# Patient Record
Sex: FEMALE | Race: WHITE | Employment: UNEMPLOYED | URBAN - METROPOLITAN AREA
[De-identification: names, ages, dates, MRNs, and addresses within clinical notes are randomized per-mention and may not be internally consistent; named-entity substitution may affect disease eponyms.]

---

## 2017-01-19 RX ORDER — BENZONATATE 100 MG/1
CAPSULE ORAL
Qty: 45 CAPSULE | Refills: 0 | Status: SHIPPED | OUTPATIENT
Start: 2017-01-19 | End: 2017-03-01 | Stop reason: SDUPTHER

## 2017-01-19 RX ORDER — DIPHENHYDRAMINE HCL 25 MG
CAPSULE ORAL
Qty: 30 CAPSULE | Refills: 0 | Status: SHIPPED | OUTPATIENT
Start: 2017-01-19 | End: 2017-01-26 | Stop reason: SDUPTHER

## 2017-01-19 RX ORDER — HYDROXYZINE HYDROCHLORIDE 10 MG/1
TABLET, FILM COATED ORAL
Qty: 60 TABLET | Refills: 0 | Status: SHIPPED | OUTPATIENT
Start: 2017-01-19 | End: 2017-03-01 | Stop reason: SDUPTHER

## 2017-01-26 ENCOUNTER — OFFICE VISIT (OUTPATIENT)
Dept: INTERNAL MEDICINE | Age: 57
End: 2017-01-26

## 2017-01-26 VITALS
BODY MASS INDEX: 22.49 KG/M2 | DIASTOLIC BLOOD PRESSURE: 76 MMHG | OXYGEN SATURATION: 94 % | HEIGHT: 65 IN | SYSTOLIC BLOOD PRESSURE: 118 MMHG | HEART RATE: 87 BPM | TEMPERATURE: 98 F | RESPIRATION RATE: 14 BRPM | WEIGHT: 135 LBS

## 2017-01-26 DIAGNOSIS — J06.9 UPPER RESPIRATORY TRACT INFECTION, UNSPECIFIED TYPE: ICD-10-CM

## 2017-01-26 DIAGNOSIS — T27.0XXA: Primary | ICD-10-CM

## 2017-01-26 DIAGNOSIS — E78.2 MIXED HYPERLIPIDEMIA: ICD-10-CM

## 2017-01-26 DIAGNOSIS — E03.9 ACQUIRED HYPOTHYROIDISM: ICD-10-CM

## 2017-01-26 PROCEDURE — 99214 OFFICE O/P EST MOD 30 MIN: CPT | Performed by: FAMILY MEDICINE

## 2017-01-26 RX ORDER — METHYLPREDNISOLONE 4 MG/1
TABLET ORAL
Qty: 1 KIT | Refills: 0 | Status: SHIPPED | OUTPATIENT
Start: 2017-01-26 | End: 2017-02-09 | Stop reason: ALTCHOICE

## 2017-01-26 RX ORDER — TRAMADOL HYDROCHLORIDE 50 MG/1
TABLET ORAL
Qty: 60 TABLET | Refills: 0 | Status: SHIPPED | OUTPATIENT
Start: 2017-01-26 | End: 2017-03-01 | Stop reason: SDUPTHER

## 2017-01-26 RX ORDER — DOXYCYCLINE HYCLATE 100 MG
100 TABLET ORAL 2 TIMES DAILY
Qty: 20 TABLET | Refills: 0 | Status: SHIPPED | OUTPATIENT
Start: 2017-01-26 | End: 2017-02-05

## 2017-02-09 ENCOUNTER — OFFICE VISIT (OUTPATIENT)
Dept: INTERNAL MEDICINE | Age: 57
End: 2017-02-09

## 2017-02-09 VITALS
WEIGHT: 133 LBS | HEART RATE: 92 BPM | OXYGEN SATURATION: 97 % | TEMPERATURE: 98.1 F | DIASTOLIC BLOOD PRESSURE: 84 MMHG | HEIGHT: 65 IN | RESPIRATION RATE: 14 BRPM | SYSTOLIC BLOOD PRESSURE: 120 MMHG | BODY MASS INDEX: 22.16 KG/M2

## 2017-02-09 DIAGNOSIS — J40 BRONCHITIS: Primary | ICD-10-CM

## 2017-02-09 PROCEDURE — 99213 OFFICE O/P EST LOW 20 MIN: CPT | Performed by: FAMILY MEDICINE

## 2017-02-09 RX ORDER — LEVOFLOXACIN 500 MG/1
500 TABLET, FILM COATED ORAL DAILY
Qty: 10 TABLET | Refills: 0 | Status: SHIPPED | OUTPATIENT
Start: 2017-02-09 | End: 2017-02-19

## 2017-03-01 RX ORDER — DIPHENHYDRAMINE HCL 25 MG
25 CAPSULE ORAL DAILY PRN
Qty: 30 CAPSULE | Refills: 0 | Status: SHIPPED | OUTPATIENT
Start: 2017-03-01 | End: 2017-04-08 | Stop reason: SDUPTHER

## 2017-03-01 RX ORDER — TRAMADOL HYDROCHLORIDE 50 MG/1
TABLET ORAL
Qty: 60 TABLET | Refills: 0 | Status: SHIPPED | OUTPATIENT
Start: 2017-03-01 | End: 2017-04-04 | Stop reason: SDUPTHER

## 2017-03-01 RX ORDER — BENZONATATE 100 MG/1
CAPSULE ORAL
Qty: 45 CAPSULE | Refills: 0 | Status: SHIPPED | OUTPATIENT
Start: 2017-03-01 | End: 2017-05-01 | Stop reason: SDUPTHER

## 2017-03-01 RX ORDER — HYDROXYZINE HYDROCHLORIDE 10 MG/1
TABLET, FILM COATED ORAL
Qty: 60 TABLET | Refills: 0 | Status: SHIPPED | OUTPATIENT
Start: 2017-03-01 | End: 2017-05-11 | Stop reason: SDUPTHER

## 2017-03-02 ENCOUNTER — NURSE ONLY (OUTPATIENT)
Dept: INTERNAL MEDICINE | Age: 57
End: 2017-03-02

## 2017-03-02 DIAGNOSIS — E03.9 ACQUIRED HYPOTHYROIDISM: Primary | ICD-10-CM

## 2017-03-02 DIAGNOSIS — E78.2 MIXED HYPERLIPIDEMIA: ICD-10-CM

## 2017-03-02 LAB
ALBUMIN SERPL-MCNC: 4.5 G/DL (ref 3.9–4.9)
ALP BLD-CCNC: 85 U/L (ref 40–130)
ALT SERPL-CCNC: 16 U/L (ref 0–33)
ANION GAP SERPL CALCULATED.3IONS-SCNC: 11 MEQ/L (ref 7–13)
AST SERPL-CCNC: 25 U/L (ref 0–35)
BILIRUB SERPL-MCNC: 0.3 MG/DL (ref 0–1.2)
BUN BLDV-MCNC: 13 MG/DL (ref 6–20)
CALCIUM SERPL-MCNC: 9.7 MG/DL (ref 8.6–10.2)
CHLORIDE BLD-SCNC: 102 MEQ/L (ref 98–107)
CHOLESTEROL, TOTAL: 306 MG/DL (ref 0–199)
CO2: 25 MEQ/L (ref 22–29)
CREAT SERPL-MCNC: 0.6 MG/DL (ref 0.5–0.9)
GFR AFRICAN AMERICAN: >60
GFR NON-AFRICAN AMERICAN: >60
GLOBULIN: 2.6 G/DL (ref 2.3–3.5)
GLUCOSE BLD-MCNC: 98 MG/DL (ref 74–109)
HDLC SERPL-MCNC: 72 MG/DL (ref 40–59)
LDL CHOLESTEROL CALCULATED: 210 MG/DL (ref 0–129)
POTASSIUM SERPL-SCNC: 4.2 MEQ/L (ref 3.5–5.1)
SODIUM BLD-SCNC: 138 MEQ/L (ref 132–144)
TOTAL PROTEIN: 7.1 G/DL (ref 6.4–8.1)
TRIGL SERPL-MCNC: 122 MG/DL (ref 0–200)
TSH SERPL DL<=0.05 MIU/L-ACNC: 1.53 UIU/ML (ref 0.27–4.2)

## 2017-03-03 LAB — T4 FREE: 1.27 NG/DL (ref 0.93–1.7)

## 2017-03-06 RX ORDER — ATORVASTATIN CALCIUM 40 MG/1
40 TABLET, FILM COATED ORAL DAILY
Qty: 90 TABLET | Refills: 3 | Status: SHIPPED | OUTPATIENT
Start: 2017-03-06 | End: 2017-07-18 | Stop reason: DRUGHIGH

## 2017-03-27 ENCOUNTER — TELEPHONE (OUTPATIENT)
Dept: INTERNAL MEDICINE | Age: 57
End: 2017-03-27

## 2017-03-27 DIAGNOSIS — Z12.31 SCREENING MAMMOGRAM, ENCOUNTER FOR: Primary | ICD-10-CM

## 2017-04-04 RX ORDER — TRAMADOL HYDROCHLORIDE 50 MG/1
TABLET ORAL
Qty: 60 TABLET | Refills: 0 | Status: SHIPPED | OUTPATIENT
Start: 2017-04-04 | End: 2017-05-04 | Stop reason: SDUPTHER

## 2017-04-11 RX ORDER — DIPHENHYDRAMINE HYDROCHLORIDE 25 MG/1
CAPSULE ORAL
Qty: 30 CAPSULE | Refills: 0 | Status: SHIPPED | OUTPATIENT
Start: 2017-04-11 | End: 2017-05-22 | Stop reason: SDUPTHER

## 2017-04-18 ENCOUNTER — HOSPITAL ENCOUNTER (OUTPATIENT)
Dept: WOMENS IMAGING | Age: 57
Discharge: HOME | End: 2017-04-18
Payer: COMMERCIAL

## 2017-04-18 DIAGNOSIS — Z12.31 SCREENING MAMMOGRAM, ENCOUNTER FOR: ICD-10-CM

## 2017-04-18 PROCEDURE — G0202 SCR MAMMO BI INCL CAD: HCPCS

## 2017-05-01 RX ORDER — BENZONATATE 100 MG/1
CAPSULE ORAL
Qty: 45 CAPSULE | Refills: 0 | Status: SHIPPED | OUTPATIENT
Start: 2017-05-01 | End: 2017-07-05 | Stop reason: SDUPTHER

## 2017-05-05 RX ORDER — SPIRONOLACTONE 25 MG/1
TABLET ORAL
Qty: 30 TABLET | Refills: 5 | Status: SHIPPED | OUTPATIENT
Start: 2017-05-05 | End: 2017-07-18 | Stop reason: DRUGHIGH

## 2017-05-05 RX ORDER — TRAMADOL HYDROCHLORIDE 50 MG/1
TABLET ORAL
Qty: 60 TABLET | Refills: 0 | Status: SHIPPED | OUTPATIENT
Start: 2017-05-05 | End: 2017-06-08 | Stop reason: SDUPTHER

## 2017-05-11 RX ORDER — AMITRIPTYLINE HYDROCHLORIDE 100 MG/1
TABLET, FILM COATED ORAL
Qty: 30 TABLET | Refills: 2 | Status: SHIPPED | OUTPATIENT
Start: 2017-05-11 | End: 2017-08-22 | Stop reason: SDUPTHER

## 2017-05-11 RX ORDER — HYDROXYZINE HYDROCHLORIDE 10 MG/1
TABLET, FILM COATED ORAL
Qty: 60 TABLET | Refills: 0 | Status: SHIPPED | OUTPATIENT
Start: 2017-05-11 | End: 2017-07-05 | Stop reason: SDUPTHER

## 2017-05-22 RX ORDER — DIPHENHYDRAMINE HCL 25 MG
CAPSULE ORAL
Qty: 30 CAPSULE | Refills: 0 | Status: SHIPPED | OUTPATIENT
Start: 2017-05-22 | End: 2017-07-18 | Stop reason: SDUPTHER

## 2017-05-27 RX ORDER — LOSARTAN POTASSIUM 25 MG/1
TABLET ORAL
Qty: 30 TABLET | Refills: 0 | Status: SHIPPED | OUTPATIENT
Start: 2017-05-27 | End: 2017-06-29 | Stop reason: SDUPTHER

## 2017-05-27 RX ORDER — CARVEDILOL 3.12 MG/1
TABLET ORAL
Qty: 60 TABLET | Refills: 0 | Status: SHIPPED | OUTPATIENT
Start: 2017-05-27 | End: 2017-08-21 | Stop reason: SDUPTHER

## 2017-06-08 RX ORDER — TRAMADOL HYDROCHLORIDE 50 MG/1
TABLET ORAL
Qty: 60 TABLET | Refills: 0 | Status: SHIPPED | OUTPATIENT
Start: 2017-06-08 | End: 2017-07-10 | Stop reason: SDUPTHER

## 2017-06-12 RX ORDER — ALENDRONATE SODIUM 70 MG/1
TABLET ORAL
Qty: 12 TABLET | Refills: 0 | Status: SHIPPED | OUTPATIENT
Start: 2017-06-12 | End: 2017-09-15 | Stop reason: SDUPTHER

## 2017-06-29 RX ORDER — LOSARTAN POTASSIUM 25 MG/1
TABLET ORAL
Qty: 30 TABLET | Refills: 5 | Status: SHIPPED | OUTPATIENT
Start: 2017-06-29 | End: 2018-02-26 | Stop reason: SDUPTHER

## 2017-07-05 RX ORDER — BENZONATATE 100 MG/1
CAPSULE ORAL
Qty: 45 CAPSULE | Refills: 0 | Status: SHIPPED | OUTPATIENT
Start: 2017-07-05 | End: 2017-09-13 | Stop reason: SDUPTHER

## 2017-07-05 RX ORDER — HYDROXYZINE HYDROCHLORIDE 10 MG/1
TABLET, FILM COATED ORAL
Qty: 60 TABLET | Refills: 0 | Status: SHIPPED | OUTPATIENT
Start: 2017-07-05 | End: 2017-08-21 | Stop reason: SDUPTHER

## 2017-07-10 RX ORDER — TRAMADOL HYDROCHLORIDE 50 MG/1
TABLET ORAL
Qty: 60 TABLET | Refills: 0 | Status: SHIPPED | OUTPATIENT
Start: 2017-07-10 | End: 2017-08-08 | Stop reason: SDUPTHER

## 2017-07-18 ENCOUNTER — OFFICE VISIT (OUTPATIENT)
Dept: INTERNAL MEDICINE | Age: 57
End: 2017-07-18

## 2017-07-18 VITALS
HEART RATE: 85 BPM | TEMPERATURE: 98.2 F | DIASTOLIC BLOOD PRESSURE: 82 MMHG | BODY MASS INDEX: 21.89 KG/M2 | HEIGHT: 64 IN | RESPIRATION RATE: 16 BRPM | SYSTOLIC BLOOD PRESSURE: 114 MMHG | WEIGHT: 128.2 LBS | OXYGEN SATURATION: 96 %

## 2017-07-18 DIAGNOSIS — E03.9 ACQUIRED HYPOTHYROIDISM: Primary | ICD-10-CM

## 2017-07-18 DIAGNOSIS — E78.2 MIXED HYPERLIPIDEMIA: ICD-10-CM

## 2017-07-18 DIAGNOSIS — M81.8 PREMATURE OSTEOPOROSIS: ICD-10-CM

## 2017-07-18 PROCEDURE — 99213 OFFICE O/P EST LOW 20 MIN: CPT | Performed by: FAMILY MEDICINE

## 2017-07-18 RX ORDER — DIPHENHYDRAMINE HCL 25 MG
CAPSULE ORAL
Qty: 30 CAPSULE | Refills: 0 | Status: SHIPPED | OUTPATIENT
Start: 2017-07-18 | End: 2022-03-11 | Stop reason: SDUPTHER

## 2017-07-18 RX ORDER — ATORVASTATIN CALCIUM 40 MG/1
20 TABLET, FILM COATED ORAL DAILY
Qty: 90 TABLET | Refills: 3 | Status: SHIPPED
Start: 2017-07-18 | End: 2019-02-28

## 2017-07-18 RX ORDER — SPIRONOLACTONE 25 MG/1
TABLET ORAL
Qty: 30 TABLET | Refills: 5 | Status: SHIPPED | OUTPATIENT
Start: 2017-07-18 | End: 2018-07-23 | Stop reason: SDUPTHER

## 2017-07-31 RX ORDER — LEVOTHYROXINE SODIUM 0.12 MG/1
TABLET ORAL
Qty: 90 TABLET | Refills: 3 | Status: SHIPPED | OUTPATIENT
Start: 2017-07-31 | End: 2018-07-23 | Stop reason: SDUPTHER

## 2017-07-31 RX ORDER — FOLIC ACID 1 MG/1
TABLET ORAL
Qty: 90 TABLET | Refills: 3 | Status: SHIPPED | OUTPATIENT
Start: 2017-07-31 | End: 2018-07-23 | Stop reason: SDUPTHER

## 2017-08-08 RX ORDER — TRAMADOL HYDROCHLORIDE 50 MG/1
TABLET ORAL
Qty: 60 TABLET | Refills: 0 | Status: SHIPPED | OUTPATIENT
Start: 2017-08-08 | End: 2017-08-08 | Stop reason: ALTCHOICE

## 2017-08-09 ENCOUNTER — NURSE ONLY (OUTPATIENT)
Dept: INTERNAL MEDICINE | Age: 57
End: 2017-08-09

## 2017-08-09 DIAGNOSIS — Z79.899 ENCOUNTER FOR LONG-TERM CURRENT USE OF MEDICATION: Primary | ICD-10-CM

## 2017-08-09 LAB
AMPHETAMINE SCREEN, URINE: NORMAL
BARBITURATE SCREEN URINE: NORMAL
BENZODIAZEPINE SCREEN, URINE: NORMAL
CANNABINOID SCREEN URINE: NORMAL
COCAINE METABOLITE SCREEN URINE: NORMAL
Lab: NORMAL
OPIATE SCREEN URINE: NORMAL
PHENCYCLIDINE SCREEN URINE: NORMAL

## 2017-08-11 RX ORDER — SERTRALINE HYDROCHLORIDE 100 MG/1
TABLET, FILM COATED ORAL
Qty: 180 TABLET | Refills: 0 | Status: SHIPPED | OUTPATIENT
Start: 2017-08-11 | End: 2017-11-13 | Stop reason: SDUPTHER

## 2017-08-15 ENCOUNTER — NURSE ONLY (OUTPATIENT)
Dept: INTERNAL MEDICINE | Age: 57
End: 2017-08-15

## 2017-08-15 DIAGNOSIS — E03.9 ACQUIRED HYPOTHYROIDISM: ICD-10-CM

## 2017-08-15 DIAGNOSIS — E78.2 MIXED HYPERLIPIDEMIA: Primary | ICD-10-CM

## 2017-08-15 LAB
ALBUMIN SERPL-MCNC: 4.6 G/DL (ref 3.9–4.9)
ALP BLD-CCNC: 92 U/L (ref 40–130)
ALT SERPL-CCNC: 24 U/L (ref 0–33)
ANION GAP SERPL CALCULATED.3IONS-SCNC: 20 MEQ/L (ref 7–13)
AST SERPL-CCNC: 31 U/L (ref 0–35)
BILIRUB SERPL-MCNC: 0.4 MG/DL (ref 0–1.2)
BUN BLDV-MCNC: 12 MG/DL (ref 6–20)
CALCIUM SERPL-MCNC: 9.3 MG/DL (ref 8.6–10.2)
CHLORIDE BLD-SCNC: 99 MEQ/L (ref 98–107)
CHOLESTEROL, TOTAL: 180 MG/DL (ref 0–199)
CO2: 24 MEQ/L (ref 22–29)
CREAT SERPL-MCNC: 0.67 MG/DL (ref 0.5–0.9)
GFR AFRICAN AMERICAN: >60
GFR NON-AFRICAN AMERICAN: >60
GLOBULIN: 3.2 G/DL (ref 2.3–3.5)
GLUCOSE BLD-MCNC: 90 MG/DL (ref 74–109)
HDLC SERPL-MCNC: 83 MG/DL (ref 40–59)
LDL CHOLESTEROL CALCULATED: 76 MG/DL (ref 0–129)
POTASSIUM SERPL-SCNC: 4.6 MEQ/L (ref 3.5–5.1)
SODIUM BLD-SCNC: 143 MEQ/L (ref 132–144)
T4 FREE: 1.37 NG/DL (ref 0.93–1.7)
TOTAL PROTEIN: 7.8 G/DL (ref 6.4–8.1)
TRIGL SERPL-MCNC: 105 MG/DL (ref 0–200)
TSH SERPL DL<=0.05 MIU/L-ACNC: 0.38 UIU/ML (ref 0.27–4.2)

## 2017-08-15 PROCEDURE — 36415 COLL VENOUS BLD VENIPUNCTURE: CPT | Performed by: FAMILY MEDICINE

## 2017-08-16 RX ORDER — OMEPRAZOLE 20 MG/1
CAPSULE, DELAYED RELEASE ORAL
Qty: 60 CAPSULE | Refills: 3 | Status: SHIPPED | OUTPATIENT
Start: 2017-08-16 | End: 2017-11-07 | Stop reason: SDUPTHER

## 2017-08-21 RX ORDER — HYDROXYZINE HYDROCHLORIDE 10 MG/1
TABLET, FILM COATED ORAL
Qty: 60 TABLET | Refills: 3 | Status: ON HOLD | OUTPATIENT
Start: 2017-08-21 | End: 2021-09-03

## 2017-08-21 RX ORDER — CARVEDILOL 3.12 MG/1
TABLET ORAL
Qty: 60 TABLET | Refills: 3 | Status: SHIPPED | OUTPATIENT
Start: 2017-08-21 | End: 2018-01-03 | Stop reason: SDUPTHER

## 2017-08-23 RX ORDER — AMITRIPTYLINE HYDROCHLORIDE 100 MG/1
TABLET, FILM COATED ORAL
Qty: 30 TABLET | Refills: 3 | Status: SHIPPED | OUTPATIENT
Start: 2017-08-23 | End: 2018-01-03 | Stop reason: SDUPTHER

## 2017-09-13 RX ORDER — DIPHENHYDRAMINE HYDROCHLORIDE 25 MG/1
CAPSULE ORAL
Qty: 30 CAPSULE | Refills: 0 | OUTPATIENT
Start: 2017-09-13

## 2017-09-14 RX ORDER — BENZONATATE 100 MG/1
CAPSULE ORAL
Qty: 45 CAPSULE | Refills: 0 | Status: SHIPPED | OUTPATIENT
Start: 2017-09-14 | End: 2017-10-28 | Stop reason: SDUPTHER

## 2017-09-15 RX ORDER — ALENDRONATE SODIUM 70 MG/1
TABLET ORAL
Qty: 12 TABLET | Refills: 0 | Status: SHIPPED | OUTPATIENT
Start: 2017-09-15 | End: 2018-02-19 | Stop reason: SDUPTHER

## 2017-10-03 RX ORDER — TRAMADOL HYDROCHLORIDE 50 MG/1
50 TABLET ORAL 3 TIMES DAILY PRN
Refills: 0 | COMMUNITY
Start: 2017-08-09 | End: 2017-10-03 | Stop reason: SDUPTHER

## 2017-10-04 RX ORDER — TRAMADOL HYDROCHLORIDE 50 MG/1
50 TABLET ORAL 3 TIMES DAILY PRN
Qty: 60 TABLET | Refills: 0 | Status: SHIPPED | OUTPATIENT
Start: 2017-10-04 | End: 2017-11-07 | Stop reason: SDUPTHER

## 2017-10-17 RX ORDER — SERTRALINE HYDROCHLORIDE 100 MG/1
TABLET, FILM COATED ORAL
Qty: 180 TABLET | Refills: 0 | Status: SHIPPED | OUTPATIENT
Start: 2017-10-17 | End: 2017-11-07 | Stop reason: SDUPTHER

## 2017-10-31 RX ORDER — BENZONATATE 100 MG/1
CAPSULE ORAL
Qty: 45 CAPSULE | Refills: 0 | Status: SHIPPED | OUTPATIENT
Start: 2017-10-31 | End: 2018-01-03 | Stop reason: SDUPTHER

## 2017-11-07 ENCOUNTER — OFFICE VISIT (OUTPATIENT)
Dept: INTERNAL MEDICINE | Age: 57
End: 2017-11-07

## 2017-11-07 VITALS
SYSTOLIC BLOOD PRESSURE: 118 MMHG | HEART RATE: 102 BPM | OXYGEN SATURATION: 95 % | RESPIRATION RATE: 16 BRPM | HEIGHT: 64 IN | TEMPERATURE: 98.2 F | WEIGHT: 136 LBS | DIASTOLIC BLOOD PRESSURE: 76 MMHG | BODY MASS INDEX: 23.22 KG/M2

## 2017-11-07 DIAGNOSIS — G89.29 CHRONIC MIDLINE LOW BACK PAIN WITHOUT SCIATICA: Primary | ICD-10-CM

## 2017-11-07 DIAGNOSIS — M54.50 CHRONIC MIDLINE LOW BACK PAIN WITHOUT SCIATICA: Primary | ICD-10-CM

## 2017-11-07 PROCEDURE — 3014F SCREEN MAMMO DOC REV: CPT | Performed by: PHYSICIAN ASSISTANT

## 2017-11-07 PROCEDURE — G8427 DOCREV CUR MEDS BY ELIG CLIN: HCPCS | Performed by: PHYSICIAN ASSISTANT

## 2017-11-07 PROCEDURE — 3017F COLORECTAL CA SCREEN DOC REV: CPT | Performed by: PHYSICIAN ASSISTANT

## 2017-11-07 PROCEDURE — G8420 CALC BMI NORM PARAMETERS: HCPCS | Performed by: PHYSICIAN ASSISTANT

## 2017-11-07 PROCEDURE — 1036F TOBACCO NON-USER: CPT | Performed by: PHYSICIAN ASSISTANT

## 2017-11-07 PROCEDURE — G8484 FLU IMMUNIZE NO ADMIN: HCPCS | Performed by: PHYSICIAN ASSISTANT

## 2017-11-07 PROCEDURE — 99213 OFFICE O/P EST LOW 20 MIN: CPT | Performed by: PHYSICIAN ASSISTANT

## 2017-11-07 RX ORDER — TRAMADOL HYDROCHLORIDE 50 MG/1
50 TABLET ORAL 3 TIMES DAILY PRN
Qty: 60 TABLET | Refills: 0 | Status: SHIPPED | OUTPATIENT
Start: 2017-11-07 | End: 2018-01-15 | Stop reason: SDUPTHER

## 2017-11-07 ASSESSMENT — ENCOUNTER SYMPTOMS
DOUBLE VISION: 0
BACK PAIN: 1
DIARRHEA: 0
ABDOMINAL PAIN: 0
NAUSEA: 0
SPUTUM PRODUCTION: 0
SORE THROAT: 0
VOMITING: 0
COUGH: 0

## 2017-11-07 NOTE — PROGRESS NOTES
Subjective  Amilcar Power, 61 y.o. female presents today with:  Chief Complaint   Patient presents with    Medication Refill     Dr. Levar Fletcher patient here requesting a refill on Tramdol. States she uses it for her pain in her back. Has DJD of lumbar spine     Health Maintenance     Declines flu vaccine - Advised patient to schedule PAP with Dr. Levar Fletcher        HPI  Patient of Levar Fletcher M.D. is here for renewal of tramadol, which she uses for chronic low back pain the medication does help control her pain. She is aware of dependency issues associated with use of the medication    Review of Systems   Constitutional: Negative for chills and malaise/fatigue. HENT: Negative for congestion, ear pain and sore throat. Eyes: Negative for double vision. Respiratory: Negative for cough and sputum production. Cardiovascular: Negative for chest pain. Gastrointestinal: Negative for abdominal pain, diarrhea, nausea and vomiting. Genitourinary: Negative for dysuria and frequency. Musculoskeletal: Positive for back pain. Negative for neck pain. Skin: Negative for itching and rash. Neurological: Negative for dizziness and headaches. Endo/Heme/Allergies: Negative for environmental allergies. Psychiatric/Behavioral: Negative for depression.          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    Cardiomyopathy ([address] 3/6/2015    COPD (chronic obstructive pulmonary disease) ([address]     Dental disease     SEVERE DENTAL PROBLEMS    DJD (degenerative joint disease), lumbar     Dyslipidemia 3/6/2015    Hepatitis B infection VIRAL-2006    Hepatitis C without mention of hepatic coma 2007-CHEMO    History of alcoholism ([address]     History of mammography, screening 2010-CAT 2    History of osteopenia 2009    Hypothyroidism     Tongue cancer [facility])      Past Surgical History:   Procedure Laterality Date    LEG SURGERY  2009 MASS R LEG SOFT TISSUE    LUMBAR FUSION  2014    Dr. Yazan Allen  09/16/15    Henry Gibson MD IMPLANT ICD     Social History     Social History    Marital status:      Spouse name: N/A    Number of children: N/A    Years of education: N/A     Occupational History    Not on file. Social History Main Topics    Smoking status: Former Smoker     Quit date: 3/5/2005    Smokeless tobacco: Never Used    Alcohol use No      Comment: ALCOHOLIC    Drug use: No    Sexual activity: Not Currently     Other Topics Concern    Not on file     Social History Narrative    No narrative on file     Family History   Problem Relation Age of Onset    High Blood Pressure Other     Diabetes Other     Cancer Other      UNCLE-LUNG     Allergies   Allergen Reactions    Pcn [Penicillins] Anaphylaxis    Demerol Rash     Current Outpatient Prescriptions   Medication Sig Dispense Refill    traMADol (ULTRAM) 50 MG tablet Take 1 tablet by mouth 3 times daily as needed (prn pAIN)  Tid prn.  60 tablet 0    benzonatate (TESSALON) 100 MG capsule TAKE 1 CAPSULE BY MOUTH EVERY 6 HOURS AS NEEDED FOR COUGH 45 capsule 0    OYSCO 500 + D 500-200 MG-UNIT per tablet TAKE 1 TABLET BY MOUTH TWICE DAILY 60 tablet 2    alendronate (FOSAMAX) 70 MG tablet TAKE 1 TABLET BY MOUTH EVERY 7 DAYS 12 tablet 0    amitriptyline (ELAVIL) 100 MG tablet TAKE 1 TABLET BY MOUTH EVERY NIGHT AT BEDTIME 30 tablet 3    hydrOXYzine (ATARAX) 10 MG tablet TAKE 1 TABLET BY MOUTH TWICE DAILY AS NEEDED FOR ITCHING 60 tablet 3    carvedilol (COREG) 3.125 MG tablet TAKE 1 TABLET BY MOUTH TWICE DAILY 60 tablet 3    sertraline (ZOLOFT) 100 MG tablet TAKE 1 TABLET BY MOUTH TWICE DAILY 944 tablet 0    folic acid (FOLVITE) 1 MG tablet TAKE 1 TABLET BY MOUTH EVERY DAY 90 tablet 3    levothyroxine (SYNTHROID) 125 MCG tablet TAKE 1 TABLET BY MOUTH EVERY DAY 90 tablet 3    diphenhydrAMINE (BENADRYL) 25 MG capsule TAKE ONE CAPSULE BY MOUTH EVERY DAY AS NEEDED FOR ITCHING 30 capsule 0    atorvastatin (LIPITOR) 40 MG tablet Take 0.5 tablets by mouth daily 90 tablet 3    neomycin-polymyxin-hydrocortisone (CORTISPORIN) 3.5-78091-5 otic solution INSTILL 3 DROPS IN BOTH EARS THREE TIMES DAILY 10 mL 0    losartan (COZAAR) 25 MG tablet TAKE 1 TABLET BY MOUTH EVERY DAY 30 tablet 5    diclofenac (VOLTAREN) 50 MG EC tablet TAKE 1 TABLET BY MOUTH ONCE DAILY 90 tablet 2    zolpidem (AMBIEN) 10 MG tablet TAKE 1 TABLET BY MOUTH EVERY NIGHT AT BEDTIME AS NEEDED FOR SLEEP 30 tablet 0    omeprazole (PRILOSEC) 20 MG capsule TAKE ONE CAPSULE BY MOUTH TWICE DAILY 60 capsule 11    Multiple Vitamins-Iron (DAILY-PARRIS/IRON/BETA-CAROTENE) TABS TAKE 1 TABLET BY MOUTH EVERY DAY 30 tablet 5    b complex vitamins capsule Take 1 capsule by mouth daily.  spironolactone (ALDACTONE) 25 MG tablet TAKE 0.5 TABLET BY MOUTH EVERY DAY 30 tablet 5     No current facility-administered medications for this visit. Objective    Vitals:    11/07/17 1303   BP: 118/76   Site: Right Arm   Position: Sitting   Cuff Size: Small Adult   Pulse: 102   Resp: 16   Temp: 98.2 °F (36.8 °C)   TempSrc: Oral   SpO2: 95%   Weight: 136 lb (61.7 kg)   Height: 5' 4\" (1.626 m)     Physical Exam   Constitutional: She is oriented to person, place, and time and well-developed, well-nourished, and in no distress. Cardiovascular: Normal rate, regular rhythm and normal heart sounds. Pulmonary/Chest: Effort normal and breath sounds normal.   Musculoskeletal: She exhibits tenderness. Neurological: She is alert and oriented to person, place, and time. Gait normal.   Skin: Skin is warm and dry. Assessment & Plan   1. Chronic midline low back pain without sciatica           No orders of the defined types were placed in this encounter.     Orders Placed This Encounter   Medications    traMADol (ULTRAM) 50 MG tablet     Sig: Take 1 tablet by mouth 3 times daily as

## 2017-11-14 RX ORDER — SERTRALINE HYDROCHLORIDE 100 MG/1
TABLET, FILM COATED ORAL
Qty: 180 TABLET | Refills: 0 | Status: SHIPPED | OUTPATIENT
Start: 2017-11-14 | End: 2018-01-22 | Stop reason: SDUPTHER

## 2017-12-14 RX ORDER — OMEPRAZOLE 20 MG/1
CAPSULE, DELAYED RELEASE ORAL
Qty: 60 CAPSULE | Refills: 5 | Status: SHIPPED | OUTPATIENT
Start: 2017-12-14 | End: 2018-01-15 | Stop reason: SDUPTHER

## 2017-12-21 RX ORDER — LOSARTAN POTASSIUM 25 MG/1
TABLET ORAL
Qty: 30 TABLET | Refills: 0 | OUTPATIENT
Start: 2017-12-21

## 2018-01-15 ENCOUNTER — OFFICE VISIT (OUTPATIENT)
Dept: INTERNAL MEDICINE | Age: 58
End: 2018-01-15

## 2018-01-15 VITALS
WEIGHT: 137 LBS | TEMPERATURE: 98.4 F | HEIGHT: 64 IN | BODY MASS INDEX: 23.39 KG/M2 | OXYGEN SATURATION: 94 % | HEART RATE: 127 BPM | SYSTOLIC BLOOD PRESSURE: 124 MMHG | DIASTOLIC BLOOD PRESSURE: 72 MMHG | RESPIRATION RATE: 14 BRPM

## 2018-01-15 DIAGNOSIS — M54.16 LUMBAR RADICULITIS: ICD-10-CM

## 2018-01-15 DIAGNOSIS — G47.00 INSOMNIA, UNSPECIFIED TYPE: ICD-10-CM

## 2018-01-15 DIAGNOSIS — Z01.419 ENCOUNTER FOR GYNECOLOGICAL EXAMINATION WITHOUT ABNORMAL FINDING: Primary | ICD-10-CM

## 2018-01-15 PROCEDURE — 99213 OFFICE O/P EST LOW 20 MIN: CPT | Performed by: FAMILY MEDICINE

## 2018-01-15 PROCEDURE — 3014F SCREEN MAMMO DOC REV: CPT | Performed by: FAMILY MEDICINE

## 2018-01-15 PROCEDURE — G8427 DOCREV CUR MEDS BY ELIG CLIN: HCPCS | Performed by: FAMILY MEDICINE

## 2018-01-15 PROCEDURE — 1036F TOBACCO NON-USER: CPT | Performed by: FAMILY MEDICINE

## 2018-01-15 PROCEDURE — G8420 CALC BMI NORM PARAMETERS: HCPCS | Performed by: FAMILY MEDICINE

## 2018-01-15 PROCEDURE — 3017F COLORECTAL CA SCREEN DOC REV: CPT | Performed by: FAMILY MEDICINE

## 2018-01-15 PROCEDURE — G8484 FLU IMMUNIZE NO ADMIN: HCPCS | Performed by: FAMILY MEDICINE

## 2018-01-15 RX ORDER — ZOLPIDEM TARTRATE 10 MG/1
TABLET ORAL
Qty: 30 TABLET | Refills: 2 | Status: SHIPPED | OUTPATIENT
Start: 2018-01-15 | End: 2018-04-15

## 2018-01-15 RX ORDER — TRAMADOL HYDROCHLORIDE 50 MG/1
50 TABLET ORAL 3 TIMES DAILY PRN
Qty: 60 TABLET | Refills: 2 | Status: SHIPPED | OUTPATIENT
Start: 2018-01-15 | End: 2018-04-15

## 2018-01-15 ASSESSMENT — PATIENT HEALTH QUESTIONNAIRE - PHQ9
SUM OF ALL RESPONSES TO PHQ QUESTIONS 1-9: 0
SUM OF ALL RESPONSES TO PHQ9 QUESTIONS 1 & 2: 0
1. LITTLE INTEREST OR PLEASURE IN DOING THINGS: 0
2. FEELING DOWN, DEPRESSED OR HOPELESS: 0

## 2018-01-16 DIAGNOSIS — Z01.419 ENCOUNTER FOR GYNECOLOGICAL EXAMINATION WITHOUT ABNORMAL FINDING: ICD-10-CM

## 2018-01-22 RX ORDER — SERTRALINE HYDROCHLORIDE 100 MG/1
TABLET, FILM COATED ORAL
Qty: 180 TABLET | Refills: 3 | Status: SHIPPED | OUTPATIENT
Start: 2018-01-22 | End: 2018-02-26 | Stop reason: SDUPTHER

## 2018-02-19 ENCOUNTER — OFFICE VISIT (OUTPATIENT)
Dept: INTERNAL MEDICINE CLINIC | Age: 58
End: 2018-02-19
Payer: COMMERCIAL

## 2018-02-19 VITALS
DIASTOLIC BLOOD PRESSURE: 70 MMHG | SYSTOLIC BLOOD PRESSURE: 100 MMHG | OXYGEN SATURATION: 96 % | HEART RATE: 95 BPM | HEIGHT: 64 IN | RESPIRATION RATE: 16 BRPM | WEIGHT: 141.6 LBS | BODY MASS INDEX: 24.17 KG/M2 | TEMPERATURE: 98 F

## 2018-02-19 DIAGNOSIS — D49.2 SKIN NEOPLASM: Primary | ICD-10-CM

## 2018-02-19 PROCEDURE — G8484 FLU IMMUNIZE NO ADMIN: HCPCS | Performed by: FAMILY MEDICINE

## 2018-02-19 PROCEDURE — G8420 CALC BMI NORM PARAMETERS: HCPCS | Performed by: FAMILY MEDICINE

## 2018-02-19 PROCEDURE — 3017F COLORECTAL CA SCREEN DOC REV: CPT | Performed by: FAMILY MEDICINE

## 2018-02-19 PROCEDURE — 1036F TOBACCO NON-USER: CPT | Performed by: FAMILY MEDICINE

## 2018-02-19 PROCEDURE — G8427 DOCREV CUR MEDS BY ELIG CLIN: HCPCS | Performed by: FAMILY MEDICINE

## 2018-02-19 PROCEDURE — 11441 EXC FACE-MM B9+MARG 0.6-1 CM: CPT | Performed by: FAMILY MEDICINE

## 2018-02-19 PROCEDURE — 3014F SCREEN MAMMO DOC REV: CPT | Performed by: FAMILY MEDICINE

## 2018-02-19 PROCEDURE — 99213 OFFICE O/P EST LOW 20 MIN: CPT | Performed by: FAMILY MEDICINE

## 2018-02-19 RX ORDER — AZITHROMYCIN 250 MG/1
TABLET, FILM COATED ORAL
Qty: 1 PACKET | Refills: 0 | Status: SHIPPED | OUTPATIENT
Start: 2018-02-19 | End: 2018-03-01

## 2018-02-19 RX ORDER — ALENDRONATE SODIUM 70 MG/1
TABLET ORAL
Qty: 12 TABLET | Refills: 0 | Status: SHIPPED | OUTPATIENT
Start: 2018-02-19 | End: 2018-06-10 | Stop reason: SDUPTHER

## 2018-02-19 NOTE — PROGRESS NOTES
Patient: Isabella Gaines    YOB: 1960    Date: 2/19/18    Chief Complaint   Patient presents with    Mole     Mole remove from forehead    Cough     She complains of cough with thick green sputum, congestion for 1 month. She is using tessalon pearls for cough with some relief. Patient Active Problem List    Diagnosis Date Noted    Premature osteoporosis 07/18/2017    Cardiomyopathy ([address] 03/06/2015    Internal derangement of right knee 02/12/2014    Lumbar radiculitis 02/12/2014    Mixed hyperlipidemia 03/06/2013    Insomnia 07/24/2012    Itching 07/24/2012    Osteoarthritis 07/24/2012    Reflux esophagitis 07/24/2012    COPD (chronic obstructive pulmonary disease) (HCC)     Hepatitis C virus infection      Overview Note:     replace inactive diagnosis      Hypothyroidism        Allergies   Allergen Reactions    Pcn [Penicillins] Anaphylaxis    Demerol Rash       Vitals:    02/19/18 1055   BP: 100/70   Site: Right Arm   Position: Sitting   Cuff Size: Small Adult   Pulse: 95   Resp: 16   Temp: 98 °F (36.7 °C)   TempSrc: Oral   SpO2: 96%   Weight: 141 lb 9.6 oz (64.2 kg)   Height: 5' 4\" (1.626 m)      Body mass index is 24.31 kg/m². HPI    She's had no fever chills but she's continued to cough since I saw her about a week or so ago. It's thick and greenish and she is exhausted. We reviewed her consent form and are reviewed the procedure as well as the risks benefits of the alternatives. She is agreeable to start. Review of Systems    Constitutional: Negative for fatigue, fever and sweats. HEENT: Negative for eye discharge and vision loss. Negative for ear drainage, hearing loss and positive for nasal drainage. Respiratory: positive for cough, negative for dyspnea and wheezing. Cardiovascular:  Negative for chest pain, claudication and irregular heartbeat/palpitations. Gastrointestinal: Negative for abdominal pain, nausea, constipation and diarrhea.   Genitourinary: Negative for dysuria, patient post menopausal.  Metabolic/Endocrine: Negative for cold intolerance, heat intolerance, polydipsia and polyphagia. No unintended weight loss or weight gain. Neuro/Psychiatric: Negative for gait disturbance. Negative for psychiatric symptoms. Dermatologic: Negative for pruritus and rash. Musculoskeletal: Negative for bone/joint symptoms. No numbness or tingling. No loss of function. Hematology: Negative for bleeding and easy bruising. Immunology:  Negative for environmental allergies and food allergies. Physical Exam    Patient's medication, allergies, past medical, surgical, social and family histories were reviewed and updated as appropriate. PHYSICAL EXAM   General Appearance: Alert oriented pleasant cooperative in no acute distress. Skin: On the right of her forehead just above her eyebrow is a 8 mm circular pigmented mole. The area was cleaned well with alcohol and Betadine we infused 1% lidocaine with epinephrine into the mole in the surrounding area. The mole was then excised under sterile technique and 3 4-0 nylon sutures were placed. Patient tolerated procedure well and there was less than 2 ml of blood loss. Lungs: Clear to auscultation no wheezes rhonchi or rales  Heart: Regular rate and rhythm without murmurs rubs or gallops. Assessment:  1. Skin neoplasm  azithromycin (ZITHROMAX Z-NILESH) 250 MG tablet    90828 - MT EXC SKIN BENIG 0.6-1CM FACE,FACIAL  The lesion was sent to pathology for examination  Wound care instructions given to patient. The  Instructions concern calling  the office if any pain, redness, discharge or questions develop about the wound. Plan:  Current Outpatient Prescriptions   Medication Sig Dispense Refill    alendronate (FOSAMAX) 70 MG tablet TAKE 1 TABLET BY MOUTH EVERY 7 DAYS 12 tablet 0    azithromycin (ZITHROMAX Z-NILESH) 250 MG tablet Complete entire pack as directed.  1 packet 0    sertraline (ZOLOFT) 100 MG

## 2018-02-26 ENCOUNTER — OFFICE VISIT (OUTPATIENT)
Dept: INTERNAL MEDICINE CLINIC | Age: 58
End: 2018-02-26

## 2018-02-26 VITALS
RESPIRATION RATE: 14 BRPM | WEIGHT: 141.8 LBS | TEMPERATURE: 98.3 F | HEART RATE: 91 BPM | OXYGEN SATURATION: 97 % | DIASTOLIC BLOOD PRESSURE: 82 MMHG | SYSTOLIC BLOOD PRESSURE: 126 MMHG | HEIGHT: 65 IN | BODY MASS INDEX: 23.63 KG/M2

## 2018-02-26 DIAGNOSIS — D36.7 FACIAL BENIGN NEOPLASM: Primary | ICD-10-CM

## 2018-02-26 PROCEDURE — 99024 POSTOP FOLLOW-UP VISIT: CPT | Performed by: FAMILY MEDICINE

## 2018-02-26 RX ORDER — SERTRALINE HYDROCHLORIDE 100 MG/1
TABLET, FILM COATED ORAL
Qty: 180 TABLET | Refills: 3 | Status: SHIPPED | OUTPATIENT
Start: 2018-02-26 | End: 2019-02-25 | Stop reason: SDUPTHER

## 2018-02-26 RX ORDER — LOSARTAN POTASSIUM 25 MG/1
TABLET ORAL
Qty: 30 TABLET | Refills: 5 | Status: SHIPPED | OUTPATIENT
Start: 2018-02-26 | End: 2018-09-04 | Stop reason: SDUPTHER

## 2018-02-26 NOTE — PROGRESS NOTES
Patient: Baby Couch    YOB: 1960    Date: 2/26/18    Chief Complaint   Patient presents with    Suture / Staple Removal     one week follow up       Patient Active Problem List    Diagnosis Date Noted    Premature osteoporosis 07/18/2017    [facility]omy[facility] 03/06/2015    Internal derangement of right knee 02/12/2014    Lumbar radiculitis 02/12/2014    Mixed hyperlipidemia 03/06/2013    Insomnia 07/24/2012    Itching 07/24/2012    Osteoarthritis 07/24/2012    Reflux esophagitis 07/24/2012    COPD (chronic obstructive pulmonary disease) (HCC)     Hepatitis C virus infection      Overview Note:     replace inactive diagnosis      Hypothyroidism        Allergies   Allergen Reactions    Pcn [Penicillins] Anaphylaxis    Demerol Rash       Vitals:    02/26/18 1114   BP: 126/82   Site: Left Arm   Position: Sitting   Cuff Size: Medium Adult   Pulse: 91   Resp: 14   Temp: 98.3 °F (36.8 °C)   TempSrc: Oral   SpO2: 97%   Weight: 141 lb 12.8 oz (64.3 kg)   Height: 5' 5\" (1.651 m)      Body mass index is 23.6 kg/m². HPI    She still coughing but she's feeling better we discussed the pathology of her lesion on from her face and it was benign. Review of Systems    Constitutional: Negative for fatigue, fever and sweats. HEENT: Negative for eye discharge and vision loss. Negative for ear drainage, hearing loss and nasal drainage. Respiratory:postive for cough, dyspnea and wheezing. Cardiovascular:  Negative for chest pain, claudication and irregular heartbeat/palpitations. Gastrointestinal: Negative for abdominal pain, nausea, constipation and diarrhea. Genitourinary: Negative for dysuria, hematuria, polyuria, dysmenorrhea, menorrhagia and vaginal discharge. Metabolic/Endocrine: Negative for cold intolerance, heat intolerance, polydipsia and polyphagia. No unintended weight loss or weight gain. Neuro/Psychiatric: Negative for gait disturbance.  Negative for psychiatric symptoms. Dermatologic: Negative for pruritus and rash. Musculoskeletal: Negative for bone/joint symptoms. No numbness or tingling. No loss of function. Hematology: Negative for bleeding and easy bruising. Immunology:  Negative for environmental allergies and food allergies. Physical Exam    Patient's medication, allergies, past medical, surgical, social and family histories were reviewed and updated as appropriate. PHYSICAL EXAM   General Appearance:  {Alert oriented pleasant cooperative in no acute distress. The excision site was soaked in peroxide and I removed the 3 simple sutures and she has good healing good wound approximation no erythema. Steri-Strips were placed. Lungs: Clear to auscultation  Heart: Regular rate and rhythm. Assessment:  1. Facial benign neoplasm                 Plan:  Current Outpatient Prescriptions   Medication Sig Dispense Refill    losartan (COZAAR) 25 MG tablet TAKE 1 TABLET BY MOUTH EVERY DAY 30 tablet 5    sertraline (ZOLOFT) 100 MG tablet TAKE 1 TABLET BY MOUTH TWICE DAILY 180 tablet 3    calcium-cholecalciferol (OYSCO 500 + D) 500-200 MG-UNIT per tablet TAKE 1 TABLET BY MOUTH TWICE DAILY 60 tablet 2    alendronate (FOSAMAX) 70 MG tablet TAKE 1 TABLET BY MOUTH EVERY 7 DAYS 12 tablet 0    neomycin-polymyxin-hydrocortisone (CORTISPORIN) 3.5-98914-8 otic solution INSTILL 3 DROPS IN BOTH EARS THREE TIMES DAILY 10 mL 0    zolpidem (AMBIEN) 10 MG tablet TAKE 1 TABLET BY MOUTH EVERY NIGHT AT BEDTIME AS NEEDED FOR SLEEP. 30 tablet 2    traMADol (ULTRAM) 50 MG tablet Take 1 tablet by mouth 3 times daily as needed (prn pAIN) for up to 90 days Tid prn.  60 tablet 2    carvedilol (COREG) 3.125 MG tablet TAKE 1 TABLET BY MOUTH TWICE DAILY 60 tablet 5    benzonatate (TESSALON) 100 MG capsule TAKE 1 CAPSULE BY MOUTH EVERY 6 HOURS AS NEEDED FOR COUGH 45 capsule 0    amitriptyline (ELAVIL) 100 MG tablet TAKE 1 TABLET BY MOUTH EVERY NIGHT AT BEDTIME 30 tablet 5    diclofenac (VOLTAREN) 50 MG EC tablet TAKE 1 TABLET BY MOUTH ONCE DAILY 90 tablet 2    hydrOXYzine (ATARAX) 10 MG tablet TAKE 1 TABLET BY MOUTH TWICE DAILY AS NEEDED FOR ITCHING 60 tablet 3    folic acid (FOLVITE) 1 MG tablet TAKE 1 TABLET BY MOUTH EVERY DAY 90 tablet 3    levothyroxine (SYNTHROID) 125 MCG tablet TAKE 1 TABLET BY MOUTH EVERY DAY 90 tablet 3    spironolactone (ALDACTONE) 25 MG tablet TAKE 0.5 TABLET BY MOUTH EVERY DAY 30 tablet 5    diphenhydrAMINE (BENADRYL) 25 MG capsule TAKE ONE CAPSULE BY MOUTH EVERY DAY AS NEEDED FOR ITCHING 30 capsule 0    atorvastatin (LIPITOR) 40 MG tablet Take 0.5 tablets by mouth daily 90 tablet 3    omeprazole (PRILOSEC) 20 MG capsule TAKE ONE CAPSULE BY MOUTH TWICE DAILY 60 capsule 11    Multiple Vitamins-Iron (DAILY-PARRIS/IRON/BETA-CAROTENE) TABS TAKE 1 TABLET BY MOUTH EVERY DAY 30 tablet 5    b complex vitamins capsule Take 1 capsule by mouth daily.  azithromycin (ZITHROMAX Z-NILESH) 250 MG tablet Complete entire pack as directed. 1 packet 0     No current facility-administered medications for this visit. No orders of the defined types were placed in this encounter. Orders Placed This Encounter   Medications    losartan (COZAAR) 25 MG tablet     Sig: TAKE 1 TABLET BY MOUTH EVERY DAY     Dispense:  30 tablet     Refill:  5    sertraline (ZOLOFT) 100 MG tablet     Sig: TAKE 1 TABLET BY MOUTH TWICE DAILY     Dispense:  180 tablet     Refill:  3    calcium-cholecalciferol (OYSCO 500 + D) 500-200 MG-UNIT per tablet     Sig: TAKE 1 TABLET BY MOUTH TWICE DAILY     Dispense:  60 tablet     Refill:  2             Return if symptoms worsen or fail to improve.     Dr. Leonora Gregorio      2/26/18  11:45 AM

## 2018-05-01 DIAGNOSIS — G89.29 CHRONIC BACK PAIN, UNSPECIFIED BACK LOCATION, UNSPECIFIED BACK PAIN LATERALITY: Primary | ICD-10-CM

## 2018-05-01 DIAGNOSIS — M54.9 CHRONIC BACK PAIN, UNSPECIFIED BACK LOCATION, UNSPECIFIED BACK PAIN LATERALITY: Primary | ICD-10-CM

## 2018-05-02 RX ORDER — TRAMADOL HYDROCHLORIDE 50 MG/1
TABLET ORAL
Qty: 60 TABLET | Refills: 0 | Status: SHIPPED | OUTPATIENT
Start: 2018-05-02 | End: 2018-06-02

## 2018-06-06 RX ORDER — CALCIUM CARBONATE/VITAMIN D3 500MG-5MCG
TABLET ORAL
Qty: 60 TABLET | Refills: 5 | Status: SHIPPED | OUTPATIENT
Start: 2018-06-06 | End: 2018-11-27 | Stop reason: SDUPTHER

## 2018-06-11 DIAGNOSIS — G89.29 CHRONIC BILATERAL LOW BACK PAIN WITHOUT SCIATICA: Primary | ICD-10-CM

## 2018-06-11 DIAGNOSIS — M54.50 CHRONIC BILATERAL LOW BACK PAIN WITHOUT SCIATICA: Primary | ICD-10-CM

## 2018-06-12 RX ORDER — ALENDRONATE SODIUM 70 MG/1
TABLET ORAL
Qty: 12 TABLET | Refills: 3 | Status: SHIPPED | OUTPATIENT
Start: 2018-06-12 | End: 2018-12-03 | Stop reason: SDUPTHER

## 2018-06-12 RX ORDER — TRAMADOL HYDROCHLORIDE 50 MG/1
50 TABLET ORAL 3 TIMES DAILY PRN
Qty: 60 TABLET | Refills: 0 | Status: SHIPPED | OUTPATIENT
Start: 2018-06-12 | End: 2018-07-12

## 2018-06-13 ENCOUNTER — HOSPITAL ENCOUNTER (OUTPATIENT)
Dept: WOMENS IMAGING | Age: 58
Discharge: HOME | End: 2018-06-15
Payer: COMMERCIAL

## 2018-06-13 DIAGNOSIS — M81.8 PREMATURE OSTEOPOROSIS: ICD-10-CM

## 2018-06-13 DIAGNOSIS — Z12.31 ENCOUNTER FOR SCREENING MAMMOGRAM FOR BREAST CANCER: ICD-10-CM

## 2018-06-13 PROCEDURE — 77080 DXA BONE DENSITY AXIAL: CPT

## 2018-06-13 PROCEDURE — 77067 SCR MAMMO BI INCL CAD: CPT

## 2018-06-14 RX ORDER — BENZONATATE 100 MG/1
CAPSULE ORAL
Qty: 45 CAPSULE | Refills: 0 | Status: SHIPPED | OUTPATIENT
Start: 2018-06-14 | End: 2019-04-18 | Stop reason: SDUPTHER

## 2018-06-21 RX ORDER — CALCIUM CARBONATE 500(1250)
500 TABLET ORAL 2 TIMES DAILY WITH MEALS
Qty: 60 TABLET | Refills: 8 | Status: SHIPPED | OUTPATIENT
Start: 2018-06-21 | End: 2022-03-11 | Stop reason: SDUPTHER

## 2018-06-21 RX ORDER — CHOLECALCIFEROL (VITAMIN D3) 25 MCG
1 CAPSULE ORAL DAILY
Qty: 30 CAPSULE | Refills: 8 | Status: SHIPPED | OUTPATIENT
Start: 2018-06-21 | End: 2019-04-21 | Stop reason: SDUPTHER

## 2018-07-23 RX ORDER — SPIRONOLACTONE 25 MG/1
TABLET ORAL
Qty: 30 TABLET | Refills: 5 | Status: SHIPPED | OUTPATIENT
Start: 2018-07-23 | End: 2019-08-21 | Stop reason: SDUPTHER

## 2018-07-23 RX ORDER — LEVOTHYROXINE SODIUM 0.12 MG/1
TABLET ORAL
Qty: 90 TABLET | Refills: 5 | Status: SHIPPED | OUTPATIENT
Start: 2018-07-23 | End: 2019-08-21 | Stop reason: SDUPTHER

## 2018-07-23 RX ORDER — FOLIC ACID 1 MG/1
TABLET ORAL
Qty: 90 TABLET | Refills: 5 | Status: SHIPPED | OUTPATIENT
Start: 2018-07-23 | End: 2019-08-21 | Stop reason: SDUPTHER

## 2018-08-17 DIAGNOSIS — G89.29 CHRONIC BILATERAL LOW BACK PAIN WITHOUT SCIATICA: ICD-10-CM

## 2018-08-17 DIAGNOSIS — M54.50 CHRONIC BILATERAL LOW BACK PAIN WITHOUT SCIATICA: ICD-10-CM

## 2018-08-17 RX ORDER — TRAMADOL HYDROCHLORIDE 50 MG/1
TABLET ORAL
Qty: 60 TABLET | Refills: 0 | Status: SHIPPED | OUTPATIENT
Start: 2018-08-17 | End: 2018-09-24 | Stop reason: SDUPTHER

## 2018-08-17 NOTE — TELEPHONE ENCOUNTER
requesting medication refill. Rx requested:  Requested Prescriptions     Pending Prescriptions Disp Refills    traMADol (ULTRAM) 50 MG tablet 60 tablet 0       Last Office Visit:   2/26/2018    Last Tox screen:    07/06/18    Last Medication contract:    07/11/17    Next Visit Date:  No future appointments.

## 2018-08-17 NOTE — TELEPHONE ENCOUNTER
Ptrequesting medication refill. Rx requested:  Requested Prescriptions     Pending Prescriptions Disp Refills    traMADol (ULTRAM) 50 MG tablet 60 tablet 0       Last Office Visit:   2/26/2018    Last Tox screen:    Needs     Last Medication contract:    needs    Next Visit Date:  No future appointments.

## 2018-08-28 ENCOUNTER — OFFICE VISIT (OUTPATIENT)
Dept: INTERNAL MEDICINE CLINIC | Age: 58
End: 2018-08-28
Payer: COMMERCIAL

## 2018-08-28 VITALS
WEIGHT: 135.8 LBS | DIASTOLIC BLOOD PRESSURE: 78 MMHG | RESPIRATION RATE: 16 BRPM | TEMPERATURE: 98.5 F | OXYGEN SATURATION: 97 % | BODY MASS INDEX: 22.63 KG/M2 | SYSTOLIC BLOOD PRESSURE: 114 MMHG | HEART RATE: 99 BPM | HEIGHT: 65 IN

## 2018-08-28 DIAGNOSIS — E78.2 MIXED HYPERLIPIDEMIA: Primary | ICD-10-CM

## 2018-08-28 DIAGNOSIS — J44.9 CHRONIC OBSTRUCTIVE PULMONARY DISEASE, UNSPECIFIED COPD TYPE (HCC): ICD-10-CM

## 2018-08-28 DIAGNOSIS — E03.9 ACQUIRED HYPOTHYROIDISM: ICD-10-CM

## 2018-08-28 DIAGNOSIS — J44.9 OBSTRUCTIVE CHRONIC BRONCHITIS WITHOUT EXACERBATION (HCC): ICD-10-CM

## 2018-08-28 DIAGNOSIS — I42.9 CARDIOMYOPATHY, UNSPECIFIED TYPE (HCC): ICD-10-CM

## 2018-08-28 DIAGNOSIS — H61.21 RIGHT EAR IMPACTED CERUMEN: ICD-10-CM

## 2018-08-28 PROCEDURE — 3023F SPIROM DOC REV: CPT | Performed by: FAMILY MEDICINE

## 2018-08-28 PROCEDURE — 99214 OFFICE O/P EST MOD 30 MIN: CPT | Performed by: FAMILY MEDICINE

## 2018-08-28 PROCEDURE — 1036F TOBACCO NON-USER: CPT | Performed by: FAMILY MEDICINE

## 2018-08-28 PROCEDURE — G8427 DOCREV CUR MEDS BY ELIG CLIN: HCPCS | Performed by: FAMILY MEDICINE

## 2018-08-28 PROCEDURE — G8420 CALC BMI NORM PARAMETERS: HCPCS | Performed by: FAMILY MEDICINE

## 2018-08-28 PROCEDURE — 69209 REMOVE IMPACTED EAR WAX UNI: CPT | Performed by: FAMILY MEDICINE

## 2018-08-28 PROCEDURE — 3017F COLORECTAL CA SCREEN DOC REV: CPT | Performed by: FAMILY MEDICINE

## 2018-08-28 PROCEDURE — G8926 SPIRO NO PERF OR DOC: HCPCS | Performed by: FAMILY MEDICINE

## 2018-08-28 NOTE — PROGRESS NOTES
Patient: Marcella Ahn    YOB: 1960    Date: 8/28/18    Chief Complaint   Patient presents with    Ear Fullness     She complains of ear fullness right ear, she states she can't hear from her ear for 4 days.  Health Maintenance     She is due for lab work. She declines shingles vaccine today. Patient Active Problem List    Diagnosis Date Noted    Premature osteoporosis 07/18/2017    Cardiomyopathy ([address] 03/06/2015    Internal derangement of right knee 02/12/2014    Lumbar radiculitis 02/12/2014    Mixed hyperlipidemia 03/06/2013    Insomnia 07/24/2012    Itching 07/24/2012    Osteoarthritis 07/24/2012    Reflux esophagitis 07/24/2012    COPD (chronic obstructive pulmonary disease) (HCC)     Hepatitis C virus infection      Overview Note:     replace inactive diagnosis      Hypothyroidism        Allergies   Allergen Reactions    Pcn [Penicillins] Anaphylaxis    Demerol Rash       Vitals:    08/28/18 1347   BP: 114/78   Site: Right Arm   Position: Sitting   Cuff Size: Small Adult   Pulse: 99   Resp: 16   Temp: 98.5 °F (36.9 °C)   TempSrc: Oral   SpO2: 97%   Weight: 135 lb 12.8 oz (61.6 kg)   Height: 5' 5\" (1.651 m)      Body mass index is 22.6 kg/m². HPI    She feels well but she can't hear out of her right ear she feels like it's plugged with wax. She has no sore throat pain fever chills cough nausea vomiting or malaise. Reviewed her health maintenance issues in which she needs done and she is also overdue for lab work which was ordered today. Review of Systems    Constitutional: Negative for fatigue, fever and sweats. HEENT: Negative for eye discharge and vision loss. Negative for ear drainage, hearing loss and nasal drainage. Respiratory: Negative for cough, dyspnea and wheezing. Cardiovascular:  Negative for chest pain, claudication and irregular heartbeat/palpitations.   Gastrointestinal: Negative for abdominal pain, nausea, constipation and  carvedilol (COREG) 3.125 MG tablet TAKE 1 TABLET BY MOUTH TWICE DAILY 60 tablet 5    spironolactone (ALDACTONE) 25 MG tablet TAKE 1/2 TABLET BY MOUTH EVERY DAY 30 tablet 5    folic acid (FOLVITE) 1 MG tablet TAKE 1 TABLET BY MOUTH EVERY DAY 90 tablet 5    levothyroxine (SYNTHROID) 125 MCG tablet TAKE 1 TABLET BY MOUTH EVERY DAY 90 tablet 5    calcium carbonate (OSCAL) 500 MG TABS tablet Take 1 tablet by mouth 2 times daily (with meals) 60 tablet 8    Cholecalciferol (VITAMIN D-3) 1000 units CAPS Take 1 capsule by mouth daily 30 capsule 8    benzonatate (TESSALON) 100 MG capsule TAKE 1 CAPSULE BY MOUTH EVERY 6 HOURS AS NEEDED FOR COUGH 45 capsule 0    alendronate (FOSAMAX) 70 MG tablet TAKE 1 TABLET BY MOUTH EVERY 7 DAYS 12 tablet 3    OYSCO 500 + D 500-200 MG-UNIT per tablet TAKE 1 TABLET BY MOUTH TWICE DAILY 60 tablet 5    losartan (COZAAR) 25 MG tablet TAKE 1 TABLET BY MOUTH EVERY DAY 30 tablet 5    sertraline (ZOLOFT) 100 MG tablet TAKE 1 TABLET BY MOUTH TWICE DAILY 180 tablet 3    neomycin-polymyxin-hydrocortisone (CORTISPORIN) 3.5-86996-2 otic solution INSTILL 3 DROPS IN BOTH EARS THREE TIMES DAILY 10 mL 0    diclofenac (VOLTAREN) 50 MG EC tablet TAKE 1 TABLET BY MOUTH ONCE DAILY 90 tablet 2    hydrOXYzine (ATARAX) 10 MG tablet TAKE 1 TABLET BY MOUTH TWICE DAILY AS NEEDED FOR ITCHING 60 tablet 3    diphenhydrAMINE (BENADRYL) 25 MG capsule TAKE ONE CAPSULE BY MOUTH EVERY DAY AS NEEDED FOR ITCHING 30 capsule 0    atorvastatin (LIPITOR) 40 MG tablet Take 0.5 tablets by mouth daily 90 tablet 3    omeprazole (PRILOSEC) 20 MG capsule TAKE ONE CAPSULE BY MOUTH TWICE DAILY 60 capsule 11    Multiple Vitamins-Iron (DAILY-PARRIS/IRON/BETA-CAROTENE) TABS TAKE 1 TABLET BY MOUTH EVERY DAY 30 tablet 5    b complex vitamins capsule Take 1 capsule by mouth daily. No current facility-administered medications for this visit.       Orders Placed This Encounter   Procedures    TSH Without Reflex Standing Status:   Future     Standing Expiration Date:   8/28/2019    T4, Free     Standing Status:   Future     Standing Expiration Date:   8/28/2019    Lipid Panel     Standing Status:   Future     Standing Expiration Date:   8/28/2019     Order Specific Question:   Is Patient Fasting?/# of Hours     Answer:   unknown    Comprehensive Metabolic Panel     Standing Status:   Future     Standing Expiration Date:   8/28/2019    AR REMOVAL IMPACTED CERUMEN IRRIGATION/LVG UNILAT       No orders of the defined types were placed in this encounter. Quality & Risk Score Accuracy    Visit Dx:  I42.9 - Cardiomyopathy, unspecified type ([facility]  Assessment and plan:  Stable based upon symptoms and exam. Continue current treatment plan and follow up at least yearly. Visit Dx:  J44.9 - Obstructive chronic bronchitis without exacerbation ([facility]  Assessment and plan:  Stable based upon symptoms and exam. Continue current treatment plan and follow up at least yearly. Last edited 08/28/18 14:01 EDT by Braydon Zuniga MD           Return in about 6 months (around 2/28/2019).     Dr. Braydon Zuniga      8/28/18  2:22 PM

## 2018-09-21 DIAGNOSIS — M54.50 CHRONIC BILATERAL LOW BACK PAIN WITHOUT SCIATICA: ICD-10-CM

## 2018-09-21 DIAGNOSIS — G89.29 CHRONIC BILATERAL LOW BACK PAIN WITHOUT SCIATICA: ICD-10-CM

## 2018-09-21 RX ORDER — TRAMADOL HYDROCHLORIDE 50 MG/1
TABLET ORAL
Qty: 60 TABLET | Refills: 0 | OUTPATIENT
Start: 2018-09-21 | End: 2018-10-21

## 2018-09-24 DIAGNOSIS — M54.50 CHRONIC BILATERAL LOW BACK PAIN WITHOUT SCIATICA: ICD-10-CM

## 2018-09-24 DIAGNOSIS — G89.29 CHRONIC BILATERAL LOW BACK PAIN WITHOUT SCIATICA: ICD-10-CM

## 2018-09-25 RX ORDER — TRAMADOL HYDROCHLORIDE 50 MG/1
50 TABLET ORAL EVERY 8 HOURS PRN
Qty: 60 TABLET | Refills: 1 | Status: SHIPPED | OUTPATIENT
Start: 2018-09-25 | End: 2018-10-23 | Stop reason: SDUPTHER

## 2018-09-25 NOTE — TELEPHONE ENCOUNTER
Rx requested:  Requested Prescriptions     Pending Prescriptions Disp Refills    traMADol (ULTRAM) 50 MG tablet 60 tablet 0     Sig: Take 1 tablet by mouth. TAKE 1 TABLET BY MOUTH THREE TIMES DAILY AS NEEDED FOR PAIN.        Last Office Visit:   8/28/2018    Last Tox screen:    needed    Last Medication contract:    needed    Next Visit Date:  Future Appointments  Date Time Provider Nidia Thomas   2/28/2019 11:00 AM [facility]

## 2018-09-26 ENCOUNTER — NURSE ONLY (OUTPATIENT)
Dept: INTERNAL MEDICINE CLINIC | Age: 58
End: 2018-09-26

## 2018-09-26 DIAGNOSIS — Z79.899 LONG-TERM USE OF HIGH-RISK MEDICATION: ICD-10-CM

## 2018-09-26 DIAGNOSIS — Z79.899 LONG-TERM USE OF HIGH-RISK MEDICATION: Primary | ICD-10-CM

## 2018-10-23 DIAGNOSIS — M54.50 CHRONIC BILATERAL LOW BACK PAIN WITHOUT SCIATICA: ICD-10-CM

## 2018-10-23 DIAGNOSIS — G89.29 CHRONIC BILATERAL LOW BACK PAIN WITHOUT SCIATICA: ICD-10-CM

## 2018-10-23 RX ORDER — TRAMADOL HYDROCHLORIDE 50 MG/1
50 TABLET ORAL EVERY 8 HOURS PRN
Qty: 60 TABLET | Refills: 1 | Status: SHIPPED | OUTPATIENT
Start: 2018-10-23 | End: 2018-11-26 | Stop reason: SDUPTHER

## 2018-11-26 DIAGNOSIS — M54.50 CHRONIC BILATERAL LOW BACK PAIN WITHOUT SCIATICA: ICD-10-CM

## 2018-11-26 DIAGNOSIS — G89.29 CHRONIC BILATERAL LOW BACK PAIN WITHOUT SCIATICA: ICD-10-CM

## 2018-11-27 RX ORDER — TRAMADOL HYDROCHLORIDE 50 MG/1
50 TABLET ORAL EVERY 8 HOURS PRN
Qty: 60 TABLET | Refills: 1 | Status: SHIPPED | OUTPATIENT
Start: 2018-11-27 | End: 2019-01-08 | Stop reason: SDUPTHER

## 2018-11-28 RX ORDER — CALCIUM CARBONATE/VITAMIN D3 500MG-5MCG
TABLET ORAL
Qty: 60 TABLET | Refills: 5 | Status: SHIPPED | OUTPATIENT
Start: 2018-11-28 | End: 2019-05-25 | Stop reason: SDUPTHER

## 2018-12-03 ENCOUNTER — OFFICE VISIT (OUTPATIENT)
Dept: INTERNAL MEDICINE CLINIC | Age: 58
End: 2018-12-03
Payer: COMMERCIAL

## 2018-12-03 VITALS
WEIGHT: 137.6 LBS | RESPIRATION RATE: 16 BRPM | HEIGHT: 64 IN | SYSTOLIC BLOOD PRESSURE: 104 MMHG | OXYGEN SATURATION: 91 % | DIASTOLIC BLOOD PRESSURE: 70 MMHG | TEMPERATURE: 100.4 F | BODY MASS INDEX: 23.49 KG/M2 | HEART RATE: 105 BPM

## 2018-12-03 DIAGNOSIS — J06.9 UPPER RESPIRATORY TRACT INFECTION, UNSPECIFIED TYPE: Primary | ICD-10-CM

## 2018-12-03 DIAGNOSIS — R68.89 ABNORMAL EAR, NOSE, AND THROAT EVALUATION: ICD-10-CM

## 2018-12-03 PROCEDURE — G8427 DOCREV CUR MEDS BY ELIG CLIN: HCPCS | Performed by: NURSE PRACTITIONER

## 2018-12-03 PROCEDURE — G8484 FLU IMMUNIZE NO ADMIN: HCPCS | Performed by: NURSE PRACTITIONER

## 2018-12-03 PROCEDURE — 99214 OFFICE O/P EST MOD 30 MIN: CPT | Performed by: NURSE PRACTITIONER

## 2018-12-03 PROCEDURE — 3017F COLORECTAL CA SCREEN DOC REV: CPT | Performed by: NURSE PRACTITIONER

## 2018-12-03 PROCEDURE — G8420 CALC BMI NORM PARAMETERS: HCPCS | Performed by: NURSE PRACTITIONER

## 2018-12-03 PROCEDURE — 1036F TOBACCO NON-USER: CPT | Performed by: NURSE PRACTITIONER

## 2018-12-03 RX ORDER — IBUPROFEN 800 MG/1
800 TABLET ORAL EVERY 8 HOURS PRN
Qty: 21 TABLET | Refills: 0 | Status: ON HOLD | OUTPATIENT
Start: 2018-12-03 | End: 2021-09-03

## 2018-12-03 RX ORDER — DOXYCYCLINE HYCLATE 100 MG
100 TABLET ORAL 2 TIMES DAILY
Qty: 14 TABLET | Refills: 0 | Status: SHIPPED | OUTPATIENT
Start: 2018-12-03 | End: 2018-12-10

## 2018-12-03 RX ORDER — ALENDRONATE SODIUM 70 MG/1
TABLET ORAL
Qty: 12 TABLET | Refills: 3 | Status: SHIPPED | OUTPATIENT
Start: 2018-12-03 | End: 2019-12-30

## 2018-12-03 ASSESSMENT — ENCOUNTER SYMPTOMS: COUGH: 1

## 2018-12-03 NOTE — PROGRESS NOTES
Encounter   Medication Reason    alendronate (FOSAMAX) 70 MG tablet REORDER     Return in about 2 weeks (around 12/17/2018) for PCP follow-up. Reviewed with the patient: currentclinical status, medications, activities and diet. Side effects, adverse effects of the medicationprescribed today, as well as treatment plan/ rationale and result expectations havebeen discussed with the patient who expresses understanding and desires to proceed. Pt instructions reviewed and given to patient.     Close follow up to evaluate treatment resultsand for coordination of care. I have reviewed the patient's medical historyin detail and updated the computerized patient record.     Rosa Elena Galicia, APRN - CNP

## 2018-12-04 ASSESSMENT — ENCOUNTER SYMPTOMS
SINUS PAIN: 0
RHINORRHEA: 1
VOMITING: 0
SINUS PRESSURE: 1
SWOLLEN GLANDS: 0
DIARRHEA: 0
FACIAL SWELLING: 0
CHEST TIGHTNESS: 0
NAUSEA: 0
SORE THROAT: 1
WHEEZING: 0
TROUBLE SWALLOWING: 0
ABDOMINAL PAIN: 0
SHORTNESS OF BREATH: 0

## 2019-01-08 DIAGNOSIS — M54.50 CHRONIC BILATERAL LOW BACK PAIN WITHOUT SCIATICA: ICD-10-CM

## 2019-01-08 DIAGNOSIS — E78.2 MIXED HYPERLIPIDEMIA: ICD-10-CM

## 2019-01-08 DIAGNOSIS — G89.29 CHRONIC BILATERAL LOW BACK PAIN WITHOUT SCIATICA: ICD-10-CM

## 2019-01-08 DIAGNOSIS — E03.9 ACQUIRED HYPOTHYROIDISM: ICD-10-CM

## 2019-01-08 LAB
ALBUMIN SERPL-MCNC: 4.5 G/DL (ref 3.9–4.9)
ALP BLD-CCNC: 109 U/L (ref 40–130)
ALT SERPL-CCNC: 23 U/L (ref 0–33)
ANION GAP SERPL CALCULATED.3IONS-SCNC: 16 MEQ/L (ref 7–13)
AST SERPL-CCNC: 32 U/L (ref 0–35)
BILIRUB SERPL-MCNC: <0.2 MG/DL (ref 0–1.2)
BUN BLDV-MCNC: 19 MG/DL (ref 6–20)
CALCIUM SERPL-MCNC: 9.1 MG/DL (ref 8.6–10.2)
CHLORIDE BLD-SCNC: 104 MEQ/L (ref 98–107)
CHOLESTEROL, TOTAL: 347 MG/DL (ref 0–199)
CO2: 24 MEQ/L (ref 22–29)
CREAT SERPL-MCNC: 0.76 MG/DL (ref 0.5–0.9)
GFR AFRICAN AMERICAN: >60
GFR NON-AFRICAN AMERICAN: >60
GLOBULIN: 3.2 G/DL (ref 2.3–3.5)
GLUCOSE BLD-MCNC: 71 MG/DL (ref 74–109)
HDLC SERPL-MCNC: 73 MG/DL (ref 40–59)
LDL CHOLESTEROL CALCULATED: 227 MG/DL (ref 0–129)
POTASSIUM SERPL-SCNC: 4.5 MEQ/L (ref 3.5–5.1)
SODIUM BLD-SCNC: 144 MEQ/L (ref 132–144)
T4 FREE: 1.23 NG/DL (ref 0.93–1.7)
TOTAL PROTEIN: 7.7 G/DL (ref 6.4–8.1)
TRIGL SERPL-MCNC: 236 MG/DL (ref 0–200)
TSH SERPL DL<=0.05 MIU/L-ACNC: 3.05 UIU/ML (ref 0.27–4.2)

## 2019-01-08 RX ORDER — TRAMADOL HYDROCHLORIDE 50 MG/1
50 TABLET ORAL EVERY 8 HOURS PRN
Qty: 60 TABLET | Refills: 1 | Status: SHIPPED | OUTPATIENT
Start: 2019-01-08 | End: 2019-03-08 | Stop reason: SDUPTHER

## 2019-01-21 RX ORDER — OMEPRAZOLE 20 MG/1
CAPSULE, DELAYED RELEASE ORAL
Qty: 60 CAPSULE | Refills: 2 | Status: SHIPPED | OUTPATIENT
Start: 2019-01-21 | End: 2019-02-28 | Stop reason: SDUPTHER

## 2019-02-28 ENCOUNTER — OFFICE VISIT (OUTPATIENT)
Dept: INTERNAL MEDICINE CLINIC | Age: 59
End: 2019-02-28
Payer: COMMERCIAL

## 2019-02-28 VITALS
DIASTOLIC BLOOD PRESSURE: 78 MMHG | WEIGHT: 148 LBS | SYSTOLIC BLOOD PRESSURE: 104 MMHG | TEMPERATURE: 98.2 F | OXYGEN SATURATION: 98 % | BODY MASS INDEX: 25.4 KG/M2 | RESPIRATION RATE: 12 BRPM | HEART RATE: 82 BPM

## 2019-02-28 DIAGNOSIS — E78.00 HYPERCHOLESTEROLEMIA: Primary | ICD-10-CM

## 2019-02-28 DIAGNOSIS — E03.9 ACQUIRED HYPOTHYROIDISM: ICD-10-CM

## 2019-02-28 DIAGNOSIS — J44.9 CHRONIC OBSTRUCTIVE PULMONARY DISEASE, UNSPECIFIED COPD TYPE (HCC): ICD-10-CM

## 2019-02-28 DIAGNOSIS — I42.9 CARDIOMYOPATHY, UNSPECIFIED TYPE (HCC): ICD-10-CM

## 2019-02-28 PROCEDURE — G8484 FLU IMMUNIZE NO ADMIN: HCPCS | Performed by: FAMILY MEDICINE

## 2019-02-28 PROCEDURE — G8926 SPIRO NO PERF OR DOC: HCPCS | Performed by: FAMILY MEDICINE

## 2019-02-28 PROCEDURE — 1036F TOBACCO NON-USER: CPT | Performed by: FAMILY MEDICINE

## 2019-02-28 PROCEDURE — G8419 CALC BMI OUT NRM PARAM NOF/U: HCPCS | Performed by: FAMILY MEDICINE

## 2019-02-28 PROCEDURE — G8427 DOCREV CUR MEDS BY ELIG CLIN: HCPCS | Performed by: FAMILY MEDICINE

## 2019-02-28 PROCEDURE — 99213 OFFICE O/P EST LOW 20 MIN: CPT | Performed by: FAMILY MEDICINE

## 2019-02-28 PROCEDURE — 3023F SPIROM DOC REV: CPT | Performed by: FAMILY MEDICINE

## 2019-02-28 PROCEDURE — 3017F COLORECTAL CA SCREEN DOC REV: CPT | Performed by: FAMILY MEDICINE

## 2019-02-28 RX ORDER — ATORVASTATIN CALCIUM 40 MG/1
40 TABLET, FILM COATED ORAL DAILY
Qty: 90 TABLET | Refills: 1 | Status: SHIPPED | OUTPATIENT
Start: 2019-02-28 | End: 2019-08-29 | Stop reason: SDUPTHER

## 2019-02-28 ASSESSMENT — PATIENT HEALTH QUESTIONNAIRE - PHQ9
SUM OF ALL RESPONSES TO PHQ QUESTIONS 1-9: 0
2. FEELING DOWN, DEPRESSED OR HOPELESS: 0
SUM OF ALL RESPONSES TO PHQ9 QUESTIONS 1 & 2: 0
SUM OF ALL RESPONSES TO PHQ QUESTIONS 1-9: 0
DEPRESSION UNABLE TO ASSESS: FUNCTIONAL CAPACITY MOTIVATION LIMITS ACCURACY
1. LITTLE INTEREST OR PLEASURE IN DOING THINGS: 0

## 2019-03-08 DIAGNOSIS — M54.50 CHRONIC BILATERAL LOW BACK PAIN WITHOUT SCIATICA: ICD-10-CM

## 2019-03-08 DIAGNOSIS — G89.29 CHRONIC BILATERAL LOW BACK PAIN WITHOUT SCIATICA: ICD-10-CM

## 2019-03-08 RX ORDER — TRAMADOL HYDROCHLORIDE 50 MG/1
50 TABLET ORAL EVERY 8 HOURS PRN
Qty: 60 TABLET | Refills: 0 | Status: SHIPPED | OUTPATIENT
Start: 2019-03-08 | End: 2019-04-15 | Stop reason: SDUPTHER

## 2019-04-09 DIAGNOSIS — G89.29 CHRONIC BILATERAL LOW BACK PAIN WITHOUT SCIATICA: ICD-10-CM

## 2019-04-09 DIAGNOSIS — M54.50 CHRONIC BILATERAL LOW BACK PAIN WITHOUT SCIATICA: ICD-10-CM

## 2019-04-09 RX ORDER — TRAMADOL HYDROCHLORIDE 50 MG/1
50 TABLET ORAL EVERY 8 HOURS PRN
Qty: 60 TABLET | Refills: 0 | OUTPATIENT
Start: 2019-04-09 | End: 2019-06-09

## 2019-04-09 NOTE — TELEPHONE ENCOUNTER
requesting medication refill. Rx requested:  Requested Prescriptions     Pending Prescriptions Disp Refills    traMADol (ULTRAM) 50 MG tablet 60 tablet 0     Sig: Take 1 tablet by mouth every 8 hours as needed for Pain for up to 61 days.  TAKE 1 TABLET BY MOUTH THREE TIMES DAILY AS NEEDED FOR PAIN       Last Office Visit:   2/28/2019    Last Tox screen:    6/26/18    Last Medication contract:    9/28/18    Next Visit Date:  Future Appointments   Date Time Provider Nidia Thomas   5/28/2019 11:15 AM MD Leti Noriega [address]

## 2019-04-11 DIAGNOSIS — G89.29 CHRONIC BILATERAL LOW BACK PAIN WITHOUT SCIATICA: ICD-10-CM

## 2019-04-11 DIAGNOSIS — M54.50 CHRONIC BILATERAL LOW BACK PAIN WITHOUT SCIATICA: ICD-10-CM

## 2019-04-11 NOTE — TELEPHONE ENCOUNTER
requesting medication refill. Rx requested:  Requested Prescriptions     Pending Prescriptions Disp Refills    traMADol (ULTRAM) 50 MG tablet 60 tablet 0     Sig: Take 1 tablet by mouth every 8 hours as needed for Pain for up to 61 days.  TAKE 1 TABLET BY MOUTH THREE TIMES DAILY AS NEEDED FOR PAIN       Last Office Visit:   2/28/2019    Last Tox screen:    9/26/18    Last Medication contract:    9/27/18    Next Visit Date:  Future Appointments   Date Time Provider Nidia Thomas   5/28/2019 11:15 AM Aure Stevens  [facility]

## 2019-04-15 RX ORDER — TRAMADOL HYDROCHLORIDE 50 MG/1
50 TABLET ORAL EVERY 8 HOURS PRN
Qty: 60 TABLET | Refills: 0 | Status: SHIPPED | OUTPATIENT
Start: 2019-04-15 | End: 2019-05-16 | Stop reason: SDUPTHER

## 2019-04-18 RX ORDER — BENZONATATE 100 MG/1
CAPSULE ORAL
Qty: 45 CAPSULE | Refills: 0 | Status: SHIPPED | OUTPATIENT
Start: 2019-04-18 | End: 2020-01-28 | Stop reason: ALTCHOICE

## 2019-04-22 RX ORDER — OMEPRAZOLE 20 MG/1
CAPSULE, DELAYED RELEASE ORAL
Qty: 60 CAPSULE | Refills: 11 | Status: SHIPPED | OUTPATIENT
Start: 2019-04-22 | End: 2020-01-28 | Stop reason: SDUPTHER

## 2019-05-16 DIAGNOSIS — G89.29 CHRONIC BILATERAL LOW BACK PAIN WITHOUT SCIATICA: ICD-10-CM

## 2019-05-16 DIAGNOSIS — M54.50 CHRONIC BILATERAL LOW BACK PAIN WITHOUT SCIATICA: ICD-10-CM

## 2019-05-16 RX ORDER — TRAMADOL HYDROCHLORIDE 50 MG/1
50 TABLET ORAL EVERY 8 HOURS PRN
Qty: 60 TABLET | Refills: 0 | Status: SHIPPED | OUTPATIENT
Start: 2019-05-16 | End: 2019-06-26 | Stop reason: SDUPTHER

## 2019-05-16 NOTE — TELEPHONE ENCOUNTER
requesting medication refill. Rx requested:  Requested Prescriptions     Pending Prescriptions Disp Refills    traMADol (ULTRAM) 50 MG tablet 60 tablet 0     Sig: Take 1 tablet by mouth every 8 hours as needed for Pain for up to 61 days.  TAKE 1 TABLET BY MOUTH THREE TIMES DAILY AS NEEDED FOR PAIN       Last Office Visit:   2/28/2019    Last Tox screen:    6/26/18    Last Medication contract:    9/28/18    Next Visit Date:  Future Appointments   Date Time Provider Nidia Thomas   5/28/2019 11:15 AM Ralph Shields  [facility] [facility]

## 2019-06-12 DIAGNOSIS — G89.29 CHRONIC BILATERAL LOW BACK PAIN WITHOUT SCIATICA: ICD-10-CM

## 2019-06-12 DIAGNOSIS — M54.50 CHRONIC BILATERAL LOW BACK PAIN WITHOUT SCIATICA: ICD-10-CM

## 2019-06-12 RX ORDER — TRAMADOL HYDROCHLORIDE 50 MG/1
50 TABLET ORAL EVERY 8 HOURS PRN
Qty: 60 TABLET | Refills: 0 | OUTPATIENT
Start: 2019-06-12 | End: 2019-08-12

## 2019-06-12 NOTE — TELEPHONE ENCOUNTER
PT requesting medication refill. Rx requested:  Requested Prescriptions     Pending Prescriptions Disp Refills    traMADol (ULTRAM) 50 MG tablet 60 tablet 0     Sig: Take 1 tablet by mouth every 8 hours as needed for Pain for up to 61 days. TAKE 1 TABLET BY MOUTH THREE TIMES DAILY AS NEEDED FOR PAIN       Last Office Visit:   2/28/2019    Last Tox screen:    9/26/2018    Last Medication contract:    9/28/2018    Next Visit Date:  No future appointments.

## 2019-06-17 ENCOUNTER — TELEPHONE (OUTPATIENT)
Dept: FAMILY MEDICINE CLINIC | Age: 59
End: 2019-06-17

## 2019-06-17 NOTE — TELEPHONE ENCOUNTER
If she needs to be on 3 tablets a day on a regular basis and she is going to need to go to a pain clinic to get the medication. The state of [place] is very worried about overuse of medications like this and they are auditing these drugs very carefully. I can refer her to a pain clinic if she would like.

## 2019-06-17 NOTE — TELEPHONE ENCOUNTER
Spoke with Pt and she is aware, Pt states she does not know if she would like to see a specialist for her pain and she will call the office back with her decision.

## 2019-06-17 NOTE — TELEPHONE ENCOUNTER
Pt called asking why her Rx for Ultram was denied with note that stated her next refill was not due until July. I did attempt to explain to the Pt that the sig states; Take 1 tablet by mouth every 8 hours as needed for Pain for up to 61 days. TAKE 1 TABLET BY MOUTH THREE TIMES DAILY AS NEEDED FOR PAIN. The medication was dispensed with a qty of 60 tablets on 5/16/2019. Pt stated that she was taking 3 tablets a day and that you know she has a bad back. Pt is asking if the Rx was supposed to last her 60 days or if she is able to have a new Rx and take her normal 3 tabs a day as needed for pain. Please advise.

## 2019-06-26 DIAGNOSIS — M54.50 CHRONIC BILATERAL LOW BACK PAIN WITHOUT SCIATICA: ICD-10-CM

## 2019-06-26 DIAGNOSIS — G89.29 CHRONIC BILATERAL LOW BACK PAIN WITHOUT SCIATICA: ICD-10-CM

## 2019-06-26 RX ORDER — TRAMADOL HYDROCHLORIDE 50 MG/1
50 TABLET ORAL EVERY 8 HOURS PRN
Qty: 60 TABLET | Refills: 1 | Status: SHIPPED | OUTPATIENT
Start: 2019-06-26 | End: 2019-07-30 | Stop reason: SDUPTHER

## 2019-07-30 DIAGNOSIS — M54.50 CHRONIC BILATERAL LOW BACK PAIN WITHOUT SCIATICA: ICD-10-CM

## 2019-07-30 DIAGNOSIS — G89.29 CHRONIC BILATERAL LOW BACK PAIN WITHOUT SCIATICA: ICD-10-CM

## 2019-07-30 RX ORDER — TRAMADOL HYDROCHLORIDE 50 MG/1
50 TABLET ORAL EVERY 8 HOURS PRN
Qty: 60 TABLET | Refills: 1 | Status: SHIPPED | OUTPATIENT
Start: 2019-07-30 | End: 2019-09-04 | Stop reason: SDUPTHER

## 2019-08-20 ENCOUNTER — HOSPITAL ENCOUNTER (OUTPATIENT)
Dept: WOMENS IMAGING | Age: 59
Discharge: HOME | End: 2019-08-22
Payer: COMMERCIAL

## 2019-08-20 DIAGNOSIS — Z12.31 ENCOUNTER FOR SCREENING MAMMOGRAM FOR BREAST CANCER: ICD-10-CM

## 2019-08-20 PROCEDURE — 77063 BREAST TOMOSYNTHESIS BI: CPT

## 2019-08-21 RX ORDER — FOLIC ACID 1 MG/1
TABLET ORAL
Qty: 90 TABLET | Refills: 0 | Status: SHIPPED | OUTPATIENT
Start: 2019-08-21 | End: 2019-11-25 | Stop reason: SDUPTHER

## 2019-08-21 RX ORDER — SPIRONOLACTONE 25 MG/1
TABLET ORAL
Qty: 30 TABLET | Refills: 0 | Status: SHIPPED | OUTPATIENT
Start: 2019-08-21 | End: 2019-10-28 | Stop reason: SDUPTHER

## 2019-08-21 RX ORDER — LEVOTHYROXINE SODIUM 0.12 MG/1
TABLET ORAL
Qty: 90 TABLET | Refills: 0 | Status: SHIPPED | OUTPATIENT
Start: 2019-08-21 | End: 2019-11-25 | Stop reason: SDUPTHER

## 2019-09-04 DIAGNOSIS — M54.50 CHRONIC BILATERAL LOW BACK PAIN WITHOUT SCIATICA: ICD-10-CM

## 2019-09-04 DIAGNOSIS — G89.29 CHRONIC BILATERAL LOW BACK PAIN WITHOUT SCIATICA: ICD-10-CM

## 2019-09-04 RX ORDER — TRAMADOL HYDROCHLORIDE 50 MG/1
50 TABLET ORAL EVERY 8 HOURS PRN
Qty: 60 TABLET | Refills: 1 | Status: SHIPPED | OUTPATIENT
Start: 2019-09-04 | End: 2019-10-09 | Stop reason: SDUPTHER

## 2019-09-06 DIAGNOSIS — Z79.899 ENCOUNTER FOR LONG-TERM CURRENT USE OF MEDICATION: Primary | ICD-10-CM

## 2019-09-06 DIAGNOSIS — Z79.899 ENCOUNTER FOR LONG-TERM CURRENT USE OF MEDICATION: ICD-10-CM

## 2019-10-09 DIAGNOSIS — G89.29 CHRONIC BILATERAL LOW BACK PAIN WITHOUT SCIATICA: ICD-10-CM

## 2019-10-09 DIAGNOSIS — M54.50 CHRONIC BILATERAL LOW BACK PAIN WITHOUT SCIATICA: ICD-10-CM

## 2019-10-09 RX ORDER — TRAMADOL HYDROCHLORIDE 50 MG/1
50 TABLET ORAL EVERY 8 HOURS PRN
Qty: 60 TABLET | Refills: 2 | Status: SHIPPED | OUTPATIENT
Start: 2019-10-09 | End: 2020-02-06

## 2019-12-30 RX ORDER — ALENDRONATE SODIUM 70 MG/1
TABLET ORAL
Qty: 12 TABLET | Refills: 3 | Status: SHIPPED | OUTPATIENT
Start: 2019-12-30 | End: 2020-12-14

## 2020-01-28 ENCOUNTER — OFFICE VISIT (OUTPATIENT)
Dept: FAMILY MEDICINE CLINIC | Age: 60
End: 2020-01-28
Payer: COMMERCIAL

## 2020-01-28 VITALS
HEIGHT: 64 IN | WEIGHT: 147 LBS | BODY MASS INDEX: 25.1 KG/M2 | DIASTOLIC BLOOD PRESSURE: 86 MMHG | TEMPERATURE: 98.1 F | HEART RATE: 85 BPM | RESPIRATION RATE: 16 BRPM | SYSTOLIC BLOOD PRESSURE: 110 MMHG | OXYGEN SATURATION: 98 %

## 2020-01-28 DIAGNOSIS — Z11.4 ENCOUNTER FOR SCREENING FOR HIV: ICD-10-CM

## 2020-01-28 DIAGNOSIS — E03.9 ACQUIRED HYPOTHYROIDISM: ICD-10-CM

## 2020-01-28 DIAGNOSIS — E78.2 MIXED HYPERLIPIDEMIA: ICD-10-CM

## 2020-01-28 LAB
ALBUMIN SERPL-MCNC: 4.4 G/DL (ref 3.5–4.6)
ALP BLD-CCNC: 88 U/L (ref 40–130)
ALT SERPL-CCNC: 27 U/L (ref 0–33)
ANION GAP SERPL CALCULATED.3IONS-SCNC: 14 MEQ/L (ref 9–15)
AST SERPL-CCNC: 35 U/L (ref 0–35)
BILIRUB SERPL-MCNC: 0.3 MG/DL (ref 0.2–0.7)
BUN BLDV-MCNC: 19 MG/DL (ref 8–23)
CALCIUM SERPL-MCNC: 8.9 MG/DL (ref 8.5–9.9)
CHLORIDE BLD-SCNC: 103 MEQ/L (ref 95–107)
CHOLESTEROL, FASTING: 169 MG/DL (ref 0–199)
CO2: 24 MEQ/L (ref 20–31)
CREAT SERPL-MCNC: 0.79 MG/DL (ref 0.5–0.9)
GFR AFRICAN AMERICAN: >60
GFR NON-AFRICAN AMERICAN: >60
GLOBULIN: 3.1 G/DL (ref 2.3–3.5)
GLUCOSE BLD-MCNC: 97 MG/DL (ref 70–99)
HDLC SERPL-MCNC: 67 MG/DL (ref 40–59)
LDL CHOLESTEROL CALCULATED: 78 MG/DL (ref 0–129)
POTASSIUM SERPL-SCNC: 4 MEQ/L (ref 3.4–4.9)
SODIUM BLD-SCNC: 141 MEQ/L (ref 135–144)
T4 FREE: 1.24 NG/DL (ref 0.84–1.68)
TOTAL PROTEIN: 7.5 G/DL (ref 6.3–8)
TRIGLYCERIDE, FASTING: 120 MG/DL (ref 0–150)
TSH SERPL DL<=0.05 MIU/L-ACNC: 1.88 UIU/ML (ref 0.44–3.86)

## 2020-01-28 PROCEDURE — G8427 DOCREV CUR MEDS BY ELIG CLIN: HCPCS | Performed by: FAMILY MEDICINE

## 2020-01-28 PROCEDURE — 3017F COLORECTAL CA SCREEN DOC REV: CPT | Performed by: FAMILY MEDICINE

## 2020-01-28 PROCEDURE — 99215 OFFICE O/P EST HI 40 MIN: CPT | Performed by: FAMILY MEDICINE

## 2020-01-28 PROCEDURE — 3023F SPIROM DOC REV: CPT | Performed by: FAMILY MEDICINE

## 2020-01-28 PROCEDURE — G8419 CALC BMI OUT NRM PARAM NOF/U: HCPCS | Performed by: FAMILY MEDICINE

## 2020-01-28 PROCEDURE — 1036F TOBACCO NON-USER: CPT | Performed by: FAMILY MEDICINE

## 2020-01-28 PROCEDURE — G8926 SPIRO NO PERF OR DOC: HCPCS | Performed by: FAMILY MEDICINE

## 2020-01-28 PROCEDURE — G8484 FLU IMMUNIZE NO ADMIN: HCPCS | Performed by: FAMILY MEDICINE

## 2020-01-28 RX ORDER — AZITHROMYCIN 250 MG/1
TABLET, FILM COATED ORAL
Qty: 1 PACKET | Refills: 0 | Status: SHIPPED | OUTPATIENT
Start: 2020-01-28 | End: 2020-02-07

## 2020-01-28 ASSESSMENT — PATIENT HEALTH QUESTIONNAIRE - PHQ9
SUM OF ALL RESPONSES TO PHQ QUESTIONS 1-9: 0
1. LITTLE INTEREST OR PLEASURE IN DOING THINGS: 0
SUM OF ALL RESPONSES TO PHQ9 QUESTIONS 1 & 2: 0
2. FEELING DOWN, DEPRESSED OR HOPELESS: 0
SUM OF ALL RESPONSES TO PHQ QUESTIONS 1-9: 0

## 2020-01-28 NOTE — PROGRESS NOTES
Patient: Andres Skinner    YOB: 1960    Date: 1/28/20    Chief Complaint   Patient presents with    Hyperlipidemia     follow up    Hypothyroidism     follow up   [facility]     She complains of painful lump on her right foot 2nd toe.  Health Maintenance     She declines flu and shingle vaccine, she will check with her insurance for coverage for CT lung screen. Patient Active Problem List    Diagnosis Date Noted    Hypercholesterolemia 02/28/2019    Premature osteoporosis 07/18/2017    Cardiomyopathy ([address] 03/06/2015    Internal derangement of right knee 02/12/2014    Lumbar radiculitis 02/12/2014    Mixed hyperlipidemia 03/06/2013    Insomnia 07/24/2012    Itching 07/24/2012    Osteoarthritis 07/24/2012    Reflux esophagitis 07/24/2012    COPD (chronic obstructive pulmonary disease) (HCC)     Hepatitis C virus infection      Overview Note:     replace inactive diagnosis      Hypothyroidism        Allergies   Allergen Reactions    Pcn [Penicillins] Anaphylaxis    Demerol Rash       Vitals:    01/28/20 1324   BP: 110/86   Site: Right Upper Arm   Position: Sitting   Cuff Size: Large Adult   Pulse: 85   Resp: 16   Temp: 98.1 °F (36.7 °C)   TempSrc: Oral   SpO2: 98%   Weight: 147 lb (66.7 kg)   Height: 5' 4\" (1.626 m)      Body mass index is 25.23 kg/m². HPI    Patient has not been seen just about a year and she comes in with a laundry list of problems. She also has some fairly significant underlying disease and needs follow-up on following up on which she has not done. The one thing that she has not done much follow-up on is her heart. She says she does see her electrophysiology doctor but she has not seen her cardiologist.  She has increasing shortness of breath and since she got sick with some kind of upper respiratory tract infection a month or so ago she is been coughing short of breath. No swelling no nausea no chest pressure no jaw or arm pain.   She is due for lab Multiple Vitamins-Iron (DAILY-PARRIS/IRON/BETA-CAROTENE) TABS TAKE 1 TABLET BY MOUTH EVERY DAY 30 tablet 5    b complex vitamins capsule Take 1 capsule by mouth daily. No current facility-administered medications for this visit.       Orders Placed This Encounter   Procedures    XR CHEST STANDARD (2 VW)     Standing Status:   Future     Number of Occurrences:   1     Standing Expiration Date:   1/27/2021    XR KNEE RIGHT (3 VIEWS)     Standing Status:   Future     Number of Occurrences:   1     Standing Expiration Date:   1/28/2021    US Pelvis Complete     transvaginal if indicated     Standing Status:   Future     Standing Expiration Date:   1/27/2021    TSH Without Reflex     Standing Status:   Future     Standing Expiration Date:   1/28/2021    T4, Free     Standing Status:   Future     Standing Expiration Date:   1/27/2021    Lipid, Fasting     Standing Status:   Future     Standing Expiration Date:   1/28/2021    Comprehensive Metabolic Panel     Standing Status:   Future     Standing Expiration Date:   1/27/2021    HIV-1 western blot     Standing Status:   Future     Standing Expiration Date:   1/27/2021    [facility] - Sol Lewis DPM, Podiatry, [place]     Referral Priority:   Routine     Referral Type:   Eval and Treat     Referral Reason:   Specialty Services Required     Referred to Provider:   Letitia Rodriguez DPM     Requested Specialty:   Podiatry     Number of Visits Requested:   [place], [address], Invasive Cardiology, [facility]     Referral Priority:   Routine     Referral Type:   Eval and Treat     Referral Reason:   Specialty Services Required     Referred to Provider:   DO Linden     Requested Specialty:   Cardiology     Number of Visits Requested:   1    Echo 2d w doppler w color w contrast     Standing Status:   Future     Standing Expiration Date:   1/28/2021     Order Specific Question:   Reason for exam:     Answer:   hisory of chf       Orders Placed This Encounter

## 2020-01-29 ENCOUNTER — TELEPHONE (OUTPATIENT)
Dept: FAMILY MEDICINE CLINIC | Age: 60
End: 2020-01-29

## 2020-01-29 NOTE — TELEPHONE ENCOUNTER
Pt returned call and was given results from her x-rays and her blood work. Pt was advised that an MRI can be ordered for the knee and she can check with her pacemaker  to make sure an MRI is okay. Pt understood and had no questions.

## 2020-01-30 LAB — HIV 1,2 COMBO ANTIGEN/ANTIBODY: NEGATIVE

## 2020-02-19 ENCOUNTER — HOSPITAL ENCOUNTER (OUTPATIENT)
Dept: NON INVASIVE DIAGNOSTICS | Age: 60
Discharge: HOME | End: 2020-02-19
Payer: COMMERCIAL

## 2020-02-19 ENCOUNTER — HOSPITAL ENCOUNTER (OUTPATIENT)
Dept: ULTRASOUND IMAGING | Age: 60
Discharge: HOME | End: 2020-02-21
Payer: COMMERCIAL

## 2020-02-19 LAB
LV EF: 50 %
LVEF MODALITY: NORMAL

## 2020-02-19 PROCEDURE — 76856 US EXAM PELVIC COMPLETE: CPT

## 2020-02-19 PROCEDURE — 93306 TTE W/DOPPLER COMPLETE: CPT

## 2020-02-19 PROCEDURE — 76830 TRANSVAGINAL US NON-OB: CPT

## 2020-02-27 ENCOUNTER — TELEPHONE (OUTPATIENT)
Dept: FAMILY MEDICINE CLINIC | Age: 60
End: 2020-02-27

## 2020-02-27 RX ORDER — AMITRIPTYLINE HYDROCHLORIDE 100 MG/1
TABLET, FILM COATED ORAL
Qty: 30 TABLET | Refills: 5 | Status: SHIPPED | OUTPATIENT
Start: 2020-02-27 | End: 2020-09-01 | Stop reason: SDUPTHER

## 2020-02-28 ENCOUNTER — OFFICE VISIT (OUTPATIENT)
Dept: CARDIOLOGY CLINIC | Age: 60
End: 2020-02-28
Payer: COMMERCIAL

## 2020-02-28 VITALS
BODY MASS INDEX: 24.89 KG/M2 | HEART RATE: 97 BPM | WEIGHT: 145 LBS | DIASTOLIC BLOOD PRESSURE: 70 MMHG | SYSTOLIC BLOOD PRESSURE: 110 MMHG

## 2020-02-28 PROCEDURE — 99204 OFFICE O/P NEW MOD 45 MIN: CPT | Performed by: INTERNAL MEDICINE

## 2020-02-28 PROCEDURE — G8427 DOCREV CUR MEDS BY ELIG CLIN: HCPCS | Performed by: INTERNAL MEDICINE

## 2020-02-28 PROCEDURE — 1036F TOBACCO NON-USER: CPT | Performed by: INTERNAL MEDICINE

## 2020-02-28 PROCEDURE — G8484 FLU IMMUNIZE NO ADMIN: HCPCS | Performed by: INTERNAL MEDICINE

## 2020-02-28 PROCEDURE — 3017F COLORECTAL CA SCREEN DOC REV: CPT | Performed by: INTERNAL MEDICINE

## 2020-02-28 PROCEDURE — 93000 ELECTROCARDIOGRAM COMPLETE: CPT | Performed by: INTERNAL MEDICINE

## 2020-02-28 PROCEDURE — G8420 CALC BMI NORM PARAMETERS: HCPCS | Performed by: INTERNAL MEDICINE

## 2020-02-28 NOTE — PROGRESS NOTES
Chief Complaint   Patient presents with    Cardiomyopathy    Shortness of Breath     VERY BAD       3-6-15: Patient presents for initial medical evaluation. Patient is followed on a regular basis by Dr. Ezra Negro MD. Presents with abn echo with severe new onset CMP, dilated LV, EF of 10-15%. Does complain of mild SOB but no CP. Denies any recent viral infections. No LE edema. No PND or orthopnea. Grandmothers with hx of CMP. Quit smoking 10 yrs ago, used to smoke 1ppd for 30 yrs. 3-27-15: as above, s/p LHC with normal coronaries, EF of 15%, LVEDP of 20mmHg. Compliant with meds. Did not tolerate Lisinopril, did have swelling of her throat. Does have a cough and URI type symptoms. Pt denies chest pain, dyspnea, dyspnea on exertion, change in exercise capacity, fatigue,  nausea, vomiting, diarrhea, constipation, motor weakness, insomnia, weight loss, syncope, dizziness, lightheadedness, palpitations, PND, orthopnea, or claudication. States she's active without any issues. 7-31-15: as above, Pt denies chest pain, dyspnea, dyspnea on exertion, change in exercise capacity, fatigue,  nausea, vomiting, diarrhea, constipation, motor weakness, insomnia, weight loss, syncope, dizziness, lightheadedness, palpitations, PND, orthopnea, or claudication. No LE edema. No change in meds. BP and HR are under control. No recent hospitalizations. States she can clean the house without any issues. Has gained 5 pounds. 8-18-15: as above, s/p MUGA scan with EF of 24%. Does get tired more easily and more SOB than what she used to according to her daughter. She is very active though, cleans her house, run around after grandchildren, up and down stairs without any issues. No LE edema. Compliant with meds.  Pt denies chest pain, dyspnea, dyspnea on exertion, change in exercise capacity, fatigue,  nausea, vomiting, diarrhea, constipation, motor weakness, insomnia, weight loss, syncope, dizziness, lightheadedness, constipation, motor weakness, insomnia, weight loss, syncope, dizziness, lightheadedness, palpitations, PND, orthopnea, or claudication. Hx of NICMP, EF of 35%. Has baseline SOB and hx of COPD. Does not follow up with pulmonary. S/p recent CXR which was ok. Compliant with meds. Denies any LE edema. No orthopnea. SOB is mainly with activity/walking. S/p ECHO in 2020 with EF of 50%, grade I DD. EKG with NSR, V paced.        Patient Active Problem List   Diagnosis    COPD (chronic obstructive pulmonary disease) ([facility]    Hepatitis C virus infection    Hypothyroidism    Insomnia    Itching    Osteoarthritis    Reflux esophagitis    Mixed hyperlipidemia    Internal derangement of right knee    Lumbar radiculitis    Cardiomyopathy ([facility]    Premature osteoporosis    Hypercholesterolemia    ICD (implantable cardioverter-defibrillator) battery depletion    Diastolic dysfunction       Past Surgical History:   Procedure Laterality Date    LEG SURGERY   MASS R LEG SOFT TISSUE    LUMBAR FUSION      Dr. Clovis Sotelo  09/16/15    Cindy Spann MD IMPLANT ICD       Social History     Socioeconomic History    Marital status:      Spouse name: Not on file    Number of children: Not on file    Years of education: Not on file    Highest education level: Not on file   Occupational History    Not on file   Social Needs    Financial resource strain: Not on file    Food insecurity:     Worry: Not on file     Inability: Not on file    Transportation needs:     Medical: Not on file     Non-medical: Not on file   Tobacco Use    Smoking status: Former Smoker     Packs/day: 1.00     Years: 30.00     Pack years: 30.00     Last attempt to quit: 3/5/2005     Years since quittin.9    Smokeless tobacco: Never Used   Substance and Sexual Activity    Alcohol use: No     Comment: ALCOHOLIC    Drug use: No    Sexual activity: Not Currently   Lifestyle    Physical activity: normal S1, S2, no murmurs, rubs, clicks or gallops  Abdomen - soft, nontender, nondistended, no masses or organomegaly  Neurological - alert, oriented, normal speech, no focal findings or movement disorder noted  Extremities - peripheral pulses normal, no pedal edema, no clubbing or cyanosis  Skin - normal coloration and turgor, no rashes, no suspicious skin lesions noted    AICD incision is normal.     Orders Placed This Encounter   Procedures    EKG 12 Lead       ASSESSMENT:     Diagnosis Orders   1. PE (physical exam), annual  EKG 12 Lead   2. ICD (implantable cardioverter-defibrillator) battery depletion     3. Hypercholesterolemia     4. Mixed hyperlipidemia     5. Cardiomyopathy, unspecified type [facility])           PLAN:     Patient will need to continue to follow up with you for their general medical care     As always, aggressive risk factor modification is strongly recommended. We should adhere to the [address] guidelines for HTN management and the NCEP ATP III guidelines for LDL-C management. Cardiac diet is always recommended with low fat, cholesterol, calories and sodium. Continue medications at current doses. The importance of daily weights, dietary sodium restriction, and contact with cardiology if weight is increased more than 3 lbs in any 48 hour period was stressed. The patient has been advised to contact us if they experience progressive SOB, orthopnea, PND or progressive edema. Patient was advised and encouraged to check blood pressure at home or at a pharmacy, maintain a logbook, and also call us back if blood pressure are above the target ranges or if it is low. Patient clearly understands and agrees to the instructions. We will need to continue to monitor muscle and liver enzymes, BUN, CR, and electrolytes.       Details of medical condition explained and patient was warned about adverse consequences of uncontrolled medical conditions and possible side effects of prescribed

## 2020-03-27 RX ORDER — SERTRALINE HYDROCHLORIDE 100 MG/1
TABLET, FILM COATED ORAL
Qty: 180 TABLET | Refills: 3 | Status: SHIPPED | OUTPATIENT
Start: 2020-03-27 | End: 2020-09-02

## 2020-06-15 RX ORDER — LOSARTAN POTASSIUM 25 MG/1
TABLET ORAL
Qty: 90 TABLET | Refills: 3 | Status: SHIPPED | OUTPATIENT
Start: 2020-06-15 | End: 2020-10-06

## 2020-06-15 RX ORDER — FOLIC ACID 1 MG/1
TABLET ORAL
Qty: 90 TABLET | Refills: 3 | Status: SHIPPED | OUTPATIENT
Start: 2020-06-15 | End: 2021-07-10

## 2020-06-18 RX ORDER — BENZONATATE 100 MG/1
CAPSULE ORAL
Qty: 45 CAPSULE | Refills: 0 | Status: SHIPPED | OUTPATIENT
Start: 2020-06-18 | End: 2021-02-08 | Stop reason: SDUPTHER

## 2020-07-07 ENCOUNTER — OFFICE VISIT (OUTPATIENT)
Dept: FAMILY MEDICINE CLINIC | Age: 60
End: 2020-07-07
Payer: COMMERCIAL

## 2020-07-07 VITALS
HEART RATE: 93 BPM | RESPIRATION RATE: 16 BRPM | HEIGHT: 64 IN | SYSTOLIC BLOOD PRESSURE: 130 MMHG | BODY MASS INDEX: 24.24 KG/M2 | WEIGHT: 142 LBS | TEMPERATURE: 98.3 F | DIASTOLIC BLOOD PRESSURE: 90 MMHG | OXYGEN SATURATION: 97 %

## 2020-07-07 DIAGNOSIS — Z02.83 ENCOUNTER FOR DRUG SCREENING: ICD-10-CM

## 2020-07-07 LAB
AMPHETAMINE SCREEN, URINE: NORMAL
BARBITURATE SCREEN URINE: NORMAL
BENZODIAZEPINE SCREEN, URINE: NORMAL
CANNABINOID SCREEN URINE: NORMAL
COCAINE METABOLITE SCREEN URINE: NORMAL
Lab: NORMAL
METHADONE SCREEN, URINE: NORMAL
OPIATE SCREEN URINE: NORMAL
OXYCODONE URINE: NORMAL
PHENCYCLIDINE SCREEN URINE: NORMAL
PROPOXYPHENE SCREEN: NORMAL

## 2020-07-07 PROCEDURE — G8427 DOCREV CUR MEDS BY ELIG CLIN: HCPCS | Performed by: FAMILY MEDICINE

## 2020-07-07 PROCEDURE — 3017F COLORECTAL CA SCREEN DOC REV: CPT | Performed by: FAMILY MEDICINE

## 2020-07-07 PROCEDURE — G8420 CALC BMI NORM PARAMETERS: HCPCS | Performed by: FAMILY MEDICINE

## 2020-07-07 PROCEDURE — 1036F TOBACCO NON-USER: CPT | Performed by: FAMILY MEDICINE

## 2020-07-07 PROCEDURE — 99214 OFFICE O/P EST MOD 30 MIN: CPT | Performed by: FAMILY MEDICINE

## 2020-07-07 RX ORDER — TRAMADOL HYDROCHLORIDE 50 MG/1
50 TABLET ORAL EVERY 8 HOURS
Qty: 60 TABLET | Refills: 2 | Status: SHIPPED | OUTPATIENT
Start: 2020-07-07 | End: 2020-10-28

## 2020-07-07 RX ORDER — TRAMADOL HYDROCHLORIDE 50 MG/1
1 TABLET ORAL EVERY 8 HOURS
COMMUNITY
Start: 2020-04-21 | End: 2020-07-07 | Stop reason: SDUPTHER

## 2020-07-07 NOTE — PROGRESS NOTES
Patient: Rafat Medeiros    YOB: 1960    Date: 7/7/20    Chief Complaint   Patient presents with    Hypothyroidism     follow up. She is due for labs and mammogram.     Hyperlipidemia     follow up    Medication Refill     Pending       Patient Active Problem List    Diagnosis Date Noted    Diastolic dysfunction     ICD (implantable cardioverter-defibrillator) battery depletion     Hypercholesterolemia 02/28/2019    Premature osteoporosis 07/18/2017    Cardiomyopathy ([address] 03/06/2015    Internal derangement of right knee 02/12/2014    Lumbar radiculitis 02/12/2014    Mixed hyperlipidemia 03/06/2013    Insomnia 07/24/2012    Itching 07/24/2012    Osteoarthritis 07/24/2012    Reflux esophagitis 07/24/2012    COPD (chronic obstructive pulmonary disease) (HCC)     Hepatitis C virus infection      Overview Note:     replace inactive diagnosis      Hypothyroidism        Allergies   Allergen Reactions    Pcn [Penicillins] Anaphylaxis    Demerol Rash    Meperidine Rash       Vitals:    07/07/20 1333 07/07/20 1342   BP: (!) 142/90 (!) 130/90   Site: Right Upper Arm Right Upper Arm   Position: Sitting Sitting   Cuff Size: Small Adult Small Adult   Pulse: 93    Resp: 16    Temp: 98.3 °F (36.8 °C)    TempSrc: Oral    SpO2: 97%    Weight: 142 lb (64.4 kg)    Height: 5' 4\" (1.626 m)       Body mass index is 24.37 kg/m². PHQ Scores 1/28/2020 2/28/2019 1/15/2018 11/16/2016 8/25/2014   PHQ2 Score 0 0 0 0 0   PHQ9 Score 0 0 0 0 0     Interpretation of Total Score Depression Severity: 1-4 = Minimal depression, 5-9 = Mild depression, 10-14 = Moderate depression, 15-19 = Moderately severe depression, 20-27 = Severe depression    HPI    She comes in today to follow-up on her thyroid her lipids and her cholesterol. She feels well and she uses her tramadol occasionally for her needed back pain. Her blood pressure was high and we spent some time talking about this as her blood pressures never been high. Her weights have significantly changed but she is extremely stressed by her mom who is in her 80s and living with the patient. She has been trying to watch her blood pressure away from here and will see her back sooner to follow-up on that. Review of Systems    Constitutional: Negative for fatigue, fever and sweats. HEENT: Negative for eye discharge and vision loss. Negative for ear drainage, hearing loss and nasal drainage. Respiratory: positive for cough, negative for dyspnea and wheezing. Cardiovascular:  Negative for chest pain, claudication and irregular heartbeat/palpitations. Gastrointestinal: Negative for abdominal pain, nausea, constipation and diarrhea. Genitourinary: Negative for dysuria, patient postmenopausal.   Metabolic/Endocrine: Negative for cold intolerance, heat intolerance, polydipsia and polyphagia. No unintended weight loss or weight gain. Neuro/Psychiatric: Negative for gait disturbance. Negative for psychiatric symptoms. Dermatologic: Negative for pruritus and rash. Musculoskeletal: positive for bone/joint symptoms. No numbness or tingling. No loss of function. Hematology: Negative for bleeding and easy bruising. Immunology:  Negative for environmental allergies and food allergies. Physical Exam    Patient's medication, allergies, past medical, surgical, social and family histories were reviewed and updated as appropriate. PHYSICAL EXAM   General appearance: Alert oriented pleasant cooperative no acute distress. Lungs: Clear to auscultation without wheezes rhonchi rales  Heart: Regular rate and rhythm without murmurs rubs or gallops  Extremities: No rashes or edema neurologically intact. Assessment:   Diagnosis Orders   1. Acquired hypothyroidism  TSH Without Reflex    T4, Free   2. Mixed hyperlipidemia  Lipid, Fasting    Comprehensive Metabolic Panel   3. Hypercholesterolemia     4. Visit for screening mammogram  VERO DIGITAL SCREEN W OR WO CAD BILATERAL   5. Chronic pain of right knee  traMADol (ULTRAM) 50 MG tablet   6. Encounter for drug screening  Urine Drug Screen   7. Pre-hypertension     Check blood pressure follow-up if still elevated may need to consider medication            Plan:  Current Outpatient Medications   Medication Sig Dispense Refill    traMADol (ULTRAM) 50 MG tablet Take 1 tablet by mouth every 8 hours for 90 days.  60 tablet 2    neomycin-polymyxin-hydrocortisone (CORTISPORIN) 3.5-02129-3 otic solution 3 drops both ears three times aday 10 mL 0    benzonatate (TESSALON) 100 MG capsule TAKE 1 CAPSULE BY MOUTH EVERY 6 HOURS AS NEEDED FOR COUGH 45 capsule 0    folic acid (FOLVITE) 1 MG tablet TAKE 1 TABLET BY MOUTH EVERY DAY 90 tablet 3    losartan (COZAAR) 25 MG tablet TAKE 1 TABLET BY MOUTH EVERY DAY 90 tablet 3    spironolactone (ALDACTONE) 25 MG tablet TAKE 1/2 (ONE-HALF) OF A TABLET BY MOUTH EVERY DAY 45 tablet 2    levothyroxine (SYNTHROID) 125 MCG tablet TAKE 1 TABLET BY MOUTH EVERY DAY 90 tablet 2    diclofenac (VOLTAREN) 50 MG EC tablet Take 1 tablet by mouth 2 times daily as needed for Pain 90 tablet 1    sertraline (ZOLOFT) 100 MG tablet TAKE 1 TABLET BY MOUTH TWICE DAILY 180 tablet 3    amitriptyline (ELAVIL) 100 MG tablet TAKE 1 TABLET BY MOUTH EVERY NIGHT AT BEDTIME 30 tablet 5    carvedilol (COREG) 3.125 MG tablet TAKE 1 TABLET BY MOUTH TWICE DAILY 180 tablet 3    alendronate (FOSAMAX) 70 MG tablet TAKE 1 TABLET BY MOUTH EVERY 7 DAYS 12 tablet 3    OYSCO 500 + D 500-200 MG-UNIT per tablet TAKE 1 TABLET BY MOUTH TWICE DAILY 180 tablet 5    atorvastatin (LIPITOR) 40 MG tablet TAKE 1 TABLET BY MOUTH DAILY 90 tablet 3    Cholecalciferol (VITAMIN D3) 1000 units CAPS TAKE 1 CAPSULE BY MOUTH DAILY 30 capsule 11    ibuprofen (ADVIL;MOTRIN) 800 MG tablet Take 1 tablet by mouth every 8 hours as needed for Pain 21 tablet 0    calcium carbonate (OSCAL) 500 MG TABS tablet Take 1 tablet by mouth 2 times daily (with meals) 60 tablet 8  hydrOXYzine (ATARAX) 10 MG tablet TAKE 1 TABLET BY MOUTH TWICE DAILY AS NEEDED FOR ITCHING 60 tablet 3    diphenhydrAMINE (BENADRYL) 25 MG capsule TAKE ONE CAPSULE BY MOUTH EVERY DAY AS NEEDED FOR ITCHING 30 capsule 0    omeprazole (PRILOSEC) 20 MG capsule TAKE ONE CAPSULE BY MOUTH TWICE DAILY 60 capsule 11    Multiple Vitamins-Iron (DAILY-PARRIS/IRON/BETA-CAROTENE) TABS TAKE 1 TABLET BY MOUTH EVERY DAY 30 tablet 5    b complex vitamins capsule Take 1 capsule by mouth daily. No current facility-administered medications for this visit. Orders Placed This Encounter   Procedures    VERO DIGITAL SCREEN W OR WO CAD BILATERAL     Standing Status:   Future     Standing Expiration Date:   9/7/2021     Scheduling Instructions:      US if needed     Order Specific Question:   Reason for exam:     Answer:   routine    TSH Without Reflex     Standing Status:   Future     Standing Expiration Date:   7/7/2021    T4, Free     Standing Status:   Future     Standing Expiration Date:   7/7/2021    Lipid, Fasting     Standing Status:   Future     Standing Expiration Date:   7/7/2021    Comprehensive Metabolic Panel     Standing Status:   Future     Standing Expiration Date:   7/7/2021    Urine Drug Screen     Standing Status:   Future     Standing Expiration Date:   7/7/2021       Orders Placed This Encounter   Medications    traMADol (ULTRAM) 50 MG tablet     Sig: Take 1 tablet by mouth every 8 hours for 90 days. Dispense:  60 tablet     Refill:  2     Reduce doses taken as pain becomes manageable    neomycin-polymyxin-hydrocortisone (CORTISPORIN) 3.5-31976-7 otic solution     Sig: 3 drops both ears three times aday     Dispense:  10 mL     Refill:  0              Return in about 3 months (around 10/7/2020) for re check BP.     Dr. Noelle Wen      7/7/20  2:13 PM

## 2020-08-17 NOTE — TELEPHONE ENCOUNTER
Patient is calling in requesting a refill on medication(s):    Requested Prescriptions     Pending Prescriptions Disp Refills    Cholecalciferol (VITAMIN D3) 25 MCG (1000 UT) CAPS 30 capsule 11          Patient's Last Office Visit:  7/7/2020     Patient's Next Visit:  10/6/2020       Patient's Medication Contract:  Medication Contract and Consent for Opioid Use Documents Filed     Patient Documents       Type of Document Status Date Received Received By Description     Medication Contract [Status Missing]  GERA Parson medciation Agreement 7/7/16     Medication Contract [Status Missing]  GERA CRAWFORD medication agreement 7/11/17     Medication Contract [Status Missing]  Beatriz Armendariz 5/12/15 Medication agreement     Medication Contract Received 9/28/2018 11:53 AM GERA CRAWFORD medication agreement 9/27/18     Medication Contract Received 9/9/2019  1:33 PM Fredrick DOZIER Medication Contract 09/06/2019                 Tox Screen:  Urine Drug Screenings (1 yr)     Urine Drug Screen  Collected: 7/7/2020  4:14 PM (Final result)    Complete Results          Urine Drug Screen  Collected: 9/6/2019  3:42 PM (Final result)    Complete Results          Urine Drug Screen  Collected: 9/26/2018  7:54 PM (Final result)    Complete Results          Urine Drug Screen  Collected: 8/9/2017  8:00 PM (Final result)    Complete Results                 Pharmacy:  Please send the medication to the pharmacy listed.       Other Comments:

## 2020-08-19 RX ORDER — MULTIVIT-MIN/IRON/FOLIC ACID/K 18-600-40
CAPSULE ORAL
Qty: 90 TABLET | Refills: 0 | Status: SHIPPED | OUTPATIENT
Start: 2020-08-19 | End: 2022-03-11 | Stop reason: SDUPTHER

## 2020-08-28 ENCOUNTER — OFFICE VISIT (OUTPATIENT)
Dept: CARDIOLOGY CLINIC | Age: 60
End: 2020-08-28
Payer: COMMERCIAL

## 2020-08-28 VITALS
WEIGHT: 140 LBS | OXYGEN SATURATION: 99 % | RESPIRATION RATE: 16 BRPM | BODY MASS INDEX: 24.03 KG/M2 | HEART RATE: 77 BPM | SYSTOLIC BLOOD PRESSURE: 112 MMHG | DIASTOLIC BLOOD PRESSURE: 80 MMHG

## 2020-08-28 DIAGNOSIS — E78.2 MIXED HYPERLIPIDEMIA: ICD-10-CM

## 2020-08-28 DIAGNOSIS — E03.9 ACQUIRED HYPOTHYROIDISM: ICD-10-CM

## 2020-08-28 LAB
ALBUMIN SERPL-MCNC: 4.3 G/DL (ref 3.5–4.6)
ALP BLD-CCNC: 78 U/L (ref 40–130)
ALT SERPL-CCNC: 19 U/L (ref 0–33)
ANION GAP SERPL CALCULATED.3IONS-SCNC: 8 MEQ/L (ref 9–15)
AST SERPL-CCNC: 32 U/L (ref 0–35)
BILIRUB SERPL-MCNC: <0.2 MG/DL (ref 0.2–0.7)
BUN BLDV-MCNC: 14 MG/DL (ref 8–23)
CALCIUM SERPL-MCNC: 8.8 MG/DL (ref 8.5–9.9)
CHLORIDE BLD-SCNC: 104 MEQ/L (ref 95–107)
CHOLESTEROL, FASTING: 167 MG/DL (ref 0–199)
CO2: 27 MEQ/L (ref 20–31)
CREAT SERPL-MCNC: 0.93 MG/DL (ref 0.5–0.9)
GFR AFRICAN AMERICAN: >60
GFR NON-AFRICAN AMERICAN: >60
GLOBULIN: 2.6 G/DL (ref 2.3–3.5)
GLUCOSE BLD-MCNC: 89 MG/DL (ref 70–99)
HDLC SERPL-MCNC: 57 MG/DL (ref 40–59)
LDL CHOLESTEROL CALCULATED: 88 MG/DL (ref 0–129)
POTASSIUM SERPL-SCNC: 4.3 MEQ/L (ref 3.4–4.9)
SODIUM BLD-SCNC: 139 MEQ/L (ref 135–144)
TOTAL PROTEIN: 6.9 G/DL (ref 6.3–8)
TRIGLYCERIDE, FASTING: 109 MG/DL (ref 0–150)
TSH SERPL DL<=0.05 MIU/L-ACNC: 1.33 UIU/ML (ref 0.44–3.86)

## 2020-08-28 PROCEDURE — 99214 OFFICE O/P EST MOD 30 MIN: CPT | Performed by: INTERNAL MEDICINE

## 2020-08-28 PROCEDURE — 3017F COLORECTAL CA SCREEN DOC REV: CPT | Performed by: INTERNAL MEDICINE

## 2020-08-28 PROCEDURE — G8420 CALC BMI NORM PARAMETERS: HCPCS | Performed by: INTERNAL MEDICINE

## 2020-08-28 PROCEDURE — 1036F TOBACCO NON-USER: CPT | Performed by: INTERNAL MEDICINE

## 2020-08-28 PROCEDURE — G8427 DOCREV CUR MEDS BY ELIG CLIN: HCPCS | Performed by: INTERNAL MEDICINE

## 2020-08-28 NOTE — PROGRESS NOTES
Chief Complaint   Patient presents with    6 Month Follow-Up    Cardiomyopathy       3-6-15: Patient presents for initial medical evaluation. Patient is followed on a regular basis by Dr. Margarita Atkins MD. Presents with abn echo with severe new onset CMP, dilated LV, EF of 10-15%. Does complain of mild SOB but no CP. Denies any recent viral infections. No LE edema. No PND or orthopnea. Grandmothers with hx of CMP. Quit smoking 10 yrs ago, used to smoke 1ppd for 30 yrs. 3-27-15: as above, s/p LHC with normal coronaries, EF of 15%, LVEDP of 20mmHg. Compliant with meds. Did not tolerate Lisinopril, did have swelling of her throat. Does have a cough and URI type symptoms. Pt denies chest pain, dyspnea, dyspnea on exertion, change in exercise capacity, fatigue,  nausea, vomiting, diarrhea, constipation, motor weakness, insomnia, weight loss, syncope, dizziness, lightheadedness, palpitations, PND, orthopnea, or claudication. States she's active without any issues. 7-31-15: as above, Pt denies chest pain, dyspnea, dyspnea on exertion, change in exercise capacity, fatigue,  nausea, vomiting, diarrhea, constipation, motor weakness, insomnia, weight loss, syncope, dizziness, lightheadedness, palpitations, PND, orthopnea, or claudication. No LE edema. No change in meds. BP and HR are under control. No recent hospitalizations. States she can clean the house without any issues. Has gained 5 pounds. 8-18-15: as above, s/p MUGA scan with EF of 24%. Does get tired more easily and more SOB than what she used to according to her daughter. She is very active though, cleans her house, run around after grandchildren, up and down stairs without any issues. No LE edema. Compliant with meds.  Pt denies chest pain, dyspnea, dyspnea on exertion, change in exercise capacity, fatigue,  nausea, vomiting, diarrhea, constipation, motor weakness, insomnia, weight loss, syncope, dizziness, lightheadedness, palpitations, PND, orthopnea. BP is under control. 9-24-15: as above, s/p AICD with Dr. Lurcecia Sarmiento. Doing well overall. States SOB and fatigue has improved. No LE edema. Compliant with meds. Pt denies chest pain, dyspnea, dyspnea on exertion, change in exercise capacity, fatigue,  nausea, vomiting, diarrhea, constipation, motor weakness, insomnia, weight loss, syncope, dizziness, lightheadedness, palpitations, PND, orthopnea, or claudication. No further ETOH. BP is good today. 3-24-16: as above, doing well overall. Pt denies chest pain, dyspnea, dyspnea on exertion, change in exercise capacity, fatigue,  nausea, vomiting, diarrhea, constipation, motor weakness, insomnia, weight loss, syncope, dizziness, lightheadedness, palpitations, PND, orthopnea, or claudication. No AICD discharge. No hospitalizations. No LE edema. Compliant with meds and diet. BP is good. CHf is stable. 11-1-16: no AICD shocks or hospitalizations. Pt denies chest pain, dyspnea, dyspnea on exertion, change in exercise capacity, fatigue,  nausea, vomiting, diarrhea, constipation, motor weakness, insomnia, weight loss, syncope, dizziness, lightheadedness, palpitations, PND, orthopnea, or claudication. No nitro use. BP and hr are good. CAD is stable. No LE discoloration or ulcers. No LE edema. No CHF type symptoms. Lipid profile is normal.     12-13-16: Pt denies chest pain, dyspnea, dyspnea on exertion, change in exercise capacity, fatigue,  nausea, vomiting, diarrhea, constipation, motor weakness, insomnia, weight loss, syncope, dizziness, lightheadedness, palpitations, PND, orthopnea, or claudication. No AICD shocks,BP and hr are good. CAD is stable. No LE discoloration or ulcers. No LE edema. No CHF type symptoms. Lipid profile is normal. Compliant with meds. S/p ECHO with EF of 35%, no valve abn.     2-28-20: has not been seen in 4 yrs. Following with EP every 6 months. C/o SOB now.   Pt denies chest pain, d nausea, vomiting, diarrhea, constipation, motor weakness, insomnia, weight loss, syncope, dizziness, lightheadedness, palpitations, PND, orthopnea, or claudication. Hx of NICMP, EF of 35%. Has baseline SOB and hx of COPD. Does not follow up with pulmonary. S/p recent CXR which was ok. Compliant with meds. Denies any LE edema. No orthopnea. SOB is mainly with activity/walking. S/p ECHO in 2/2020 with EF of 50%, grade I DD. EKG with NSR, V paced. 8-28-20: Pt denies chest pain, dyspnea, dyspnea on exertion, change in exercise capacity, fatigue,  nausea, vomiting, diarrhea, constipation, motor weakness, insomnia, weight loss, syncope, dizziness, lightheadedness, palpitations, PND, orthopnea, or claudication. No nitro use. BP and hr are good. CAD is stable. No LE discoloration or ulcers. No LE edema. No CHF type symptoms. Lipid profile is normal. No recent hospitalization. No change in meds. Hx of NICMP, EF of 35% and now recovered to 50% per most recent echo in 2/2020. Hx of AICD, no discharge.        Patient Active Problem List   Diagnosis    COPD (chronic obstructive pulmonary disease) ([address]    Hepatitis C virus infection    Hypothyroidism    Insomnia    Itching    Osteoarthritis    Reflux esophagitis    Mixed hyperlipidemia    Internal derangement of right knee    Lumbar radiculitis    Cardiomyopathy ([address]    Premature osteoporosis    Hypercholesterolemia    ICD (implantable cardioverter-defibrillator) battery depletion    Diastolic dysfunction       Past Surgical History:   Procedure Laterality Date    LEG SURGERY  2009 MASS R LEG SOFT TISSUE    LUMBAR FUSION  2014    Dr. Altaf Rey  09/16/15    Maira Luna MD IMPLANT ICD       Social History     Socioeconomic History    Marital status:      Spouse name: None    Number of children: None    Years of education: None    Highest education level: None   Occupational History    None   Social Needs    Financial resource strain: None    Food insecurity Worry: None     Inability: None    Transportation needs     Medical: None     Non-medical: None   Tobacco Use    Smoking status: Former Smoker     Packs/day: 1.00     Years: 30.00     Pack years: 30.00     Last attempt to quit: 3/5/2005     Years since quitting: 15.4    Smokeless tobacco: Never Used   Substance and Sexual Activity    Alcohol use: No     Comment: ALCOHOLIC    Drug use: No    Sexual activity: Not Currently   Lifestyle    Physical activity     Days per week: None     Minutes per session: None    Stress: None   Relationships    Social connections     Talks on phone: None     Gets together: None     Attends Christianity service: None     Active member of club or organization: None     Attends meetings of clubs or organizations: None     Relationship status: None    Intimate partner violence     Fear of current or ex partner: None     Emotionally abused: None     Physically abused: None     Forced sexual activity: None   Other Topics Concern    None   Social History Narrative    None       Family History   Problem Relation Age of Onset    High Blood Pressure Other     Diabetes Other     Cancer Other         UNCLE-LUNG       Current Outpatient Medications   Medication Sig Dispense Refill    D3-1000 25 MCG (1000 UT) TABS tablet TAKE 1 CAPSULE BY MOUTH DAILY 90 tablet 0    Cholecalciferol (VITAMIN D3) 25 MCG (1000 UT) CAPS TAKE 1 CAPSULE BY MOUTH DAILY 30 capsule 5    omeprazole (PRILOSEC) 20 MG delayed release capsule TAKE 1 CAPSULE BY MOUTH TWICE DAILY 120 capsule 2    traMADol (ULTRAM) 50 MG tablet Take 1 tablet by mouth every 8 hours for 90 days.  60 tablet 2    neomycin-polymyxin-hydrocortisone (CORTISPORIN) 3.5-81322-6 otic solution 3 drops both ears three times aday 10 mL 0    benzonatate (TESSALON) 100 MG capsule TAKE 1 CAPSULE BY MOUTH EVERY 6 HOURS AS NEEDED FOR COUGH 45 capsule 0    folic acid (FOLVITE) 1 MG tablet TAKE 1 TABLET BY MOUTH EVERY DAY 90 tablet 3    losartan no abdominal pain, change in bowel habits, or black or bloody stools  Genito-Urinary ROS: no dysuria, trouble voiding, or hematuria  Musculoskeletal ROS: negative  Neurological ROS: no TIA or stroke symptoms  Dermatological ROS: negative    VITALS:  Blood pressure 112/80, pulse 77, resp. rate 16, weight 140 lb (63.5 kg), SpO2 99 %, not currently breastfeeding. Body mass index is 24.03 kg/m². Physical Examination:  General appearance - alert, well appearing, and in no distress  Mental status - alert, oriented to person, place, and time  Neck - Neck is supple, no JVD or carotid bruits. No thyromegaly or adenopathy. Chest - clear to auscultation, no wheezes, rales or rhonchi, symmetric air entry  Heart - normal rate, regular rhythm, normal S1, S2, no murmurs, rubs, clicks or gallops  Abdomen - soft, nontender, nondistended, no masses or organomegaly  Neurological - alert, oriented, normal speech, no focal findings or movement disorder noted  Extremities - peripheral pulses normal, no pedal edema, no clubbing or cyanosis  Skin - normal coloration and turgor, no rashes, no suspicious skin lesions noted    AICD incision is normal.     No orders of the defined types were placed in this encounter. ASSESSMENT:     Diagnosis Orders   1. Cardiomyopathy, unspecified type ([address]     2. Hypercholesterolemia     3. ICD (implantable cardioverter-defibrillator) battery depletion     4. Diastolic dysfunction           PLAN:     Patient will need to continue to follow up with you for their general medical care     As always, aggressive risk factor modification is strongly recommended. We should adhere to the [address] guidelines for HTN management and the NCEP ATP III guidelines for LDL-C management. Cardiac diet is always recommended with low fat, cholesterol, calories and sodium. Continue medications at current doses.      The importance of daily weights, dietary sodium restriction, and contact with cardiology if weight is increased more than 3 lbs in any 48 hour period was stressed. The patient has been advised to contact us if they experience progressive SOB, orthopnea, PND or progressive edema. Patient was advised and encouraged to check blood pressure at home or at a pharmacy, maintain a logbook, and also call us back if blood pressure are above the target ranges or if it is low. Patient clearly understands and agrees to the instructions. We will need to continue to monitor muscle and liver enzymes, BUN, CR, and electrolytes. Details of medical condition explained and patient was warned about adverse consequences of uncontrolled medical conditions and possible side effects of prescribed medications. Patient was advised to go to the ER if he starts experiencing adverse effects of the medications. patient was instructed to call us back or go to nearby emergency room immediately if symptoms get worse or do not improve. Thank you for allowing me to participate in the care of your patient, please don't hesitate to contact me if you have any further questions.

## 2020-08-29 LAB — T4 FREE: 1.31 NG/DL (ref 0.84–1.68)

## 2020-09-01 RX ORDER — AMITRIPTYLINE HYDROCHLORIDE 100 MG/1
TABLET, FILM COATED ORAL
Qty: 90 TABLET | Refills: 5 | Status: SHIPPED | OUTPATIENT
Start: 2020-09-01 | End: 2020-09-02

## 2020-09-01 RX ORDER — CARVEDILOL 3.12 MG/1
TABLET ORAL
Qty: 180 TABLET | Refills: 3 | Status: SHIPPED | OUTPATIENT
Start: 2020-09-01 | End: 2020-09-02

## 2020-09-01 NOTE — TELEPHONE ENCOUNTER
[facility] is calling in requesting a refill on medication(s). Requested Prescriptions     Pending Prescriptions Disp Refills    amitriptyline (ELAVIL) 100 MG tablet 30 tablet 5     Sig: TAKE 1 TABLET BY MOUTH EVERY NIGHT AT BEDTIME    carvedilol (COREG) 3.125 MG tablet 180 tablet 3          Patient's Last Office Visit:  7/7/2020     Patient's Next Visit:  10/6/2020     Patient's Medication Contract:  Medication Contract and Consent for Opioid Use Documents Filed     Patient Documents       Type of Document Status Date Received Received By Description     Medication Contract [Status Missing]  GERA Parson medciation Agreement 7/7/16     Medication Contract [Status Missing]  GERA Parson medication agreement 7/11/17     Medication Contract [Status Missing]  Burak Rodas 5/12/15 Medication agreement     Medication Contract Received 9/28/2018 11:53 AM GERA CRAWFORD medication agreement 9/27/18     Medication Contract Received 9/9/2019  1:33 PM Diane DOZIER Medication Contract 09/06/2019                 Tox Screen:  Urine Drug Screenings (1 yr)     Urine Drug Screen  Collected: 7/7/2020  4:14 PM (Final result)    Complete Results          Urine Drug Screen  Collected: 9/6/2019  3:42 PM (Final result)    Complete Results          Urine Drug Screen  Collected: 9/26/2018  7:54 PM (Final result)    Complete Results          Urine Drug Screen  Collected: 8/9/2017  8:00 PM (Final result)    Complete Results                 Pharmacy:  Please send the medication to the pharmacy listed.       Other Comments:

## 2020-09-02 RX ORDER — CARVEDILOL 3.12 MG/1
TABLET ORAL
Qty: 180 TABLET | Refills: 3 | Status: SHIPPED | OUTPATIENT
Start: 2020-09-02 | End: 2021-09-19 | Stop reason: SDUPTHER

## 2020-09-02 RX ORDER — ATORVASTATIN CALCIUM 40 MG/1
40 TABLET, FILM COATED ORAL DAILY
Qty: 180 TABLET | Refills: 3 | Status: SHIPPED | OUTPATIENT
Start: 2020-09-02 | End: 2021-07-06 | Stop reason: SDUPTHER

## 2020-09-02 RX ORDER — AMITRIPTYLINE HYDROCHLORIDE 100 MG/1
TABLET, FILM COATED ORAL
Qty: 30 TABLET | Refills: 5 | Status: SHIPPED | OUTPATIENT
Start: 2020-09-02 | End: 2021-06-21

## 2020-09-02 RX ORDER — SERTRALINE HYDROCHLORIDE 100 MG/1
TABLET, FILM COATED ORAL
Qty: 180 TABLET | Refills: 3 | Status: SHIPPED | OUTPATIENT
Start: 2020-09-02 | End: 2021-07-06 | Stop reason: SDUPTHER

## 2020-09-22 ENCOUNTER — HOSPITAL ENCOUNTER (OUTPATIENT)
Dept: WOMENS IMAGING | Age: 60
Discharge: HOME | End: 2020-09-24
Payer: COMMERCIAL

## 2020-09-22 PROCEDURE — 77067 SCR MAMMO BI INCL CAD: CPT

## 2020-10-06 ENCOUNTER — OFFICE VISIT (OUTPATIENT)
Dept: FAMILY MEDICINE CLINIC | Age: 60
End: 2020-10-06
Payer: COMMERCIAL

## 2020-10-06 VITALS
TEMPERATURE: 97.9 F | BODY MASS INDEX: 24.07 KG/M2 | HEIGHT: 64 IN | SYSTOLIC BLOOD PRESSURE: 122 MMHG | OXYGEN SATURATION: 97 % | RESPIRATION RATE: 11 BRPM | DIASTOLIC BLOOD PRESSURE: 86 MMHG | WEIGHT: 141 LBS | HEART RATE: 89 BPM

## 2020-10-06 PROCEDURE — G8482 FLU IMMUNIZE ORDER/ADMIN: HCPCS | Performed by: FAMILY MEDICINE

## 2020-10-06 PROCEDURE — G8420 CALC BMI NORM PARAMETERS: HCPCS | Performed by: FAMILY MEDICINE

## 2020-10-06 PROCEDURE — 90471 IMMUNIZATION ADMIN: CPT | Performed by: FAMILY MEDICINE

## 2020-10-06 PROCEDURE — 99213 OFFICE O/P EST LOW 20 MIN: CPT | Performed by: FAMILY MEDICINE

## 2020-10-06 PROCEDURE — 1036F TOBACCO NON-USER: CPT | Performed by: FAMILY MEDICINE

## 2020-10-06 PROCEDURE — G8427 DOCREV CUR MEDS BY ELIG CLIN: HCPCS | Performed by: FAMILY MEDICINE

## 2020-10-06 PROCEDURE — 90688 IIV4 VACCINE SPLT 0.5 ML IM: CPT | Performed by: FAMILY MEDICINE

## 2020-10-06 PROCEDURE — 3017F COLORECTAL CA SCREEN DOC REV: CPT | Performed by: FAMILY MEDICINE

## 2020-10-06 RX ORDER — LOSARTAN POTASSIUM 50 MG/1
50 TABLET ORAL DAILY
Qty: 90 TABLET | Refills: 1 | Status: SHIPPED | OUTPATIENT
Start: 2020-10-06 | End: 2021-04-25

## 2020-10-06 NOTE — PROGRESS NOTES
Patient: Kieran Beck    YOB: 1960    Date: 10/6/20    Chief Complaint   Patient presents with    3 Month Follow-Up     addressed getting flu shot pt will think about it makes her sick    Hypertension       Patient Active Problem List    Diagnosis Date Noted    Diastolic dysfunction     ICD (implantable cardioverter-defibrillator) battery depletion     Hypercholesterolemia 02/28/2019    Premature osteoporosis 07/18/2017    Cardiomyopathy [facility] 03/06/2015    Internal derangement of right knee 02/12/2014    Lumbar radiculitis 02/12/2014    Mixed hyperlipidemia 03/06/2013    Insomnia 07/24/2012    Itching 07/24/2012    Osteoarthritis 07/24/2012    Reflux esophagitis 07/24/2012    COPD (chronic obstructive pulmonary disease) ([facility]     Hepatitis C virus infection      Overview Note:     replace inactive diagnosis      Hypothyroidism        Allergies   Allergen Reactions    Pcn [Penicillins] Anaphylaxis    Demerol Rash    Meperidine Rash       Vitals:    10/06/20 1256 10/06/20 1335   BP: (!) 152/98 122/86   Site:  Left Upper Arm   Position:  Sitting   Cuff Size:  Small Adult   Pulse: 89    Resp: 11    Temp: 97.9 °F (36.6 °C)    SpO2: 97%    Weight: 141 lb (64 kg)    Height: 5' 4\" (1.626 m)       Body mass index is 24.2 kg/m². PHQ Scores 1/28/2020 2/28/2019 1/15/2018 11/16/2016 8/25/2014   PHQ2 Score 0 0 0 0 0   PHQ9 Score 0 0 0 0 0     Interpretation of Total Score Depression Severity: 1-4 = Minimal depression, 5-9 = Mild depression, 10-14 = Moderate depression, 15-19 = Moderately severe depression, 20-27 = Severe depression            HPI    She comes in today to follow-up on her blood pressure which is still elevated but it did come down after she was sitting here for a while. I am going to start her on blood pressure medication.   She still short of breath and she says that her cardiologist are not concerned with her heart function as a cause for this they recommend she look into her lung function she was a former smoker. She is otherwise well and has no complaints she agrees to a flu shot    Review of Systems    Constitutional: Negative for fatigue, fever and sweats. HEENT: Negative for eye discharge and vision loss. Negative for ear drainage, hearing loss and nasal drainage. Respiratory: Negative for cough, dyspnea and wheezing. Cardiovascular:  Negative for chest pain, claudication and irregular heartbeat/palpitations. Gastrointestinal: Negative for abdominal pain, nausea, constipation and diarrhea. Genitourinary: Negative for dysuria, hematuria, polyuria, dysmenorrhea, menorrhagia and vaginal discharge. Metabolic/Endocrine: Negative for cold intolerance, heat intolerance, polydipsia and polyphagia. No unintended weight loss or weight gain. Neuro/Psychiatric: Negative for gait disturbance. Negative for psychiatric symptoms. Dermatologic: Negative for pruritus and rash. Musculoskeletal: Negative for bone/joint symptoms. No numbness or tingling. No loss of function. Hematology: Negative for bleeding and easy bruising. Immunology:  Negative for environmental allergies and food allergies. .    Physical Exam    Patient's medication, allergies, past medical, surgical, social and family histories were reviewed and updated as appropriate. PHYSICAL EXAM   General appearance: Alert oriented pleasant cooperative in no acute distress. Lungs: Clear to auscultation without wheezes rhonchi or rales she does seem to have diminished breath sounds throughout with not a lot of chest excursion  Heart: Regular rate and rhythm without murmurs rubs or gallops          Assessment:   Diagnosis Orders   1. Hypertension, unspecified type  losartan (COZAAR) 50 MG tablet like her blood pressure to be more consistently regularly low so I will increase her losartan to 50 mg a day   2. SOB (shortness of breath)  Full PFT Study With Bronchodilator   3.  Need for influenza vaccination  Myriam Munguia, 3 YRS AND OLDER, IM, MDV, 0.5ML (Antonietta Goodpasture)               Plan:  Current Outpatient Medications   Medication Sig Dispense Refill    losartan (COZAAR) 50 MG tablet Take 1 tablet by mouth daily 90 tablet 1    amitriptyline (ELAVIL) 100 MG tablet TAKE 1 TABLET BY MOUTH EVERY NIGHT AT BEDTIME 30 tablet 5    atorvastatin (LIPITOR) 40 MG tablet TAKE 1 TABLET BY MOUTH DAILY 180 tablet 3    carvedilol (COREG) 3.125 MG tablet TAKE 1 TABLET BY MOUTH TWICE DAILY 180 tablet 3    sertraline (ZOLOFT) 100 MG tablet TAKE 1 TABLET BY MOUTH TWICE DAILY 180 tablet 3    D3-1000 25 MCG (1000 UT) TABS tablet TAKE 1 CAPSULE BY MOUTH DAILY 90 tablet 0    Cholecalciferol (VITAMIN D3) 25 MCG (1000 UT) CAPS TAKE 1 CAPSULE BY MOUTH DAILY 30 capsule 5    omeprazole (PRILOSEC) 20 MG delayed release capsule TAKE 1 CAPSULE BY MOUTH TWICE DAILY 120 capsule 2    neomycin-polymyxin-hydrocortisone (CORTISPORIN) 3.5-00254-4 otic solution 3 drops both ears three times aday 10 mL 0    benzonatate (TESSALON) 100 MG capsule TAKE 1 CAPSULE BY MOUTH EVERY 6 HOURS AS NEEDED FOR COUGH 45 capsule 0    folic acid (FOLVITE) 1 MG tablet TAKE 1 TABLET BY MOUTH EVERY DAY 90 tablet 3    spironolactone (ALDACTONE) 25 MG tablet TAKE 1/2 (ONE-HALF) OF A TABLET BY MOUTH EVERY DAY 45 tablet 2    levothyroxine (SYNTHROID) 125 MCG tablet TAKE 1 TABLET BY MOUTH EVERY DAY 90 tablet 2    diclofenac (VOLTAREN) 50 MG EC tablet Take 1 tablet by mouth 2 times daily as needed for Pain 90 tablet 1    alendronate (FOSAMAX) 70 MG tablet TAKE 1 TABLET BY MOUTH EVERY 7 DAYS 12 tablet 3    OYSCO 500 + D 500-200 MG-UNIT per tablet TAKE 1 TABLET BY MOUTH TWICE DAILY 180 tablet 5    ibuprofen (ADVIL;MOTRIN) 800 MG tablet Take 1 tablet by mouth every 8 hours as needed for Pain 21 tablet 0    calcium carbonate (OSCAL) 500 MG TABS tablet Take 1 tablet by mouth 2 times daily (with meals) 60 tablet 8    hydrOXYzine (ATARAX) 10 MG tablet TAKE 1 TABLET BY MOUTH TWICE DAILY AS NEEDED FOR ITCHING 60 tablet 3    diphenhydrAMINE (BENADRYL) 25 MG capsule TAKE ONE CAPSULE BY MOUTH EVERY DAY AS NEEDED FOR ITCHING 30 capsule 0    Multiple Vitamins-Iron (DAILY-PARRIS/IRON/BETA-CAROTENE) TABS TAKE 1 TABLET BY MOUTH EVERY DAY 30 tablet 5    b complex vitamins capsule Take 1 capsule by mouth daily. No current facility-administered medications for this visit. Orders Placed This Encounter   Procedures    INFLUENZA, QUADV, 3 YRS AND OLDER, IM, MDV, 0.5ML (AFLURIA QUADV)    Full PFT Study With Bronchodilator     Standing Status:   Future     Standing Expiration Date:   10/6/2021       Orders Placed This Encounter   Medications    losartan (COZAAR) 50 MG tablet     Sig: Take 1 tablet by mouth daily     Dispense:  90 tablet     Refill:  1              Return in about 8 weeks (around 12/1/2020).     Dr. Chandler Friday      10/6/20  1:49 PM

## 2020-10-08 ENCOUNTER — TELEPHONE (OUTPATIENT)
Dept: CARDIOLOGY CLINIC | Age: 60
End: 2020-10-08

## 2020-10-08 NOTE — TELEPHONE ENCOUNTER
The patient was seen Tuesday by her PCP Dr. Liana Bolanos who at that visit increased her dose of Losartan to 50mg daily instead of the 25mg daily she was on previously for better blood pressure control but the patient wanted to check with your first before she started the new dosing. Thank you.

## 2020-10-23 ENCOUNTER — HOSPITAL ENCOUNTER (OUTPATIENT)
Dept: PULMONOLOGY | Age: 60
Discharge: HOME | End: 2020-10-23
Payer: COMMERCIAL

## 2020-10-23 PROCEDURE — 94060 EVALUATION OF WHEEZING: CPT | Performed by: INTERNAL MEDICINE

## 2020-10-23 PROCEDURE — 94726 PLETHYSMOGRAPHY LUNG VOLUMES: CPT | Performed by: INTERNAL MEDICINE

## 2020-10-23 PROCEDURE — 6360000002 HC RX W HCPCS: Performed by: FAMILY MEDICINE

## 2020-10-23 PROCEDURE — 94726 PLETHYSMOGRAPHY LUNG VOLUMES: CPT

## 2020-10-23 PROCEDURE — 94060 EVALUATION OF WHEEZING: CPT

## 2020-10-23 PROCEDURE — 94729 DIFFUSING CAPACITY: CPT | Performed by: INTERNAL MEDICINE

## 2020-10-23 PROCEDURE — 94729 DIFFUSING CAPACITY: CPT

## 2020-10-23 RX ORDER — ALBUTEROL SULFATE 2.5 MG/3ML
2.5 SOLUTION RESPIRATORY (INHALATION) ONCE
Status: COMPLETED | OUTPATIENT
Start: 2020-10-23 | End: 2020-10-23

## 2020-10-23 RX ADMIN — ALBUTEROL SULFATE 2.5 MG: 2.5 SOLUTION RESPIRATORY (INHALATION) at 13:45

## 2020-10-27 RX ORDER — TIOTROPIUM BROMIDE 18 UG/1
18 CAPSULE ORAL; RESPIRATORY (INHALATION) DAILY
Qty: 90 CAPSULE | Refills: 1 | Status: SHIPPED | OUTPATIENT
Start: 2020-10-27 | End: 2021-05-23

## 2020-10-27 NOTE — PROCEDURES
Complete pulmonary function testing were done on this 63year-old patient who's height is 64 inches and weight is 140 pounds with 30-year smoking history    Indications  Dyspnea and cough    Spirometry  FVC is 2.78 liters which is 84% of predicted, FEV1 is 1.69 liters which is 66% of predicted, FEV1/FVC ratio is 60% which is moderately decreased   Flow volume loop suggest moderate obstructive pattern, FEF 25-75% is 0.72 L/Sec, which is 31% of predicted  Following bronchodilator therapy minimal improvement noted in terminal airflow otherwise no significant reversibility    Lung volumes  Done by body plethysmography and showed a total lung capacity of 5.16 liters which is 102% of predicted, residual volume is 2.33 liters which is 116% of predicted  RV/TLC ratio is 45% which is slightly increased     Diffusion capacity  Is 10.05 ml/Min/mmHg which is severely decreased at 46% of predicted    Specific airway resistance is slightly increased, specific airway conductance is normal    Overall impression  The study is consistent with moderate obstructive ventilatory impairment with minimal reversibility after bronchodilators associated with severe diffusion abnormality suggesting emphysema, clinical correlation is needed    Electronically signed by Boris Mora MD, [facility] on 10/27/2020 at 12:05 PM

## 2020-10-28 RX ORDER — TRAMADOL HYDROCHLORIDE 50 MG/1
50 TABLET ORAL EVERY 8 HOURS
Qty: 60 TABLET | Refills: 0 | Status: SHIPPED | OUTPATIENT
Start: 2020-10-28 | End: 2020-12-01 | Stop reason: SDUPTHER

## 2020-10-28 NOTE — TELEPHONE ENCOUNTER
----- Message from Michelle Angelo MD sent at 10/26/2020  9:40 AM EDT -----  Please check if the pulmonologist interpretation is coming on the PFT. Thanks.

## 2020-10-28 NOTE — TELEPHONE ENCOUNTER
----- Message from Gilbert Douglas MD sent at 10/26/2020  9:40 AM EDT -----  Please check if the pulmonologist interpretation is coming on the PFT. Thanks.

## 2020-12-01 ENCOUNTER — VIRTUAL VISIT (OUTPATIENT)
Dept: FAMILY MEDICINE CLINIC | Age: 60
End: 2020-12-01
Payer: COMMERCIAL

## 2020-12-01 PROCEDURE — 99214 OFFICE O/P EST MOD 30 MIN: CPT | Performed by: FAMILY MEDICINE

## 2020-12-01 RX ORDER — OMEPRAZOLE 20 MG/1
20 CAPSULE, DELAYED RELEASE ORAL 2 TIMES DAILY
Qty: 180 CAPSULE | Refills: 3 | Status: SHIPPED | OUTPATIENT
Start: 2020-12-01 | End: 2021-11-26 | Stop reason: SDUPTHER

## 2020-12-01 RX ORDER — MULTIVITAMIN WITH IRON
1 TABLET ORAL DAILY
Qty: 90 TABLET | Refills: 3 | Status: SHIPPED | OUTPATIENT
Start: 2020-12-01

## 2020-12-01 RX ORDER — TRAMADOL HYDROCHLORIDE 50 MG/1
50 TABLET ORAL EVERY 8 HOURS
Qty: 60 TABLET | Refills: 1 | Status: SHIPPED | OUTPATIENT
Start: 2020-12-01 | End: 2021-02-08 | Stop reason: SDUPTHER

## 2020-12-01 NOTE — PROGRESS NOTES
Patient: Kenny Wild    YOB: 1960    Date: 12/1/20    Chief Complaint   Patient presents with    1 Month Follow-Up       Patient Active Problem List    Diagnosis Date Noted    Diastolic dysfunction     ICD (implantable cardioverter-defibrillator) battery depletion     Hypercholesterolemia 02/28/2019    Premature osteoporosis 07/18/2017    Cardiomyopathy ([facility] 03/06/2015    Internal derangement of right knee 02/12/2014    Lumbar radiculitis 02/12/2014    Mixed hyperlipidemia 03/06/2013    Insomnia 07/24/2012    Itching 07/24/2012    Osteoarthritis 07/24/2012    Reflux esophagitis 07/24/2012    COPD (chronic obstructive pulmonary disease) ([facility]     Hepatitis C virus infection      Overview Note:     replace inactive diagnosis      Hypothyroidism        Allergies   Allergen Reactions    Pcn [Penicillins] Anaphylaxis    Demerol Rash    Meperidine Rash           There were no vitals filed for this visit. There is no height or weight on file to calculate BMI. HPI    TELEHEALTH EVALUATION -- Audio/Visual (During [id number] public health emergency        Due to COVID 19 outbreak, patient's office visit was converted to a virtual visit. The patient was identified at the start of the visit. Patient was contacted and agreed to proceed with a virtual visit via Telephone Visit Time spent in visit with management in direct patient care 21 minutes. The risks and benefits of converting to a virtual visit were discussed in light of the current infectious disease epidemic. Patient also understood that insurance coverage and co-pays are up to their individual insurance plans and this is a billable visit.     Pursuant to the emergency declaration under the [facility], [address] [place] [address] and the [employer] General Act, this Virtual  Visit was conducted, with patient's consent, to reduce the patient's risk of exposure to COVID-19 and provide continuity of care for an established patient. Services were provided through a phone discussion virtually to substitute for in-person clinic visit. The patient was located home and myself, the provider is located office. Patient candida ovalle (:  58 ) has requested an audio/video evaluation for the following concern(s):     HPI:    She is feeling better and tolerating the Spiriva. We discussed her breathing test and she was confused as to why her exam and her chest x-rays were not showing a COPD but the breathing test did. Her chronic cough as well as weakness in that direction and she is feeling better. She is otherwise without complaints except that she is fallen twice in the last few months. Both times she has been standing on a chair and lost her balance. She not knocked herself out she has some scrapes and some contusions but she is otherwise well no nausea vomiting headache malaise just a little stiff and soreness. Review of Systems    Constitutional: Negative for fatigue, fever and sweats. HEENT: Negative for eye discharge and vision loss. Negative for ear drainage, hearing loss and nasal drainage. Respiratory: Negative for cough, dyspnea and wheezing. Cardiovascular:  Negative for chest pain, claudication and irregular heartbeat/palpitations. Gastrointestinal: Negative for abdominal pain, nausea, constipation and diarrhea. Genitourinary: Negative for dysuria, hematuria, polyuria, dysmenorrhea, menorrhagia and vaginal discharge. Metabolic/Endocrine: Negative for cold intolerance, heat intolerance, polydipsia and polyphagia. No unintended weight loss or weight gain. Neuro/Psychiatric: Negative for gait disturbance. Negative for psychiatric symptoms. Dermatologic: Negative for pruritus and rash. Musculoskeletal: Negative for bone/joint symptoms. No numbness or tingling. No loss of function.   Hematology: Negative for bleeding and easy bruising. Immunology:  Negative for environmental allergies and food allergies. Physical Exam    Not able to be done as this was a phone visit. Patient was alert, oriented, appropriate, not SOB with talking and not in distress. Assessment:   Diagnosis Orders   1. Fall at home, initial encounter   recovering   2. Viral cardiomyopathy ([facility]   stable   3. Chronic obstructive pulmonary disease, unspecified COPD type ([facility]   on Spiriva is helping her breathing   4. Chronic pain of right knee  traMADol (ULTRAM) 50 MG tablet              Plan:  Current Outpatient Medications   Medication Sig Dispense Refill    Multiple Vitamins-Iron (DAILY-PARRIS/IRON/BETA-CAROTENE) TABS Take 1 tablet by mouth Daily 90 tablet 3    traMADol (ULTRAM) 50 MG tablet Take 1 tablet by mouth every 8 hours for 120 doses.  60 tablet 1    omeprazole (PRILOSEC) 20 MG delayed release capsule Take 1 capsule by mouth 2 times daily 180 capsule 3    diclofenac (VOLTAREN) 50 MG EC tablet Take 1 tablet by mouth 2 times daily as needed for Pain 90 tablet 1    tiotropium (SPIRIVA HANDIHALER) 18 MCG inhalation capsule Inhale 1 capsule into the lungs daily 90 capsule 1    losartan (COZAAR) 50 MG tablet Take 1 tablet by mouth daily 90 tablet 1    amitriptyline (ELAVIL) 100 MG tablet TAKE 1 TABLET BY MOUTH EVERY NIGHT AT BEDTIME 30 tablet 5    atorvastatin (LIPITOR) 40 MG tablet TAKE 1 TABLET BY MOUTH DAILY 180 tablet 3    carvedilol (COREG) 3.125 MG tablet TAKE 1 TABLET BY MOUTH TWICE DAILY 180 tablet 3    sertraline (ZOLOFT) 100 MG tablet TAKE 1 TABLET BY MOUTH TWICE DAILY 180 tablet 3    D3-1000 25 MCG (1000 UT) TABS tablet TAKE 1 CAPSULE BY MOUTH DAILY 90 tablet 0    Cholecalciferol (VITAMIN D3) 25 MCG (1000 UT) CAPS TAKE 1 CAPSULE BY MOUTH DAILY 30 capsule 5    neomycin-polymyxin-hydrocortisone (CORTISPORIN) 3.5-21222-7 otic solution 3 drops both ears three times aday 10 mL 0    benzonatate (TESSALON) 100 MG capsule TAKE 1 CAPSULE BY MOUTH EVERY 6 HOURS AS NEEDED FOR COUGH 45 capsule 0    folic acid (FOLVITE) 1 MG tablet TAKE 1 TABLET BY MOUTH EVERY DAY 90 tablet 3    spironolactone (ALDACTONE) 25 MG tablet TAKE 1/2 (ONE-HALF) OF A TABLET BY MOUTH EVERY DAY 45 tablet 2    levothyroxine (SYNTHROID) 125 MCG tablet TAKE 1 TABLET BY MOUTH EVERY DAY 90 tablet 2    alendronate (FOSAMAX) 70 MG tablet TAKE 1 TABLET BY MOUTH EVERY 7 DAYS 12 tablet 3    OYSCO 500 + D 500-200 MG-UNIT per tablet TAKE 1 TABLET BY MOUTH TWICE DAILY 180 tablet 5    ibuprofen (ADVIL;MOTRIN) 800 MG tablet Take 1 tablet by mouth every 8 hours as needed for Pain 21 tablet 0    calcium carbonate (OSCAL) 500 MG TABS tablet Take 1 tablet by mouth 2 times daily (with meals) 60 tablet 8    hydrOXYzine (ATARAX) 10 MG tablet TAKE 1 TABLET BY MOUTH TWICE DAILY AS NEEDED FOR ITCHING 60 tablet 3    diphenhydrAMINE (BENADRYL) 25 MG capsule TAKE ONE CAPSULE BY MOUTH EVERY DAY AS NEEDED FOR ITCHING 30 capsule 0    b complex vitamins capsule Take 1 capsule by mouth daily. No current facility-administered medications for this visit. No orders of the defined types were placed in this encounter. Orders Placed This Encounter   Medications    Multiple Vitamins-Iron (DAILY-PARRIS/IRON/BETA-CAROTENE) TABS     Sig: Take 1 tablet by mouth Daily     Dispense:  90 tablet     Refill:  3    traMADol (ULTRAM) 50 MG tablet     Sig: Take 1 tablet by mouth every 8 hours for 120 doses. Dispense:  60 tablet     Refill:  1     Reduce doses taken as pain becomes manageable    omeprazole (PRILOSEC) 20 MG delayed release capsule     Sig: Take 1 capsule by mouth 2 times daily     Dispense:  180 capsule     Refill:  3             Return in about 6 months (around 6/1/2021).     Dr. Makeda Franklin      12/1/20  3:26 PM

## 2021-01-04 ENCOUNTER — VIRTUAL VISIT (OUTPATIENT)
Dept: FAMILY MEDICINE CLINIC | Age: 61
End: 2021-01-04
Payer: COMMERCIAL

## 2021-01-04 DIAGNOSIS — S46.012D TRAUMATIC TEAR OF LEFT ROTATOR CUFF, UNSPECIFIED TEAR EXTENT, SUBSEQUENT ENCOUNTER: Primary | ICD-10-CM

## 2021-01-04 PROCEDURE — 3017F COLORECTAL CA SCREEN DOC REV: CPT | Performed by: FAMILY MEDICINE

## 2021-01-04 PROCEDURE — G8427 DOCREV CUR MEDS BY ELIG CLIN: HCPCS | Performed by: FAMILY MEDICINE

## 2021-01-04 PROCEDURE — 99213 OFFICE O/P EST LOW 20 MIN: CPT | Performed by: FAMILY MEDICINE

## 2021-01-04 ASSESSMENT — PATIENT HEALTH QUESTIONNAIRE - PHQ9
1. LITTLE INTEREST OR PLEASURE IN DOING THINGS: 0
SUM OF ALL RESPONSES TO PHQ QUESTIONS 1-9: 0
SUM OF ALL RESPONSES TO PHQ QUESTIONS 1-9: 0
SUM OF ALL RESPONSES TO PHQ9 QUESTIONS 1 & 2: 0

## 2021-01-04 NOTE — PROGRESS NOTES
Patient: Katelynn Choi    YOB: 1960    Date: 1/4/21    Chief Complaint   Patient presents with    Joint Pain     left shoulder       Patient Active Problem List    Diagnosis Date Noted    Diastolic dysfunction     ICD (implantable cardioverter-defibrillator) battery depletion     Hypercholesterolemia 02/28/2019    Premature osteoporosis 07/18/2017    Cardiomyopathy ([facility] 03/06/2015    Internal derangement of right knee 02/12/2014    Lumbar radiculitis 02/12/2014    Mixed hyperlipidemia 03/06/2013    Insomnia 07/24/2012    Itching 07/24/2012    Osteoarthritis 07/24/2012    Reflux esophagitis 07/24/2012    COPD (chronic obstructive pulmonary disease) ([facility]     Hepatitis C virus infection      Overview Note:     replace inactive diagnosis      Hypothyroidism        Allergies   Allergen Reactions    Pcn [Penicillins] Anaphylaxis    Demerol Rash    Meperidine Rash           There were no vitals filed for this visit. There is no height or weight on file to calculate BMI. HPI    TELEHEALTH EVALUATION -- Audio/Visual (During [id number] public health emergency        Due to COVID 19 outbreak, patient's office visit was converted to a virtual visit. The patient was identified at the start of the visit. Patient was contacted and agreed to proceed with a virtual visit via Doxy. me Time spent in visit with management in direct patient care 22 minutes. The risks and benefits of converting to a virtual visit were discussed in light of the current infectious disease epidemic. Patient also understood that insurance coverage and co-pays are up to their individual insurance plans and this is a billable visit. Pursuant to the emergency declaration under the [facility], [facility] waiver authority and the [employer] Act, this Virtual  Visit was conducted, with patient's consent, to reduce the patient's risk of exposure to COVID-19 and provide continuity of care for an established patient. Services were provided through a video discussion virtually to substitute for in-person clinic visit. The patient was located home and myself, the provider is located home. Patient candida ovalle (:  58 ) has requested an audio/video evaluation for the following concern(s):     HPI:  Pt fell off of her couch a month ago, see previous visit, and her left shoulder is still very painful. Has tried NSAID'S and home PT and it is no better. It is weak and has limited ROM. No numbness or tingling and fore arm and hand normal. No previous history of injury on that side. Review of Systems    Constitutional: Negative for fatigue, fever and sweats. HEENT: Negative for eye discharge and vision loss. Negative for ear drainage, hearing loss and nasal drainage. Respiratory: Negative for cough, dyspnea and wheezing. Cardiovascular:  Negative for chest pain, claudication and irregular heartbeat/palpitations. Gastrointestinal: Negative for abdominal pain, nausea, constipation and diarrhea. Genitourinary: Negative for dysuria, hematuria, polyuria, dysmenorrhea, menorrhagia and vaginal discharge. Metabolic/Endocrine: Negative for cold intolerance, heat intolerance, polydipsia and polyphagia. No unintended weight loss or weight gain. Neuro/Psychiatric: Negative for gait disturbance. Negative for psychiatric symptoms. Dermatologic: Negative for pruritus and rash. Musculoskeletal: pos for bone/joint symptoms. No numbness or tingling. No loss of function. Hematology: Negative for bleeding and easy bruising. Immunology:  Negative for environmental allergies and food allergies. Physical Exam    Patient seen on video and is alert, comfortable in no acute distress. The patient is comfortable and appropriate and oriented. Able to speak without difficulty. Pt moving shoulder but seems to have limitations on ROM. Assessment:   Diagnosis Orders   1. Traumatic tear of left rotator cuff, unspecified tear extent, subsequent encounter  XR SHOULDER LEFT (MIN 2 VIEWS)    MRI SHOULDER LEFT [address]  [facility], [place]              Plan:  Current Outpatient Medications   Medication Sig Dispense Refill    alendronate (FOSAMAX) 70 MG tablet TAKE 1 TABLET BY MOUTH EVERY 7 DAYS 12 tablet 0    Multiple Vitamins-Iron (DAILY-PARRIS/IRON/BETA-CAROTENE) TABS Take 1 tablet by mouth Daily 90 tablet 3    traMADol (ULTRAM) 50 MG tablet Take 1 tablet by mouth every 8 hours for 120 doses.  60 tablet 1    omeprazole (PRILOSEC) 20 MG delayed release capsule Take 1 capsule by mouth 2 times daily 180 capsule 3    diclofenac (VOLTAREN) 50 MG EC tablet Take 1 tablet by mouth 2 times daily as needed for Pain 90 tablet 1    tiotropium (SPIRIVA HANDIHALER) 18 MCG inhalation capsule Inhale 1 capsule into the lungs daily 90 capsule 1    losartan (COZAAR) 50 MG tablet Take 1 tablet by mouth daily 90 tablet 1    amitriptyline (ELAVIL) 100 MG tablet TAKE 1 TABLET BY MOUTH EVERY NIGHT AT BEDTIME 30 tablet 5    atorvastatin (LIPITOR) 40 MG tablet TAKE 1 TABLET BY MOUTH DAILY 180 tablet 3    carvedilol (COREG) 3.125 MG tablet TAKE 1 TABLET BY MOUTH TWICE DAILY 180 tablet 3    sertraline (ZOLOFT) 100 MG tablet TAKE 1 TABLET BY MOUTH TWICE DAILY 180 tablet 3    D3-1000 25 MCG (1000 UT) TABS tablet TAKE 1 CAPSULE BY MOUTH DAILY 90 tablet 0    Cholecalciferol (VITAMIN D3) 25 MCG (1000 UT) CAPS TAKE 1 CAPSULE BY MOUTH DAILY 30 capsule 5  neomycin-polymyxin-hydrocortisone (CORTISPORIN) 3.5-55960-2 otic solution 3 drops both ears three times aday 10 mL 0    benzonatate (TESSALON) 100 MG capsule TAKE 1 CAPSULE BY MOUTH EVERY 6 HOURS AS NEEDED FOR COUGH 45 capsule 0    folic acid (FOLVITE) 1 MG tablet TAKE 1 TABLET BY MOUTH EVERY DAY 90 tablet 3    spironolactone (ALDACTONE) 25 MG tablet TAKE 1/2 (ONE-HALF) OF A TABLET BY MOUTH EVERY DAY 45 tablet 2    levothyroxine (SYNTHROID) 125 MCG tablet TAKE 1 TABLET BY MOUTH EVERY DAY 90 tablet 2    OYSCO 500 + D 500-200 MG-UNIT per tablet TAKE 1 TABLET BY MOUTH TWICE DAILY 180 tablet 5    ibuprofen (ADVIL;MOTRIN) 800 MG tablet Take 1 tablet by mouth every 8 hours as needed for Pain 21 tablet 0    calcium carbonate (OSCAL) 500 MG TABS tablet Take 1 tablet by mouth 2 times daily (with meals) 60 tablet 8    hydrOXYzine (ATARAX) 10 MG tablet TAKE 1 TABLET BY MOUTH TWICE DAILY AS NEEDED FOR ITCHING 60 tablet 3    diphenhydrAMINE (BENADRYL) 25 MG capsule TAKE ONE CAPSULE BY MOUTH EVERY DAY AS NEEDED FOR ITCHING 30 capsule 0    b complex vitamins capsule Take 1 capsule by mouth daily. No current facility-administered medications for this visit. Orders Placed This Encounter   Procedures    XR SHOULDER LEFT (MIN 2 VIEWS)     Standing Status:   Future     Standing Expiration Date:   1/4/2022    MRI SHOULDER LEFT WO CONTRAST     Standing Status:   Future     Standing Expiration Date:   1/4/2022   [address] [facility]     Referral Priority:   Routine     Referral Type:   Eval and Treat     Referral Reason:   Specialty Services Required     Requested Specialty:   Orthopedic Surgery     Number of Visits Requested:   1       No orders of the defined types were placed in this encounter. Return if symptoms worsen or fail to improve.     Dr. Enrico Crow      1/4/21  10:59 AM

## 2021-01-14 ENCOUNTER — TELEPHONE (OUTPATIENT)
Dept: FAMILY MEDICINE CLINIC | Age: 61
End: 2021-01-14

## 2021-02-08 DIAGNOSIS — G89.29 CHRONIC PAIN OF RIGHT KNEE: ICD-10-CM

## 2021-02-08 DIAGNOSIS — M25.561 CHRONIC PAIN OF RIGHT KNEE: ICD-10-CM

## 2021-02-08 RX ORDER — BENZONATATE 100 MG/1
CAPSULE ORAL
Qty: 45 CAPSULE | Refills: 0 | Status: ON HOLD | OUTPATIENT
Start: 2021-02-08 | End: 2021-09-03

## 2021-02-08 RX ORDER — SPIRONOLACTONE 25 MG/1
TABLET ORAL
Qty: 45 TABLET | Refills: 2 | Status: SHIPPED | OUTPATIENT
Start: 2021-02-08 | End: 2021-11-09 | Stop reason: SDUPTHER

## 2021-02-08 RX ORDER — TRAMADOL HYDROCHLORIDE 50 MG/1
50 TABLET ORAL EVERY 8 HOURS
Qty: 60 TABLET | Refills: 1 | Status: SHIPPED | OUTPATIENT
Start: 2021-02-08 | End: 2021-04-27 | Stop reason: SDUPTHER

## 2021-02-08 NOTE — TELEPHONE ENCOUNTER
Patient requesting medication refill. Please approve or deny this request.    Rx requested:  Requested Prescriptions     Pending Prescriptions Disp Refills    traMADol (ULTRAM) 50 MG tablet 60 tablet 1     Sig: Take 1 tablet by mouth every 8 hours for 120 doses.          Last Office Visit:   1/4/2021      Next Visit Date:  Future Appointments   Date Time Provider Nidia Thomas   3/12/2021 12:00 PM Feliciano Devine, [address]

## 2021-02-10 ENCOUNTER — TELEPHONE (OUTPATIENT)
Dept: FAMILY MEDICINE CLINIC | Age: 61
End: 2021-02-10

## 2021-02-10 DIAGNOSIS — S46.012D TRAUMATIC TEAR OF LEFT ROTATOR CUFF, UNSPECIFIED TEAR EXTENT, SUBSEQUENT ENCOUNTER: Primary | ICD-10-CM

## 2021-02-10 NOTE — TELEPHONE ENCOUNTER
Radiology department calling \[facility]\"" doctor ordered a MRI left shoulder. Due to her pace maker Dr. Asia Alicia recommends a CT left shoulder Arthrogram.  Please put in new order.

## 2021-02-18 RX ORDER — LEVOTHYROXINE SODIUM 0.12 MG/1
TABLET ORAL
Qty: 90 TABLET | Refills: 2 | Status: SHIPPED | OUTPATIENT
Start: 2021-02-18 | End: 2021-06-21 | Stop reason: SDUPTHER

## 2021-03-09 ENCOUNTER — HOSPITAL ENCOUNTER (OUTPATIENT)
Dept: ORTHOPEDIC SURGERY | Age: 61
Discharge: HOME | End: 2021-03-11
Payer: COMMERCIAL

## 2021-03-09 ENCOUNTER — OFFICE VISIT (OUTPATIENT)
Dept: ORTHOPEDIC SURGERY | Age: 61
End: 2021-03-09
Payer: COMMERCIAL

## 2021-03-09 VITALS
OXYGEN SATURATION: 99 % | HEIGHT: 64 IN | TEMPERATURE: 97.3 F | BODY MASS INDEX: 24.07 KG/M2 | HEART RATE: 78 BPM | WEIGHT: 141 LBS

## 2021-03-09 DIAGNOSIS — M75.52 SUBACROMIAL BURSITIS OF LEFT SHOULDER JOINT: ICD-10-CM

## 2021-03-09 DIAGNOSIS — G89.29 CHRONIC LEFT SHOULDER PAIN: Primary | ICD-10-CM

## 2021-03-09 DIAGNOSIS — M25.512 CHRONIC LEFT SHOULDER PAIN: ICD-10-CM

## 2021-03-09 DIAGNOSIS — M75.42 SUBACROMIAL IMPINGEMENT OF LEFT SHOULDER: ICD-10-CM

## 2021-03-09 DIAGNOSIS — M25.512 CHRONIC LEFT SHOULDER PAIN: Primary | ICD-10-CM

## 2021-03-09 DIAGNOSIS — G89.29 CHRONIC LEFT SHOULDER PAIN: ICD-10-CM

## 2021-03-09 PROCEDURE — 20610 DRAIN/INJ JOINT/BURSA W/O US: CPT | Performed by: ORTHOPAEDIC SURGERY

## 2021-03-09 PROCEDURE — G8427 DOCREV CUR MEDS BY ELIG CLIN: HCPCS | Performed by: ORTHOPAEDIC SURGERY

## 2021-03-09 PROCEDURE — 1036F TOBACCO NON-USER: CPT | Performed by: ORTHOPAEDIC SURGERY

## 2021-03-09 PROCEDURE — 99204 OFFICE O/P NEW MOD 45 MIN: CPT | Performed by: ORTHOPAEDIC SURGERY

## 2021-03-09 PROCEDURE — 73030 X-RAY EXAM OF SHOULDER: CPT | Performed by: ORTHOPAEDIC SURGERY

## 2021-03-09 PROCEDURE — 3017F COLORECTAL CA SCREEN DOC REV: CPT | Performed by: ORTHOPAEDIC SURGERY

## 2021-03-09 PROCEDURE — G8482 FLU IMMUNIZE ORDER/ADMIN: HCPCS | Performed by: ORTHOPAEDIC SURGERY

## 2021-03-09 PROCEDURE — 73030 X-RAY EXAM OF SHOULDER: CPT

## 2021-03-09 PROCEDURE — G8420 CALC BMI NORM PARAMETERS: HCPCS | Performed by: ORTHOPAEDIC SURGERY

## 2021-03-09 RX ORDER — MELOXICAM 7.5 MG/1
7.5 TABLET ORAL DAILY
Qty: 30 TABLET | Refills: 5 | Status: ON HOLD | OUTPATIENT
Start: 2021-03-09 | End: 2021-09-03

## 2021-03-09 RX ORDER — LIDOCAINE HYDROCHLORIDE 10 MG/ML
2 INJECTION, SOLUTION INFILTRATION; PERINEURAL ONCE
Status: COMPLETED | OUTPATIENT
Start: 2021-03-09 | End: 2021-03-09

## 2021-03-09 RX ORDER — METHYLPREDNISOLONE ACETATE 80 MG/ML
80 INJECTION, SUSPENSION INTRA-ARTICULAR; INTRALESIONAL; INTRAMUSCULAR; SOFT TISSUE ONCE
Status: COMPLETED | OUTPATIENT
Start: 2021-03-09 | End: 2021-03-09

## 2021-03-09 RX ADMIN — LIDOCAINE HYDROCHLORIDE 2 ML: 10 INJECTION, SOLUTION INFILTRATION; PERINEURAL at 13:50

## 2021-03-09 RX ADMIN — METHYLPREDNISOLONE ACETATE 80 MG: 80 INJECTION, SUSPENSION INTRA-ARTICULAR; INTRALESIONAL; INTRAMUSCULAR; SOFT TISSUE at 13:51

## 2021-03-09 NOTE — PROGRESS NOTES
Subjective:      Patient ID: Henrique Carrillo is a 63 y.o. female who presents today for:  Chief Complaint   Patient presents with    Shoulder Injury     Traumatic tear of left rotator cuff,  has unspecified tear extent. She injured her shoulder around Thanksgiving. Pain is an 8/10. She has not tried any medication to help. has not had an x-ray or an MR     HPI:    The patient presents for evaluation of left shoulder pain which has been present for approximately 6 months. Does have history of prior left shoulder injury with clavicle fracture which went on to uneventful union. Notes that she has pain with overhead lifting and reaching. Pain is both anteriorly and posteriorly based on the left side and rated 8/10. Denies numbness or tingling and has no neck pain as such. Has attempted ibuprofen intermittently. Icing sporadically and notes pain is most pronounced at night.     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    Cardiomyopathy ([address] 3/6/2015    COPD (chronic obstructive pulmonary disease) (HCC)     COPD (chronic obstructive pulmonary disease) (HCC)     Dental disease     SEVERE DENTAL PROBLEMS    Diastolic dysfunction     DJD (degenerative joint disease), lumbar     Dyslipidemia 3/6/2015    Hepatitis B infection VIRAL-2006    Hepatitis C without mention of hepatic coma 2007-CHEMO    History of alcoholism ([address]     History of osteopenia 2009    Hypothyroidism     ICD (implantable cardioverter-defibrillator) battery depletion     Tongue cancer [facility])      Past Surgical History:   Procedure Laterality Date    LEG SURGERY  2009 MASS R LEG SOFT TISSUE    LUMBAR FUSION  2014    Dr. Bailee Ellis HISTORY  09/16/15    Elaine Frey MD IMPLANT ICD     Social History     Socioeconomic History    Marital status:      Spouse name: Not on file    Number of children: Not on file    Years of education: Not on file    Highest education level: Not on file   Occupational History    Not on file   Social Needs    Financial resource strain: Not on file    Food insecurity     Worry: Not on file     Inability: Not on file    Transportation needs     Medical: Not on file     Non-medical: Not on file   Tobacco Use    Smoking status: Former Smoker     Packs/day: 1.00     Years: 30.00     Pack years: 30.00     Quit date: 3/5/2005     Years since quittin.0    Smokeless tobacco: Never Used   Substance and Sexual Activity    Alcohol use: No     Comment: ALCOHOLIC    Drug use: No    Sexual activity: Not Currently   Lifestyle    Physical activity     Days per week: Not on file     Minutes per session: Not on file    Stress: Not on file   Relationships    Social connections     Talks on phone: Not on file     Gets together: Not on file     Attends Confucianism service: Not on file     Active member of club or organization: Not on file     Attends meetings of clubs or organizations: Not on file     Relationship status: Not on file    Intimate partner violence     Fear of current or ex partner: Not on file     Emotionally abused: Not on file     Physically abused: Not on file     Forced sexual activity: Not on file   Other Topics Concern    Not on file   Social History Narrative    Not on file     Family History   Problem Relation Age of Onset    High Blood Pressure Other     Diabetes Other     Cancer Other         UNCLE-LUNG     Allergies   Allergen Reactions    Pcn [Penicillins] Anaphylaxis    Demerol Rash    Meperidine Rash     Current Outpatient Medications on File Prior to Visit   Medication Sig Dispense Refill    alendronate (FOSAMAX) 70 MG tablet TAKE 1 TABLET BY MOUTH EVERY 7 DAYS 12 tablet 3    levothyroxine (SYNTHROID) 125 MCG tablet TAKE 1 TABLET BY MOUTH EVERY DAY 90 tablet 2    spironolactone (ALDACTONE) 25 MG tablet TAKE 1/2 (ONE-HALF) OF A TABLET BY MOUTH EVERY DAY 45 tablet 2    benzonatate (TESSALON) 100 MG capsule TAKE 1 CAPSULE BY MOUTH EVERY 6 HOURS AS NEEDED FOR COUGH 45 capsule 0    neomycin-polymyxin-hydrocortisone (CORTISPORIN) 3.5-34303-4 otic solution 3 drops both ears three times a day as needed for itch 10 mL 1    traMADol (ULTRAM) 50 MG tablet Take 1 tablet by mouth every 8 hours for 120 doses.  60 tablet 1    Multiple Vitamins-Iron (DAILY-PARRIS/IRON/BETA-CAROTENE) TABS Take 1 tablet by mouth Daily 90 tablet 3    omeprazole (PRILOSEC) 20 MG delayed release capsule Take 1 capsule by mouth 2 times daily 180 capsule 3    diclofenac (VOLTAREN) 50 MG EC tablet Take 1 tablet by mouth 2 times daily as needed for Pain 90 tablet 1    tiotropium (SPIRIVA HANDIHALER) 18 MCG inhalation capsule Inhale 1 capsule into the lungs daily 90 capsule 1    losartan (COZAAR) 50 MG tablet Take 1 tablet by mouth daily 90 tablet 1    amitriptyline (ELAVIL) 100 MG tablet TAKE 1 TABLET BY MOUTH EVERY NIGHT AT BEDTIME 30 tablet 5    atorvastatin (LIPITOR) 40 MG tablet TAKE 1 TABLET BY MOUTH DAILY 180 tablet 3    carvedilol (COREG) 3.125 MG tablet TAKE 1 TABLET BY MOUTH TWICE DAILY 180 tablet 3    sertraline (ZOLOFT) 100 MG tablet TAKE 1 TABLET BY MOUTH TWICE DAILY 180 tablet 3    D3-1000 25 MCG (1000 UT) TABS tablet TAKE 1 CAPSULE BY MOUTH DAILY 90 tablet 0    Cholecalciferol (VITAMIN D3) 25 MCG (1000 UT) CAPS TAKE 1 CAPSULE BY MOUTH DAILY 30 capsule 5    folic acid (FOLVITE) 1 MG tablet TAKE 1 TABLET BY MOUTH EVERY DAY 90 tablet 3    OYSCO 500 + D 500-200 MG-UNIT per tablet TAKE 1 TABLET BY MOUTH TWICE DAILY 180 tablet 5    ibuprofen (ADVIL;MOTRIN) 800 MG tablet Take 1 tablet by mouth every 8 hours as needed for Pain 21 tablet 0    calcium carbonate (OSCAL) 500 MG TABS tablet Take 1 tablet by mouth 2 times daily (with meals) 60 tablet 8    hydrOXYzine (ATARAX) 10 MG tablet TAKE 1 TABLET BY MOUTH TWICE DAILY AS NEEDED FOR ITCHING 60 tablet 3    diphenhydrAMINE (BENADRYL) 25 MG capsule TAKE ONE CAPSULE BY MOUTH EVERY DAY AS NEEDED FOR ITCHING 30 capsule 0    b complex vitamins capsule Take 1 capsule by mouth daily. No current facility-administered medications on file prior to visit. Acute and Chronic Pain Monitoring:   RX Monitoring 2/8/2021   Attestation -   Acute Pain Prescriptions -   Periodic Controlled Substance Monitoring Possible medication side effects, risk of tolerance/dependence & alternative treatments discussed. ;No signs of potential drug abuse or diversion identified. Chronic Pain > 80 MEDD -         Objective--Physical Examination:     Pulse 78   Temp 97.3 °F (36.3 °C) (Temporal)   Ht 5' 4\" (1.626 m)   Wt 141 lb (64 kg)   SpO2 99%   BMI 24.20 kg/m²     Physical Examination:  Pleasant 69-year-old female in mild to moderate distress, alert and cooperative. Cervical range of motion full and well-tolerated with negative Lhermitte and Spurling maneuvers, no pain with lateral flexion or rotation. Examination left upper extremity reveals that she is able to fire the anterior, posterior, lateral heads of the left deltoid. No scapular winging or atrophy. She is able to actively forward flex left shoulder to 130 degrees with 4+/5 strength, limited by apprehension. She abduction the left shoulder to 90 degrees with 4+/5 strength, also limited by apprehension and pain. No scapular winging or atrophy. No discomfort over the trapezius. Negative sulcus sign. Nontender to palpation over the lateral aspect of the shoulder including the deltoid. Maxwell and Neer's tests are associated with discomfort. Crossarm test positive. Hopeton's test negative. Sensory intact light touch along the lateral aspect of the shoulder as well as in the radial, median, ulnar nerve distributions. Radial pulse 2+ with capillary refill less than 2 seconds.    strength, finger abduction, EPL strength, biceps, triceps strength are 5/5.    Radiographs and Laboratory Studies:     Diagnostic Imaging Studies:    Xr Shoulder Left (min 2 Views)    Result Date: 3/9/2021  AP, Grashey, scapular Y view of the left shoulder were obtained to evaluate left shoulder pain. Demonstrate remote distal clavicle fracture with progression to union. Acromioclavicular joint is intact with some widening which appears chronic. Acromiohumeral interval is borderline but nonetheless preserved at 10 mm. Glenohumeral joint is concentric. No soft tissue calcifications. Impression: Healed left distal clavicle fracture (remote), intact glenohumeral articulation. Laboratory Studies:   Lab Results   Component Value Date    WBC 5.5 09/14/2015    HGB 12.5 09/14/2015    HCT 38.4 09/14/2015    MCV 95.3 09/14/2015     09/14/2015     No results found for: SEDRATE  No results found for: CRP    Assessment / Plan:      Diagnosis Orders   1. Chronic left shoulder pain  XR SHOULDER LEFT (MIN 2 VIEWS)    OR ARTHROCENTESIS ASPIR&/INJ MAJOR JT/BURSA W/O US    meloxicam (MOBIC) 7.5 MG tablet   2. Subacromial impingement of left shoulder     3.  Subacromial bursitis of left shoulder joint        Orders Placed This Encounter   Procedures    XR SHOULDER LEFT (MIN 2 VIEWS)     Standing Status:   Future     Number of Occurrences:   1     Standing Expiration Date:   3/9/2022     Scheduling Instructions:      AP, Grashey, scapular Y of left shoulder     Order Specific Question:   Reason for exam:     Answer:   shoulder pain    OR ARTHROCENTESIS ASPIR&/INJ MAJOR JT/BURSA W/O US     Orders Placed This Encounter   Medications    lidocaine 1 % injection 2 mL    methylPREDNISolone acetate (DEPO-MEDROL) injection 80 mg    meloxicam (MOBIC) 7.5 MG tablet     Sig: Take 1 tablet by mouth daily     Dispense:  30 tablet     Refill:  5       -Chronic left shoulder pain with components of subacromial impingement, subacromial bursitis, rotator cuff inflammation: Options for structured conservative management were discussed with patient. Has a pacemaker in place so unable to obtain MRI scan, but may be candidate for CT arthrogram at some point in the future if further delineation of underlying anatomic correlates as necessary. Discussed daily anti-inflammatory use and she was provided with a prescription for meloxicam.  History of chronic renal insufficiency noted but last creatinine was 0.93 (normal 0.9). Also has topical anti-inflammatory available but has not been utilizing this frequently. May utilize Tylenol intermittently for pain and ice for 15-20 minutes every 4 hours. Discussed option of subacromial injection for palliative purposes and she was agreeable to that today. Discussed option of further physical therapy which she is declined as she has had extensive physical therapy in the past.    Procedures Performed Today:     Procedure: Subacromial steroid injection of left shoulder: The risks, benefits, alternatives, procedure, and convalescence for corticosteroid injection of left shoulder subacromial bursa was discussed with patient. The risk discussed included, but were not limited to, infection, limited efficacy, blanching of the skin, localized atrophy of the adipose and muscle tissue, localized contusion and swelling, and persistent or recurrent discomfort. Patient verbalized understanding and agreed to proceed after all questions were answered. The patient was injected in seated position with the left upper arm adducted against the torso. The lateral and posterolateral aspects of the shoulder were prepped with Betadine solution. Topical cold spray was applied at the planned injection site. An injection consisting of 1 mL of Depo-Medrol and 2 mL of 1% lidocaine was administered from an entry point along posterior lateral extent of the acromion using a syringe and a 21-gauge needle directed toward the coracoid.   Hemostasis was achieved using direct pressure, and the

## 2021-03-09 NOTE — PROGRESS NOTES
A timeout was performed immediately prior to the start of the left shoulder injection procedure and included the correct patient (two identifiers), correct procedure and correct site(s). Procedure consent and allergies were also verified. Saray Gooden, 1960, and allergies have been confirmed. Patient is aware that injection is to be given in Left shoulder. she is aware that they will be seeing Dr. Christiano Robbins and the injection will be given by him.

## 2021-03-12 ENCOUNTER — OFFICE VISIT (OUTPATIENT)
Dept: CARDIOLOGY CLINIC | Age: 61
End: 2021-03-12
Payer: COMMERCIAL

## 2021-03-12 VITALS
SYSTOLIC BLOOD PRESSURE: 118 MMHG | WEIGHT: 139 LBS | DIASTOLIC BLOOD PRESSURE: 86 MMHG | BODY MASS INDEX: 23.86 KG/M2 | RESPIRATION RATE: 16 BRPM | OXYGEN SATURATION: 99 % | HEART RATE: 87 BPM

## 2021-03-12 DIAGNOSIS — E78.2 MIXED HYPERLIPIDEMIA: ICD-10-CM

## 2021-03-12 DIAGNOSIS — Z45.02 ICD (IMPLANTABLE CARDIOVERTER-DEFIBRILLATOR) BATTERY DEPLETION: ICD-10-CM

## 2021-03-12 DIAGNOSIS — B33.24 VIRAL CARDIOMYOPATHY (HCC): Primary | ICD-10-CM

## 2021-03-12 PROCEDURE — 3017F COLORECTAL CA SCREEN DOC REV: CPT | Performed by: INTERNAL MEDICINE

## 2021-03-12 PROCEDURE — G8427 DOCREV CUR MEDS BY ELIG CLIN: HCPCS | Performed by: INTERNAL MEDICINE

## 2021-03-12 PROCEDURE — 99213 OFFICE O/P EST LOW 20 MIN: CPT | Performed by: INTERNAL MEDICINE

## 2021-03-12 PROCEDURE — G8482 FLU IMMUNIZE ORDER/ADMIN: HCPCS | Performed by: INTERNAL MEDICINE

## 2021-03-12 PROCEDURE — 93000 ELECTROCARDIOGRAM COMPLETE: CPT | Performed by: INTERNAL MEDICINE

## 2021-03-12 PROCEDURE — G8420 CALC BMI NORM PARAMETERS: HCPCS | Performed by: INTERNAL MEDICINE

## 2021-03-12 PROCEDURE — 1036F TOBACCO NON-USER: CPT | Performed by: INTERNAL MEDICINE

## 2021-03-12 NOTE — PROGRESS NOTES
Chief Complaint   Patient presents with    Cardiomyopathy    Hyperlipidemia     8/28/20- LDL 88       3-6-15: Patient presents for initial medical evaluation. Patient is followed on a regular basis by Dr. Claudean Rosales, MD. Presents with abn echo with severe new onset CMP, dilated LV, EF of 10-15%. Does complain of mild SOB but no CP. Denies any recent viral infections. No LE edema. No PND or orthopnea. Grandmothers with hx of CMP. Quit smoking 10 yrs ago, used to smoke 1ppd for 30 yrs. 3-27-15: as above, s/p LHC with normal coronaries, EF of 15%, LVEDP of 20mmHg. Compliant with meds. Did not tolerate Lisinopril, did have swelling of her throat. Does have a cough and URI type symptoms. Pt denies chest pain, dyspnea, dyspnea on exertion, change in exercise capacity, fatigue,  nausea, vomiting, diarrhea, constipation, motor weakness, insomnia, weight loss, syncope, dizziness, lightheadedness, palpitations, PND, orthopnea, or claudication. States she's active without any issues. 7-31-15: as above, Pt denies chest pain, dyspnea, dyspnea on exertion, change in exercise capacity, fatigue,  nausea, vomiting, diarrhea, constipation, motor weakness, insomnia, weight loss, syncope, dizziness, lightheadedness, palpitations, PND, orthopnea, or claudication. No LE edema. No change in meds. BP and HR are under control. No recent hospitalizations. States she can clean the house without any issues. Has gained 5 pounds. 8-18-15: as above, s/p MUGA scan with EF of 24%. Does get tired more easily and more SOB than what she used to according to her daughter. She is very active though, cleans her house, run around after grandchildren, up and down stairs without any issues. No LE edema. Compliant with meds.  Pt denies chest pain, dyspnea, dyspnea on exertion, change in exercise capacity, fatigue,  nausea, vomiting, diarrhea, constipation, motor weakness, insomnia, weight loss, syncope, dizziness, lightheadedness, palpitations, PND, orthopnea. BP is under control. 9-24-15: as above, s/p AICD with Dr. Alli Ritter. Doing well overall. States SOB and fatigue has improved. No LE edema. Compliant with meds. Pt denies chest pain, dyspnea, dyspnea on exertion, change in exercise capacity, fatigue,  nausea, vomiting, diarrhea, constipation, motor weakness, insomnia, weight loss, syncope, dizziness, lightheadedness, palpitations, PND, orthopnea, or claudication. No further ETOH. BP is good today. 3-24-16: as above, doing well overall. Pt denies chest pain, dyspnea, dyspnea on exertion, change in exercise capacity, fatigue,  nausea, vomiting, diarrhea, constipation, motor weakness, insomnia, weight loss, syncope, dizziness, lightheadedness, palpitations, PND, orthopnea, or claudication. No AICD discharge. No hospitalizations. No LE edema. Compliant with meds and diet. BP is good. CHf is stable. 11-1-16: no AICD shocks or hospitalizations. Pt denies chest pain, dyspnea, dyspnea on exertion, change in exercise capacity, fatigue,  nausea, vomiting, diarrhea, constipation, motor weakness, insomnia, weight loss, syncope, dizziness, lightheadedness, palpitations, PND, orthopnea, or claudication. No nitro use. BP and hr are good. CAD is stable. No LE discoloration or ulcers. No LE edema. No CHF type symptoms. Lipid profile is normal.     12-13-16: Pt denies chest pain, dyspnea, dyspnea on exertion, change in exercise capacity, fatigue,  nausea, vomiting, diarrhea, constipation, motor weakness, insomnia, weight loss, syncope, dizziness, lightheadedness, palpitations, PND, orthopnea, or claudication. No AICD shocks,BP and hr are good. CAD is stable. No LE discoloration or ulcers. No LE edema. No CHF type symptoms. Lipid profile is normal. Compliant with meds. S/p ECHO with EF of 35%, no valve abn.     2-28-20: has not been seen in 4 yrs. Following with EP every 6 months. C/o SOB now.   Pt denies chest pain, d nausea, vomiting, diarrhea, constipation, motor weakness, insomnia, weight loss, syncope, dizziness, lightheadedness, palpitations, PND, orthopnea, or claudication. Hx of NICMP, EF of 35%. Has baseline SOB and hx of COPD. Does not follow up with pulmonary. S/p recent CXR which was ok. Compliant with meds. Denies any LE edema. No orthopnea. SOB is mainly with activity/walking. S/p ECHO in 2/2020 with EF of 50%, grade I DD. EKG with NSR, V paced. 8-28-20: Pt denies chest pain, dyspnea, dyspnea on exertion, change in exercise capacity, fatigue,  nausea, vomiting, diarrhea, constipation, motor weakness, insomnia, weight loss, syncope, dizziness, lightheadedness, palpitations, PND, orthopnea, or claudication. No nitro use. BP and hr are good. CAD is stable. No LE discoloration or ulcers. No LE edema. No CHF type symptoms. Lipid profile is normal. No recent hospitalization. No change in meds. Hx of NICMP, EF of 35% and now recovered to 50% per most recent echo in 2/2020. Hx of AICD, no discharge. 3-12-21: Patient is doing well overall. She denies any recent hospitalizations or any major symptoms of angina or CHF. She has a history of nonischemic cardiomyopathy ejection fraction of 35% that is now recovered with most recent echo showing an ejection fraction of 50%. She has a history of defibrillator but no shocks. Pressure is wonderful today. eKG with normal sinus rhythm, V paced. She is following with the EP./dR Vazquez. Pt denies chest pain, dyspnea, dyspnea on exertion, change in exercise capacity, fatigue,  nausea, vomiting, diarrhea, constipation, motor weakness, insomnia, weight loss, syncope, dizziness, lightheadedness, palpitations, PND, orthopnea, or claudication.     Patient Active Problem List   Diagnosis    COPD [facility]chronic obstructive [facility] disease) ([facility] [address]    Hepatitis C virus infection    Hypothyroidism    Insomnia    Itching    Osteoarthritis    Reflux esophagitis    Mixed hyperlipidemia    Internal derangement of right knee    Lumbar radiculitis    Cardiomyopathy ([address]    Premature osteoporosis    Hypercholesterolemia    ICD (implantable cardioverter-defibrillator) battery depletion    Diastolic dysfunction    Chronic left shoulder pain    Subacromial impingement of left shoulder    Subacromial bursitis of left shoulder joint       Past Surgical History:   Procedure Laterality Date    LEG SURGERY  2009 MASS R LEG SOFT TISSUE    LUMBAR FUSION      Dr. Marcie Bains  09/16/15    Shubham Fairbanks MD IMPLANT ICD       Social History     Socioeconomic History    Marital status:      Spouse name: None    Number of children: None    Years of education: None    Highest education level: None   Occupational History    None   Social Needs    Financial resource strain: None    Food insecurity     Worry: None     Inability: None    Transportation needs     Medical: None     Non-medical: None   Tobacco Use    Smoking status: Former Smoker     Packs/day: 1.00     Years: 30.00     Pack years: 30.00     Quit date: 3/5/2005     Years since quittin.0    Smokeless tobacco: Never Used   Substance and Sexual Activity    Alcohol use: No     Comment: ALCOHOLIC    Drug use: No    Sexual activity: Not Currently   Lifestyle    Physical activity     Days per week: None     Minutes per session: None    Stress: None   Relationships    Social connections     Talks on phone: None     Gets together: None     Attends Yazidi service: None     Active member of club or organization: None     Attends meetings of clubs or organizations: None     Relationship status: None    Intimate partner violence     Fear of current or ex partner: None     Emotionally abused: None     Physically abused: None     Forced sexual activity: None   Other Topics Concern    None   Social History Narrative    None       Family History   Problem Relation Age of Onset    High Blood Pressure Other     Diabetes Other     Cancer Other         UNCLE-LUNG       Current Outpatient Medications   Medication Sig Dispense Refill    meloxicam (MOBIC) 7.5 MG tablet Take 1 tablet by mouth daily 30 tablet 5    alendronate (FOSAMAX) 70 MG tablet TAKE 1 TABLET BY MOUTH EVERY 7 DAYS 12 tablet 3    levothyroxine (SYNTHROID) 125 MCG tablet TAKE 1 TABLET BY MOUTH EVERY DAY 90 tablet 2    spironolactone (ALDACTONE) 25 MG tablet TAKE 1/2 (ONE-HALF) OF A TABLET BY MOUTH EVERY DAY 45 tablet 2    neomycin-polymyxin-hydrocortisone (CORTISPORIN) 3.5-39489-6 otic solution 3 drops both ears three times a day as needed for itch 10 mL 1    traMADol (ULTRAM) 50 MG tablet Take 1 tablet by mouth every 8 hours for 120 doses.  60 tablet 1    Multiple Vitamins-Iron (DAILY-PARRIS/IRON/BETA-CAROTENE) TABS Take 1 tablet by mouth Daily 90 tablet 3    omeprazole (PRILOSEC) 20 MG delayed release capsule Take 1 capsule by mouth 2 times daily 180 capsule 3    diclofenac (VOLTAREN) 50 MG EC tablet Take 1 tablet by mouth 2 times daily as needed for Pain 90 tablet 1    tiotropium (SPIRIVA HANDIHALER) 18 MCG inhalation capsule Inhale 1 capsule into the lungs daily 90 capsule 1    losartan (COZAAR) 50 MG tablet Take 1 tablet by mouth daily 90 tablet 1    amitriptyline (ELAVIL) 100 MG tablet TAKE 1 TABLET BY MOUTH EVERY NIGHT AT BEDTIME 30 tablet 5    atorvastatin (LIPITOR) 40 MG tablet TAKE 1 TABLET BY MOUTH DAILY 180 tablet 3    carvedilol (COREG) 3.125 MG tablet TAKE 1 TABLET BY MOUTH TWICE DAILY 180 tablet 3    sertraline (ZOLOFT) 100 MG tablet TAKE 1 TABLET BY MOUTH TWICE DAILY 180 tablet 3    D3-1000 25 MCG (1000 UT) TABS tablet TAKE 1 CAPSULE BY MOUTH DAILY 90 tablet 0    Cholecalciferol (VITAMIN D3) 25 MCG (1000 UT) CAPS TAKE 1 CAPSULE BY MOUTH DAILY 30 capsule 5    folic acid (FOLVITE) 1 MG tablet TAKE 1 TABLET BY MOUTH EVERY DAY 90 tablet 3    OYSCO 500 + D 500-200 MG-UNIT per tablet TAKE 1 TABLET BY MOUTH TWICE DAILY 180 tablet 5    ibuprofen (ADVIL;MOTRIN) 800 MG tablet Take 1 tablet by mouth every 8 hours as needed for Pain 21 tablet 0    calcium carbonate (OSCAL) 500 MG TABS tablet Take 1 tablet by mouth 2 times daily (with meals) 60 tablet 8    hydrOXYzine (ATARAX) 10 MG tablet TAKE 1 TABLET BY MOUTH TWICE DAILY AS NEEDED FOR ITCHING 60 tablet 3    diphenhydrAMINE (BENADRYL) 25 MG capsule TAKE ONE CAPSULE BY MOUTH EVERY DAY AS NEEDED FOR ITCHING 30 capsule 0    b complex vitamins capsule Take 1 capsule by mouth daily.  benzonatate (TESSALON) 100 MG capsule TAKE 1 CAPSULE BY MOUTH EVERY 6 HOURS AS NEEDED FOR COUGH (Patient not taking: Reported on 3/12/2021) 45 capsule 0     No current facility-administered medications for this visit. Pcn [penicillins], Demerol, and Meperidine    Review of Systems:  General ROS: negative  Psychological ROS: negative  Hematological and Lymphatic ROS: No history of blood clots or bleeding disorder. Respiratory ROS: no cough, shortness of breath, or wheezing  negative  Cardiovascular ROS: no chest pain or dyspnea on exertion  Gastrointestinal ROS: no abdominal pain, change in bowel habits, or black or bloody stools  Genito-Urinary ROS: no dysuria, trouble voiding, or hematuria  Musculoskeletal ROS: negative  Neurological ROS: no TIA or stroke symptoms  Dermatological ROS: negative    VITALS:  Blood pressure 118/86, pulse 87, resp. rate 16, weight 139 lb (63 kg), SpO2 99 %, not currently breastfeeding. Body mass index is 23.86 kg/m². Physical Examination:  General appearance - alert, well appearing, and in no distress  Mental status - alert, oriented to person, place, and time  Neck - Neck is supple, no JVD or carotid bruits. No thyromegaly or adenopathy.    Chest - clear to auscultation, no wheezes, rales or rhonchi, symmetric air entry  Heart - normal rate, regular rhythm, normal S1, S2, no murmurs, rubs, clicks or gallops  Abdomen - soft, nontender, nondistended, no masses or organomegaly  Neurological - alert, oriented, normal speech, no focal findings or movement disorder noted  Extremities - peripheral pulses normal, no pedal edema, no clubbing or cyanosis  Skin - normal coloration and turgor, no rashes, no suspicious skin lesions noted    AICD incision is normal.     Orders Placed This Encounter   Procedures    EKG 12 lead       ASSESSMENT:     Diagnosis Orders   1. Viral cardiomyopathy ([address]     2. Mixed hyperlipidemia     3. ICD (implantable cardioverter-defibrillator) battery depletion  EKG 12 lead         PLAN:     Patient will need to continue to follow up with you for their general medical care     As always, aggressive risk factor modification is strongly recommended. We should adhere to the [address] guidelines for HTN management and the NCEP ATP III guidelines for LDL-C management. Cardiac diet is always recommended with low fat, cholesterol, calories and sodium. Continue medications at current doses. The importance of daily weights, dietary sodium restriction, and contact with cardiology if weight is increased more than 3 lbs in any 48 hour period was stressed. The patient has been advised to contact us if they experience progressive SOB, orthopnea, PND or progressive edema. Patient was advised and encouraged to check blood pressure at home or at a pharmacy, maintain a logbook, and also call us back if blood pressure are above the target ranges or if it is low. Patient clearly understands and agrees to the instructions. We will need to continue to monitor muscle and liver enzymes, BUN, CR, and electrolytes. Details of medical condition explained and patient was warned about adverse consequences of uncontrolled medical conditions and possible side effects of prescribed medications. Patient was advised to go to the ER if he starts experiencing adverse effects of the medications. patient was instructed to call us back or go to nearby emergency room immediately if symptoms get worse or do not improve. Thank you for allowing me to participate in the care of your patient, please don't hesitate to contact me if you have any further questions.

## 2021-04-25 DIAGNOSIS — I10 HYPERTENSION, UNSPECIFIED TYPE: ICD-10-CM

## 2021-04-25 RX ORDER — LOSARTAN POTASSIUM 50 MG/1
50 TABLET ORAL DAILY
Qty: 90 TABLET | Refills: 1 | Status: SHIPPED | OUTPATIENT
Start: 2021-04-25 | End: 2021-10-29 | Stop reason: SDUPTHER

## 2021-04-27 DIAGNOSIS — M25.561 CHRONIC PAIN OF RIGHT KNEE: ICD-10-CM

## 2021-04-27 DIAGNOSIS — G89.29 CHRONIC PAIN OF RIGHT KNEE: ICD-10-CM

## 2021-04-27 RX ORDER — TRAMADOL HYDROCHLORIDE 50 MG/1
50 TABLET ORAL EVERY 8 HOURS
Qty: 60 TABLET | Refills: 1 | Status: SHIPPED | OUTPATIENT
Start: 2021-04-27 | End: 2021-06-06

## 2021-04-27 NOTE — TELEPHONE ENCOUNTER
Patient requesting medication refill. Please approve or deny this request.    Rx requested:  Requested Prescriptions     Pending Prescriptions Disp Refills    traMADol (ULTRAM) 50 MG tablet 60 tablet 1     Sig: Take 1 tablet by mouth every 8 hours for 120 doses.          Last Office Visit:   1/4/2021      Next Visit Date:  Future Appointments   Date Time Provider Nidia Thomas   [facility]2021 11[facility]

## 2021-05-23 RX ORDER — TIOTROPIUM BROMIDE 18 UG/1
18 CAPSULE ORAL; RESPIRATORY (INHALATION) DAILY
Qty: 90 CAPSULE | Refills: 1 | Status: SHIPPED | OUTPATIENT
Start: 2021-05-23 | End: 2022-03-20 | Stop reason: SDUPTHER

## 2021-06-28 RX ORDER — CALCIUM CARBONATE/VITAMIN D3 500MG-5MCG
TABLET ORAL
Qty: 180 TABLET | Refills: 1 | Status: SHIPPED | OUTPATIENT
Start: 2021-06-28 | End: 2021-11-26 | Stop reason: SDUPTHER

## 2021-07-06 ENCOUNTER — OFFICE VISIT (OUTPATIENT)
Dept: FAMILY MEDICINE CLINIC | Age: 61
End: 2021-07-06
Payer: COMMERCIAL

## 2021-07-06 VITALS
WEIGHT: 140 LBS | SYSTOLIC BLOOD PRESSURE: 118 MMHG | HEIGHT: 64 IN | BODY MASS INDEX: 23.9 KG/M2 | TEMPERATURE: 98.7 F | DIASTOLIC BLOOD PRESSURE: 86 MMHG | HEART RATE: 93 BPM | OXYGEN SATURATION: 94 %

## 2021-07-06 DIAGNOSIS — E78.00 HYPERCHOLESTEROLEMIA: ICD-10-CM

## 2021-07-06 DIAGNOSIS — E03.9 ACQUIRED HYPOTHYROIDISM: ICD-10-CM

## 2021-07-06 DIAGNOSIS — J44.9 CHRONIC OBSTRUCTIVE PULMONARY DISEASE, UNSPECIFIED COPD TYPE (HCC): Primary | ICD-10-CM

## 2021-07-06 DIAGNOSIS — M25.512 CHRONIC LEFT SHOULDER PAIN: ICD-10-CM

## 2021-07-06 DIAGNOSIS — H61.21 IMPACTED CERUMEN OF RIGHT EAR: ICD-10-CM

## 2021-07-06 DIAGNOSIS — Z12.31 SCREENING MAMMOGRAM, ENCOUNTER FOR: ICD-10-CM

## 2021-07-06 DIAGNOSIS — M85.80 OSTEOPENIA, UNSPECIFIED LOCATION: ICD-10-CM

## 2021-07-06 DIAGNOSIS — G89.29 CHRONIC LEFT SHOULDER PAIN: ICD-10-CM

## 2021-07-06 LAB
ALBUMIN SERPL-MCNC: 4.7 G/DL (ref 3.5–4.6)
ALP BLD-CCNC: 98 U/L (ref 40–130)
ALT SERPL-CCNC: 26 U/L (ref 0–33)
ANION GAP SERPL CALCULATED.3IONS-SCNC: 9 MEQ/L (ref 9–15)
AST SERPL-CCNC: 34 U/L (ref 0–35)
BASOPHILS ABSOLUTE: 0.2 K/UL (ref 0–0.2)
BASOPHILS RELATIVE PERCENT: 3 %
BILIRUB SERPL-MCNC: 0.4 MG/DL (ref 0.2–0.7)
BUN BLDV-MCNC: 20 MG/DL (ref 8–23)
CALCIUM SERPL-MCNC: 9.8 MG/DL (ref 8.5–9.9)
CHLORIDE BLD-SCNC: 98 MEQ/L (ref 95–107)
CHOLESTEROL, FASTING: 174 MG/DL (ref 0–199)
CO2: 26 MEQ/L (ref 20–31)
CREAT SERPL-MCNC: 0.96 MG/DL (ref 0.5–0.9)
EOSINOPHILS ABSOLUTE: 0.4 K/UL (ref 0–0.7)
EOSINOPHILS RELATIVE PERCENT: 7 %
GFR AFRICAN AMERICAN: >60
GFR NON-AFRICAN AMERICAN: 58.9
GLOBULIN: 3.2 G/DL (ref 2.3–3.5)
GLUCOSE BLD-MCNC: 81 MG/DL (ref 70–99)
HCT VFR BLD CALC: 37.5 % (ref 37–47)
HDLC SERPL-MCNC: 63 MG/DL (ref 40–59)
HEMOGLOBIN: 12.6 G/DL (ref 12–16)
LDL CHOLESTEROL CALCULATED: 80 MG/DL (ref 0–129)
LYMPHOCYTES ABSOLUTE: 1.3 K/UL (ref 1–4.8)
LYMPHOCYTES RELATIVE PERCENT: 21.6 %
MCH RBC QN AUTO: 30.9 PG (ref 27–31.3)
MCHC RBC AUTO-ENTMCNC: 33.7 % (ref 33–37)
MCV RBC AUTO: 91.7 FL (ref 82–100)
MONOCYTES ABSOLUTE: 0.5 K/UL (ref 0.2–0.8)
MONOCYTES RELATIVE PERCENT: 8.3 %
NEUTROPHILS ABSOLUTE: 3.7 K/UL (ref 1.4–6.5)
NEUTROPHILS RELATIVE PERCENT: 60.1 %
PDW BLD-RTO: 14.8 % (ref 11.5–14.5)
PLATELET # BLD: 313 K/UL (ref 130–400)
POTASSIUM SERPL-SCNC: 4.4 MEQ/L (ref 3.4–4.9)
RBC # BLD: 4.08 M/UL (ref 4.2–5.4)
SODIUM BLD-SCNC: 133 MEQ/L (ref 135–144)
TOTAL PROTEIN: 7.9 G/DL (ref 6.3–8)
TRIGLYCERIDE, FASTING: 153 MG/DL (ref 0–150)
TSH REFLEX: 0.48 UIU/ML (ref 0.44–3.86)
WBC # BLD: 6.2 K/UL (ref 4.8–10.8)

## 2021-07-06 PROCEDURE — G8926 SPIRO NO PERF OR DOC: HCPCS | Performed by: PHYSICIAN ASSISTANT

## 2021-07-06 PROCEDURE — 99213 OFFICE O/P EST LOW 20 MIN: CPT | Performed by: PHYSICIAN ASSISTANT

## 2021-07-06 PROCEDURE — G8420 CALC BMI NORM PARAMETERS: HCPCS | Performed by: PHYSICIAN ASSISTANT

## 2021-07-06 PROCEDURE — G8427 DOCREV CUR MEDS BY ELIG CLIN: HCPCS | Performed by: PHYSICIAN ASSISTANT

## 2021-07-06 PROCEDURE — 3023F SPIROM DOC REV: CPT | Performed by: PHYSICIAN ASSISTANT

## 2021-07-06 PROCEDURE — 3017F COLORECTAL CA SCREEN DOC REV: CPT | Performed by: PHYSICIAN ASSISTANT

## 2021-07-06 PROCEDURE — 1036F TOBACCO NON-USER: CPT | Performed by: PHYSICIAN ASSISTANT

## 2021-07-06 RX ORDER — ATORVASTATIN CALCIUM 40 MG/1
40 TABLET, FILM COATED ORAL DAILY
Qty: 180 TABLET | Refills: 3 | Status: SHIPPED | OUTPATIENT
Start: 2021-07-06 | End: 2022-08-02

## 2021-07-06 RX ORDER — SERTRALINE HYDROCHLORIDE 100 MG/1
100 TABLET, FILM COATED ORAL DAILY
Qty: 180 TABLET | Refills: 3 | Status: SHIPPED | OUTPATIENT
Start: 2021-07-06 | End: 2021-08-26 | Stop reason: SDUPTHER

## 2021-07-06 RX ORDER — TRAMADOL HYDROCHLORIDE 50 MG/1
50 TABLET ORAL EVERY 6 HOURS PRN
Qty: 28 TABLET | Refills: 0 | Status: SHIPPED | OUTPATIENT
Start: 2021-07-06 | End: 2021-08-02

## 2021-07-06 SDOH — ECONOMIC STABILITY: FOOD INSECURITY: WITHIN THE PAST 12 MONTHS, YOU WORRIED THAT YOUR FOOD WOULD RUN OUT BEFORE YOU GOT MONEY TO BUY MORE.: SOMETIMES TRUE

## 2021-07-06 SDOH — ECONOMIC STABILITY: FOOD INSECURITY: WITHIN THE PAST 12 MONTHS, THE FOOD YOU BOUGHT JUST DIDN'T LAST AND YOU DIDN'T HAVE MONEY TO GET MORE.: SOMETIMES TRUE

## 2021-07-06 ASSESSMENT — SOCIAL DETERMINANTS OF HEALTH (SDOH): HOW HARD IS IT FOR YOU TO PAY FOR THE VERY BASICS LIKE FOOD, HOUSING, MEDICAL CARE, AND HEATING?: NOT HARD AT ALL

## 2021-07-06 NOTE — PROGRESS NOTES
Subjective  Glorine Bamberger, 57 y.o. female presents today with:  Chief Complaint   Patient presents with    New Patient     Pt here to establish care with new provider. Right ear feels plugged up. Dorothy Azul last November & Dr. Robin Krishnamurthy sent her to ortho where she got a cortizone shot & it helped. Her left shoulder & arm is painful, 6/10. Not taking anything for it.  Medication Refill     Pt needs med refill on Tramadol too. Setraline, atorvastin. HPI  Patient is here to establish care previously followed by Dr. Robin Krishnamurthy past medical history reviewed and documented in chart  Patient with complaint of fullness in her right ear denies pain states she has been using earwax removal drops for the past several days  Otherwise she complains of left shoulder pain that is  chronic symptoms resolved temporarily  with steroid injection provided by [facility]) considering follow-up for repeat injection   Otherwise doing well needs prescription refills  Quit smoking 16 yrs ago-using Spiriva daily which keeps symptoms under control    Review of Systems   HENT:        Fullness right ear   All other systems reviewed and are negative.         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    Cardiomyopathy ([address] 3/6/2015    COPD (chronic obstructive pulmonary disease) (HCC)     COPD (chronic obstructive pulmonary disease) ([address]     Dental disease     SEVERE DENTAL PROBLEMS    Diastolic dysfunction     DJD (degenerative joint disease), lumbar     Dyslipidemia 3/6/2015    Hepatitis B infection VIRAL-2006    Hepatitis C without mention of hepatic coma 2007-CHEMO    History of alcoholism ([address]     History of osteopenia 2009    Hypothyroidism     ICD (implantable cardioverter-defibrillator) battery depletion     Tongue cancer [facility])      Past Surgical History:   Procedure Laterality Date    LEG SURGERY  2009 MASS R LEG SOFT TISSUE    LUMBAR FUSION      Dr. Ani Shah  09/16/15    Mushtaq Duncan MD IMPLANT ICD     Social History     Socioeconomic History    Marital status:      Spouse name: Not on file    Number of children: Not on file    Years of education: Not on file    Highest education level: Not on file   Occupational History    Not on file   Tobacco Use    Smoking status: Former Smoker     Packs/day: 1.00     Years: 30.00     Pack years: 30.00     Quit date: 3/5/2005     Years since quittin.3    Smokeless tobacco: Never Used   Substance and Sexual Activity    Alcohol use: No     Comment: ALCOHOLIC    Drug use: No    Sexual activity: Not Currently   Other Topics Concern    Not on file   Social History Narrative    Not on file     Social Determinants of Health     Financial Resource Strain: Low Risk     Difficulty of Paying Living Expenses: Not hard at all   Food Insecurity: Food Insecurity Present    Worried About [facility] in the Last Year: Sometimes true    Hermelinda of Food in the Last Year: Sometimes true   Transportation Needs:     Lack of Transportation (Medical):      Lack of Transportation (Non-Medical):    Physical Activity:     Days of Exercise per Week:     Minutes of Exercise per Session:    Stress:     Feeling of Stress :    Social Connections:     Frequency of Communication with Friends and Family:     Frequency of Social Gatherings with Friends and Family:     Attends Church Services:     Active Member of Clubs or Organizations:     Attends Club or Organization Meetings:     Marital Status:    Intimate Partner Violence:     Fear of Current or Ex-Partner:     Emotionally Abused:     Physically Abused:     Sexually Abused:      Family History   Problem Relation Age of Onset    High Blood Pressure Other     Diabetes Other     Cancer Other         UNCLE-LUNG        Allergies   Allergen Reactions    Pcn [Penicillins] Anaphylaxis    Demerol Rash    Meperidine Rash     Current Outpatient Medications   Medication Sig Dispense Refill    sertraline (ZOLOFT) 100 MG tablet Take 1 tablet by mouth daily 180 tablet 3    atorvastatin (LIPITOR) 40 MG tablet Take 1 tablet by mouth daily 180 tablet 3    traMADol (ULTRAM) 50 MG tablet Take 1 tablet by mouth every 6 hours as needed for Pain for up to 7 days. Intended supply: 7 days.  Take lowest dose possible to manage pain 28 tablet 0    calcium-cholecalciferol (OYSCO 500 + D) 500-200 MG-UNIT per tablet TAKE 1 TABLET BY MOUTH TWICE DAILY 180 tablet 1    diclofenac (VOLTAREN) 50 MG EC tablet Take 1 tablet by mouth 2 times daily as needed for Pain 90 tablet 1    amitriptyline (ELAVIL) 100 MG tablet Take 1 tablet by mouth nightly 90 tablet 1    levothyroxine (SYNTHROID) 125 MCG tablet Take 1 tablet by mouth Daily 90 tablet 1    SPIRIVA HANDIHALER 18 MCG inhalation capsule Inhale 1 capsule into the lungs daily 90 capsule 1    losartan (COZAAR) 50 MG tablet Take 1 tablet by mouth daily 90 tablet 1    meloxicam (MOBIC) 7.5 MG tablet Take 1 tablet by mouth daily 30 tablet 5    alendronate (FOSAMAX) 70 MG tablet TAKE 1 TABLET BY MOUTH EVERY 7 DAYS 12 tablet 3    spironolactone (ALDACTONE) 25 MG tablet TAKE 1/2 (ONE-HALF) OF A TABLET BY MOUTH EVERY DAY 45 tablet 2    neomycin-polymyxin-hydrocortisone (CORTISPORIN) 3.5-95034-2 otic solution 3 drops both ears three times a day as needed for itch 10 mL 1    Multiple Vitamins-Iron (DAILY-PARRIS/IRON/BETA-CAROTENE) TABS Take 1 tablet by mouth Daily 90 tablet 3    omeprazole (PRILOSEC) 20 MG delayed release capsule Take 1 capsule by mouth 2 times daily 180 capsule 3    carvedilol (COREG) 3.125 MG tablet TAKE 1 TABLET BY MOUTH TWICE DAILY 180 tablet 3    D3-1000 25 MCG (1000 UT) TABS tablet TAKE 1 CAPSULE BY MOUTH DAILY 90 tablet 0    Cholecalciferol (VITAMIN D3) 25 MCG (1000 UT) CAPS TAKE 1 CAPSULE BY MOUTH DAILY 30 capsule 5  folic acid (FOLVITE) 1 MG tablet TAKE 1 TABLET BY MOUTH EVERY DAY 90 tablet 3    ibuprofen (ADVIL;MOTRIN) 800 MG tablet Take 1 tablet by mouth every 8 hours as needed for Pain 21 tablet 0    calcium carbonate (OSCAL) 500 MG TABS tablet Take 1 tablet by mouth 2 times daily (with meals) 60 tablet 8    hydrOXYzine (ATARAX) 10 MG tablet TAKE 1 TABLET BY MOUTH TWICE DAILY AS NEEDED FOR ITCHING 60 tablet 3    diphenhydrAMINE (BENADRYL) 25 MG capsule TAKE ONE CAPSULE BY MOUTH EVERY DAY AS NEEDED FOR ITCHING 30 capsule 0    b complex vitamins capsule Take 1 capsule by mouth daily.  benzonatate (TESSALON) 100 MG capsule TAKE 1 CAPSULE BY MOUTH EVERY 6 HOURS AS NEEDED FOR COUGH (Patient not taking: Reported on 3/12/2021) 45 capsule 0     No current facility-administered medications for this visit. Objective    Vitals:    07/06/21 1333   BP: 118/86   Pulse: 93   Temp: 98.7 °F (37.1 °C)   TempSrc: Oral   SpO2: 94%   Weight: 140 lb (63.5 kg)   Height: 5' 4\" (1.626 m)     Physical Exam  Constitutional:       General: She is not in acute distress. Appearance: Normal appearance. She is not ill-appearing. HENT:      Head: Normocephalic and atraumatic. Right Ear: There is impacted cerumen. Left Ear: External ear normal.      Ears:      Comments: Effusion left      Nose: No congestion. Eyes:      Conjunctiva/sclera: Conjunctivae normal.      Pupils: Pupils are equal, round, and reactive to light. Neck:      Thyroid: No thyromegaly. Cardiovascular:      Rate and Rhythm: Normal rate and regular rhythm. Heart sounds: Normal heart sounds. No murmur heard. Pulmonary:      Effort: Pulmonary effort is normal. No respiratory distress. Breath sounds: Normal breath sounds. No wheezing. Abdominal:      General: Bowel sounds are normal.      Palpations: Abdomen is soft. There is no mass. Tenderness: There is no abdominal tenderness. There is no guarding.    Musculoskeletal: Cervical back: Normal range of motion and neck supple. Right lower leg: No edema. Left lower leg: No edema. Lymphadenopathy:      Cervical: No cervical adenopathy. Skin:     General: Skin is warm and dry. Coloration: Skin is not jaundiced or pale. Neurological:      General: No focal deficit present. Mental Status: She is alert and oriented to person, place, and time. Cranial Nerves: No cranial nerve deficit. Motor: No weakness. Coordination: Coordination normal.      Gait: Gait normal.   Psychiatric:         Mood and Affect: Mood normal.         Behavior: Behavior normal.         Thought Content: Thought content normal.         Judgment: Judgment normal.              Assessment & Plan    Diagnosis Orders   1. Chronic obstructive pulmonary disease, unspecified COPD type ([facility]     2. Chronic left shoulder pain  traMADol (ULTRAM) 50 MG tablet   3. Impacted cerumen of right ear      Successful irrigation of right EAC   4. Hypercholesterolemia  atorvastatin (LIPITOR) 40 MG tablet    Comprehensive Metabolic Panel    Lipid, Fasting    CBC Auto Differential   5. Osteopenia, unspecified location  DEXA BONE DENSITY AXIAL SKELETON   6. Acquired hypothyroidism  TSH with Reflex   7.  Screening mammogram, encounter for  VERO DIGITAL SCREEN W OR WO CAD BILATERAL         Orders Placed This Encounter   Procedures    VERO DIGITAL SCREEN W OR WO CAD BILATERAL     Standing Status:   Future     Standing Expiration Date:   9/6/2022    DEXA BONE DENSITY AXIAL SKELETON     Standing Status:   Future     Standing Expiration Date:   7/6/2022    Comprehensive Metabolic Panel     Standing Status:   Future     Number of Occurrences:   1     Standing Expiration Date:   7/6/2022    Lipid, Fasting     Standing Status:   Future     Number of Occurrences:   1     Standing Expiration Date:   7/6/2022    CBC Auto Differential     Standing Status:   Future     Number of Occurrences:   1     Standing Expiration Date:   7/6/2022    TSH with Reflex     Standing Status:   Future     Number of Occurrences:   1     Standing Expiration Date:   7/6/2022     Orders Placed This Encounter   Medications    sertraline (ZOLOFT) 100 MG tablet     Sig: Take 1 tablet by mouth daily     Dispense:  180 tablet     Refill:  3    atorvastatin (LIPITOR) 40 MG tablet     Sig: Take 1 tablet by mouth daily     Dispense:  180 tablet     Refill:  3    traMADol (ULTRAM) 50 MG tablet     Sig: Take 1 tablet by mouth every 6 hours as needed for Pain for up to 7 days. Intended supply: 7 days. Take lowest dose possible to manage pain     Dispense:  28 tablet     Refill:  0     Reduce doses taken as pain becomes manageable     Medications Discontinued During This Encounter   Medication Reason    atorvastatin (LIPITOR) 40 MG tablet REORDER    sertraline (ZOLOFT) 100 MG tablet REORDER     Return in about 6 months (around 1/6/2022), or if symptoms worsen or fail to improve, for call for results, repeat labs.     Riri Santos PA-C

## 2021-07-10 RX ORDER — FOLIC ACID 1 MG/1
TABLET ORAL
Qty: 90 TABLET | Refills: 3 | Status: SHIPPED | OUTPATIENT
Start: 2021-07-10 | End: 2021-09-19 | Stop reason: SDUPTHER

## 2021-07-30 ENCOUNTER — TELEPHONE (OUTPATIENT)
Dept: FAMILY MEDICINE CLINIC | Age: 61
End: 2021-07-30

## 2021-07-30 DIAGNOSIS — M25.512 CHRONIC LEFT SHOULDER PAIN: ICD-10-CM

## 2021-07-30 DIAGNOSIS — G89.29 CHRONIC LEFT SHOULDER PAIN: ICD-10-CM

## 2021-08-01 ENCOUNTER — TELEPHONE (OUTPATIENT)
Dept: FAMILY MEDICINE CLINIC | Age: 61
End: 2021-08-01

## 2021-08-01 NOTE — TELEPHONE ENCOUNTER
Patient requesting refill of Tramadol 50 mg. Pt had previous prescription for rx, 1 by mouth every 6 hours prn for pain, up to 7 days.       Last OV: 07/06/2021

## 2021-08-02 RX ORDER — TRAMADOL HYDROCHLORIDE 50 MG/1
50 TABLET ORAL EVERY 6 HOURS PRN
Qty: 28 TABLET | Refills: 0 | Status: SHIPPED | OUTPATIENT
Start: 2021-08-02 | End: 2021-09-07

## 2021-08-05 ENCOUNTER — TELEPHONE (OUTPATIENT)
Dept: FAMILY MEDICINE CLINIC | Age: 61
End: 2021-08-05

## 2021-08-05 NOTE — TELEPHONE ENCOUNTER
Patient tested positive for Covid  at [facility] today    She has questions about Covid and wondering what she should do? Patient did state that she is fully vacinated    LOV 7/6/21 New Patient to Riri Santos.

## 2021-08-13 NOTE — TELEPHONE ENCOUNTER
Spoke to patient. She states she is doing much better and feeling well. She does not need appointment at this time.

## 2021-08-25 NOTE — TELEPHONE ENCOUNTER
----- Message from Krystle Bagley sent at 8/25/2021 11:23 AM EDT -----  Subject: Medication Problem    QUESTIONS  Name of Medication? sertraline (ZOLOFT) 100 MG tablet  Patient-reported dosage and instructions? 2x day  What question or problem do you have with the medication? Patient states   she was taking 2 a day with Dr Taqueria Jha for years. And states that Dr Ke Watson   switched her script to once a day. She is completely out of medication and   states the pharmacy would not refill due to the way the script was   written. Please call pt b  Preferred Pharmacy? [id number] - [facility] [employer] phone number (if available)? [phone]  Additional Information for Provider?   ---------------------------------------------------------------------------  --------------  CALL BACK INFO  What is the best way for the office to contact you? OK to leave message on   voicemail  Preferred Call Back Phone Number? [phone]  ---------------------------------------------------------------------------  --------------  SCRIPT ANSWERS  Relationship to Patient?  Self

## 2021-08-26 ENCOUNTER — TELEPHONE (OUTPATIENT)
Dept: FAMILY MEDICINE CLINIC | Age: 61
End: 2021-08-26

## 2021-08-26 RX ORDER — SERTRALINE HYDROCHLORIDE 100 MG/1
100 TABLET, FILM COATED ORAL DAILY
Qty: 180 TABLET | Refills: 3 | Status: SHIPPED | OUTPATIENT
Start: 2021-08-26 | End: 2021-08-26 | Stop reason: SDUPTHER

## 2021-08-26 RX ORDER — SERTRALINE HYDROCHLORIDE 100 MG/1
TABLET, FILM COATED ORAL
Qty: 180 TABLET | Refills: 3 | Status: SHIPPED | OUTPATIENT
Start: 2021-08-26 | End: 2022-07-21

## 2021-08-26 NOTE — TELEPHONE ENCOUNTER
----- Message from Janae Jensen sent at 8/26/2021 10:09 AM EDT -----  Subject: Medication Problem    QUESTIONS  Name of Medication? sertraline (ZOLOFT) 100 MG tablet  Patient-reported dosage and instructions? 2 per day 100MG each   What question or problem do you have with the medication? Patient called   stating that she has been taking Zoloft for about twenty years and has   been taking 2 per day of the 100 MG each. She got a refill on the   medication from Dr. Micheline Cordova and didn't realize that the bottle said only to   take one, and she has   Preferred Pharmacy? [facility] [address] [place] [address] [facility] [place] phone number (if available)? [phone]  Additional Information for Provider? been taking two per day. She ran out   of medication two days ago and said that she is experiencing shakiness   now. Please return her call.   ---------------------------------------------------------------------------  --------------  CALL BACK INFO  What is the best way for the office to contact you? OK to leave message on   voicemail  Preferred Call Back Phone Number? [phone]  ---------------------------------------------------------------------------  --------------  SCRIPT ANSWERS  Relationship to Patient?  Self

## 2021-08-26 NOTE — TELEPHONE ENCOUNTER
Called and spoke to Burke Cheema letting her know that a new prescription was sent over for the Zoloft twice daily.

## 2021-08-26 NOTE — TELEPHONE ENCOUNTER
Please advise, patient states she has taken 2 pills of Zoloft daily for 20 years and the new script sent by you states to only take it once a day.

## 2021-09-03 ENCOUNTER — HOSPITAL ENCOUNTER (OUTPATIENT)
Dept: CARDIAC CATH/INVASIVE PROCEDURES | Age: 61
Discharge: HOME | End: 2021-09-03
Attending: SPECIALIST | Admitting: SPECIALIST
Payer: COMMERCIAL

## 2021-09-03 VITALS
OXYGEN SATURATION: 94 % | BODY MASS INDEX: 23.05 KG/M2 | SYSTOLIC BLOOD PRESSURE: 156 MMHG | HEART RATE: 76 BPM | RESPIRATION RATE: 10 BRPM | WEIGHT: 135 LBS | TEMPERATURE: 97 F | DIASTOLIC BLOOD PRESSURE: 103 MMHG | HEIGHT: 64 IN

## 2021-09-03 PROCEDURE — C1882 AICD, OTHER THAN SING/DUAL: HCPCS

## 2021-09-03 PROCEDURE — 2580000003 HC RX 258

## 2021-09-03 PROCEDURE — 93005 ELECTROCARDIOGRAM TRACING: CPT | Performed by: SPECIALIST

## 2021-09-03 PROCEDURE — 33264 RMVL & RPLCMT DFB GEN MLT LD: CPT | Performed by: SPECIALIST

## 2021-09-03 PROCEDURE — 2500000003 HC RX 250 WO HCPCS

## 2021-09-03 PROCEDURE — 2780000010 HC IMPLANT OTHER

## 2021-09-03 PROCEDURE — 6360000002 HC RX W HCPCS

## 2021-09-03 PROCEDURE — 2709999900 HC NON-CHARGEABLE SUPPLY

## 2021-09-03 RX ORDER — MIDAZOLAM HYDROCHLORIDE 1 MG/ML
INJECTION INTRAMUSCULAR; INTRAVENOUS
Status: COMPLETED | OUTPATIENT
Start: 2021-09-03 | End: 2021-09-03

## 2021-09-03 RX ORDER — ACETAMINOPHEN 325 MG/1
650 TABLET ORAL EVERY 4 HOURS PRN
Status: DISCONTINUED | OUTPATIENT
Start: 2021-09-03 | End: 2021-09-03 | Stop reason: HOSPADM

## 2021-09-03 RX ORDER — SODIUM CHLORIDE 450 MG/100ML
INJECTION, SOLUTION INTRAVENOUS CONTINUOUS
Status: DISCONTINUED | OUTPATIENT
Start: 2021-09-03 | End: 2021-09-03 | Stop reason: HOSPADM

## 2021-09-03 RX ORDER — HYDROCODONE BITARTRATE AND ACETAMINOPHEN 5; 325 MG/1; MG/1
2 TABLET ORAL EVERY 4 HOURS PRN
Status: DISCONTINUED | OUTPATIENT
Start: 2021-09-03 | End: 2021-09-03 | Stop reason: HOSPADM

## 2021-09-03 RX ORDER — HYDROCODONE BITARTRATE AND ACETAMINOPHEN 5; 325 MG/1; MG/1
1 TABLET ORAL EVERY 4 HOURS PRN
Status: DISCONTINUED | OUTPATIENT
Start: 2021-09-03 | End: 2021-09-03 | Stop reason: HOSPADM

## 2021-09-03 NOTE — DISCHARGE INSTR - ACTIVITY
Do not get the incision wet x 30 days. If fever, chills, increased swelling, any discharge call dr Roxana Cline.  [phone]

## 2021-09-03 NOTE — PROGRESS NOTES
Dr. Jesse Castillo reviewed results with patient at the bedside.   Left chest dressing remains unchanged from prior assessment

## 2021-09-03 NOTE — PROGRESS NOTES
Arrived to pre/post from the cath lab. Left upper chest dressing is dry and intact with no bleeding or hematoma. Attached to monitor and vitals are stable.   Patient is resting comfortably at this time

## 2021-09-04 LAB
EKG ATRIAL RATE: 79 BPM
EKG P AXIS: 49 DEGREES
EKG Q-T INTERVAL: 468 MS
EKG QRS DURATION: 148 MS
EKG QTC CALCULATION (BAZETT): 536 MS
EKG R AXIS: 112 DEGREES
EKG T AXIS: 18 DEGREES
EKG VENTRICULAR RATE: 79 BPM

## 2021-09-04 PROCEDURE — 93010 ELECTROCARDIOGRAM REPORT: CPT | Performed by: INTERNAL MEDICINE

## 2021-09-04 NOTE — OP NOTE
was accepted. The RV  lead is by [address] [place] [address], Ne number [id number], serial number [id number]. The  lead was implanted on 09/16/2015. R-wave of 9.9 mV, slew rate of 1.4 V  per second, impedance 557 ohms. At 0.5 msec, voltage threshold was 0.6  V and the current of 1.1 mA. That lead is accepted. The LV lead is by  [address] [place] [address], Ne number [id number], serial number of [id number], implanted on  09/16/2015. Impedance in the configuration of RLV tip to the RV coil  showed impedance of 1080 at 1.0 msec, voltage threshold was 1.7 V and  the current of 1.6 mA. This is much better than the initially  programmed parameters. All leads were accepted. Hemostasis was  evident. The pocket was irrigated with copious amount of antibiotic. The new generator by [facility] model number [id number] and serial number  of [id number] was hooked up to the leads. Device based testing showed  P-wave of 3.4, R-wave of 11 mV. Atrial lead impedance 600, , LV  of 950 ohms. In the atrium at 0.5 msec, voltage threshold was 0.5 V and  the RV at 0.5 msec was 0.75 V. In the LV at 1.0 msec was 1.75 V. Heavy  sedation was induced with total of 60 mg of propofol. Using the device  itself, V-fib was induced. Device detected V-fib and terminated it with  VFT of lower than 30 joules. The patient tolerated the procedure well. Hemostasis was evident. A TYRX pouch by [employer] was used. The pocket  was irrigated with copious amount of antibiotic. The subcutaneous  tissue was closed using 3-0 Vicryl in two separate layers. The  subcuticular tissue was closed using 4-0 Vicryl. The wound was bandaged  in the usual fashion. The patient tolerated the procedure well, was  discharged from the EP lab in a stable condition.         Toi Billingsley MD    D: 09/03/2021 15:29:35       T: 09/03/2021 15:34:10     [email or web address]  Job#: [id number]     Doc#: [id number]    CC:

## 2021-09-07 ENCOUNTER — TELEPHONE (OUTPATIENT)
Dept: FAMILY MEDICINE CLINIC | Age: 61
End: 2021-09-07

## 2021-09-07 DIAGNOSIS — M25.512 CHRONIC LEFT SHOULDER PAIN: ICD-10-CM

## 2021-09-07 DIAGNOSIS — G89.29 CHRONIC LEFT SHOULDER PAIN: ICD-10-CM

## 2021-09-07 RX ORDER — TRAMADOL HYDROCHLORIDE 50 MG/1
50 TABLET ORAL EVERY 6 HOURS PRN
Qty: 28 TABLET | Refills: 0 | Status: SHIPPED | OUTPATIENT
Start: 2021-09-07 | End: 2021-10-07 | Stop reason: SDUPTHER

## 2021-09-07 NOTE — TELEPHONE ENCOUNTER
----- Message from Andrew Schmidt sent at 9/7/2021 12:59 PM EDT -----  Subject: Refill Request    QUESTIONS  Name of Medication? traMADol (ULTRAM) 50 MG tablet  Patient-reported dosage and instructions? 2 a day as needed for pain  How many days do you have left? 0  Preferred Pharmacy? [id number]  Pharmacy phone number (if available)? [phone]  ---------------------------------------------------------------------------  --------------  Chyna MAYBERRY  What is the best way for the office to contact you? OK to leave message on   voicemail  Preferred Call Back Phone Number?  [phone]

## 2021-09-07 NOTE — TELEPHONE ENCOUNTER
Requesting medication refill. Please approve or deny this request.    Rx requested:  Requested Prescriptions     Pending Prescriptions Disp Refills    traMADol (ULTRAM) 50 MG tablet [Pharmacy Med Name: tramadol 50 mg tablet] 28 tablet 0     Sig: Take 1 tablet by mouth every 6 hours as needed for Pain for up to 7 days. Intended supply: 7 days.  Take lowest dose possible to manage pain       Last Office Visit:   7/6/2021    Last Filled:      Last Labs:      Next Visit Date:  Future Appointments   Date Time Provider Nidia Thomas   9/28/2021  2:40 PM [facility]   9/28/2021  3:00 PM [place] BONE DENSITY RM 1 [facility] Fac RAD   11/12/2021 11:30 AM Feliciano Taylor [facility], DO [address] [place] [address]   1/6/2022  1:00 PM Riri Santos PA-C [address]

## 2021-09-19 NOTE — TELEPHONE ENCOUNTER
----- Message from Alicia Hitchcocku sent at 9/15/2021  1:04 PM EDT -----  Subject: Refill Request    QUESTIONS  Name of Medication? carvedilol (COREG) 3.125 MG tablet  Patient-reported dosage and instructions? twice a day  How many days do you have left? 0  Preferred Pharmacy? [id number]  Pharmacy phone number (if available)? [phone]  ---------------------------------------------------------------------------  --------------,  Name of Medication? folic acid (FOLVITE) 1 MG tablet  Patient-reported dosage and instructions? once a day  How many days do you have left? 2  Preferred Pharmacy? [id number]  Pharmacy phone number (if available)? [phone]  ---------------------------------------------------------------------------  --------------  Shen YupiCall INFO  What is the best way for the office to contact you? OK to leave message on   voicemail  Preferred Call Back Phone Number?  [phone]

## 2021-09-20 RX ORDER — CARVEDILOL 3.12 MG/1
3.12 TABLET ORAL 2 TIMES DAILY WITH MEALS
Qty: 90 TABLET | Refills: 2 | Status: SHIPPED | OUTPATIENT
Start: 2021-09-20 | End: 2022-02-02 | Stop reason: SDUPTHER

## 2021-09-20 RX ORDER — FOLIC ACID 1 MG/1
TABLET ORAL
Qty: 90 TABLET | Refills: 2 | Status: SHIPPED | OUTPATIENT
Start: 2021-09-20 | End: 2022-07-19

## 2021-09-28 ENCOUNTER — HOSPITAL ENCOUNTER (OUTPATIENT)
Dept: WOMENS IMAGING | Age: 61
Discharge: HOME | End: 2021-09-30
Payer: COMMERCIAL

## 2021-09-28 DIAGNOSIS — Z12.31 SCREENING MAMMOGRAM, ENCOUNTER FOR: ICD-10-CM

## 2021-09-28 DIAGNOSIS — M85.80 OSTEOPENIA, UNSPECIFIED LOCATION: ICD-10-CM

## 2021-09-28 PROCEDURE — 77080 DXA BONE DENSITY AXIAL: CPT

## 2021-09-28 PROCEDURE — 77063 BREAST TOMOSYNTHESIS BI: CPT

## 2021-10-07 DIAGNOSIS — M25.512 CHRONIC LEFT SHOULDER PAIN: ICD-10-CM

## 2021-10-07 DIAGNOSIS — G89.29 CHRONIC LEFT SHOULDER PAIN: ICD-10-CM

## 2021-10-07 RX ORDER — TRAMADOL HYDROCHLORIDE 50 MG/1
50 TABLET ORAL EVERY 6 HOURS PRN
Qty: 28 TABLET | Refills: 0 | Status: SHIPPED | OUTPATIENT
Start: 2021-10-07 | End: 2021-11-16 | Stop reason: SDUPTHER

## 2021-10-07 NOTE — TELEPHONE ENCOUNTER
----- Message from Timothy Galvez sent at 10/7/2021 12:24 PM EDT -----  Subject: Refill Request    QUESTIONS  Name of Medication? traMADol (ULTRAM) 50 MG tablet  Patient-reported dosage and instructions? 50 mg   How many days do you have left? 0  Preferred Pharmacy? [id number]  Pharmacy phone number (if available)? [phone]  Additional Information for Provider? Patient is needing a refill of her   medication ASAP.   ---------------------------------------------------------------------------  --------------  CALL BACK INFO  What is the best way for the office to contact you? OK to leave message on   voicemail  Preferred Call Back Phone Number?  [phone]

## 2021-10-08 ENCOUNTER — IMMUNIZATION (OUTPATIENT)
Dept: FAMILY MEDICINE CLINIC | Age: 61
End: 2021-10-08
Payer: COMMERCIAL

## 2021-10-08 DIAGNOSIS — Z23 NEED FOR INFLUENZA VACCINATION: Primary | ICD-10-CM

## 2021-10-08 PROCEDURE — 90674 CCIIV4 VAC NO PRSV 0.5 ML IM: CPT | Performed by: PHYSICIAN ASSISTANT

## 2021-10-08 PROCEDURE — 90471 IMMUNIZATION ADMIN: CPT | Performed by: PHYSICIAN ASSISTANT

## 2021-10-08 NOTE — PROGRESS NOTES
Vaccine Information Sheet, \"Influenza - Inactivated\"  given to Steph Parisi, or parent/legal guardian of  Steph Parisi and verbalized understanding. Patient responses:    Have you ever had a reaction to a flu vaccine? No  Are you able to eat eggs without adverse effects? Yes  Do you have any current illness? No  Have you ever had Guillian [place] Syndrome? No    Flu vaccine given per order. Please see immunization tab.

## 2021-10-29 DIAGNOSIS — I10 HYPERTENSION, UNSPECIFIED TYPE: ICD-10-CM

## 2021-10-30 RX ORDER — LOSARTAN POTASSIUM 50 MG/1
50 TABLET ORAL DAILY
Qty: 90 TABLET | Refills: 3 | Status: SHIPPED | OUTPATIENT
Start: 2021-10-30 | End: 2021-11-12 | Stop reason: ALTCHOICE

## 2021-11-08 RX ORDER — SPIRONOLACTONE 25 MG/1
TABLET ORAL
Qty: 45 TABLET | Refills: 2 | Status: CANCELLED | OUTPATIENT
Start: 2021-11-08

## 2021-11-08 NOTE — TELEPHONE ENCOUNTER
----- Message from Diego Ryanmike sent at 11/8/2021  4:28 PM EST -----  Subject: Refill Request    QUESTIONS  Name of Medication? spironolactone (ALDACTONE) 25 MG tablet  Patient-reported dosage and instructions? half a tablet daily   How many days do you have left? 1  Preferred Pharmacy? [id number]  Pharmacy phone number (if available)? [phone]  ---------------------------------------------------------------------------  --------------  Bryn MAYBERRY  What is the best way for the office to contact you? OK to leave message on   voicemail  Preferred Call Back Phone Number?  [phone]

## 2021-11-09 RX ORDER — SPIRONOLACTONE 25 MG/1
TABLET ORAL
Qty: 45 TABLET | Refills: 3 | Status: SHIPPED | OUTPATIENT
Start: 2021-11-09 | End: 2022-08-02 | Stop reason: SDUPTHER

## 2021-11-12 ENCOUNTER — OFFICE VISIT (OUTPATIENT)
Dept: CARDIOLOGY CLINIC | Age: 61
End: 2021-11-12
Payer: COMMERCIAL

## 2021-11-12 VITALS
OXYGEN SATURATION: 98 % | SYSTOLIC BLOOD PRESSURE: 102 MMHG | DIASTOLIC BLOOD PRESSURE: 70 MMHG | WEIGHT: 144 LBS | BODY MASS INDEX: 24.72 KG/M2 | HEART RATE: 80 BPM | RESPIRATION RATE: 16 BRPM

## 2021-11-12 DIAGNOSIS — I51.89 DIASTOLIC DYSFUNCTION: ICD-10-CM

## 2021-11-12 DIAGNOSIS — Z45.02 ICD (IMPLANTABLE CARDIOVERTER-DEFIBRILLATOR) BATTERY DEPLETION: ICD-10-CM

## 2021-11-12 DIAGNOSIS — G89.29 CHRONIC LEFT SHOULDER PAIN: Primary | ICD-10-CM

## 2021-11-12 DIAGNOSIS — M25.512 CHRONIC LEFT SHOULDER PAIN: ICD-10-CM

## 2021-11-12 DIAGNOSIS — G89.29 CHRONIC LEFT SHOULDER PAIN: ICD-10-CM

## 2021-11-12 DIAGNOSIS — B33.24 VIRAL CARDIOMYOPATHY (HCC): Primary | ICD-10-CM

## 2021-11-12 DIAGNOSIS — M25.512 CHRONIC LEFT SHOULDER PAIN: Primary | ICD-10-CM

## 2021-11-12 DIAGNOSIS — E78.00 HYPERCHOLESTEROLEMIA: ICD-10-CM

## 2021-11-12 DIAGNOSIS — E78.2 MIXED HYPERLIPIDEMIA: ICD-10-CM

## 2021-11-12 PROCEDURE — G8482 FLU IMMUNIZE ORDER/ADMIN: HCPCS | Performed by: INTERNAL MEDICINE

## 2021-11-12 PROCEDURE — 1036F TOBACCO NON-USER: CPT | Performed by: INTERNAL MEDICINE

## 2021-11-12 PROCEDURE — 99214 OFFICE O/P EST MOD 30 MIN: CPT | Performed by: INTERNAL MEDICINE

## 2021-11-12 PROCEDURE — 3017F COLORECTAL CA SCREEN DOC REV: CPT | Performed by: INTERNAL MEDICINE

## 2021-11-12 PROCEDURE — G8420 CALC BMI NORM PARAMETERS: HCPCS | Performed by: INTERNAL MEDICINE

## 2021-11-12 PROCEDURE — G8427 DOCREV CUR MEDS BY ELIG CLIN: HCPCS | Performed by: INTERNAL MEDICINE

## 2021-11-12 RX ORDER — TRAMADOL HYDROCHLORIDE 50 MG/1
TABLET ORAL
Qty: 28 TABLET | Refills: 0 | OUTPATIENT
Start: 2021-11-12

## 2021-11-12 NOTE — PROGRESS NOTES
Chief Complaint   Patient presents with    Follow-up     8 month-ekg 9/3/2021    Cardiomyopathy    Hyperlipidemia       3-6-15: Patient presents for initial medical evaluation. Patient is followed on a regular basis by Dr. Roxanne Hobbs PA-C. Presents with abn echo with severe new onset CMP, dilated LV, EF of 10-15%. Does complain of mild SOB but no CP. Denies any recent viral infections. No LE edema. No PND or orthopnea. Grandmothers with hx of CMP. Quit smoking 10 yrs ago, used to smoke 1ppd for 30 yrs. 3-27-15: as above, s/p LHC with normal coronaries, EF of 15%, LVEDP of 20mmHg. Compliant with meds. Did not tolerate Lisinopril, did have swelling of her throat. Does have a cough and URI type symptoms. Pt denies chest pain, dyspnea, dyspnea on exertion, change in exercise capacity, fatigue,  nausea, vomiting, diarrhea, constipation, motor weakness, insomnia, weight loss, syncope, dizziness, lightheadedness, palpitations, PND, orthopnea, or claudication. States she's active without any issues. 7-31-15: as above, Pt denies chest pain, dyspnea, dyspnea on exertion, change in exercise capacity, fatigue,  nausea, vomiting, diarrhea, constipation, motor weakness, insomnia, weight loss, syncope, dizziness, lightheadedness, palpitations, PND, orthopnea, or claudication. No LE edema. No change in meds. BP and HR are under control. No recent hospitalizations. States she can clean the house without any issues. Has gained 5 pounds. 8-18-15: as above, s/p MUGA scan with EF of 24%. Does get tired more easily and more SOB than what she used to according to her daughter. She is very active though, cleans her house, run around after grandchildren, up and down stairs without any issues. No LE edema. Compliant with meds.  Pt denies chest pain, dyspnea, dyspnea on exertion, change in exercise capacity, fatigue,  nausea, vomiting, diarrhea, constipation, motor weakness, insomnia, weight loss, syncope, dizziness, lightheadedness, palpitations, PND, orthopnea. BP is under control. 9-24-15: as above, s/p AICD with Dr. Brandon Blue. Doing well overall. States SOB and fatigue has improved. No LE edema. Compliant with meds. Pt denies chest pain, dyspnea, dyspnea on exertion, change in exercise capacity, fatigue,  nausea, vomiting, diarrhea, constipation, motor weakness, insomnia, weight loss, syncope, dizziness, lightheadedness, palpitations, PND, orthopnea, or claudication. No further ETOH. BP is good today. 3-24-16: as above, doing well overall. Pt denies chest pain, dyspnea, dyspnea on exertion, change in exercise capacity, fatigue,  nausea, vomiting, diarrhea, constipation, motor weakness, insomnia, weight loss, syncope, dizziness, lightheadedness, palpitations, PND, orthopnea, or claudication. No AICD discharge. No hospitalizations. No LE edema. Compliant with meds and diet. BP is good. CHf is stable. 11-1-16: no AICD shocks or hospitalizations. Pt denies chest pain, dyspnea, dyspnea on exertion, change in exercise capacity, fatigue,  nausea, vomiting, diarrhea, constipation, motor weakness, insomnia, weight loss, syncope, dizziness, lightheadedness, palpitations, PND, orthopnea, or claudication. No nitro use. BP and hr are good. CAD is stable. No LE discoloration or ulcers. No LE edema. No CHF type symptoms. Lipid profile is normal.     12-13-16: Pt denies chest pain, dyspnea, dyspnea on exertion, change in exercise capacity, fatigue,  nausea, vomiting, diarrhea, constipation, motor weakness, insomnia, weight loss, syncope, dizziness, lightheadedness, palpitations, PND, orthopnea, or claudication. No AICD shocks,BP and hr are good. CAD is stable. No LE discoloration or ulcers. No LE edema. No CHF type symptoms. Lipid profile is normal. Compliant with meds. S/p ECHO with EF of 35%, no valve abn.     2-28-20: has not been seen in 4 yrs. Following with EP every 6 months. C/o SOB now.   Pt denies chest pain, d nausea, vomiting, diarrhea, constipation, motor weakness, insomnia, weight loss, syncope, dizziness, lightheadedness, palpitations, PND, orthopnea, or claudication. Hx of NICMP, EF of 35%. Has baseline SOB and hx of COPD. Does not follow up with pulmonary. S/p recent CXR which was ok. Compliant with meds. Denies any LE edema. No orthopnea. SOB is mainly with activity/walking. S/p ECHO in 2/2020 with EF of 50%, grade I DD. EKG with NSR, V paced. 8-28-20: Pt denies chest pain, dyspnea, dyspnea on exertion, change in exercise capacity, fatigue,  nausea, vomiting, diarrhea, constipation, motor weakness, insomnia, weight loss, syncope, dizziness, lightheadedness, palpitations, PND, orthopnea, or claudication. No nitro use. BP and hr are good. CAD is stable. No LE discoloration or ulcers. No LE edema. No CHF type symptoms. Lipid profile is normal. No recent hospitalization. No change in meds. Hx of NICMP, EF of 35% and now recovered to 50% per most recent echo in 2/2020. Hx of AICD, no discharge. 3-12-21: Patient is doing well overall. She denies any recent hospitalizations or any major symptoms of angina or CHF. She has a history of nonischemic cardiomyopathy ejection fraction of 35% that is now recovered with most recent echo showing an ejection fraction of 50%. She has a history of defibrillator but no shocks. Pressure is wonderful today. eKG with normal sinus rhythm, V paced. She is following with the EP./dR Vazquez. Pt denies chest pain, dyspnea, dyspnea on exertion, change in exercise capacity, fatigue,  nausea, vomiting, diarrhea, constipation, motor weakness, insomnia, weight loss, syncope, dizziness, lightheadedness, palpitations, PND, orthopnea, or claudication. 11-12-21: She has a history of nonischemic cardiomyopathy ejection fraction of 35% that is now recovered with most recent echo showing an ejection fraction of 50%. She has a history of defibrillator but no shocks. s/p AICD gen change a few months ago. Pt denies chest pain, dyspnea, dyspnea on exertion, change in exercise capacity, fatigue,  nausea, vomiting, diarrhea, constipation, motor weakness, insomnia, weight loss, syncope, dizziness, lightheadedness, palpitations, PND, orthopnea, or claudication.             Patient Active Problem List   Diagnosis    COPD [facility]chronic obstructive [facility] disease) ([facility]    Hepatitis C virus infection    Hypothyroidism    Insomnia    Itching    Osteoarthritis    Reflux esophagitis    Mixed hyperlipidemia    Internal derangement of right knee    Lumbar radiculitis    Cardiomyopathy ([facility]    Premature osteoporosis    Hypercholesterolemia    ICD (implantable cardioverter-defibrillator) battery depletion    Diastolic dysfunction    Chronic left shoulder pain    Subacromial impingement of left shoulder    Subacromial bursitis of left shoulder joint       Past Surgical History:   Procedure Laterality Date    LEG SURGERY  2009 MASS R LEG SOFT TISSUE    LUMBAR FUSION      Dr. John Harley  09/16/15    Arvind Vazquez MD IMPLANT ICD       Social History     Socioeconomic History    Marital status:      Spouse name: None    Number of children: None    Years of education: None    Highest education level: None   Occupational History    None   Tobacco Use    Smoking status: Former Smoker     Packs/day: 1.00     Years: 30.00     Pack years: 30.00     Quit date: 3/5/2005     Years since quittin.7    Smokeless tobacco: Never Used   Substance and Sexual Activity    Alcohol use: No     Comment: ALCOHOLIC    Drug use: No    Sexual activity: Not Currently   Other Topics Concern    None   Social History Narrative    None     Social Determinants of Health     Financial Resource Strain: Low Risk     Difficulty of Paying Living Expenses: Not hard at all   Food Insecurity: Food Insecurity Present    Worried About [employer] [employer] in the Last Year: Sometimes true   [place] Ran Out of Food in the Last Year: Sometimes true   Transportation Needs:     Lack of Transportation (Medical): Not on file    Lack of Transportation (Non-Medical):  Not on file   Physical Activity:     Days of Exercise per Week: Not on file    Minutes of Exercise per Session: Not on file   Stress:     Feeling of Stress : Not on file   Social Connections:     Frequency of Communication with Friends and Family: Not on file    Frequency of Social Gatherings with Friends and Family: Not on file    Attends Jewish Services: Not on file    Active Member of [address] [place] [address] or Organizations: Not on file    Attends Club or Organization Meetings: Not on file    Marital Status: Not on file   Intimate Partner Violence:     Fear of Current or Ex-Partner: Not on file    Emotionally Abused: Not on file    Physically Abused: Not on file    Sexually Abused: Not on file   Housing Stability:     Unable to Pay for Housing in the Last Year: Not on file    Number of [place] in the Last Year: Not on file    Unstable Housing in the Last Year: Not on file       Family History   Problem Relation Age of Onset    High Blood Pressure Other     Diabetes Other     Cancer Other         UNCLE-LUNG       Current Outpatient Medications   Medication Sig Dispense Refill    sacubitril-valsartan (ENTRESTO) 24-26 MG per tablet Take 1 tablet by mouth 2 times daily 60 tablet 5    diclofenac (VOLTAREN) 50 MG EC tablet Take 1 tablet by mouth 2 times daily as needed for Pain 90 tablet 3    spironolactone (ALDACTONE) 25 MG tablet TAKE 1/2 (ONE-HALF) OF A TABLET BY MOUTH EVERY DAY 45 tablet 3    carvedilol (COREG) 3.125 MG tablet Take 1 tablet by mouth 2 times daily (with meals) 90 tablet 2    folic acid (FOLVITE) 1 MG tablet TAKE 1 TABLET BY MOUTH EVERY DAY 90 tablet 2    sertraline (ZOLOFT) 100 MG tablet Take 1 tablet by mouth twice daily (Patient taking differently: Take 100 mg by mouth 2 times daily Take 1 tablet by mouth twice daily) 180 tablet 3    atorvastatin (LIPITOR) 40 MG tablet Take 1 tablet by mouth daily 180 tablet 3    calcium-cholecalciferol (OYSCO 500 + D) 500-200 MG-UNIT per tablet TAKE 1 TABLET BY MOUTH TWICE DAILY (Patient taking differently: Take 1 tablet by mouth 2 times daily TAKE 1 TABLET BY MOUTH TWICE DAILY) 180 tablet 1    SPIRIVA HANDIHALER 18 MCG inhalation capsule Inhale 1 capsule into the lungs daily 90 capsule 1    alendronate (FOSAMAX) 70 MG tablet TAKE 1 TABLET BY MOUTH EVERY 7 DAYS (Patient taking differently: Take 70 mg by mouth every 7 days TAKE 1 TABLET BY MOUTH EVERY 7 DAYS) 12 tablet 3    neomycin-polymyxin-hydrocortisone (CORTISPORIN) 3.5-60236-5 otic solution 3 drops both ears three times a day as needed for itch (Patient taking differently: Place 3 drops into both ears 3 drops both ears three times a day as needed for itch) 10 mL 1    Multiple Vitamins-Iron (DAILY-PARRIS/IRON/BETA-CAROTENE) TABS Take 1 tablet by mouth Daily 90 tablet 3    omeprazole (PRILOSEC) 20 MG delayed release capsule Take 1 capsule by mouth 2 times daily 180 capsule 3    D3-1000 25 MCG (1000 UT) TABS tablet TAKE 1 CAPSULE BY MOUTH DAILY (Patient taking differently: 1,000 Units daily ) 90 tablet 0    Cholecalciferol (VITAMIN D3) 25 MCG (1000 UT) CAPS TAKE 1 CAPSULE BY MOUTH DAILY (Patient taking differently: 1 capsule daily TAKE 1 CAPSULE BY MOUTH DAILY) 30 capsule 5    calcium carbonate (OSCAL) 500 MG TABS tablet Take 1 tablet by mouth 2 times daily (with meals) 60 tablet 8    diphenhydrAMINE (BENADRYL) 25 MG capsule TAKE ONE CAPSULE BY MOUTH EVERY DAY AS NEEDED FOR ITCHING (Patient taking differently: Take 25 mg by mouth nightly as needed TAKE ONE CAPSULE BY MOUTH EVERY DAY AS NEEDED FOR ITCHING) 30 capsule 0    b complex vitamins capsule Take 1 capsule by mouth daily.       amitriptyline (ELAVIL) 100 MG tablet Take 1 tablet by mouth nightly 90 tablet 1    levothyroxine (SYNTHROID) 125 MCG tablet Take 1 tablet by mouth Daily 90 tablet 1     No current facility-administered medications for this visit. Pcn [penicillins], Demerol, and Meperidine    Review of Systems:  General ROS: negative  Psychological ROS: negative  Hematological and Lymphatic ROS: No history of blood clots or bleeding disorder. Respiratory ROS: no cough, shortness of breath, or wheezing  negative  Cardiovascular ROS: no chest pain or dyspnea on exertion  Gastrointestinal ROS: no abdominal pain, change in bowel habits, or black or bloody stools  Genito-Urinary ROS: no dysuria, trouble voiding, or hematuria  Musculoskeletal ROS: negative  Neurological ROS: no TIA or stroke symptoms  Dermatological ROS: negative    VITALS:  Blood pressure 102/70, pulse 80, resp. rate 16, weight 144 lb (65.3 kg), SpO2 98 %, not currently breastfeeding. Body mass index is 24.72 kg/m². Physical Examination:  General appearance - alert, well appearing, and in no distress  Mental status - alert, oriented to person, place, and time  Neck - Neck is supple, no JVD or carotid bruits. No thyromegaly or adenopathy. Chest - clear to auscultation, no wheezes, rales or rhonchi, symmetric air entry  Heart - normal rate, regular rhythm, normal S1, S2, no murmurs, rubs, clicks or gallops  Abdomen - soft, nontender, nondistended, no masses or organomegaly  Neurological - alert, oriented, normal speech, no focal findings or movement disorder noted  Extremities - peripheral pulses normal, no pedal edema, no clubbing or cyanosis  Skin - normal coloration and turgor, no rashes, no suspicious skin lesions noted    AICD incision is normal.     No orders of the defined types were placed in this encounter. ASSESSMENT:     Diagnosis Orders   1. Viral cardiomyopathy ([address]     2. Mixed hyperlipidemia     3. Diastolic dysfunction     4. Hypercholesterolemia     5.  ICD (implantable cardioverter-defibrillator) battery depletion           PLAN:     Patient will need to continue to follow up with you for their general medical care     As always, aggressive risk factor modification is strongly recommended. We should adhere to the [address] guidelines for HTN management and the NCEP ATP III guidelines for LDL-C management. Cardiac diet is always recommended with low fat, cholesterol, calories and sodium. Continue medications at current doses. The importance of daily weights, dietary sodium restriction, and contact with cardiology if weight is increased more than 3 lbs in any 48 hour period was stressed. The patient has been advised to contact us if they experience progressive SOB, orthopnea, PND or progressive edema. Stop Losartan and change to [facility] after 48 hour washout. Patient was advised and encouraged to check blood pressure at home or at a pharmacy, maintain a logbook, and also call us back if blood pressure are above the target ranges or if it is low. Patient clearly understands and agrees to the instructions. We will need to continue to monitor muscle and liver enzymes, BUN, CR, and electrolytes. Details of medical condition explained and patient was warned about adverse consequences of uncontrolled medical conditions and possible side effects of prescribed medications. Patient was advised to go to the ER if he starts experiencing adverse effects of the medications. patient was instructed to call us back or go to nearby emergency room immediately if symptoms get worse or do not improve. Thank you for allowing me to participate in the care of your patient, please don't hesitate to contact me if you have any further questions.

## 2021-11-12 NOTE — TELEPHONE ENCOUNTER
----- Message from [facility] (TRISH SHEEHAN) sent at 11/12/2021 10:16 AM EST -----  Subject: Refill Request    QUESTIONS  Name of Medication? traMADol (ULTRAM) 50 MG tablet  Patient-reported dosage and instructions? 50 mg 2 a day  How many days do you have left? 2  Preferred Pharmacy? "[facility]" #[id number]  Pharmacy phone number (if available)? [phone]  ---------------------------------------------------------------------------  --------------  Lori MAYBERRY  What is the best way for the office to contact you? OK to leave message on   voicemail  Preferred Call Back Phone Number?  [phone]

## 2021-11-13 RX ORDER — TRAMADOL HYDROCHLORIDE 50 MG/1
50 TABLET ORAL EVERY 6 HOURS PRN
Qty: 28 TABLET | Refills: 0 | OUTPATIENT
Start: 2021-11-13 | End: 2021-11-20

## 2021-11-15 ENCOUNTER — TELEPHONE (OUTPATIENT)
Dept: FAMILY MEDICINE CLINIC | Age: 61
End: 2021-11-15

## 2021-11-16 ENCOUNTER — TELEPHONE (OUTPATIENT)
Dept: CARDIOLOGY CLINIC | Age: 61
End: 2021-11-16

## 2021-11-16 DIAGNOSIS — M25.512 CHRONIC LEFT SHOULDER PAIN: ICD-10-CM

## 2021-11-16 DIAGNOSIS — G89.29 CHRONIC LEFT SHOULDER PAIN: ICD-10-CM

## 2021-11-16 RX ORDER — TRAMADOL HYDROCHLORIDE 50 MG/1
50 TABLET ORAL EVERY 6 HOURS PRN
Qty: 28 TABLET | Refills: 0 | Status: SHIPPED | OUTPATIENT
Start: 2021-11-16 | End: 2021-12-27 | Stop reason: SDUPTHER

## 2021-11-16 NOTE — TELEPHONE ENCOUNTER
Prior auth for entresto was denied for patient as she did not meet the criteria-CHF with class 2-4 and EF of 40% or less.

## 2021-11-18 RX ORDER — VALSARTAN 80 MG/1
80 TABLET ORAL DAILY
Qty: 30 TABLET | Refills: 5 | Status: SHIPPED | OUTPATIENT
Start: 2021-11-18 | End: 2022-05-18

## 2021-11-28 RX ORDER — AMITRIPTYLINE HYDROCHLORIDE 100 MG/1
100 TABLET, FILM COATED ORAL NIGHTLY
Qty: 90 TABLET | Refills: 1 | Status: SHIPPED | OUTPATIENT
Start: 2021-11-28 | End: 2022-05-23 | Stop reason: SDUPTHER

## 2021-11-28 RX ORDER — CALCIUM CARBONATE/VITAMIN D3 500MG-5MCG
TABLET ORAL
Qty: 180 TABLET | Refills: 1 | Status: SHIPPED | OUTPATIENT
Start: 2021-11-28

## 2021-11-28 RX ORDER — ALENDRONATE SODIUM 70 MG/1
70 TABLET ORAL
Qty: 12 TABLET | Refills: 3 | Status: SHIPPED | OUTPATIENT
Start: 2021-11-28 | End: 2022-10-24

## 2021-11-28 RX ORDER — OMEPRAZOLE 20 MG/1
20 CAPSULE, DELAYED RELEASE ORAL 2 TIMES DAILY
Qty: 180 CAPSULE | Refills: 3 | Status: SHIPPED | OUTPATIENT
Start: 2021-11-28

## 2021-12-27 DIAGNOSIS — M25.512 CHRONIC LEFT SHOULDER PAIN: ICD-10-CM

## 2021-12-27 DIAGNOSIS — G89.29 CHRONIC LEFT SHOULDER PAIN: ICD-10-CM

## 2021-12-27 RX ORDER — TRAMADOL HYDROCHLORIDE 50 MG/1
50 TABLET ORAL EVERY 6 HOURS PRN
Qty: 28 TABLET | Refills: 0 | Status: SHIPPED | OUTPATIENT
Start: 2021-12-27 | End: 2022-01-03

## 2021-12-27 NOTE — TELEPHONE ENCOUNTER
Maxine Gilmore [facility] Clinical Staff  Subject: Refill Request     QUESTIONS   Name of Medication? traMADol (ULTRAM) 50 MG tablet   Patient-reported dosage and instructions? 1 per day   How many days do you have left? 0   Preferred Pharmacy? [facility] #[id number]   Pharmacy phone number (if available)? [phone]   ---------------------------------------------------------------------------   --------------   Cyndie MAYBERRY   What is the best way for the office to contact you? OK to leave message on   voicemail   Preferred Call Back Phone Number?  [phone]

## 2022-01-06 ENCOUNTER — OFFICE VISIT (OUTPATIENT)
Dept: FAMILY MEDICINE CLINIC | Age: 62
End: 2022-01-06
Payer: COMMERCIAL

## 2022-01-06 VITALS
DIASTOLIC BLOOD PRESSURE: 68 MMHG | TEMPERATURE: 97.6 F | BODY MASS INDEX: 24.17 KG/M2 | HEART RATE: 102 BPM | HEIGHT: 64 IN | OXYGEN SATURATION: 95 % | SYSTOLIC BLOOD PRESSURE: 102 MMHG | WEIGHT: 141.6 LBS

## 2022-01-06 DIAGNOSIS — E03.9 ACQUIRED HYPOTHYROIDISM: ICD-10-CM

## 2022-01-06 DIAGNOSIS — I10 HYPERTENSION, UNSPECIFIED TYPE: ICD-10-CM

## 2022-01-06 DIAGNOSIS — F41.9 ANXIETY AND DEPRESSION: ICD-10-CM

## 2022-01-06 DIAGNOSIS — F32.A ANXIETY AND DEPRESSION: ICD-10-CM

## 2022-01-06 DIAGNOSIS — M47.816 SPONDYLOSIS OF LUMBAR REGION WITHOUT MYELOPATHY OR RADICULOPATHY: ICD-10-CM

## 2022-01-06 DIAGNOSIS — J44.9 CHRONIC OBSTRUCTIVE PULMONARY DISEASE, UNSPECIFIED COPD TYPE (HCC): Primary | ICD-10-CM

## 2022-01-06 PROCEDURE — G8420 CALC BMI NORM PARAMETERS: HCPCS | Performed by: PHYSICIAN ASSISTANT

## 2022-01-06 PROCEDURE — G8427 DOCREV CUR MEDS BY ELIG CLIN: HCPCS | Performed by: PHYSICIAN ASSISTANT

## 2022-01-06 PROCEDURE — 99214 OFFICE O/P EST MOD 30 MIN: CPT | Performed by: PHYSICIAN ASSISTANT

## 2022-01-06 PROCEDURE — G8482 FLU IMMUNIZE ORDER/ADMIN: HCPCS | Performed by: PHYSICIAN ASSISTANT

## 2022-01-06 PROCEDURE — 3017F COLORECTAL CA SCREEN DOC REV: CPT | Performed by: PHYSICIAN ASSISTANT

## 2022-01-06 PROCEDURE — 3023F SPIROM DOC REV: CPT | Performed by: PHYSICIAN ASSISTANT

## 2022-01-06 PROCEDURE — 1036F TOBACCO NON-USER: CPT | Performed by: PHYSICIAN ASSISTANT

## 2022-01-06 ASSESSMENT — PATIENT HEALTH QUESTIONNAIRE - PHQ9
1. LITTLE INTEREST OR PLEASURE IN DOING THINGS: 1
SUM OF ALL RESPONSES TO PHQ9 QUESTIONS 1 & 2: 2
SUM OF ALL RESPONSES TO PHQ QUESTIONS 1-9: 2
2. FEELING DOWN, DEPRESSED OR HOPELESS: 1

## 2022-01-06 NOTE — PROGRESS NOTES
Subjective  Laura Stevens, 57 y.o. female presents today with:  Chief Complaint   Patient presents with    6 Month Follow-Up     Pt states no issues/concerns. HPI  Patient is here for 6-month follow-up states she has been doing well overall needs prescription refill no new concerns    Review of Systems   Musculoskeletal: Positive for arthralgias. All other systems reviewed and are negative.         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    Cardiomyopathy ([facility] 3/6/2015    COPD (chronic obstructive pulmonary disease) (HCC)     COPD (chronic obstructive pulmonary disease) (HCC)     Dental disease     SEVERE DENTAL PROBLEMS    Diastolic dysfunction     DJD (degenerative joint disease), lumbar     Dyslipidemia 3/6/2015    Hepatitis B infection VIRAL-    Hepatitis C without mention of hepatic coma -CHEMO    History of alcoholism ([facility]     History of osteopenia     Hypothyroidism     ICD (implantable cardioverter-defibrillator) battery depletion     Tongue cancer ([facility]      Past Surgical History:   Procedure Laterality Date    LEG SURGERY  2009 MASS R LEG SOFT TISSUE    LUMBAR FUSION      Dr. Zach Bourgeois  09/16/15    Ani Perkins MD IMPLANT ICD     Social History     Socioeconomic History    Marital status:      Spouse name: Not on file    Number of children: Not on file    Years of education: Not on file    Highest education level: Not on file   Occupational History    Not on file   Tobacco Use    Smoking status: Former Smoker     Packs/day: 1.00     Years: 30.00     Pack years: 30.00     Quit date: 3/5/2005     Years since quittin.8    Smokeless tobacco: Never Used   Substance and Sexual Activity    Alcohol use: No     Comment: ALCOHOLIC    Drug use: No    Sexual activity: Not Currently   Other Topics Concern    Not on file   Social History Narrative    Not on file     Social Determinants of Health     Financial Resource Strain: Low Risk     Difficulty of Paying Living Expenses: Not hard at all   Food Insecurity: Food Insecurity Present    Worried About [address] in the Last Year: Sometimes true    [place] [employer] in the Last Year: Sometimes true   Transportation Needs:     Lack of Transportation (Medical): Not on file    Lack of Transportation (Non-Medical):  Not on file   Physical Activity:     Days of Exercise per Week: Not on file    Minutes of Exercise per Session: Not on file   Stress:     Feeling of Stress : Not on file   Social Connections:     Frequency of Communication with Friends and Family: Not on file    Frequency of Social Gatherings with Friends and Family: Not on file    Attends Rastafarian Services: Not on file    Active Member of [address] [place] [address] or Organizations: Not on file    Attends Club or Organization Meetings: Not on file    Marital Status: Not on file   Intimate Partner Violence:     Fear of Current or Ex-Partner: Not on file    Emotionally Abused: Not on file    Physically Abused: Not on file    Sexually Abused: Not on file   Housing Stability:     Unable to Pay for Housing in the Last Year: Not on file    Number of [place] in the Last Year: Not on file    Unstable Housing in the Last Year: Not on file     Family History   Problem Relation Age of Onset    High Blood Pressure Other     Diabetes Other     Cancer Other         UNCLE-LUNG        Allergies   Allergen Reactions    Pcn [Penicillins] Anaphylaxis    Demerol Rash    Meperidine Rash     Current Outpatient Medications   Medication Sig Dispense Refill    alendronate (FOSAMAX) 70 MG tablet Take 1 tablet by mouth every 7 days TAKE 1 TABLET BY MOUTH EVERY 7 DAYS 12 tablet 3    calcium-cholecalciferol (OYSCO 500 + D) 500-200 MG-UNIT per tablet TAKE 1 TABLET BY MOUTH TWICE DAILY 180 tablet 1    omeprazole (PRILOSEC) 20 MG delayed release capsule Take 1 capsule by mouth 2 times daily 180 capsule 3    valsartan (DIOVAN) 80 MG tablet Take 1 tablet by mouth daily 30 tablet 5    diclofenac (VOLTAREN) 50 MG EC tablet Take 1 tablet by mouth 2 times daily as needed for Pain 90 tablet 3    spironolactone (ALDACTONE) 25 MG tablet TAKE 1/2 (ONE-HALF) OF A TABLET BY MOUTH EVERY DAY 45 tablet 3    carvedilol (COREG) 3.125 MG tablet Take 1 tablet by mouth 2 times daily (with meals) 90 tablet 2    folic acid (FOLVITE) 1 MG tablet TAKE 1 TABLET BY MOUTH EVERY DAY 90 tablet 2    sertraline (ZOLOFT) 100 MG tablet Take 1 tablet by mouth twice daily (Patient taking differently: Take 100 mg by mouth 2 times daily Take 1 tablet by mouth twice daily) 180 tablet 3    atorvastatin (LIPITOR) 40 MG tablet Take 1 tablet by mouth daily 180 tablet 3    SPIRIVA HANDIHALER 18 MCG inhalation capsule Inhale 1 capsule into the lungs daily 90 capsule 1    neomycin-polymyxin-hydrocortisone (CORTISPORIN) 3.5-69336-6 otic solution 3 drops both ears three times a day as needed for itch (Patient taking differently: Place 3 drops into both ears 3 drops both ears three times a day as needed for itch) 10 mL 1    Multiple Vitamins-Iron (DAILY-PARRIS/IRON/BETA-CAROTENE) TABS Take 1 tablet by mouth Daily 90 tablet 3    D3-1000 25 MCG (1000 UT) TABS tablet TAKE 1 CAPSULE BY MOUTH DAILY (Patient taking differently: 1,000 Units daily ) 90 tablet 0    Cholecalciferol (VITAMIN D3) 25 MCG (1000 UT) CAPS TAKE 1 CAPSULE BY MOUTH DAILY (Patient taking differently: 1 capsule daily TAKE 1 CAPSULE BY MOUTH DAILY) 30 capsule 5    calcium carbonate (OSCAL) 500 MG TABS tablet Take 1 tablet by mouth 2 times daily (with meals) 60 tablet 8    diphenhydrAMINE (BENADRYL) 25 MG capsule TAKE ONE CAPSULE BY MOUTH EVERY DAY AS NEEDED FOR ITCHING (Patient taking differently: Take 25 mg by mouth nightly as needed TAKE ONE CAPSULE BY MOUTH EVERY DAY AS NEEDED FOR Behavior: Behavior normal.         Thought Content: Thought content normal.         Judgment: Judgment normal.              Assessment & Plan    Diagnosis Orders   1. Chronic obstructive pulmonary disease, unspecified COPD type ([facility]      stable   2. Hypertension, unspecified type      controlled   3. Acquired hypothyroidism     4. Spondylosis of lumbar region without myelopathy or radiculopathy     5. Anxiety and depression           No orders of the defined types were placed in this encounter. No orders of the defined types were placed in this encounter. There are no discontinued medications. Return in about 6 months (around 7/6/2022), or if symptoms worsen or fail to improve.     Riri Santos PA-C

## 2022-01-14 RX ORDER — TRAMADOL HYDROCHLORIDE 50 MG/1
50 TABLET ORAL EVERY 6 HOURS PRN
Qty: 28 TABLET | Refills: 0 | Status: SHIPPED | OUTPATIENT
Start: 2022-01-14 | End: 2022-02-24 | Stop reason: SDUPTHER

## 2022-01-14 RX ORDER — HYDROXYZINE PAMOATE 25 MG/1
25 CAPSULE ORAL 3 TIMES DAILY PRN
Qty: 90 CAPSULE | Refills: 2 | Status: SHIPPED | OUTPATIENT
Start: 2022-01-14 | End: 2022-02-13

## 2022-01-31 RX ORDER — LEVOTHYROXINE SODIUM 0.12 MG/1
125 TABLET ORAL DAILY
Qty: 90 TABLET | Refills: 3 | Status: SHIPPED | OUTPATIENT
Start: 2022-01-31 | End: 2022-05-01

## 2022-01-31 NOTE — TELEPHONE ENCOUNTER
Kelly Gilmore [facility] Clinical Staff  Subject: Refill Request     QUESTIONS   Name of Medication? levothyroxine (SYNTHROID) 125 MCG tablet   Patient-reported dosage and instructions? 125 mcg; once a day   How many days do you have left? 0   Preferred Pharmacy? [id number]   Pharmacy phone number (if available)? [phone]   ---------------------------------------------------------------------------   --------------   Abe MAYBERRY   What is the best way for the office to contact you? OK to leave message on   voicemail   Preferred Call Back Phone Number?  [phone]

## 2022-02-02 RX ORDER — CARVEDILOL 3.12 MG/1
3.12 TABLET ORAL 2 TIMES DAILY WITH MEALS
Qty: 90 TABLET | Refills: 2 | Status: SHIPPED | OUTPATIENT
Start: 2022-02-02 | End: 2022-02-03

## 2022-02-02 NOTE — TELEPHONE ENCOUNTER
PT called for a refill on 2 prescriptions benzonatate (TESSALON) 100 MG capsule and carvedilol (COREG) 3.125 MG tablet     Last appt.  1/6/2022

## 2022-02-03 RX ORDER — CARVEDILOL 3.12 MG/1
3.12 TABLET ORAL 2 TIMES DAILY WITH MEALS
Qty: 90 TABLET | Refills: 2 | Status: SHIPPED | OUTPATIENT
Start: 2022-02-03 | End: 2022-08-12

## 2022-02-03 NOTE — TELEPHONE ENCOUNTER
Requesting medication refill.  Please approve or deny this request.    Rx requested:  Requested Prescriptions     Pending Prescriptions Disp Refills    carvedilol (COREG) 3.125 MG tablet [Pharmacy Med Name: carvedilol 3.125 mg tablet] 90 tablet 2     Sig: Take 1 tablet by mouth 2 times daily (with meals)       Last Office Visit:   1/6/2022    Last Filled:      Last Labs:      Next Visit Date:  Future Appointments   Date Time Provider Nidia Thomas   7/6/2022  3:15 PM Riri Townsend [facility]   8/12/2022 11:30 AM Feliciano Mejias [facility]

## 2022-02-23 DIAGNOSIS — M47.816 SPONDYLOSIS OF LUMBAR REGION WITHOUT MYELOPATHY OR RADICULOPATHY: ICD-10-CM

## 2022-02-24 DIAGNOSIS — M47.816 SPONDYLOSIS OF LUMBAR REGION WITHOUT MYELOPATHY OR RADICULOPATHY: ICD-10-CM

## 2022-02-24 RX ORDER — TRAMADOL HYDROCHLORIDE 50 MG/1
50 TABLET ORAL EVERY 6 HOURS PRN
Qty: 28 TABLET | Refills: 0 | OUTPATIENT
Start: 2022-02-24 | End: 2022-03-03

## 2022-02-24 RX ORDER — TRAMADOL HYDROCHLORIDE 50 MG/1
50 TABLET ORAL EVERY 6 HOURS PRN
Qty: 28 TABLET | Refills: 0 | Status: SHIPPED | OUTPATIENT
Start: 2022-02-24 | End: 2022-03-29 | Stop reason: SDUPTHER

## 2022-02-24 NOTE — TELEPHONE ENCOUNTER
Mani Gilmore [facility] Clinical Staff  Subject: Refill Request     QUESTIONS   Name of Medication? traMADol (ULTRAM) 50 MG tablet   Patient-reported dosage and instructions? as needed for pain   How many days do you have left? 4   Preferred Pharmacy? [id number]   Pharmacy phone number (if available)? [phone]   ---------------------------------------------------------------------------   --------------   Kenzie MAYBERRY   What is the best way for the office to contact you? OK to leave message on   voicemail   Preferred Call Back Phone Number?  [phone]

## 2022-03-11 ENCOUNTER — TELEMEDICINE (OUTPATIENT)
Dept: FAMILY MEDICINE CLINIC | Age: 62
End: 2022-03-11
Payer: COMMERCIAL

## 2022-03-11 DIAGNOSIS — B33.24 VIRAL CARDIOMYOPATHY (HCC): ICD-10-CM

## 2022-03-11 DIAGNOSIS — M81.0 SENILE OSTEOPOROSIS: ICD-10-CM

## 2022-03-11 DIAGNOSIS — B34.9 VIRAL SYNDROME: Primary | ICD-10-CM

## 2022-03-11 DIAGNOSIS — J30.9 ALLERGIC RHINITIS, UNSPECIFIED SEASONALITY, UNSPECIFIED TRIGGER: ICD-10-CM

## 2022-03-11 PROCEDURE — G8482 FLU IMMUNIZE ORDER/ADMIN: HCPCS | Performed by: PHYSICIAN ASSISTANT

## 2022-03-11 PROCEDURE — G8427 DOCREV CUR MEDS BY ELIG CLIN: HCPCS | Performed by: PHYSICIAN ASSISTANT

## 2022-03-11 PROCEDURE — 99212 OFFICE O/P EST SF 10 MIN: CPT | Performed by: PHYSICIAN ASSISTANT

## 2022-03-11 PROCEDURE — 3017F COLORECTAL CA SCREEN DOC REV: CPT | Performed by: PHYSICIAN ASSISTANT

## 2022-03-11 PROCEDURE — 1036F TOBACCO NON-USER: CPT | Performed by: PHYSICIAN ASSISTANT

## 2022-03-11 PROCEDURE — G8420 CALC BMI NORM PARAMETERS: HCPCS | Performed by: PHYSICIAN ASSISTANT

## 2022-03-11 RX ORDER — FLUTICASONE PROPIONATE 50 MCG
1 SPRAY, SUSPENSION (ML) NASAL DAILY
Qty: 16 G | Refills: 5 | Status: SHIPPED | OUTPATIENT
Start: 2022-03-11

## 2022-03-11 RX ORDER — DIPHENHYDRAMINE HCL 25 MG
CAPSULE ORAL
Qty: 30 CAPSULE | Refills: 5 | Status: SHIPPED | OUTPATIENT
Start: 2022-03-11

## 2022-03-11 RX ORDER — CALCIUM CARBONATE 500(1250)
500 TABLET ORAL 2 TIMES DAILY WITH MEALS
Qty: 11 TABLET | Refills: 8 | Status: SHIPPED | OUTPATIENT
Start: 2022-03-11 | End: 2022-05-23 | Stop reason: SDUPTHER

## 2022-03-11 ASSESSMENT — ENCOUNTER SYMPTOMS
DIARRHEA: 1
ABDOMINAL PAIN: 0
SHORTNESS OF BREATH: 0
SINUS PRESSURE: 1
COUGH: 1
SORE THROAT: 1
NAUSEA: 0
BLOOD IN STOOL: 0
VOMITING: 0

## 2022-03-11 NOTE — PROGRESS NOTES
Subjective  Diego Fountain, 57 y.o. female presents today with:  Chief Complaint   Patient presents with    URI     WEd night, sore throat, cough, sinus congestion and headache. no covid exposure that she is aware of. Currently vaccinated. has not tested. HPI  Telemedicine telephone visit due to concern for exposure to COVID-19 (coronavirus). Patient with complaint of sore throat nonproductive cough sinus congestion headache chills loose stools decreased appetite  for the past 3 days symptoms improved with use of Motrin patient did not check her temperature   Denies loss of taste and smell     Review of Systems   Constitutional: Positive for chills and fatigue. Negative for fever. HENT: Positive for postnasal drip, sinus pressure and sore throat. Respiratory: Positive for cough. Negative for shortness of breath. Cardiovascular: Negative. Negative for chest pain, palpitations and leg swelling. Gastrointestinal: Positive for diarrhea. Negative for abdominal pain, blood in stool, nausea and vomiting. Musculoskeletal: Positive for myalgias. Allergic/Immunologic: Positive for environmental allergies. All other systems reviewed and are negative.         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    Cardiomyopathy ([address] 3/6/2015    COPD (chronic obstructive pulmonary disease) (HCC)     COPD (chronic obstructive pulmonary disease) ([address]     Dental disease     SEVERE DENTAL PROBLEMS    Diastolic dysfunction     DJD (degenerative joint disease), lumbar     Dyslipidemia 3/6/2015    Hepatitis B infection VIRAL-2006    Hepatitis C without mention of hepatic coma 2007-CHEMO    History of alcoholism ([address]     History of osteopenia 2009    Hypothyroidism     ICD (implantable cardioverter-defibrillator) battery depletion     Tongue cancer [facility])      Past Surgical History:   Procedure Laterality Date    LEG SURGERY  2009 MASS R LEG SOFT TISSUE    LUMBAR FUSION      Dr. Zach Bourgeois  09/16/15    Ani Perkins MD IMPLANT ICD     Social History     Socioeconomic History    Marital status:      Spouse name: Not on file    Number of children: Not on file    Years of education: Not on file    Highest education level: Not on file   Occupational History    Not on file   Tobacco Use    Smoking status: Former Smoker     Packs/day: 1.00     Years: 30.00     Pack years: 30.00     Quit date: 3/5/2005     Years since quittin.0    Smokeless tobacco: Never Used   Substance and Sexual Activity    Alcohol use: No     Comment: ALCOHOLIC    Drug use: No    Sexual activity: Not Currently   Other Topics Concern    Not on file   Social History Narrative    Not on file     Social Determinants of Health     Financial Resource Strain: Low Risk     Difficulty of Paying Living Expenses: Not hard at all   Food Insecurity: Food Insecurity Present    Worried About [facility] in the Last Year: Sometimes true    Hermelinda of Food in the Last Year: Sometimes true   Transportation Needs:     Lack of Transportation (Medical): Not on file    Lack of Transportation (Non-Medical):  Not on file   Physical Activity:     Days of Exercise per Week: Not on file    Minutes of Exercise per Session: Not on file   Stress:     Feeling of Stress : Not on file   Social Connections:     Frequency of Communication with Friends and Family: Not on file    Frequency of Social Gatherings with Friends and Family: Not on file    Attends Scientologist Services: Not on file    Active Member of Clubs or Organizations: Not on file    Attends Club or Organization Meetings: Not on file    Marital Status: Not on file   Intimate Partner Violence:     Fear of Current or Ex-Partner: Not on file    Emotionally Abused: Not on file    Physically Abused: Not on file   [facility] Sexually Abused: Not on file   Housing Stability:     Unable to Pay for Housing in the Last Year: Not on file    Number of Ronit in the Last Year: Not on file    Unstable Housing in the Last Year: Not on file     Family History   Problem Relation Age of Onset    High Blood Pressure Other     Diabetes Other     Cancer Other         UNCLE-LUNG     Allergies   Allergen Reactions    Pcn [Penicillins] Anaphylaxis    Demerol Rash    Meperidine Rash     Current Outpatient Medications   Medication Sig Dispense Refill    carvedilol (COREG) 3.125 MG tablet Take 1 tablet by mouth 2 times daily (with meals) 90 tablet 2    alendronate (FOSAMAX) 70 MG tablet Take 1 tablet by mouth every 7 days TAKE 1 TABLET BY MOUTH EVERY 7 DAYS 12 tablet 3    amitriptyline (ELAVIL) 100 MG tablet Take 1 tablet by mouth nightly 90 tablet 1    calcium-cholecalciferol (OYSCO 500 + D) 500-200 MG-UNIT per tablet TAKE 1 TABLET BY MOUTH TWICE DAILY 180 tablet 1    valsartan (DIOVAN) 80 MG tablet Take 1 tablet by mouth daily 30 tablet 5    diclofenac (VOLTAREN) 50 MG EC tablet Take 1 tablet by mouth 2 times daily as needed for Pain 90 tablet 3    spironolactone (ALDACTONE) 25 MG tablet TAKE 1/2 (ONE-HALF) OF A TABLET BY MOUTH EVERY DAY 45 tablet 3    folic acid (FOLVITE) 1 MG tablet TAKE 1 TABLET BY MOUTH EVERY DAY 90 tablet 2    sertraline (ZOLOFT) 100 MG tablet Take 1 tablet by mouth twice daily (Patient taking differently: Take 100 mg by mouth 2 times daily Take 1 tablet by mouth twice daily) 180 tablet 3    atorvastatin (LIPITOR) 40 MG tablet Take 1 tablet by mouth daily 180 tablet 3    SPIRIVA HANDIHALER 18 MCG inhalation capsule Inhale 1 capsule into the lungs daily 90 capsule 1    Multiple Vitamins-Iron (DAILY-PARRIS/IRON/BETA-CAROTENE) TABS Take 1 tablet by mouth Daily 90 tablet 3    calcium carbonate (OSCAL) 500 MG TABS tablet Take 1 tablet by mouth 2 times daily (with meals) 60 tablet 8    b complex vitamins capsule Take 1 capsule by mouth daily.  levothyroxine (SYNTHROID) 125 MCG tablet Take 1 tablet by mouth Daily 90 tablet 3    omeprazole (PRILOSEC) 20 MG delayed release capsule Take 1 capsule by mouth 2 times daily 180 capsule 3    neomycin-polymyxin-hydrocortisone (CORTISPORIN) 3.5-64438-8 otic solution 3 drops both ears three times a day as needed for itch (Patient taking differently: Place 3 drops into both ears 3 drops both ears three times a day as needed for itch) 10 mL 1    diphenhydrAMINE (BENADRYL) 25 MG capsule TAKE ONE CAPSULE BY MOUTH EVERY DAY AS NEEDED FOR ITCHING (Patient not taking: Reported on 3/11/2022) 30 capsule 0     No current facility-administered medications for this visit. Objective    There were no vitals filed for this visit. Physical Exam  Pulmonary:      Effort: Pulmonary effort is normal. No respiratory distress. Neurological:      Mental Status: She is oriented to person, place, and time. Psychiatric:         Mood and Affect: Mood normal.                 Assessment & Plan    Diagnosis Orders   1. Viral syndrome     2. Senile osteoporosis     3. Allergic rhinitis, unspecified seasonality, unspecified trigger     4. Viral cardiomyopathy ([facility]      Followed by cardiology         No orders of the defined types were placed in this encounter. No orders of the defined types were placed in this encounter. Medications Discontinued During This Encounter   Medication Reason    Cholecalciferol (VITAMIN D3) 25 MCG (1000 UT) CAPS Formulary change    D3-1000 25 MCG (1000 UT) TABS tablet DUPLICATE     No follow-ups on file. Riri Santos PA-C      Assessment & Plan    Diagnosis Orders   1. Viral syndrome     2. Senile osteoporosis     3. Allergic rhinitis, unspecified seasonality, unspecified trigger     4. Viral cardiomyopathy ([facility]      Followed by cardiology         No orders of the defined types were placed in this encounter.     No orders of the defined types were placed in this encounter. Medications Discontinued During This Encounter   Medication Reason    Cholecalciferol (VITAMIN D3) 25 MCG (1000 UT) CAPS Formulary change    D3-1000 25 MCG (1000 UT) TABS tablet DUPLICATE     No follow-ups on file.     Riri Santos PA-C

## 2022-03-18 NOTE — TELEPHONE ENCOUNTER
Rx request   Requested Prescriptions     Pending Prescriptions Disp Refills    tiotropium (Lieutenant Silveira) 18 MCG inhalation capsule 90 capsule 1     Sig: Inhale 1 capsule into the lungs daily     LOV 3/11/2022  Next Visit Date:  Future Appointments   Date Time Provider Nidia Thomas   7/6/2022  3:15 PM Riri Herring [facility]   8/12/2022 11:30 AM Feliciano Pastor [facility]

## 2022-03-20 RX ORDER — TIOTROPIUM BROMIDE 18 UG/1
18 CAPSULE ORAL; RESPIRATORY (INHALATION) DAILY
Qty: 90 CAPSULE | Refills: 1 | Status: SHIPPED | OUTPATIENT
Start: 2022-03-20

## 2022-03-29 DIAGNOSIS — M47.816 SPONDYLOSIS OF LUMBAR REGION WITHOUT MYELOPATHY OR RADICULOPATHY: ICD-10-CM

## 2022-03-29 RX ORDER — TRAMADOL HYDROCHLORIDE 50 MG/1
50 TABLET ORAL EVERY 6 HOURS PRN
Qty: 28 TABLET | Refills: 0 | OUTPATIENT
Start: 2022-03-29 | End: 2022-04-05

## 2022-03-29 RX ORDER — TRAMADOL HYDROCHLORIDE 50 MG/1
50 TABLET ORAL EVERY 6 HOURS PRN
Qty: 28 TABLET | Refills: 0 | Status: SHIPPED | OUTPATIENT
Start: 2022-03-29 | End: 2022-04-06 | Stop reason: SDUPTHER

## 2022-03-29 NOTE — TELEPHONE ENCOUNTER
Patient requesting medication refill. Please approve or deny this request.    Rx requested:  Requested Prescriptions     Pending Prescriptions Disp Refills    traMADol (ULTRAM) 50 MG tablet 28 tablet 0     Sig: Take 1 tablet by mouth every 6 hours as needed for Pain for up to 7 days. Intended supply: 7 days.  Take lowest dose possible to manage pain         Last Office Visit:   3/11/2022      Next Visit Date:  Future Appointments   Date Time Provider Nidia Thomas   7/6/2022  3:15 PM Riri Shields [address] [place]   8/12/2022 11:30 AM Feliciano May [facility]

## 2022-04-01 NOTE — TELEPHONE ENCOUNTER
Patient returned call to the office to check on the status of this medication. KWAME Menchaca is Prescriber.   Patient is out of Medication

## 2022-04-06 DIAGNOSIS — M47.816 SPONDYLOSIS OF LUMBAR REGION WITHOUT MYELOPATHY OR RADICULOPATHY: ICD-10-CM

## 2022-04-06 RX ORDER — TRAMADOL HYDROCHLORIDE 50 MG/1
50 TABLET ORAL EVERY 6 HOURS PRN
Qty: 28 TABLET | Refills: 0 | Status: SHIPPED | OUTPATIENT
Start: 2022-04-06 | End: 2022-04-19 | Stop reason: SDUPTHER

## 2022-04-06 RX ORDER — TRAMADOL HYDROCHLORIDE 50 MG/1
50 TABLET ORAL EVERY 6 HOURS PRN
Qty: 28 TABLET | Refills: 0 | OUTPATIENT
Start: 2022-04-06 | End: 2022-04-13

## 2022-04-06 NOTE — TELEPHONE ENCOUNTER
Patient requesting medication refill. Please approve or deny this request.    Rx requested:  Requested Prescriptions     Pending Prescriptions Disp Refills    traMADol (ULTRAM) 50 MG tablet 28 tablet 0     Sig: Take 1 tablet by mouth every 6 hours as needed for Pain for up to 7 days. Intended supply: 7 days.  Take lowest dose possible to manage pain         Last Office Visit:   3/11/2022      Next Visit Date:  Future Appointments   Date Time Provider Nidia Thomas   7/6/2022  3:15 PM Riri Conklin [place] [address] [place] [address]   8/12/2022 11:30 AM Feliciano Perez,  [facility]

## 2022-04-15 DIAGNOSIS — M47.816 SPONDYLOSIS OF LUMBAR REGION WITHOUT MYELOPATHY OR RADICULOPATHY: ICD-10-CM

## 2022-04-15 NOTE — TELEPHONE ENCOUNTER
patient requesting medication refill. Please approve or deny this request.    Rx requested:  Requested Prescriptions     Pending Prescriptions Disp Refills    traMADol (ULTRAM) 50 MG tablet 28 tablet 0     Sig: Take 1 tablet by mouth every 6 hours as needed for Pain for up to 7 days. Intended supply: 7 days.  Take lowest dose possible to manage pain         Last Office Visit:   3/11/2022      Next Visit Date:  Future Appointments   Date Time Provider Nidia Thomas   7/6/2022  3:15 PM Riri Pike [address]   8/12/2022 11:30 AM Feliciano Verdin [facility]

## 2022-04-19 RX ORDER — TRAMADOL HYDROCHLORIDE 50 MG/1
50 TABLET ORAL EVERY 6 HOURS PRN
Qty: 28 TABLET | Refills: 0 | Status: SHIPPED | OUTPATIENT
Start: 2022-04-19 | End: 2022-05-26 | Stop reason: SDUPTHER

## 2022-05-18 RX ORDER — VALSARTAN 80 MG/1
80 TABLET ORAL DAILY
Qty: 30 TABLET | Refills: 5 | Status: SHIPPED | OUTPATIENT
Start: 2022-05-18

## 2022-05-23 DIAGNOSIS — M81.0 SENILE OSTEOPOROSIS: ICD-10-CM

## 2022-05-23 RX ORDER — AMITRIPTYLINE HYDROCHLORIDE 100 MG/1
100 TABLET, FILM COATED ORAL NIGHTLY
Qty: 90 TABLET | Refills: 2 | Status: SHIPPED | OUTPATIENT
Start: 2022-05-23 | End: 2022-06-22

## 2022-05-23 RX ORDER — CALCIUM CARBONATE 500(1250)
500 TABLET ORAL 2 TIMES DAILY WITH MEALS
Qty: 60 TABLET | Refills: 3 | Status: SHIPPED | OUTPATIENT
Start: 2022-05-23

## 2022-05-26 DIAGNOSIS — M47.816 SPONDYLOSIS OF LUMBAR REGION WITHOUT MYELOPATHY OR RADICULOPATHY: ICD-10-CM

## 2022-05-26 RX ORDER — TRAMADOL HYDROCHLORIDE 50 MG/1
50 TABLET ORAL EVERY 6 HOURS PRN
Qty: 28 TABLET | Refills: 0 | Status: SHIPPED | OUTPATIENT
Start: 2022-05-26 | End: 2022-06-30 | Stop reason: SDUPTHER

## 2022-05-26 NOTE — TELEPHONE ENCOUNTER
----- Message from Gerson Grigsby sent at 5/26/2022 12:51 PM EDT -----  Subject: Refill Request    QUESTIONS  Name of Medication? traMADol (ULTRAM) 50 MG tablet  Patient-reported dosage and instructions? Take one tablet twice a day as   needed for pain  How many days do you have left? 0  Preferred Pharmacy? [id number]  Pharmacy phone number (if available)? [phone]  Additional Information for Provider? Dispense Quantity: 28 tablet  ---------------------------------------------------------------------------  --------------  CALL BACK INFO  What is the best way for the office to contact you? OK to leave message on   voicemail  Preferred Call Back Phone Number? [phone]  ---------------------------------------------------------------------------  --------------  SCRIPT ANSWERS  Relationship to Patient?  Self

## 2022-06-30 DIAGNOSIS — M47.816 SPONDYLOSIS OF LUMBAR REGION WITHOUT MYELOPATHY OR RADICULOPATHY: ICD-10-CM

## 2022-06-30 RX ORDER — TRAMADOL HYDROCHLORIDE 50 MG/1
50 TABLET ORAL EVERY 6 HOURS PRN
Qty: 28 TABLET | Refills: 0 | Status: SHIPPED | OUTPATIENT
Start: 2022-06-30 | End: 2022-08-02 | Stop reason: SDUPTHER

## 2022-06-30 NOTE — TELEPHONE ENCOUNTER
Rx requested:  Requested Prescriptions     Pending Prescriptions Disp Refills    traMADol (ULTRAM) 50 MG tablet 28 tablet 0     Sig: Take 1 tablet by mouth every 6 hours as needed for Pain for up to 7 days. Intended supply: 7 days.  Take lowest dose possible to manage pain         Last Office Visit:   3/11/2022      Next Visit Date:  Future Appointments   Date Time Provider Nidia Thomas   7/6/2022  3:15 PM Riri Antonio [address] [place]   8/12/2022 11:30 AM Feliciano Harris,  [facility]

## 2022-07-01 RX ORDER — TRAMADOL HYDROCHLORIDE 50 MG/1
TABLET ORAL
Qty: 28 TABLET | Refills: 0 | OUTPATIENT
Start: 2022-07-01

## 2022-07-09 NOTE — TELEPHONE ENCOUNTER
requesting medication refill.  Please approve or deny this request.    Rx requested:  Requested Prescriptions     Pending Prescriptions Disp Refills    diclofenac (VOLTAREN) 50 MG EC tablet [Pharmacy Med Name: diclofenac sodium 50 mg tablet,delayed release] 90 tablet 3     Sig: Take 1 tablet by mouth 2 times daily as needed for Pain         Last Office Visit:   3/11/2022      Next Visit Date:  Future Appointments   Date Time Provider Nidia Thomas   7/12/2022  2:00 PM Riri Huang [place] [id number]   8/12/2022 11:30 AM Feliciano Mas  [facility]

## 2022-07-12 ENCOUNTER — TELEPHONE (OUTPATIENT)
Dept: FAMILY MEDICINE CLINIC | Age: 62
End: 2022-07-12

## 2022-07-12 ENCOUNTER — TELEMEDICINE (OUTPATIENT)
Dept: FAMILY MEDICINE CLINIC | Age: 62
End: 2022-07-12
Payer: COMMERCIAL

## 2022-07-12 DIAGNOSIS — E03.9 ACQUIRED HYPOTHYROIDISM: ICD-10-CM

## 2022-07-12 DIAGNOSIS — I10 HYPERTENSION, UNSPECIFIED TYPE: ICD-10-CM

## 2022-07-12 DIAGNOSIS — B33.24 VIRAL CARDIOMYOPATHY (HCC): ICD-10-CM

## 2022-07-12 DIAGNOSIS — J44.9 CHRONIC OBSTRUCTIVE PULMONARY DISEASE, UNSPECIFIED COPD TYPE (HCC): Primary | ICD-10-CM

## 2022-07-12 PROCEDURE — G8420 CALC BMI NORM PARAMETERS: HCPCS | Performed by: PHYSICIAN ASSISTANT

## 2022-07-12 PROCEDURE — 1036F TOBACCO NON-USER: CPT | Performed by: PHYSICIAN ASSISTANT

## 2022-07-12 PROCEDURE — 3023F SPIROM DOC REV: CPT | Performed by: PHYSICIAN ASSISTANT

## 2022-07-12 PROCEDURE — 99213 OFFICE O/P EST LOW 20 MIN: CPT | Performed by: PHYSICIAN ASSISTANT

## 2022-07-12 PROCEDURE — 3017F COLORECTAL CA SCREEN DOC REV: CPT | Performed by: PHYSICIAN ASSISTANT

## 2022-07-12 PROCEDURE — G8427 DOCREV CUR MEDS BY ELIG CLIN: HCPCS | Performed by: PHYSICIAN ASSISTANT

## 2022-07-12 SDOH — ECONOMIC STABILITY: FOOD INSECURITY: WITHIN THE PAST 12 MONTHS, YOU WORRIED THAT YOUR FOOD WOULD RUN OUT BEFORE YOU GOT MONEY TO BUY MORE.: NEVER TRUE

## 2022-07-12 SDOH — ECONOMIC STABILITY: FOOD INSECURITY: WITHIN THE PAST 12 MONTHS, THE FOOD YOU BOUGHT JUST DIDN'T LAST AND YOU DIDN'T HAVE MONEY TO GET MORE.: NEVER TRUE

## 2022-07-12 ASSESSMENT — SOCIAL DETERMINANTS OF HEALTH (SDOH): HOW HARD IS IT FOR YOU TO PAY FOR THE VERY BASICS LIKE FOOD, HOUSING, MEDICAL CARE, AND HEATING?: NOT HARD AT ALL

## 2022-07-12 NOTE — TELEPHONE ENCOUNTER
Called Archana ward today's vv.  LM on  to call office or schedule appt online for 6 mo f/u per provider KWAME Santos. Office ph# provided in messg.

## 2022-07-12 NOTE — PROGRESS NOTES
Kimberly Burns (:  1960) is a Established patient, here for evaluation of the following: Follow up on chronic concerns  Assessment & Plan   Below is the assessment and plan developed based on review of pertinent history, physical exam, labs, studies, and medications. 1. Chronic obstructive pulmonary disease, unspecified COPD type ([facility]  -     Comprehensive Metabolic Panel; Future  2. Viral cardiomyopathy ([facility]  -     Comprehensive Metabolic Panel; Future  -     Lipid, Fasting; Future  3. Hypertension, unspecified type  -     Comprehensive Metabolic Panel; Future  -     Lipid, Fasting; Future  -     CBC with Auto Differential; Future  4. Acquired hypothyroidism  -     TSH with Reflex; Future    Return in about 6 months (around 2023), or if symptoms worsen or fail to improve, for call for results. Subjective   HPI   Telemedicine Doxy video visit due to concern for exposure to COVID-19 (coronavirus). Patient states she has been doing well overall denies any COPD exacerbation chest pain pressure, palpitations   She had been very active cutting grass without difficulty  She has a follow-up with Radha ROBLES   Pt was  reminded of pended  labs  Review of Systems   All other systems reviewed and are negative. Objective   Patient-Reported Vitals  No data recorded     Physical Exam  Constitutional:       Appearance: Normal appearance. HENT:      Head: Normocephalic and atraumatic. Eyes:      Extraocular Movements: Extraocular movements intact. Conjunctiva/sclera: Conjunctivae normal.      Pupils: Pupils are equal, round, and reactive to light. Pulmonary:      Effort: Pulmonary effort is normal. No respiratory distress. Musculoskeletal:         General: Normal range of motion. Skin:     Coloration: Skin is not jaundiced or pale. Neurological:      Mental Status: She is alert and oriented to person, place, and time.    Psychiatric:         Mood and Affect: Mood normal. Thought Content: Thought content normal.         Judgment: Judgment normal.                On this date 7/12/2022 I have reviewed previous notes, test results and face to face (virtual) with the patient discussing the diagnosis and importance of compliance with the treatment plan as well as documenting on the day of the visit. Suzy Kawasaki, was evaluated through a synchronous (real-time) audio-video encounter. The patient (or guardian if applicable) is aware that this is a billable service, which includes applicable co-pays. This Virtual Visit was conducted with patient's (and/or legal guardian's) consent. The visit was conducted pursuant to the emergency declaration under the [address] [place] [address], [address] [place] [address] authority and the [employer] General Act. Patient identification was verified, and a caregiver was present when appropriate. The patient was located at Home: [facility] [id number].    Provider was located at office on [facility]       --Jeannine Mitchell PA-C

## 2022-07-19 RX ORDER — FOLIC ACID 1 MG/1
TABLET ORAL
Qty: 90 TABLET | Refills: 2 | Status: SHIPPED | OUTPATIENT
Start: 2022-07-19

## 2022-07-21 RX ORDER — SERTRALINE HYDROCHLORIDE 100 MG/1
TABLET, FILM COATED ORAL
Qty: 180 TABLET | Refills: 3 | Status: SHIPPED | OUTPATIENT
Start: 2022-07-21

## 2022-07-21 NOTE — TELEPHONE ENCOUNTER
Rx requested:  Requested Prescriptions     Pending Prescriptions Disp Refills    sertraline (ZOLOFT) 100 MG tablet [Pharmacy Med Name: sertraline 100 mg tablet] 180 tablet 3     Sig: Take 1 tablet by mouth twice daily         Last Office Visit:   7/12/2022      Next Visit Date:  Future Appointments   Date Time Provider Nidia Thomas   8/12/2022 11:30 AM Feliciano Winter [facility]   1/13/2023  1:30 PM Riri Santos PA-C [address]

## 2022-07-26 DIAGNOSIS — B33.24 VIRAL CARDIOMYOPATHY (HCC): ICD-10-CM

## 2022-07-26 DIAGNOSIS — I10 HYPERTENSION, UNSPECIFIED TYPE: ICD-10-CM

## 2022-07-26 DIAGNOSIS — J44.9 CHRONIC OBSTRUCTIVE PULMONARY DISEASE, UNSPECIFIED COPD TYPE (HCC): ICD-10-CM

## 2022-07-26 DIAGNOSIS — E03.9 ACQUIRED HYPOTHYROIDISM: ICD-10-CM

## 2022-07-26 LAB
ALBUMIN SERPL-MCNC: 4.5 G/DL (ref 3.5–4.6)
ALP BLD-CCNC: 84 U/L (ref 40–130)
ALT SERPL-CCNC: 27 U/L (ref 0–33)
ANION GAP SERPL CALCULATED.3IONS-SCNC: 15 MEQ/L (ref 9–15)
AST SERPL-CCNC: 45 U/L (ref 0–35)
BASOPHILS ABSOLUTE: 0.1 K/UL (ref 0–0.2)
BASOPHILS RELATIVE PERCENT: 1.4 %
BILIRUB SERPL-MCNC: 0.3 MG/DL (ref 0.2–0.7)
BUN BLDV-MCNC: 11 MG/DL (ref 8–23)
CALCIUM SERPL-MCNC: 9.3 MG/DL (ref 8.5–9.9)
CHLORIDE BLD-SCNC: 106 MEQ/L (ref 95–107)
CHOLESTEROL, FASTING: 164 MG/DL (ref 0–199)
CO2: 21 MEQ/L (ref 20–31)
CREAT SERPL-MCNC: 0.92 MG/DL (ref 0.5–0.9)
EOSINOPHILS ABSOLUTE: 0.3 K/UL (ref 0–0.7)
EOSINOPHILS RELATIVE PERCENT: 6 %
GFR AFRICAN AMERICAN: >60
GFR NON-AFRICAN AMERICAN: >60
GLOBULIN: 2.6 G/DL (ref 2.3–3.5)
GLUCOSE BLD-MCNC: 99 MG/DL (ref 70–99)
HCT VFR BLD CALC: 35.8 % (ref 37–47)
HDLC SERPL-MCNC: 65 MG/DL (ref 40–59)
HEMOGLOBIN: 11.7 G/DL (ref 12–16)
LDL CHOLESTEROL CALCULATED: 79 MG/DL (ref 0–129)
LYMPHOCYTES ABSOLUTE: 1 K/UL (ref 1–4.8)
LYMPHOCYTES RELATIVE PERCENT: 21.2 %
MCH RBC QN AUTO: 30.4 PG (ref 27–31.3)
MCHC RBC AUTO-ENTMCNC: 32.6 % (ref 33–37)
MCV RBC AUTO: 93.1 FL (ref 82–100)
MONOCYTES ABSOLUTE: 0.4 K/UL (ref 0.2–0.8)
MONOCYTES RELATIVE PERCENT: 8.6 %
NEUTROPHILS ABSOLUTE: 3.1 K/UL (ref 1.4–6.5)
NEUTROPHILS RELATIVE PERCENT: 62.8 %
PDW BLD-RTO: 15.3 % (ref 11.5–14.5)
PLATELET # BLD: 283 K/UL (ref 130–400)
POTASSIUM SERPL-SCNC: 4.3 MEQ/L (ref 3.4–4.9)
RBC # BLD: 3.84 M/UL (ref 4.2–5.4)
SODIUM BLD-SCNC: 142 MEQ/L (ref 135–144)
T4 FREE: 1.49 NG/DL (ref 0.84–1.68)
TOTAL PROTEIN: 7.1 G/DL (ref 6.3–8)
TRIGLYCERIDE, FASTING: 102 MG/DL (ref 0–150)
TSH REFLEX: 0.06 UIU/ML (ref 0.44–3.86)
WBC # BLD: 4.9 K/UL (ref 4.8–10.8)

## 2022-07-27 DIAGNOSIS — E03.9 ACQUIRED HYPOTHYROIDISM: Primary | ICD-10-CM

## 2022-08-02 DIAGNOSIS — M47.816 SPONDYLOSIS OF LUMBAR REGION WITHOUT MYELOPATHY OR RADICULOPATHY: ICD-10-CM

## 2022-08-02 DIAGNOSIS — E78.00 HYPERCHOLESTEROLEMIA: ICD-10-CM

## 2022-08-02 RX ORDER — SPIRONOLACTONE 25 MG/1
TABLET ORAL
Qty: 135 TABLET | Refills: 0 | Status: SHIPPED | OUTPATIENT
Start: 2022-08-02 | End: 2022-10-30 | Stop reason: SDUPTHER

## 2022-08-02 RX ORDER — TRAMADOL HYDROCHLORIDE 50 MG/1
50 TABLET ORAL EVERY 6 HOURS PRN
Qty: 28 TABLET | Refills: 0 | Status: SHIPPED | OUTPATIENT
Start: 2022-08-02 | End: 2022-09-06 | Stop reason: SDUPTHER

## 2022-08-02 RX ORDER — TRAMADOL HYDROCHLORIDE 50 MG/1
50 TABLET ORAL EVERY 6 HOURS PRN
Qty: 28 TABLET | Refills: 0 | OUTPATIENT
Start: 2022-08-02 | End: 2022-08-09

## 2022-08-02 RX ORDER — ATORVASTATIN CALCIUM 40 MG/1
40 TABLET, FILM COATED ORAL DAILY
Qty: 180 TABLET | Refills: 0 | Status: SHIPPED | OUTPATIENT
Start: 2022-08-02

## 2022-08-02 NOTE — TELEPHONE ENCOUNTER
[address] [facility] Clinical Staff  Subject: Refill Request     QUESTIONS   Name of Medication? atorvastatin (LIPITOR) 40 MG tablet   Patient-reported dosage and instructions? 40mg   How many days do you have left? 0   Preferred Pharmacy? [id number]   Pharmacy phone number (if available)? [phone]   ---------------------------------------------------------------------------   --------------,   Name of Medication? spironolactone (ALDACTONE) 25 MG tablet   Patient-reported dosage and instructions? 25mg   How many days do you have left? 0   Preferred Pharmacy? [id number]   Pharmacy phone number (if available)? [phone]   ---------------------------------------------------------------------------   --------------,   Name of Medication? traMADol (ULTRAM) 50 MG tablet   Patient-reported dosage and instructions? 50mg   How many days do you have left? 0   Preferred Pharmacy? [id number]   Pharmacy phone number (if available)? [phone]   ---------------------------------------------------------------------------   --------------   Raphael MAYBERRY   What is the best way for the office to contact you? OK to leave message on   voicemail   Preferred Call Back Phone Number? [phone]   ---------------------------------------------------------------------------   --------------   SCRIPT ANSWERS   Relationship to Patient?  Self

## 2022-08-12 RX ORDER — CARVEDILOL 3.12 MG/1
TABLET ORAL
Qty: 90 TABLET | Refills: 2 | Status: SHIPPED | OUTPATIENT
Start: 2022-08-12 | End: 2022-08-15 | Stop reason: SDUPTHER

## 2022-08-12 NOTE — TELEPHONE ENCOUNTER
requesting medication refill.  Please approve or deny this request.    Rx requested:  Requested Prescriptions     Pending Prescriptions Disp Refills    carvedilol (COREG) 3.125 MG tablet [Pharmacy Med Name: carvedilol 3.125 mg tablet] 90 tablet 2     Sig: TAKE 1 TABLET BY MOUTH TWICE DAILY WITH A MEAL         Last Office Visit:   7/12/2022      Next Visit Date:  Future Appointments   Date Time Provider Nidia Thomas   [facility]   1/13/2023  1:30 PM Riri Santos PA-C [address] [place]

## 2022-08-15 RX ORDER — CARVEDILOL 3.12 MG/1
TABLET ORAL
Qty: 90 TABLET | Refills: 1 | Status: SHIPPED | OUTPATIENT
Start: 2022-08-15

## 2022-09-05 DIAGNOSIS — M47.816 SPONDYLOSIS OF LUMBAR REGION WITHOUT MYELOPATHY OR RADICULOPATHY: ICD-10-CM

## 2022-09-06 DIAGNOSIS — M47.816 SPONDYLOSIS OF LUMBAR REGION WITHOUT MYELOPATHY OR RADICULOPATHY: ICD-10-CM

## 2022-09-06 RX ORDER — TRAMADOL HYDROCHLORIDE 50 MG/1
50 TABLET ORAL EVERY 6 HOURS PRN
Qty: 28 TABLET | Refills: 0 | Status: SHIPPED | OUTPATIENT
Start: 2022-09-06 | End: 2022-11-02 | Stop reason: SDUPTHER

## 2022-09-06 NOTE — TELEPHONE ENCOUNTER
[address] [facility] Clinical Staff  Subject: Refill Request     QUESTIONS   Name of Medication? traMADol (ULTRAM) 50 MG tablet   Patient-reported dosage and instructions? 50 MG tab as needed for pain   How many days do you have left? 1   Preferred Pharmacy? [facility]   Pharmacy phone number (if available)? [phone]   Additional Information for Provider? pt requesting a 30-day refill.   ---------------------------------------------------------------------------   --------------   CALL BACK INFO   What is the best way for the office to contact you? OK to leave message on   voicemail   Preferred Call Back Phone Number? [phone]   ---------------------------------------------------------------------------   --------------   SCRIPT ANSWERS   Relationship to Patient?  Self

## 2022-09-07 DIAGNOSIS — M47.816 SPONDYLOSIS OF LUMBAR REGION WITHOUT MYELOPATHY OR RADICULOPATHY: ICD-10-CM

## 2022-09-07 RX ORDER — TRAMADOL HYDROCHLORIDE 50 MG/1
TABLET ORAL
Qty: 28 TABLET | Refills: 0 | OUTPATIENT
Start: 2022-09-07

## 2022-09-07 NOTE — TELEPHONE ENCOUNTER
Requested Prescriptions     Pending Prescriptions Disp Refills    traMADol (ULTRAM) 50 MG tablet [Pharmacy Med Name: tramadol 50 mg tablet] 28 tablet 0     Sig: TAKE 1 TABLET BY MOUTH EVERY 6 HOURS AS NEEDED FOR PAIN FOR UP TO 7 DAYS. Take the lowest dose possible to manage pain.

## 2022-09-16 ENCOUNTER — OFFICE VISIT (OUTPATIENT)
Dept: CARDIOLOGY CLINIC | Age: 62
End: 2022-09-16
Payer: COMMERCIAL

## 2022-09-16 VITALS
WEIGHT: 128 LBS | SYSTOLIC BLOOD PRESSURE: 130 MMHG | BODY MASS INDEX: 21.97 KG/M2 | DIASTOLIC BLOOD PRESSURE: 80 MMHG | HEART RATE: 89 BPM

## 2022-09-16 DIAGNOSIS — Z45.02 ICD (IMPLANTABLE CARDIOVERTER-DEFIBRILLATOR) BATTERY DEPLETION: Primary | ICD-10-CM

## 2022-09-16 DIAGNOSIS — I51.89 DIASTOLIC DYSFUNCTION: ICD-10-CM

## 2022-09-16 DIAGNOSIS — B33.24 VIRAL CARDIOMYOPATHY (HCC): ICD-10-CM

## 2022-09-16 DIAGNOSIS — E78.2 MIXED HYPERLIPIDEMIA: ICD-10-CM

## 2022-09-16 DIAGNOSIS — E78.00 HYPERCHOLESTEROLEMIA: ICD-10-CM

## 2022-09-16 PROCEDURE — G8427 DOCREV CUR MEDS BY ELIG CLIN: HCPCS | Performed by: INTERNAL MEDICINE

## 2022-09-16 PROCEDURE — 3017F COLORECTAL CA SCREEN DOC REV: CPT | Performed by: INTERNAL MEDICINE

## 2022-09-16 PROCEDURE — 1036F TOBACCO NON-USER: CPT | Performed by: INTERNAL MEDICINE

## 2022-09-16 PROCEDURE — 93000 ELECTROCARDIOGRAM COMPLETE: CPT | Performed by: INTERNAL MEDICINE

## 2022-09-16 PROCEDURE — 99214 OFFICE O/P EST MOD 30 MIN: CPT | Performed by: INTERNAL MEDICINE

## 2022-09-16 PROCEDURE — G8420 CALC BMI NORM PARAMETERS: HCPCS | Performed by: INTERNAL MEDICINE

## 2022-09-16 NOTE — PROGRESS NOTES
Chief Complaint   Patient presents with    Cardiomyopathy     9 MONTH F/U       3-6-15: Patient presents for initial medical evaluation. Patient is followed on a regular basis by Dr. Grace Madera PA-C. Presents with abn echo with severe new onset CMP, dilated LV, EF of 10-15%. Does complain of mild SOB but no CP. Denies any recent viral infections. No LE edema. No PND or orthopnea. Grandmothers with hx of CMP. Quit smoking 10 yrs ago, used to smoke 1ppd for 30 yrs. 3-27-15: as above, s/p LHC with normal coronaries, EF of 15%, LVEDP of 20mmHg. Compliant with meds. Did not tolerate Lisinopril, did have swelling of her throat. Does have a cough and URI type symptoms. Pt denies chest pain, dyspnea, dyspnea on exertion, change in exercise capacity, fatigue,  nausea, vomiting, diarrhea, constipation, motor weakness, insomnia, weight loss, syncope, dizziness, lightheadedness, palpitations, PND, orthopnea, or claudication. States she's active without any issues. 7-31-15: as above, Pt denies chest pain, dyspnea, dyspnea on exertion, change in exercise capacity, fatigue,  nausea, vomiting, diarrhea, constipation, motor weakness, insomnia, weight loss, syncope, dizziness, lightheadedness, palpitations, PND, orthopnea, or claudication. No LE edema. No change in meds. BP and HR are under control. No recent hospitalizations. States she can clean the house without any issues. Has gained 5 pounds. 8-18-15: as above, s/p MUGA scan with EF of 24%. Does get tired more easily and more SOB than what she used to according to her daughter. She is very active though, cleans her house, run around after grandchildren, up and down stairs without any issues. No LE edema. Compliant with meds.  Pt denies chest pain, dyspnea, dyspnea on exertion, change in exercise capacity, fatigue,  nausea, vomiting, diarrhea, constipation, motor weakness, insomnia, weight loss, syncope, dizziness, lightheadedness, palpitations, PND, weakness, insomnia, weight loss, syncope, dizziness, lightheadedness, palpitations, PND, orthopnea, or claudication. Hx of NICMP, EF of 35%. Has baseline SOB and hx of COPD. Does not follow up with pulmonary. S/p recent CXR which was ok. Compliant with meds. Denies any LE edema. No orthopnea. SOB is mainly with activity/walking. S/p ECHO in 2/2020 with EF of 50%, grade I DD. EKG with NSR, V paced. 8-28-20: Pt denies chest pain, dyspnea, dyspnea on exertion, change in exercise capacity, fatigue,  nausea, vomiting, diarrhea, constipation, motor weakness, insomnia, weight loss, syncope, dizziness, lightheadedness, palpitations, PND, orthopnea, or claudication. No nitro use. BP and hr are good. CAD is stable. No LE discoloration or ulcers. No LE edema. No CHF type symptoms. Lipid profile is normal. No recent hospitalization. No change in meds. Hx of NICMP, EF of 35% and now recovered to 50% per most recent echo in 2/2020. Hx of AICD, no discharge. 3-12-21: Patient is doing well overall. She denies any recent hospitalizations or any major symptoms of angina or CHF. She has a history of nonischemic cardiomyopathy ejection fraction of 35% that is now recovered with most recent echo showing an ejection fraction of 50%. She has a history of defibrillator but no shocks. Pressure is wonderful today. eKG with normal sinus rhythm, V paced. She is following with the EP./dR Vazquez. Pt denies chest pain, dyspnea, dyspnea on exertion, change in exercise capacity, fatigue,  nausea, vomiting, diarrhea, constipation, motor weakness, insomnia, weight loss, syncope, dizziness, lightheadedness, palpitations, PND, orthopnea, or claudication. 11-12-21: She has a history of nonischemic cardiomyopathy ejection fraction of 35% that is now recovered with most recent echo showing an ejection fraction of 50%. She has a history of defibrillator but no shocks. s/p AICD gen change a few months ago.    Pt denies chest pain, dyspnea, dyspnea on exertion, change in exercise capacity, fatigue,  nausea, vomiting, diarrhea, constipation, motor weakness, insomnia, weight loss, syncope, dizziness, lightheadedness, palpitations, PND, orthopnea, or claudication. 2022: Patient with history of nonischemic cardiomyopathy ejection fraction of 35% does not recovered to EF of 50% she has a history of defibrillator but no defibrillator shocks. She complains of left upper arm discomfort after working in the yard quite heavily. Blood pressure and heart rate are within normal limits. No smoke  EKG today shows normal sinus rhythm and V paced rhythm.     Patient Active Problem List   Diagnosis    COPD (chronic obstructive pulmonary disease) (HCC)    Hepatitis C virus infection    Hypothyroidism    Insomnia    Itching    Osteoarthritis    Reflux esophagitis    Mixed hyperlipidemia    Internal derangement of right knee    Lumbar radiculitis    Cardiomyopathy ([address]    Premature osteoporosis    Hypercholesterolemia    ICD (implantable cardioverter-defibrillator) battery depletion    Diastolic dysfunction    Chronic left shoulder pain    Subacromial impingement of left shoulder    Subacromial bursitis of left shoulder joint       Past Surgical History:   Procedure Laterality Date    LEG SURGERY  2009 MASS R LEG SOFT TISSUE    LUMBAR FUSION      Dr. Marin Sender HISTORY  09/16/15    FEDERICO REA MD IMPLANT ICD       Social History     Socioeconomic History    Marital status:    Tobacco Use    Smoking status: Former     Packs/day: 1.00     Years: 30.00     Pack years: 30.00     Types: Cigarettes     Quit date: 3/5/2005     Years since quittin.5    Smokeless tobacco: Never   Substance and Sexual Activity    Alcohol use: No     Comment: ALCOHOLIC    Drug use: No    Sexual activity: Not Currently     Social Determinants of Health     Financial Resource Strain: Low Risk     Difficulty of Paying Living Expenses: Not hard at all both ears 3 drops both ears three times a day as needed for itch) 10 mL 1    Multiple Vitamins-Iron (DAILY-PARRIS/IRON/BETA-CAROTENE) TABS Take 1 tablet by mouth Daily 90 tablet 3    b complex vitamins capsule Take 1 capsule by mouth daily. amitriptyline (ELAVIL) 100 MG tablet Take 1 tablet by mouth nightly 90 tablet 2    levothyroxine (SYNTHROID) 125 MCG tablet Take 1 tablet by mouth Daily 90 tablet 3     No current facility-administered medications for this visit. Pcn [penicillins], Demerol, and Meperidine    Review of Systems:  General ROS: negative  Psychological ROS: negative  Hematological and Lymphatic ROS: No history of blood clots or bleeding disorder. Respiratory ROS: no cough, shortness of breath, or wheezing  negative  Cardiovascular ROS: no chest pain or dyspnea on exertion  Gastrointestinal ROS: no abdominal pain, change in bowel habits, or black or bloody stools  Genito-Urinary ROS: no dysuria, trouble voiding, or hematuria  Musculoskeletal ROS: negative  Neurological ROS: no TIA or stroke symptoms  Dermatological ROS: negative    VITALS:  Blood pressure 130/80, pulse 89, weight 128 lb (58.1 kg), not currently breastfeeding. Body mass index is 21.97 kg/m². Physical Examination:  General appearance - alert, well appearing, and in no distress  Mental status - alert, oriented to person, place, and time  Neck - Neck is supple, no JVD or carotid bruits. No thyromegaly or adenopathy.    Chest - clear to auscultation, no wheezes, rales or rhonchi, symmetric air entry  Heart - normal rate, regular rhythm, normal S1, S2, no murmurs, rubs, clicks or gallops  Abdomen - soft, nontender, nondistended, no masses or organomegaly  Neurological - alert, oriented, normal speech, no focal findings or movement disorder noted  Extremities - peripheral pulses normal, no pedal edema, no clubbing or cyanosis  Skin - normal coloration and turgor, no rashes, no suspicious skin lesions noted    AICD incision is normal.     Orders Placed This Encounter   Procedures    EKG 12 Lead       ASSESSMENT:     Diagnosis Orders   1. ICD (implantable cardioverter-defibrillator) battery depletion  EKG 12 Lead      2. Viral cardiomyopathy ([address]        3. Hypercholesterolemia        4. Diastolic dysfunction        5. Mixed hyperlipidemia              PLAN:     Patient will need to continue to follow up with you for their general medical care     As always, aggressive risk factor modification is strongly recommended. We should adhere to the [address] guidelines for HTN management and the NCEP ATP III guidelines for LDL-C management. Cardiac diet is always recommended with low fat, cholesterol, calories and sodium. Continue medications at current doses. The importance of daily weights, dietary sodium restriction, and contact with cardiology if weight is increased more than 3 lbs in any 48 hour period was stressed. The patient has been advised to contact us if they experience progressive SOB, orthopnea, PND or progressive edema. FOLLOW UP WITH DR GREGORIO FOR AICD CHECKS. Patient was advised and encouraged to check blood pressure at home or at a pharmacy, maintain a logbook, and also call us back if blood pressure are above the target ranges or if it is low. Patient clearly understands and agrees to the instructions. We will need to continue to monitor muscle and liver enzymes, BUN, CR, and electrolytes. Details of medical condition explained and patient was warned about adverse consequences of uncontrolled medical conditions and possible side effects of prescribed medications. Patient was advised to go to the ER if he starts experiencing adverse effects of the medications. patient was instructed to call us back or go to nearby emergency room immediately if symptoms get worse or do not improve.     Thank you for allowing me to participate in the care of your patient, please don't hesitate to contact me if you have

## 2022-09-29 ENCOUNTER — IMMUNIZATION (OUTPATIENT)
Dept: FAMILY MEDICINE CLINIC | Age: 62
End: 2022-09-29
Payer: COMMERCIAL

## 2022-09-29 DIAGNOSIS — Z23 NEED FOR INFLUENZA VACCINATION: Primary | ICD-10-CM

## 2022-09-29 PROCEDURE — 91313 COVID-19, MODERNA BIVALENT BOOSTER, (AGE 18Y+), IM, 50 MCG/0.5 ML: CPT | Performed by: FAMILY MEDICINE

## 2022-09-29 PROCEDURE — 0134A COVID-19, MODERNA BIVALENT BOOSTER, (AGE 18Y+), IM, 50 MCG/0.5 ML: CPT | Performed by: FAMILY MEDICINE

## 2022-09-29 PROCEDURE — 90674 CCIIV4 VAC NO PRSV 0.5 ML IM: CPT | Performed by: PHYSICIAN ASSISTANT

## 2022-09-29 PROCEDURE — 90471 IMMUNIZATION ADMIN: CPT | Performed by: PHYSICIAN ASSISTANT

## 2022-09-29 NOTE — PROGRESS NOTES
Vaccine Information Sheet, \"Influenza - Inactivated\"  given to Amlan Race, or parent/legal guardian of  Amedeo Race and verbalized understanding. Patient responses:    Have you ever had a reaction to a flu vaccine? No  Are you able to eat eggs without adverse effects? Yes  Do you have any current illness? No  Have you ever had Guillian [place] Syndrome? No    Flu vaccine given per order. Please see immunization tab.

## 2022-10-10 DIAGNOSIS — M47.816 SPONDYLOSIS OF LUMBAR REGION WITHOUT MYELOPATHY OR RADICULOPATHY: ICD-10-CM

## 2022-10-10 DIAGNOSIS — M47.816 OSTEOARTHRITIS OF LUMBAR SPINE, UNSPECIFIED SPINAL OSTEOARTHRITIS COMPLICATION STATUS: Primary | ICD-10-CM

## 2022-10-10 RX ORDER — TRAMADOL HYDROCHLORIDE 50 MG/1
50 TABLET ORAL EVERY 6 HOURS PRN
Qty: 28 TABLET | Refills: 0 | OUTPATIENT
Start: 2022-10-10 | End: 2022-10-17

## 2022-10-10 RX ORDER — TRAMADOL HYDROCHLORIDE 50 MG/1
TABLET ORAL
Qty: 28 TABLET | Refills: 0 | OUTPATIENT
Start: 2022-10-10

## 2022-10-10 NOTE — TELEPHONE ENCOUNTER
Comments: his request is coming from the patient. Last Office Visit (last PCP visit):   7/12/2022    Next Visit Date:  Future Appointments   Date Time Provider Nidia Thomas   [facility]2022  [facility] [id number] [facility]   1/13/2023  1:30 PM Riri Santos PA-C [facility]   9/15/2023 11:30 AM Feliciano Trujillo [facility]       If hasn't been seen in over a year OR hasn't followed up according to last diabetes/ADHD visit, make appointment for patient before sending refill to provider. Rx requested:  Requested Prescriptions     Pending Prescriptions Disp Refills    traMADol (ULTRAM) 50 MG tablet 28 tablet 0     Sig: Take 1 tablet by mouth every 6 hours as needed for Pain for up to 7 days. Intended supply: 7 days.  Take lowest dose possible to manage pain

## 2022-10-24 RX ORDER — ALENDRONATE SODIUM 70 MG/1
TABLET ORAL
Qty: 12 TABLET | Refills: 3 | Status: SHIPPED | OUTPATIENT
Start: 2022-10-24

## 2022-10-24 NOTE — TELEPHONE ENCOUNTER
Pharmacy requesting medication refill. Please approve or deny this request.    Rx requested:  Requested Prescriptions     Pending Prescriptions Disp Refills    alendronate (FOSAMAX) 70 MG tablet [Pharmacy Med Name: alendronate 70 mg tablet] 12 tablet 3     Sig: Take 1 tablet by mouth every 7 days.          Last Office Visit:   7/12/2022      Next Visit Date:  Future Appointments   Date Time Provider Nidia Thomas   11/7/2022  2:40 PM [place] ROOM 2 [facility]   1/13/2023  1:30 PM KATLYN [facility] Niranjan   9/15/2023 11:30 AM Feliciano Covington [facility]

## 2022-10-27 DIAGNOSIS — M47.816 OSTEOARTHRITIS OF LUMBAR SPINE, UNSPECIFIED SPINAL OSTEOARTHRITIS COMPLICATION STATUS: ICD-10-CM

## 2022-10-28 ENCOUNTER — TELEPHONE (OUTPATIENT)
Dept: FAMILY MEDICINE CLINIC | Age: 62
End: 2022-10-28

## 2022-10-28 NOTE — TELEPHONE ENCOUNTER
Andrés Gilmore [facility] Clinical Staff  Subject: Refill Request     QUESTIONS   Name of Medication? spironolactone (ALDACTONE) 25 MG tablet   Patient-reported dosage and instructions? 25 mg take 1/2 once a day   How many days do you have left? 2   Preferred Pharmacy? [id number]   Pharmacy phone number (if available)? [phone]   ---------------------------------------------------------------------------   --------------   Rubia MAYBERRY   What is the best way for the office to contact you? OK to leave message on   voicemail   Preferred Call Back Phone Number? [phone]   ---------------------------------------------------------------------------   --------------   SCRIPT ANSWERS   Relationship to Patient?  Self

## 2022-10-28 NOTE — TELEPHONE ENCOUNTER
----- Message from [facility] JESSICA Proctor sent at 10/28/2022  1:05 PM EDT -----  Subject: Message to Provider    QUESTIONS  Information for Provider? The pt called inquiring about documentation   pertaining to her refill for her pain medication that was misplaced by   office and would like to know if the form was found for her to receive her   refill at this time. Please call the pt to further assist at this time. ---------------------------------------------------------------------------  --------------  Zora Bosworth GINI  [phone]; OK to leave message on voicemail  ---------------------------------------------------------------------------  --------------  SCRIPT ANSWERS  Relationship to Patient?  Self

## 2022-10-30 LAB
6-ACETYLMORPHINE: NOT DETECTED
7-AMINOCLONAZEPAM: NOT DETECTED
ALPHA-OH-ALPRAZOLAM: NOT DETECTED
ALPHA-OH-MIDAZOLAM, URINE: NOT DETECTED
ALPRAZOLAM: NOT DETECTED
AMPHETAMINE: NOT DETECTED
BARBITURATES: NOT DETECTED
BENZOYLECGONINE: NOT DETECTED
BUPRENORPHINE: NOT DETECTED
CARISOPRODOL: NOT DETECTED
CLONAZEPAM: NOT DETECTED
CODEINE: NOT DETECTED
CREATININE URINE: 269.2 MG/DL (ref 20–400)
DIAZEPAM: NOT DETECTED
EER PAIN MGT DRUG PANEL, HIGH RES/EMIT U: NORMAL
ETHYL GLUCURONIDE: PRESENT
FENTANYL: NOT DETECTED
GABAPENTIN: NOT DETECTED
HYDROCODONE: NOT DETECTED
HYDROMORPHONE: NOT DETECTED
LORAZEPAM: NOT DETECTED
MARIJUANA METABOLITE: NOT DETECTED
MDA: NOT DETECTED
MDEA: NOT DETECTED
MDMA URINE: NOT DETECTED
MEPERIDINE: NOT DETECTED
METHADONE: NOT DETECTED
METHAMPHETAMINE: NOT DETECTED
METHYLPHENIDATE: NOT DETECTED
MIDAZOLAM: NOT DETECTED
MORPHINE: NOT DETECTED
NALOXONE: NOT DETECTED
NORBUPRENORPHINE, FREE: NOT DETECTED
NORDIAZEPAM: NOT DETECTED
NORFENTANYL: NOT DETECTED
NORHYDROCODONE, URINE: NOT DETECTED
NOROXYCODONE: NOT DETECTED
NOROXYMORPHONE, URINE: NOT DETECTED
OXAZEPAM: NOT DETECTED
OXYCODONE: NOT DETECTED
OXYMORPHONE: NOT DETECTED
PAIN MANAGEMENT DRUG PANEL: NORMAL
PCP: NOT DETECTED
PHENTERMINE: NOT DETECTED
PREGABALIN: NOT DETECTED
TAPENTADOL, URINE: NOT DETECTED
TAPENTADOL-O-SULFATE, URINE: NOT DETECTED
TEMAZEPAM: NOT DETECTED
TRAMADOL: NOT DETECTED
ZOLPIDEM: NOT DETECTED

## 2022-10-30 RX ORDER — SPIRONOLACTONE 25 MG/1
TABLET ORAL
Qty: 135 TABLET | Refills: 5 | Status: SHIPPED | OUTPATIENT
Start: 2022-10-30

## 2022-11-02 ENCOUNTER — TELEPHONE (OUTPATIENT)
Dept: FAMILY MEDICINE CLINIC | Age: 62
End: 2022-11-02

## 2022-11-02 DIAGNOSIS — M47.816 SPONDYLOSIS OF LUMBAR REGION WITHOUT MYELOPATHY OR RADICULOPATHY: ICD-10-CM

## 2022-11-02 RX ORDER — TRAMADOL HYDROCHLORIDE 50 MG/1
50 TABLET ORAL EVERY 6 HOURS PRN
Qty: 28 TABLET | Refills: 0 | Status: CANCELLED | OUTPATIENT
Start: 2022-11-02 | End: 2022-11-09

## 2022-11-02 RX ORDER — TRAMADOL HYDROCHLORIDE 50 MG/1
50 TABLET ORAL EVERY 6 HOURS PRN
Qty: 28 TABLET | Refills: 0 | Status: SHIPPED | OUTPATIENT
Start: 2022-11-02 | End: 2022-11-09

## 2022-11-02 NOTE — TELEPHONE ENCOUNTER
Patient requesting medication refill. Please approve or deny this request.    Rx requested:  Requested Prescriptions     Pending Prescriptions Disp Refills    traMADol (ULTRAM) 50 MG tablet 28 tablet 0     Sig: Take 1 tablet by mouth every 6 hours as needed for Pain for up to 7 days. Intended supply: 7 days.  Take lowest dose possible to manage pain         Last Office Visit:   7/12/2022      Next Visit Date:  Future Appointments   Date Time Provider Nidia Thomas   [facility]20[facility] [id number]   1/13/2023  1:30 PM KATLYN Johnson [facility] Niranjan   9/15/2023 11:30 AM Feliciano Awad [facility]

## 2022-11-02 NOTE — TELEPHONE ENCOUNTER
Patient is calling to inquire about her medication refill for Tramadol - states that she signed all of the paperwork that needed to be signed and is unsure why it has not been refilled    Patient is requesting a call in regards to this

## 2022-11-07 ENCOUNTER — HOSPITAL ENCOUNTER (OUTPATIENT)
Dept: WOMENS IMAGING | Age: 62
Discharge: HOME | End: 2022-11-09
Payer: COMMERCIAL

## 2022-11-07 DIAGNOSIS — M81.0 AGE-RELATED OSTEOPOROSIS WITHOUT CURRENT PATHOLOGICAL FRACTURE: ICD-10-CM

## 2022-11-07 DIAGNOSIS — Z12.31 ENCOUNTER FOR SCREENING MAMMOGRAM FOR BREAST CANCER: ICD-10-CM

## 2022-11-07 PROCEDURE — 77067 SCR MAMMO BI INCL CAD: CPT

## 2022-11-07 RX ORDER — CALCIUM CARBONATE 500(1250)
TABLET ORAL
Qty: 60 TABLET | Refills: 11 | Status: SHIPPED | OUTPATIENT
Start: 2022-11-07

## 2022-11-07 NOTE — TELEPHONE ENCOUNTER
Patient requesting medication refill.  Please approve or deny this request.    Rx requested:  Requested Prescriptions     Pending Prescriptions Disp Refills    calcium elemental (OSCAL) 500 MG TABS tablet [Pharmacy Med Name: Samantha Sen Calcium 500  500 mg calcium (1,250 mg) tablet] 60 tablet 3     Sig: TAKE 1 TABLET BY MOUTH TWICE DAILY WITH MEALS         Last Office Visit:   7/12/2022      Next Visit Date:  Future Appointments   Date Time Provider Nidia Thomas   1/13/2023  1:30 PM Riri Verdin [facility]   9/15/2023 11:30 AM Feliciano [facility]

## 2022-11-21 RX ORDER — VALSARTAN 80 MG/1
80 TABLET ORAL DAILY
Qty: 30 TABLET | Refills: 5 | Status: SHIPPED | OUTPATIENT
Start: 2022-11-21

## 2022-11-21 NOTE — TELEPHONE ENCOUNTER
Requesting medication refill. Please approve or deny this request.    Rx requested:  Requested Prescriptions     Pending Prescriptions Disp Refills    valsartan (DIOVAN) 80 MG tablet [Pharmacy Med Name: valsartan 80 mg tablet] 30 tablet 5     Sig: Take 1 tablet by mouth daily         Last Office Visit:   09/16/22      Next Visit Date:  Future Appointments   Date Time Provider Nidia Thomas   [facility]13[facility]   9/15/2023 11:30 AM Feliciano Valenzuela  [facility]               Last refill 10/23/22. Please approve or deny.

## 2022-11-22 RX ORDER — VALSARTAN 80 MG/1
80 TABLET ORAL DAILY
Qty: 30 TABLET | Refills: 5 | Status: SHIPPED | OUTPATIENT
Start: 2022-11-22

## 2022-12-06 DIAGNOSIS — M47.816 SPONDYLOSIS OF LUMBAR REGION WITHOUT MYELOPATHY OR RADICULOPATHY: ICD-10-CM

## 2022-12-06 RX ORDER — TRAMADOL HYDROCHLORIDE 50 MG/1
50 TABLET ORAL EVERY 8 HOURS PRN
Qty: 42 TABLET | Refills: 0 | Status: SHIPPED | OUTPATIENT
Start: 2022-12-06 | End: 2022-12-20

## 2022-12-06 NOTE — TELEPHONE ENCOUNTER
Patient requesting medication refill. Please approve or deny this request.    Rx requested:  Requested Prescriptions     Pending Prescriptions Disp Refills    traMADol (ULTRAM) 50 MG tablet [Pharmacy Med Name: tramadol 50 mg tablet] 28 tablet 0     Sig: Take 1 tablet by mouth every 6 hours as needed for Pain for up to 7 days. Take lowest dose possibLE.          Last Office Visit:   7/12/2022      Next Visit Date:  Future Appointments   Date Time Provider Nidia Thomas   1/13/2023  1:30 PM Riri Gonzalez [facility] [place]   9/15/2023 11:30 AM Feliciano Morales [facility]

## 2022-12-23 RX ORDER — OMEPRAZOLE 20 MG/1
20 CAPSULE, DELAYED RELEASE ORAL 2 TIMES DAILY
Qty: 180 CAPSULE | Refills: 0 | Status: SHIPPED | OUTPATIENT
Start: 2022-12-23

## 2022-12-23 RX ORDER — OMEPRAZOLE 20 MG/1
20 CAPSULE, DELAYED RELEASE ORAL 2 TIMES DAILY
Qty: 180 CAPSULE | Refills: 3 | OUTPATIENT
Start: 2022-12-23

## 2022-12-23 NOTE — TELEPHONE ENCOUNTER
Pavel Gilmore [facility] Clinical Staff  Subject: Refill Request     QUESTIONS   Name of Medication? omeprazole (PRILOSEC) 20 MG delayed release capsule   Patient-reported dosage and instructions? Take 1 capsule by mouth 2 times   daily   How many days do you have left? 3   Preferred Pharmacy? [id number]   Pharmacy phone number (if available)? [phone]   Additional Information for Provider? Patient does not have any more   refills left on this medication.   ---------------------------------------------------------------------------   --------------   CALL BACK INFO   What is the best way for the office to contact you? OK to leave message on   voicemail   Preferred Call Back Phone Number? [phone]   ---------------------------------------------------------------------------   --------------   SCRIPT ANSWERS   Relationship to Patient?  Self

## 2023-01-10 RX ORDER — CARVEDILOL 3.12 MG/1
TABLET ORAL
Qty: 90 TABLET | Refills: 1 | Status: SHIPPED | OUTPATIENT
Start: 2023-01-10

## 2023-01-10 NOTE — TELEPHONE ENCOUNTER
Patient requesting medication refill.  Please approve or deny this request.    Rx requested:  Requested Prescriptions     Pending Prescriptions Disp Refills    carvedilol (COREG) 3.125 MG tablet 90 tablet 1     Sig: TAKE 1 TABLET BY MOUTH TWICE DAILY WITH A MEAL         Last Office Visit:   7/12/2022      Next Visit Date:  Future Appointments   Date Time Provider Nidia Thomas   1/13/2023  1:00 PM Riri Rojas  [facility]   9/15/2023 11:30 AM Feliciano Banerjee [facility]

## 2023-01-13 ENCOUNTER — OFFICE VISIT (OUTPATIENT)
Dept: FAMILY MEDICINE CLINIC | Age: 63
End: 2023-01-13
Payer: COMMERCIAL

## 2023-01-13 VITALS
DIASTOLIC BLOOD PRESSURE: 80 MMHG | WEIGHT: 127 LBS | HEIGHT: 64 IN | SYSTOLIC BLOOD PRESSURE: 122 MMHG | TEMPERATURE: 97.2 F | OXYGEN SATURATION: 98 % | HEART RATE: 86 BPM | BODY MASS INDEX: 21.68 KG/M2

## 2023-01-13 DIAGNOSIS — B33.24 VIRAL CARDIOMYOPATHY (HCC): ICD-10-CM

## 2023-01-13 DIAGNOSIS — I10 HYPERTENSION, UNSPECIFIED TYPE: ICD-10-CM

## 2023-01-13 DIAGNOSIS — E03.9 ACQUIRED HYPOTHYROIDISM: ICD-10-CM

## 2023-01-13 DIAGNOSIS — M47.16 OSTEOARTHRITIS OF LUMBAR SPINE WITH MYELOPATHY: ICD-10-CM

## 2023-01-13 DIAGNOSIS — M17.0 PRIMARY OSTEOARTHRITIS OF BOTH KNEES: ICD-10-CM

## 2023-01-13 DIAGNOSIS — J44.9 CHRONIC OBSTRUCTIVE PULMONARY DISEASE, UNSPECIFIED COPD TYPE (HCC): Primary | ICD-10-CM

## 2023-01-13 PROCEDURE — 3017F COLORECTAL CA SCREEN DOC REV: CPT | Performed by: PHYSICIAN ASSISTANT

## 2023-01-13 PROCEDURE — 99214 OFFICE O/P EST MOD 30 MIN: CPT | Performed by: PHYSICIAN ASSISTANT

## 2023-01-13 PROCEDURE — G8427 DOCREV CUR MEDS BY ELIG CLIN: HCPCS | Performed by: PHYSICIAN ASSISTANT

## 2023-01-13 PROCEDURE — 3079F DIAST BP 80-89 MM HG: CPT | Performed by: PHYSICIAN ASSISTANT

## 2023-01-13 PROCEDURE — G8482 FLU IMMUNIZE ORDER/ADMIN: HCPCS | Performed by: PHYSICIAN ASSISTANT

## 2023-01-13 PROCEDURE — 1036F TOBACCO NON-USER: CPT | Performed by: PHYSICIAN ASSISTANT

## 2023-01-13 PROCEDURE — 3023F SPIROM DOC REV: CPT | Performed by: PHYSICIAN ASSISTANT

## 2023-01-13 PROCEDURE — 3074F SYST BP LT 130 MM HG: CPT | Performed by: PHYSICIAN ASSISTANT

## 2023-01-13 PROCEDURE — G8420 CALC BMI NORM PARAMETERS: HCPCS | Performed by: PHYSICIAN ASSISTANT

## 2023-01-13 RX ORDER — BENZONATATE 100 MG/1
CAPSULE ORAL
Qty: 45 CAPSULE | Refills: 0 | Status: SHIPPED | OUTPATIENT
Start: 2023-01-13

## 2023-01-13 RX ORDER — LORATADINE 10 MG/1
10 TABLET ORAL DAILY
Qty: 30 TABLET | Refills: 3 | Status: SHIPPED | OUTPATIENT
Start: 2023-01-13

## 2023-01-13 ASSESSMENT — PATIENT HEALTH QUESTIONNAIRE - PHQ9
7. TROUBLE CONCENTRATING ON THINGS, SUCH AS READING THE NEWSPAPER OR WATCHING TELEVISION: 0
3. TROUBLE FALLING OR STAYING ASLEEP: 0
SUM OF ALL RESPONSES TO PHQ QUESTIONS 1-9: 4
1. LITTLE INTEREST OR PLEASURE IN DOING THINGS: 3
SUM OF ALL RESPONSES TO PHQ QUESTIONS 1-9: 4
2. FEELING DOWN, DEPRESSED OR HOPELESS: 0
6. FEELING BAD ABOUT YOURSELF - OR THAT YOU ARE A FAILURE OR HAVE LET YOURSELF OR YOUR FAMILY DOWN: 0
4. FEELING TIRED OR HAVING LITTLE ENERGY: 1
SUM OF ALL RESPONSES TO PHQ QUESTIONS 1-9: 4
SUM OF ALL RESPONSES TO PHQ QUESTIONS 1-9: 4
9. THOUGHTS THAT YOU WOULD BE BETTER OFF DEAD, OR OF HURTING YOURSELF: 0
SUM OF ALL RESPONSES TO PHQ9 QUESTIONS 1 & 2: 3
10. IF YOU CHECKED OFF ANY PROBLEMS, HOW DIFFICULT HAVE THESE PROBLEMS MADE IT FOR YOU TO DO YOUR WORK, TAKE CARE OF THINGS AT HOME, OR GET ALONG WITH OTHER PEOPLE: 0
5. POOR APPETITE OR OVEREATING: 0
8. MOVING OR SPEAKING SO SLOWLY THAT OTHER PEOPLE COULD HAVE NOTICED. OR THE OPPOSITE, BEING SO FIGETY OR RESTLESS THAT YOU HAVE BEEN MOVING AROUND A LOT MORE THAN USUAL: 0

## 2023-01-13 NOTE — PROGRESS NOTES
Subjective  Lata Ferrer, 60 y.o. female presents today with:  Chief Complaint   Patient presents with    6 Month Follow-Up     Pt presents today for 6 month f/u           No results found for: LABA1C  Lab Results   Component Value Date    CREATININE 0.92 (H) 07/26/2022     Lab Results   Component Value Date    ALT 27 07/26/2022    AST 45 (H) 07/26/2022     Lab Results   Component Value Date    CHOL 347 (H) 01/08/2019    TRIG 236 (H) 01/08/2019    HDL 65 (H) 07/26/2022    LDLCALC 79 07/26/2022          HPI    Is here for follow-up on chronic concerns she denies chest pain pressure shortness of breath she has intentionally lost 20 pounds since last visit through diet  She has had no exacerbation of her COPD  She does have a history of DJD of lumbar spine and djd of the knees-  tramadol helps she would like to resume dosing takes it 1-2 times daily prn  C.o ear fullness  and decreased hearing  Osteopenia patient has been on Fosamax since 2011 without hiatus we discussed risks side effects - rec holding for 6 mos then may resume  Review of Systems   HENT:  Positive for ear pain and hearing loss. All other systems reviewed and are negative.       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-SQUAMOUS    Cardiomyopathy ([address] 3/6/2015    COPD (chronic obstructive pulmonary disease) (HCC)     COPD (chronic obstructive pulmonary disease) (HCC)     Dental disease     SEVERE DENTAL PROBLEMS    Diastolic dysfunction     DJD (degenerative joint disease), lumbar     Dyslipidemia 3/6/2015    Hepatitis B infection VIRAL-2006    Hepatitis C without mention of hepatic coma 2007-CHEMO    History of alcoholism ([address]     History of osteopenia 2009    Hypothyroidism     ICD (implantable cardioverter-defibrillator) battery depletion     Tongue cancer [facility])      Past Surgical History:   Procedure Laterality Date    LEG SURGERY  2009 MASS R LEG SOFT TISSUE    LUMBAR FUSION      Dr. Ritesh Moyer  09/16/15    Sean Bo MD IMPLANT ICD     Social History     Socioeconomic History    Marital status:      Spouse name: Not on file    Number of children: Not on file    Years of education: Not on file    Highest education level: Not on file   Occupational History    Not on file   Tobacco Use    Smoking status: Former     Packs/day: 1.00     Years: 30.00     Pack years: 30.00     Types: Cigarettes     Quit date: 3/5/2005     Years since quittin.8    Smokeless tobacco: Never   Substance and Sexual Activity    Alcohol use: No     Comment: ALCOHOLIC    Drug use: No    Sexual activity: Not Currently   Other Topics Concern    Not on file   Social History Narrative    Not on file     Social Determinants of Health     Financial Resource Strain: Low Risk     Difficulty of Paying Living Expenses: Not hard at all   Food Insecurity: No Food Insecurity    Worried About Running Out of Food in the Last Year: Never true    Ran Out of Food in the Last Year: Never true   Transportation Needs: Not on file   Physical Activity: Not on file   Stress: Not on file   Social Connections: Not on file   Intimate Partner Violence: Not on file   Housing Stability: Not on file     Family History   Problem Relation Age of Onset    High Blood Pressure Other     Diabetes Other     Cancer Other         UNCLE-LUNG     Allergies   Allergen Reactions    Pcn [Penicillins] Anaphylaxis    Demerol Rash    Meperidine Rash        Current Outpatient Medications   Medication Sig Dispense Refill    benzonatate (TESSALON) 100 MG capsule TAKE 1 CAPSULE BY MOUTH EVERY 6 HOURS AS NEEDED FOR COUGH 45 capsule 0    loratadine (CLARITIN) 10 MG tablet Take 1 tablet by mouth daily 30 tablet 3    carvedilol (COREG) 3.125 MG tablet TAKE 1 TABLET BY MOUTH TWICE DAILY WITH A MEAL 90 tablet 1    omeprazole (PRILOSEC) 20 MG delayed release capsule Take 1 capsule by mouth in the morning and 1 capsule in the evening. 180 capsule 0    valsartan (DIOVAN) 80 MG tablet Take 1 tablet by mouth daily 30 tablet 5    valsartan (DIOVAN) 80 MG tablet Take 1 tablet by mouth daily 30 tablet 5    calcium elemental (OSCAL) 500 MG TABS tablet TAKE 1 TABLET BY MOUTH TWICE DAILY WITH MEALS 60 tablet 11    spironolactone (ALDACTONE) 25 MG tablet TAKE 1/2 (ONE-HALF) OF A TABLET BY MOUTH EVERY  tablet 5    alendronate (FOSAMAX) 70 MG tablet Take 1 tablet by mouth every 7 days. 12 tablet 3    atorvastatin (LIPITOR) 40 MG tablet Take 1 tablet by mouth daily 180 tablet 0    sertraline (ZOLOFT) 100 MG tablet Take 1 tablet by mouth twice daily 983 tablet 3    folic acid (FOLVITE) 1 MG tablet TAKE 1 TABLET BY MOUTH EVERY DAY 90 tablet 2    diclofenac (VOLTAREN) 50 MG EC tablet Take 1 tablet by mouth 2 times daily as needed for Pain 90 tablet 3    calcium carbonate (OSCAL) 500 MG TABS tablet Take 1 tablet by mouth 2 times daily (with meals) 60 tablet 3    tiotropium (SPIRIVA HANDIHALER) 18 MCG inhalation capsule Inhale 1 capsule into the lungs daily 90 capsule 1    diphenhydrAMINE (BENADRYL) 25 MG capsule TAKE ONE CAPSULE BY MOUTH EVERY DAY AS NEEDED FOR ITCHING 30 capsule 5    fluticasone (FLONASE) 50 MCG/ACT nasal spray 1 spray by Each Nostril route daily 16 g 5    calcium-cholecalciferol (OYSCO 500 + D) 500-200 MG-UNIT per tablet TAKE 1 TABLET BY MOUTH TWICE DAILY 180 tablet 1    neomycin-polymyxin-hydrocortisone (CORTISPORIN) 3.5-50946-7 otic solution 3 drops both ears three times a day as needed for itch (Patient taking differently: Place 3 drops into both ears 3 drops both ears three times a day as needed for itch) 10 mL 1    Multiple Vitamins-Iron (DAILY-PARRIS/IRON/BETA-CAROTENE) TABS Take 1 tablet by mouth Daily 90 tablet 3    b complex vitamins capsule Take 1 capsule by mouth daily.       amitriptyline (ELAVIL) 100 MG tablet Take 1 tablet by mouth nightly 90 tablet 2    levothyroxine (SYNTHROID) 125 MCG tablet Take 1 tablet by mouth Daily 90 tablet 3     No current facility-administered medications for this visit. Objective    Vitals:    01/13/23 1307   BP: 122/80   Site: Left Upper Arm   Position: Sitting   Cuff Size: Medium Adult   Pulse: 86   Temp: 97.2 °F (36.2 °C)   TempSrc: Temporal   SpO2: 98%   Weight: 127 lb (57.6 kg)   Height: 5' 4\" (1.626 m)     Physical Exam  Constitutional:       General: She is not in acute distress. Appearance: Normal appearance. She is not ill-appearing. HENT:      Head: Normocephalic and atraumatic. Right Ear: There is impacted cerumen. Left Ear: External ear normal.      Nose: No congestion. Eyes:      Extraocular Movements: Extraocular movements intact. Conjunctiva/sclera: Conjunctivae normal.      Pupils: Pupils are equal, round, and reactive to light. Neck:      Thyroid: No thyromegaly. Cardiovascular:      Rate and Rhythm: Normal rate and regular rhythm. Heart sounds: Normal heart sounds. No murmur heard. Pulmonary:      Effort: Pulmonary effort is normal. No respiratory distress. Breath sounds: Normal breath sounds. No wheezing, rhonchi or rales. Abdominal:      General: Bowel sounds are normal.      Palpations: Abdomen is soft. There is no mass. Tenderness: There is no abdominal tenderness. There is no guarding. Musculoskeletal:         General: Deformity present. Cervical back: Normal range of motion and neck supple. Lymphadenopathy:      Cervical: No cervical adenopathy. Skin:     General: Skin is warm and dry. Coloration: Skin is not jaundiced or pale. Neurological:      General: No focal deficit present. Mental Status: She is alert and oriented to person, place, and time. Cranial Nerves: No cranial nerve deficit. Motor: No weakness.       Coordination: Coordination normal.      Gait: Gait normal.   Psychiatric:         Mood and Affect: Mood normal.         Behavior: Behavior normal.         Thought Content: Thought content normal.         Judgment: Judgment normal.          Assessment & Plan    Diagnosis Orders   1. Chronic obstructive pulmonary disease, unspecified COPD type ([facility]        2. Viral cardiomyopathy (HCC)      h.o      3. Osteoarthritis of lumbar spine with myelopathy  Pain Management Drug Screen      4. Primary osteoarthritis of both knees        5. Hypertension, unspecified type  Comprehensive Metabolic Panel    Lipid, Fasting    CBC with Auto Differential    controlled      6. Acquired hypothyroidism  TSH with Reflex            Orders Placed This Encounter   Procedures    Comprehensive Metabolic Panel     Standing Status:   Future     Standing Expiration Date:   1/13/2024    Lipid, Fasting     Standing Status:   Future     Standing Expiration Date:   1/13/2024    TSH with Reflex     Standing Status:   Future     Standing Expiration Date:   1/13/2024    CBC with Auto Differential     Standing Status:   Future     Standing Expiration Date:   1/13/2024    Pain Management Drug Screen     Standing Status:   Future     Standing Expiration Date:   1/13/2024     Orders Placed This Encounter   Medications    benzonatate (TESSALON) 100 MG capsule     Sig: TAKE 1 CAPSULE BY MOUTH EVERY 6 HOURS AS NEEDED FOR COUGH     Dispense:  45 capsule     Refill:  0    loratadine (CLARITIN) 10 MG tablet     Sig: Take 1 tablet by mouth daily     Dispense:  30 tablet     Refill:  3     There are no discontinued medications. Return in about 6 months (around 7/13/2023), or if symptoms worsen or fail to improve.     Riri Santos PA-C

## 2023-01-27 DIAGNOSIS — E03.9 ACQUIRED HYPOTHYROIDISM: ICD-10-CM

## 2023-01-27 DIAGNOSIS — M47.16 OSTEOARTHRITIS OF LUMBAR SPINE WITH MYELOPATHY: ICD-10-CM

## 2023-01-27 DIAGNOSIS — I10 HYPERTENSION, UNSPECIFIED TYPE: ICD-10-CM

## 2023-01-27 LAB
ALBUMIN SERPL-MCNC: 4.4 G/DL (ref 3.5–4.6)
ALP BLD-CCNC: 74 U/L (ref 40–130)
ALT SERPL-CCNC: 19 U/L (ref 0–33)
ANION GAP SERPL CALCULATED.3IONS-SCNC: 13 MEQ/L (ref 9–15)
AST SERPL-CCNC: 31 U/L (ref 0–35)
BASOPHILS ABSOLUTE: 0.1 K/UL (ref 0–0.2)
BASOPHILS RELATIVE PERCENT: 2.4 %
BILIRUB SERPL-MCNC: 0.3 MG/DL (ref 0.2–0.7)
BUN BLDV-MCNC: 17 MG/DL (ref 8–23)
CALCIUM SERPL-MCNC: 9.2 MG/DL (ref 8.5–9.9)
CHLORIDE BLD-SCNC: 106 MEQ/L (ref 95–107)
CHOLESTEROL, FASTING: 161 MG/DL (ref 0–199)
CO2: 24 MEQ/L (ref 20–31)
CREAT SERPL-MCNC: 1.17 MG/DL (ref 0.5–0.9)
EOSINOPHILS ABSOLUTE: 0.4 K/UL (ref 0–0.7)
EOSINOPHILS RELATIVE PERCENT: 7.6 %
GFR SERPL CREATININE-BSD FRML MDRD: 52.2 ML/MIN/{1.73_M2}
GLOBULIN: 2.8 G/DL (ref 2.3–3.5)
GLUCOSE BLD-MCNC: 91 MG/DL (ref 70–99)
HCT VFR BLD CALC: 33.8 % (ref 37–47)
HDLC SERPL-MCNC: 59 MG/DL (ref 40–59)
HEMOGLOBIN: 11.6 G/DL (ref 12–16)
LDL CHOLESTEROL CALCULATED: 77 MG/DL (ref 0–129)
LYMPHOCYTES ABSOLUTE: 1.7 K/UL (ref 1–4.8)
LYMPHOCYTES RELATIVE PERCENT: 29.4 %
MCH RBC QN AUTO: 31.6 PG (ref 27–31.3)
MCHC RBC AUTO-ENTMCNC: 34.2 % (ref 33–37)
MCV RBC AUTO: 92.4 FL (ref 79.4–94.8)
MONOCYTES ABSOLUTE: 0.6 K/UL (ref 0.2–0.8)
MONOCYTES RELATIVE PERCENT: 9.7 %
NEUTROPHILS ABSOLUTE: 2.9 K/UL (ref 1.4–6.5)
NEUTROPHILS RELATIVE PERCENT: 50.9 %
PDW BLD-RTO: 15 % (ref 11.5–14.5)
PLATELET # BLD: 305 K/UL (ref 130–400)
POTASSIUM SERPL-SCNC: 4.5 MEQ/L (ref 3.4–4.9)
RBC # BLD: 3.66 M/UL (ref 4.2–5.4)
SODIUM BLD-SCNC: 143 MEQ/L (ref 135–144)
T4 FREE: 1.57 NG/DL (ref 0.84–1.68)
TOTAL PROTEIN: 7.2 G/DL (ref 6.3–8)
TRIGLYCERIDE, FASTING: 127 MG/DL (ref 0–150)
TSH REFLEX: 0.07 UIU/ML (ref 0.44–3.86)
WBC # BLD: 5.8 K/UL (ref 4.8–10.8)

## 2023-01-29 DIAGNOSIS — E03.9 ACQUIRED HYPOTHYROIDISM: ICD-10-CM

## 2023-01-29 DIAGNOSIS — I10 HYPERTENSION, UNSPECIFIED TYPE: Primary | ICD-10-CM

## 2023-01-29 RX ORDER — LEVOTHYROXINE SODIUM 112 UG/1
112 TABLET ORAL DAILY
Qty: 90 TABLET | Refills: 3 | Status: SHIPPED | OUTPATIENT
Start: 2023-01-29 | End: 2023-04-29

## 2023-01-30 DIAGNOSIS — M47.816 SPONDYLOSIS OF LUMBAR REGION WITHOUT MYELOPATHY OR RADICULOPATHY: ICD-10-CM

## 2023-01-30 DIAGNOSIS — M47.816 SPONDYLOSIS OF LUMBAR REGION WITHOUT MYELOPATHY OR RADICULOPATHY: Primary | ICD-10-CM

## 2023-01-30 RX ORDER — TRAMADOL HYDROCHLORIDE 50 MG/1
50 TABLET ORAL 2 TIMES DAILY PRN
Qty: 60 TABLET | Refills: 0 | Status: SHIPPED | OUTPATIENT
Start: 2023-01-30 | End: 2023-03-01

## 2023-01-30 RX ORDER — TRAMADOL HYDROCHLORIDE 50 MG/1
50 TABLET ORAL EVERY 6 HOURS PRN
Qty: 28 TABLET | Refills: 0 | Status: CANCELLED | OUTPATIENT
Start: 2023-01-30 | End: 2023-02-06

## 2023-01-30 NOTE — TELEPHONE ENCOUNTER
Patient requesting medication refill. Please approve or deny this request.    Rx requested:  Requested Prescriptions     Pending Prescriptions Disp Refills    traMADol (ULTRAM) 50 MG tablet 28 tablet 0     Sig: Take 1 tablet by mouth every 6 hours as needed for Pain for up to 7 days. Intended supply: 7 days.  Take lowest dose possible to manage pain         Last Office Visit:   1/13/2023      Next Visit Date:  Future Appointments   Date Time Provider Nidia [place]   7/13/2023  1:00 PM Riri Fountain [address]   9/15/2023 11:30 AM Feliciano Olsen,  [facility]

## 2023-02-01 LAB
6-ACETYLMORPHINE: NOT DETECTED
7-AMINOCLONAZEPAM: NOT DETECTED
ALPHA-OH-ALPRAZOLAM: NOT DETECTED
ALPHA-OH-MIDAZOLAM, URINE: NOT DETECTED
ALPRAZOLAM: NOT DETECTED
AMPHETAMINE: NOT DETECTED
BARBITURATES: NOT DETECTED
BENZOYLECGONINE: NOT DETECTED
BUPRENORPHINE: NOT DETECTED
CARISOPRODOL: NOT DETECTED
CLONAZEPAM: NOT DETECTED
CODEINE: NOT DETECTED
CREATININE URINE: 107.1 MG/DL (ref 20–400)
DIAZEPAM: NOT DETECTED
EER PAIN MGT DRUG PANEL, HIGH RES/EMIT U: NORMAL
ETHYL GLUCURONIDE: NOT DETECTED
FENTANYL: NOT DETECTED
GABAPENTIN: NOT DETECTED
HYDROCODONE: NOT DETECTED
HYDROMORPHONE: NOT DETECTED
LORAZEPAM: NOT DETECTED
MARIJUANA METABOLITE: PRESENT
MDA: NOT DETECTED
MDEA: NOT DETECTED
MDMA URINE: NOT DETECTED
MEPERIDINE: NOT DETECTED
METHADONE: NOT DETECTED
METHAMPHETAMINE: NOT DETECTED
METHYLPHENIDATE: NOT DETECTED
MIDAZOLAM: NOT DETECTED
MORPHINE: NOT DETECTED
NALOXONE: NOT DETECTED
NORBUPRENORPHINE, FREE: NOT DETECTED
NORDIAZEPAM: NOT DETECTED
NORFENTANYL: NOT DETECTED
NORHYDROCODONE, URINE: NOT DETECTED
NOROXYCODONE: NOT DETECTED
NOROXYMORPHONE, URINE: NOT DETECTED
OXAZEPAM: NOT DETECTED
OXYCODONE: NOT DETECTED
OXYMORPHONE: NOT DETECTED
PAIN MANAGEMENT DRUG PANEL: NORMAL
PCP: NOT DETECTED
PHENTERMINE: NOT DETECTED
PREGABALIN: NOT DETECTED
TAPENTADOL, URINE: NOT DETECTED
TAPENTADOL-O-SULFATE, URINE: NOT DETECTED
TEMAZEPAM: NOT DETECTED
TRAMADOL: NOT DETECTED
ZOLPIDEM: NOT DETECTED

## 2023-02-23 DIAGNOSIS — E78.00 HYPERCHOLESTEROLEMIA: ICD-10-CM

## 2023-02-23 RX ORDER — ATORVASTATIN CALCIUM 40 MG/1
40 TABLET, FILM COATED ORAL DAILY
Qty: 180 TABLET | Refills: 0 | Status: SHIPPED | OUTPATIENT
Start: 2023-02-23

## 2023-02-23 NOTE — TELEPHONE ENCOUNTER
Patient requesting medication refill.  Please approve or deny this request.    Rx requested:  Requested Prescriptions     Pending Prescriptions Disp Refills    atorvastatin (LIPITOR) 40 MG tablet [Pharmacy Med Name: atorvastatin 40 mg tablet] 180 tablet 0     Sig: TAKE 1 TABLET BY MOUTH DAILY         Last Office Visit:   Visit date not found      Next Visit Date:  Future Appointments   Date Time Provider Nidia Thomas   7/13/2023  1:00 PM Riri Alegria [address] [address]   9/15/2023 11:30 AM Feliciano Munoz [facility]

## 2023-02-28 LAB — SARS-COV-2 RESULT: NOT DETECTED

## 2023-05-09 PROBLEM — I10 ESSENTIAL (PRIMARY) HYPERTENSION: Status: ACTIVE | Noted: 2023-04-03

## 2023-05-09 PROBLEM — C02.9 MALIGNANT NEOPLASM OF TONGUE (MULTI): Status: ACTIVE | Noted: 2023-02-24

## 2023-05-09 PROBLEM — Z95.810 IMPLANTABLE CARDIOVERTER-DEFIBRILLATOR (ICD) IN SITU: Status: ACTIVE | Noted: 2023-02-24

## 2023-05-09 PROBLEM — B19.20 HEPATITIS C VIRUS INFECTION: Status: ACTIVE | Noted: 2023-05-09

## 2023-05-09 PROBLEM — E03.9 HYPOTHYROIDISM: Status: ACTIVE | Noted: 2023-05-09

## 2023-05-09 PROBLEM — F41.8 MIXED ANXIETY DEPRESSIVE DISORDER: Status: ACTIVE | Noted: 2023-03-07

## 2023-05-09 PROBLEM — J44.9 COPD (CHRONIC OBSTRUCTIVE PULMONARY DISEASE) (MULTI): Status: ACTIVE | Noted: 2023-05-09

## 2023-05-09 PROBLEM — D62 ACUTE POSTHEMORRHAGIC ANEMIA: Status: ACTIVE | Noted: 2023-03-07

## 2023-05-09 PROBLEM — E43 UNSPECIFIED SEVERE PROTEIN-CALORIE MALNUTRITION (MULTI): Status: ACTIVE | Noted: 2023-03-07

## 2023-05-10 ENCOUNTER — OFFICE VISIT (OUTPATIENT)
Dept: PRIMARY CARE | Facility: CLINIC | Age: 63
End: 2023-05-10
Payer: COMMERCIAL

## 2023-05-10 VITALS
RESPIRATION RATE: 16 BRPM | TEMPERATURE: 98.1 F | DIASTOLIC BLOOD PRESSURE: 62 MMHG | BODY MASS INDEX: 20.32 KG/M2 | WEIGHT: 119 LBS | HEART RATE: 82 BPM | HEIGHT: 64 IN | OXYGEN SATURATION: 97 % | SYSTOLIC BLOOD PRESSURE: 110 MMHG

## 2023-05-10 DIAGNOSIS — I42.9 CARDIOMYOPATHY, UNSPECIFIED TYPE (MULTI): ICD-10-CM

## 2023-05-10 DIAGNOSIS — C02.9 MALIGNANT NEOPLASM OF TONGUE (MULTI): ICD-10-CM

## 2023-05-10 DIAGNOSIS — R73.9 HYPERGLYCEMIA: ICD-10-CM

## 2023-05-10 DIAGNOSIS — E78.2 MIXED HYPERLIPIDEMIA: ICD-10-CM

## 2023-05-10 DIAGNOSIS — E03.9 HYPOTHYROIDISM, UNSPECIFIED TYPE: ICD-10-CM

## 2023-05-10 DIAGNOSIS — Z00.00 PHYSICAL EXAM: ICD-10-CM

## 2023-05-10 DIAGNOSIS — J44.9 CHRONIC OBSTRUCTIVE PULMONARY DISEASE, UNSPECIFIED COPD TYPE (MULTI): ICD-10-CM

## 2023-05-10 DIAGNOSIS — I10 ESSENTIAL (PRIMARY) HYPERTENSION: ICD-10-CM

## 2023-05-10 DIAGNOSIS — D64.9 ANEMIA, UNSPECIFIED TYPE: Primary | ICD-10-CM

## 2023-05-10 DIAGNOSIS — F41.8 MIXED ANXIETY DEPRESSIVE DISORDER: ICD-10-CM

## 2023-05-10 DIAGNOSIS — Z95.810 IMPLANTABLE CARDIOVERTER-DEFIBRILLATOR (ICD) IN SITU: ICD-10-CM

## 2023-05-10 DIAGNOSIS — Z86.19 HISTORY OF HEPATITIS C: ICD-10-CM

## 2023-05-10 PROCEDURE — 3008F BODY MASS INDEX DOCD: CPT | Performed by: FAMILY MEDICINE

## 2023-05-10 PROCEDURE — 3074F SYST BP LT 130 MM HG: CPT | Performed by: FAMILY MEDICINE

## 2023-05-10 PROCEDURE — 1036F TOBACCO NON-USER: CPT | Performed by: FAMILY MEDICINE

## 2023-05-10 PROCEDURE — 99386 PREV VISIT NEW AGE 40-64: CPT | Performed by: FAMILY MEDICINE

## 2023-05-10 PROCEDURE — 3078F DIAST BP <80 MM HG: CPT | Performed by: FAMILY MEDICINE

## 2023-05-10 RX ORDER — SPIRONOLACTONE 25 MG/1
12.5 TABLET ORAL ONCE
COMMUNITY
Start: 2020-05-13 | End: 2023-08-29 | Stop reason: ALTCHOICE

## 2023-05-10 RX ORDER — VALSARTAN 80 MG/1
80 TABLET ORAL DAILY
COMMUNITY
End: 2023-08-29 | Stop reason: ALTCHOICE

## 2023-05-10 RX ORDER — ATORVASTATIN CALCIUM 40 MG/1
40 TABLET, FILM COATED ORAL DAILY
COMMUNITY
Start: 2021-07-06 | End: 2024-01-12 | Stop reason: SDUPTHER

## 2023-05-10 RX ORDER — FOLIC ACID 1 MG/1
1 TABLET ORAL DAILY
COMMUNITY
Start: 2023-03-07 | End: 2024-02-27 | Stop reason: SDUPTHER

## 2023-05-10 RX ORDER — CARVEDILOL 3.12 MG/1
3.12 TABLET ORAL
COMMUNITY
End: 2023-08-29 | Stop reason: ALTCHOICE

## 2023-05-10 RX ORDER — SERTRALINE HYDROCHLORIDE 100 MG/1
100 TABLET, FILM COATED ORAL 2 TIMES DAILY
COMMUNITY
End: 2023-08-29 | Stop reason: SDUPTHER

## 2023-05-10 RX ORDER — AMITRIPTYLINE HYDROCHLORIDE 100 MG/1
1 TABLET ORAL NIGHTLY
Qty: 30 TABLET | Refills: 22 | COMMUNITY
Start: 2021-06-22 | End: 2024-02-26 | Stop reason: SDUPTHER

## 2023-05-10 RX ORDER — LEVOTHYROXINE SODIUM 112 UG/1
112 TABLET ORAL
COMMUNITY

## 2023-05-10 NOTE — PROGRESS NOTES
"Subjective   Patient ID: Jessica Cope is a 62 y.o. female who presents for Establish Care (Going through radiation for tongue CA).  Covid vax: x 4  CRC: never  Mammogram: 2022  Pap: [id number] per pt--advised  Lmp:   In radiation now  Quit tob 2005  Per pt ef better  Txd for hep c 2007    HPI  Patient Active Problem List   Diagnosis    Acute posthemorrhagic anemia    Cardiomyopathy (CMS/HCC)    COPD (chronic obstructive pulmonary disease) (CMS/HCC)    Essential (primary) hypertension    Hepatitis C virus infection    Mixed hyperlipidemia    Hypothyroidism    Implantable cardioverter-defibrillator (ICD) in situ    Insomnia    Malignant neoplasm of tongue (CMS/HCC)    Mixed anxiety depressive disorder    Reflux esophagitis    Unspecified severe protein-calorie malnutrition (CMS/HCC)       Past Surgical History:   Procedure Laterality Date    CARDIAC DEFIBRILLATOR PLACEMENT       SECTION, CLASSIC  1983    x 4 last one      GASTROSTOMY TUBE CHANGE      LUMBAR FUSION      LYMPHADENECTOMY      CA-L neck and r neck    STOMACH SURGERY  2023    has feeding tube    TONGUE SURGERY      dr mason       Review of Systems  This patient has   NO history of recent Covid nor flu symptoms,  NO Fever nor chills,  NO Chest pain, shortness of breath nor paroxysmal nocturnal dyspnea,  NO Nausea, vomiting, nor diarrhea,  NO Hematochezia nor melena,  NO Dysuria, hematuria, nor new incontinence issues  NO new severe headaches nor neurological complaints,  NO new issues with anxiety nor depression nor new psychiatric complaints,  NO suicidal nor homicidal ideations.     OBJECTIVE:  /62   Pulse 82   Temp 36.7 °C (98.1 °F) (Temporal)   Resp 16   Ht 1.626 m (5' 4\")   Wt 54 kg (119 lb)   LMP 05/10/2005 (Approximate)   SpO2 97%   BMI 20.43 kg/m²      General:  alert, oriented, no acute distress.  No obvious skin rashes noted.   No gait disturbance noted.    Mood is pleasant, not " tearful, no signs of emotional distress.  Not appearing intoxicated or altered.   No voiced delusions,   Normal, appropriate behavior.    HEENT: Normocephalic, atraumatic,   Pupils round, reactive to light  Extraocular motions intact and wnl  Tympanic membranes normal    Neck: no nuchal rigidity  No masses palpable.  No carotid bruits.  No thyromegaly.  Tongue unusual appearance bisected but healed  Speech impediment from surgery    Respiratory: Equal breath sounds  No wheezes,    rales,    nor rhonchi  No respiratory distress.    Heart: Regular rate and rhythm, no    murmurs  no rubs/gallops    Abdomen: no masses palpable, nontender, no rebound nor guarding. G tube present some scant yellow drianage no erythema at site(sees ENT /GI)      Extremities: NO cyanosis noted, no clubbing.   No edema noted.  2+dorsalis pedis pulses.    Normal-not antalgic, steady gait.    Legacy Encounter on 02/24/2023   Component Date Value Ref Range Status    Staph/MRSA Screen Culture 02/24/2023 NO Staphylococcus aureus ISOLATED.   Final    ABO GROUP (TYPE) IN BLOOD 02/24/2023 O   Final    Rh 02/24/2023 POS   Final    ANTIBODY SCREEN 02/24/2023 NEG   Final    WBC 02/24/2023 5.8  4.4 - 11.3 x10E9/L Final    nRBC 02/24/2023 0.0  0.0 - 0.0 /100 WBC Final    RBC 02/24/2023 3.69 (L)  4.00 - 5.20 x10E12/L Final    Hemoglobin 02/24/2023 11.0 (L)  12.0 - 16.0 g/dL Final    Hematocrit 02/24/2023 35.2 (L)  36.0 - 46.0 % Final    MCV 02/24/2023 95  80 - 100 fL Final    MCHC 02/24/2023 31.3 (L)  32.0 - 36.0 g/dL Final    Platelets 02/24/2023 369  150 - 450 x10E9/L Final    RDW 02/24/2023 14.6 (H)  11.5 - 14.5 % Final    Glucose 02/24/2023 98  74 - 99 mg/dL Final    Sodium 02/24/2023 140  136 - 145 mmol/L Final    Potassium 02/24/2023 4.4  3.5 - 5.3 mmol/L Final    Chloride 02/24/2023 106  98 - 107 mmol/L Final    Bicarbonate 02/24/2023 25  21 - 32 mmol/L Final    Anion Gap 02/24/2023 13  10 - 20 mmol/L Final    Urea Nitrogen 02/24/2023 14  6 - 23  mg/dL Final    Creatinine 02/24/2023 0.99  0.50 - 1.05 mg/dL Final    GFR Female 02/24/2023 64  >90 mL/min/1.73m2 Final    Comment:  CALCULATIONS OF ESTIMATED GFR ARE PERFORMED   USING THE 2021 CKD-EPI STUDY REFIT EQUATION   WITHOUT THE RACE VARIABLE FOR THE IDMS-TRACEABLE   CREATININE METHODS.    https://jasn.asnjournals.org/content/early/2021/09/22/ASN.6754451544      Calcium 02/24/2023 9.6  8.6 - 10.6 mg/dL Final    Protime 02/24/2023 12.1  9.8 - 13.4 sec Final    INR 02/24/2023 1.0  0.9 - 1.1 Final    aPTT 02/24/2023 35  26 - 39 sec Final    Comment:   THE APTT IS NO LONGER USED FOR MONITORING     UNFRACTIONATED HEPARIN THERAPY.    FOR MONITORING HEPARIN THERAPY,     USE THE HEPARIN ASSAY.      ABO GROUP (TYPE) IN BLOOD 03/01/2023 O   Final    Rh 03/01/2023 POS   Final   Orders Only on 02/20/2023   Component Date Value Ref Range Status    SARS-COV-2 RESULT 02/27/2023 NOT DETECTED  Not Detected Final    Comment: .  This assay is designed to detect the ORF1a/b and E genes of SARS-CoV-2 via   nucleic acid amplification. A Not Detected result does not preclude   2019-nCoV infection since the adequacy of sample collection and/or low viral   burden may result in presence of viral nucleic acids below the clinical   sensitivity of this test method.     Fact sheet for providers: https://www.fda.gov/media/535113/download   Fact sheet for patients: https://www.fda.gov/media/979325/download     This test has received FDA Emergency Use Authorization (EUA) and has been   verified for use by [facility] ([facility]).   This test is only authorized for the duration of time that circumstances   exist to justify the authorization of the emergency use of in vitro   diagnostic tests for the detection of SARS-CoV-2 virus and/or diagnosis of   COVID-19 infection under section 564(b)(1) of the Act, 21 U.S.C.   360bbb-3(b)(1), unless the authorization is terminated or revoked sooner.                                [facility] is certified under CLIA-88 as   qualified to perform high complexity testing. Testing is performed in the   [facility] laboratories located at [address] [place] [address].          Assessment/Plan     Problem List Items Addressed This Visit          Respiratory    COPD (chronic obstructive pulmonary disease) (CMS/HCC)       Circulatory    Cardiomyopathy (CMS/HCC)    Relevant Medications    carvedilol (Coreg) 3.125 mg tablet    Essential (primary) hypertension    Relevant Orders    Comprehensive Metabolic Panel    Hemoglobin A1C    Lipid Panel    Implantable cardioverter-defibrillator (ICD) in situ       Endocrine/Metabolic    Hypothyroidism    Relevant Orders    Thyroid Stimulating Hormone    Thyroxine, Free       Other    Mixed hyperlipidemia    Relevant Orders    Comprehensive Metabolic Panel    Hemoglobin A1C    Lipid Panel    Malignant neoplasm of tongue (CMS/HCC)    Relevant Orders    Thyroid Stimulating Hormone    Thyroxine, Free    Mixed anxiety depressive disorder     Other Visit Diagnoses       Anemia, unspecified type    -  Primary    Relevant Orders    CBC and Auto Differential    Ferritin    Vitamin B12    Vitamin D 1,25 Dihydroxy    Thyroid Stimulating Hormone    Thyroxine, Free    Hyperglycemia        Relevant Orders    Hemoglobin A1C    History of hepatitis C        Relevant Orders    Hepatitis C RNA, Quantitative, PCR          Labs due SOON  Unclear why anemia ?ACD  The patient is aware that results will be forthcoming of ALL planned labs and or tests. If no results are received on my chart or by letter within 1 - 3 weeks, the patient is aware they need to call to obtain results as this is not usual.     Advised gi workup-not ideal for her at this time  See me 3mo

## 2023-05-13 ENCOUNTER — APPOINTMENT (OUTPATIENT)
Dept: GENERAL RADIOLOGY | Age: 63
End: 2023-05-13
Payer: COMMERCIAL

## 2023-05-13 ENCOUNTER — HOSPITAL ENCOUNTER (EMERGENCY)
Age: 63
Discharge: HOME | End: 2023-05-13
Attending: FAMILY MEDICINE
Payer: COMMERCIAL

## 2023-05-13 VITALS
SYSTOLIC BLOOD PRESSURE: 127 MMHG | RESPIRATION RATE: 20 BRPM | HEART RATE: 92 BPM | OXYGEN SATURATION: 97 % | HEIGHT: 64 IN | WEIGHT: 120 LBS | TEMPERATURE: 97.6 F | DIASTOLIC BLOOD PRESSURE: 71 MMHG | BODY MASS INDEX: 20.49 KG/M2

## 2023-05-13 DIAGNOSIS — K94.21 BLEEDING FROM GASTROSTOMY TUBE SITE (HCC): Primary | ICD-10-CM

## 2023-05-13 PROCEDURE — 6360000004 HC RX CONTRAST MEDICATION: Performed by: FAMILY MEDICINE

## 2023-05-13 PROCEDURE — 74018 RADEX ABDOMEN 1 VIEW: CPT

## 2023-05-13 PROCEDURE — 99283 EMERGENCY DEPT VISIT LOW MDM: CPT

## 2023-05-13 RX ADMIN — DIATRIZOATE MEGLUMINE AND DIATRIZOATE SODIUM 60 ML: 660; 100 LIQUID ORAL; RECTAL at 19:14

## 2023-05-13 ASSESSMENT — LIFESTYLE VARIABLES
HOW MANY STANDARD DRINKS CONTAINING ALCOHOL DO YOU HAVE ON A TYPICAL DAY: PATIENT DOES NOT DRINK
HOW OFTEN DO YOU HAVE A DRINK CONTAINING ALCOHOL: NEVER

## 2023-05-13 ASSESSMENT — PAIN DESCRIPTION - LOCATION: LOCATION: ABDOMEN

## 2023-05-13 ASSESSMENT — PAIN - FUNCTIONAL ASSESSMENT: PAIN_FUNCTIONAL_ASSESSMENT: 0-10

## 2023-05-13 ASSESSMENT — PAIN DESCRIPTION - PAIN TYPE: TYPE: ACUTE PAIN

## 2023-05-13 ASSESSMENT — PAIN SCALES - GENERAL: PAINLEVEL_OUTOF10: 10

## 2023-05-14 ASSESSMENT — ENCOUNTER SYMPTOMS: RESPIRATORY NEGATIVE: 1

## 2023-05-15 RX ORDER — FOLIC ACID 1 MG/1
TABLET ORAL
Qty: 90 TABLET | Refills: 2 | Status: SHIPPED | OUTPATIENT
Start: 2023-05-15

## 2023-05-15 NOTE — TELEPHONE ENCOUNTER
Pharmacy requesting medication refill.  Please approve or deny this request.    Rx requested:  Requested Prescriptions     Pending Prescriptions Disp Refills    folic acid (FOLVITE) 1 MG tablet [Pharmacy Med Name: folic acid 1 mg tablet] 90 tablet 2     Sig: TAKE 1 TABLET BY MOUTH EVERY DAY         Last Office Visit:   1/13/2023      Next Visit Date:  Future Appointments   Date Time Provider Nidia Thomas   7/13/2023  1:00 PM Riri Delgado [address] [address]   9/15/2023 11:30 AM Feliciano Arreola, [facility]

## 2023-05-19 ENCOUNTER — LAB (OUTPATIENT)
Dept: LAB | Facility: LAB | Age: 63
End: 2023-05-19
Payer: COMMERCIAL

## 2023-05-19 DIAGNOSIS — C02.9 MALIGNANT NEOPLASM OF TONGUE (MULTI): ICD-10-CM

## 2023-05-19 DIAGNOSIS — R73.9 HYPERGLYCEMIA: ICD-10-CM

## 2023-05-19 DIAGNOSIS — E78.2 MIXED HYPERLIPIDEMIA: ICD-10-CM

## 2023-05-19 DIAGNOSIS — Z86.19 HISTORY OF HEPATITIS C: ICD-10-CM

## 2023-05-19 DIAGNOSIS — D64.9 ANEMIA, UNSPECIFIED TYPE: ICD-10-CM

## 2023-05-19 DIAGNOSIS — I10 ESSENTIAL (PRIMARY) HYPERTENSION: ICD-10-CM

## 2023-05-19 DIAGNOSIS — E03.9 HYPOTHYROIDISM, UNSPECIFIED TYPE: ICD-10-CM

## 2023-05-19 LAB
ALANINE AMINOTRANSFERASE (SGPT) (U/L) IN SER/PLAS: 13 U/L (ref 7–45)
ALBUMIN (G/DL) IN SER/PLAS: 3.8 G/DL (ref 3.4–5)
ALKALINE PHOSPHATASE (U/L) IN SER/PLAS: 84 U/L (ref 33–136)
ANION GAP IN SER/PLAS: 13 MMOL/L (ref 10–20)
ASPARTATE AMINOTRANSFERASE (SGOT) (U/L) IN SER/PLAS: 17 U/L (ref 9–39)
BASOPHILS (10*3/UL) IN BLOOD BY AUTOMATED COUNT: 0.07 X10E9/L (ref 0–0.1)
BASOPHILS/100 LEUKOCYTES IN BLOOD BY AUTOMATED COUNT: 1.2 % (ref 0–2)
BILIRUBIN TOTAL (MG/DL) IN SER/PLAS: 0.2 MG/DL (ref 0–1.2)
CALCIUM (MG/DL) IN SER/PLAS: 9.1 MG/DL (ref 8.6–10.3)
CARBON DIOXIDE, TOTAL (MMOL/L) IN SER/PLAS: 26 MMOL/L (ref 21–32)
CHLORIDE (MMOL/L) IN SER/PLAS: 99 MMOL/L (ref 98–107)
CHOLESTEROL (MG/DL) IN SER/PLAS: 107 MG/DL (ref 0–199)
CHOLESTEROL IN HDL (MG/DL) IN SER/PLAS: 41.1 MG/DL
CHOLESTEROL/HDL RATIO: 2.6
COBALAMIN (VITAMIN B12) (PG/ML) IN SER/PLAS: 1195 PG/ML (ref 211–911)
CREATININE (MG/DL) IN SER/PLAS: 0.76 MG/DL (ref 0.5–1.05)
EOSINOPHILS (10*3/UL) IN BLOOD BY AUTOMATED COUNT: 0.26 X10E9/L (ref 0–0.7)
EOSINOPHILS/100 LEUKOCYTES IN BLOOD BY AUTOMATED COUNT: 4.3 % (ref 0–6)
ERYTHROCYTE DISTRIBUTION WIDTH (RATIO) BY AUTOMATED COUNT: 14.3 % (ref 11.5–14.5)
ERYTHROCYTE MEAN CORPUSCULAR HEMOGLOBIN CONCENTRATION (G/DL) BY AUTOMATED: 31.1 G/DL (ref 32–36)
ERYTHROCYTE MEAN CORPUSCULAR VOLUME (FL) BY AUTOMATED COUNT: 89 FL (ref 80–100)
ERYTHROCYTES (10*6/UL) IN BLOOD BY AUTOMATED COUNT: 3.67 X10E12/L (ref 4–5.2)
GFR FEMALE: 88 ML/MIN/1.73M2
GLUCOSE (MG/DL) IN SER/PLAS: 100 MG/DL (ref 74–99)
HEMATOCRIT (%) IN BLOOD BY AUTOMATED COUNT: 32.8 % (ref 36–46)
HEMOGLOBIN (G/DL) IN BLOOD: 10.2 G/DL (ref 12–16)
IMMATURE GRANULOCYTES/100 LEUKOCYTES IN BLOOD BY AUTOMATED COUNT: 0.3 % (ref 0–0.9)
LDL: 41 MG/DL (ref 0–99)
LEUKOCYTES (10*3/UL) IN BLOOD BY AUTOMATED COUNT: 6.1 X10E9/L (ref 4.4–11.3)
LYMPHOCYTES (10*3/UL) IN BLOOD BY AUTOMATED COUNT: 0.79 X10E9/L (ref 1.2–4.8)
LYMPHOCYTES/100 LEUKOCYTES IN BLOOD BY AUTOMATED COUNT: 13 % (ref 13–44)
MONOCYTES (10*3/UL) IN BLOOD BY AUTOMATED COUNT: 0.79 X10E9/L (ref 0.1–1)
MONOCYTES/100 LEUKOCYTES IN BLOOD BY AUTOMATED COUNT: 13 % (ref 2–10)
NEUTROPHILS (10*3/UL) IN BLOOD BY AUTOMATED COUNT: 4.15 X10E9/L (ref 1.2–7.7)
NEUTROPHILS/100 LEUKOCYTES IN BLOOD BY AUTOMATED COUNT: 68.2 % (ref 40–80)
PLATELETS (10*3/UL) IN BLOOD AUTOMATED COUNT: 372 X10E9/L (ref 150–450)
POTASSIUM (MMOL/L) IN SER/PLAS: 4.4 MMOL/L (ref 3.5–5.3)
PROTEIN TOTAL: 7.3 G/DL (ref 6.4–8.2)
SODIUM (MMOL/L) IN SER/PLAS: 134 MMOL/L (ref 136–145)
THYROTROPIN (MIU/L) IN SER/PLAS BY DETECTION LIMIT <= 0.05 MIU/L: 1.96 MIU/L (ref 0.44–3.98)
THYROXINE (T4) FREE (NG/DL) IN SER/PLAS: 0.88 NG/DL (ref 0.61–1.12)
TRIGLYCERIDE (MG/DL) IN SER/PLAS: 126 MG/DL (ref 0–149)
UREA NITROGEN (MG/DL) IN SER/PLAS: 21 MG/DL (ref 6–23)
VLDL: 25 MG/DL (ref 0–40)

## 2023-05-19 PROCEDURE — 82728 ASSAY OF FERRITIN: CPT

## 2023-05-19 PROCEDURE — 87522 HEPATITIS C REVRS TRNSCRPJ: CPT

## 2023-05-19 PROCEDURE — 80053 COMPREHEN METABOLIC PANEL: CPT

## 2023-05-19 PROCEDURE — 85025 COMPLETE CBC W/AUTO DIFF WBC: CPT

## 2023-05-19 PROCEDURE — 84443 ASSAY THYROID STIM HORMONE: CPT

## 2023-05-19 PROCEDURE — 82607 VITAMIN B-12: CPT

## 2023-05-19 PROCEDURE — 36415 COLL VENOUS BLD VENIPUNCTURE: CPT

## 2023-05-19 PROCEDURE — 84439 ASSAY OF FREE THYROXINE: CPT

## 2023-05-19 PROCEDURE — 80061 LIPID PANEL: CPT

## 2023-05-19 PROCEDURE — 82652 VIT D 1 25-DIHYDROXY: CPT

## 2023-05-19 PROCEDURE — 83036 HEMOGLOBIN GLYCOSYLATED A1C: CPT

## 2023-05-20 LAB
ESTIMATED AVERAGE GLUCOSE FOR HBA1C: 137 MG/DL
FERRITIN (UG/LL) IN SER/PLAS: 40 UG/L (ref 8–150)
HEMOGLOBIN A1C/HEMOGLOBIN TOTAL IN BLOOD: 6.4 %

## 2023-05-21 LAB
HCV PCR QUANT: NOT DETECTED IU/ML
HCV RNA, PCR LOG: NORMAL LOG10 IU/ML

## 2023-05-22 DIAGNOSIS — E11.69 TYPE 2 DIABETES MELLITUS WITH OTHER SPECIFIED COMPLICATION, UNSPECIFIED WHETHER LONG TERM INSULIN USE (MULTI): ICD-10-CM

## 2023-05-22 DIAGNOSIS — R73.9 HYPERGLYCEMIA: Primary | ICD-10-CM

## 2023-05-22 DIAGNOSIS — R73.9 HYPERGLYCEMIA: ICD-10-CM

## 2023-05-22 DIAGNOSIS — Z00.00 ROUTINE GENERAL MEDICAL EXAMINATION AT A HEALTH CARE FACILITY: ICD-10-CM

## 2023-05-22 LAB — VITAMIN D 1,25-DIHYDROXY: 30 PG/ML (ref 19.9–79.3)

## 2023-05-22 RX ORDER — METFORMIN HYDROCHLORIDE 500 MG/1
500 TABLET ORAL
Qty: 30 TABLET | Refills: 3 | Status: SHIPPED | OUTPATIENT
Start: 2023-05-22 | End: 2023-08-29 | Stop reason: ALTCHOICE

## 2023-05-22 RX ORDER — METFORMIN HYDROCHLORIDE EXTENDED-RELEASE TABLETS 500 MG/1
500 TABLET, FILM COATED, EXTENDED RELEASE ORAL
Qty: 30 TABLET | Refills: 0 | Status: SHIPPED | OUTPATIENT
Start: 2023-05-22 | End: 2023-05-22 | Stop reason: ENTERED-IN-ERROR

## 2023-05-22 RX ORDER — METFORMIN HYDROCHLORIDE 500 MG/1
500 TABLET ORAL
Qty: 30 TABLET | Refills: 3 | Status: CANCELLED | OUTPATIENT
Start: 2023-05-22

## 2023-05-22 RX ORDER — METFORMIN HYDROCHLORIDE EXTENDED-RELEASE TABLETS 500 MG/1
500 TABLET, FILM COATED, EXTENDED RELEASE ORAL
COMMUNITY
End: 2023-05-22 | Stop reason: SDUPTHER

## 2023-06-06 DIAGNOSIS — E78.00 HYPERCHOLESTEROLEMIA: ICD-10-CM

## 2023-06-06 RX ORDER — VALSARTAN 80 MG/1
80 TABLET ORAL DAILY
Qty: 30 TABLET | Refills: 5 | Status: SHIPPED | OUTPATIENT
Start: 2023-06-06

## 2023-06-06 RX ORDER — ATORVASTATIN CALCIUM 40 MG/1
40 TABLET, FILM COATED ORAL DAILY
Qty: 90 TABLET | Refills: 1 | Status: SHIPPED | OUTPATIENT
Start: 2023-06-06

## 2023-06-06 NOTE — TELEPHONE ENCOUNTER
Rx requested:  Requested Prescriptions     Pending Prescriptions Disp Refills    valsartan (DIOVAN) 80 MG tablet [Pharmacy Med Name: valsartan 80 mg tablet] 30 tablet 0     Sig: Take 1 tablet by mouth daily         Last Office Visit:   1/13/2023      Next Visit Date:  Future Appointments   Date Time Provider [address]   7/13/2023  1:00 PM [facility] [address]   9/15/2023 11:30 AM Feliciano Bradley [facility]

## 2023-06-06 NOTE — TELEPHONE ENCOUNTER
Pharmacy requesting medication refill.  Please approve or deny this request.    Rx requested:  Requested Prescriptions     Pending Prescriptions Disp Refills    atorvastatin (LIPITOR) 40 MG tablet [Pharmacy Med Name: atorvastatin 40 mg tablet] 180 tablet 5     Sig: TAKE 1 TABLET BY MOUTH DAILY         Last Office Visit:   1/13/2023      Next Visit Date:  Future Appointments   Date Time Provider [address]   7/13/2023  1:00 PM Riri Purvis [address] [place] [address]   9[facility]15/2023 11:30 [facility]

## 2023-06-07 RX ORDER — TIOTROPIUM BROMIDE 18 UG/1
18 CAPSULE ORAL; RESPIRATORY (INHALATION) DAILY
Qty: 90 CAPSULE | Refills: 3 | Status: SHIPPED | OUTPATIENT
Start: 2023-06-07

## 2023-06-07 NOTE — TELEPHONE ENCOUNTER
Rx requested:  Requested Prescriptions     Pending Prescriptions Disp Refills    [place] 18 MCG inhalation capsule [Pharmacy Med Name: Spiriva with HandiHaler 18 mcg and inhalation capsules] 90 capsule 1     Sig: Inhale 1 capsule into the lungs daily         Last Office Visit:   1/13/2023      Next Visit Date:  Future Appointments   Date Time Provider [address]   7/13/2023  1:00 PM Raquel Darryle [address]   9/15/2023 11:30 AM Feliciano Gonzalez, [facility]

## 2023-07-28 ENCOUNTER — HOSPITAL ENCOUNTER (OUTPATIENT)
Dept: DATA CONVERSION | Facility: HOSPITAL | Age: 63
End: 2023-07-28
Attending: OTOLARYNGOLOGY
Payer: COMMERCIAL

## 2023-07-28 DIAGNOSIS — R91.1 SOLITARY PULMONARY NODULE: ICD-10-CM

## 2023-07-28 DIAGNOSIS — C34.32 MALIGNANT NEOPLASM OF LOWER LOBE, LEFT BRONCHUS OR LUNG (MULTI): ICD-10-CM

## 2023-07-28 LAB
INR IN PPP BY COAGULATION ASSAY: 1.1 (ref 0.9–1.1)
PLATELETS (10*3/UL) IN BLOOD AUTOMATED COUNT: 436 X10E9/L (ref 150–450)
PROTHROMBIN TIME (PT) IN PPP BY COAGULATION ASSAY: 12.7 SEC (ref 9.8–12.8)

## 2023-07-31 ENCOUNTER — TELEPHONE (OUTPATIENT)
Dept: PRIMARY CARE | Facility: CLINIC | Age: 63
End: 2023-07-31
Payer: COMMERCIAL

## 2023-07-31 LAB
COMPLETE PATHOLOGY REPORT: NORMAL
CONVERTED ADDENDUM DIAGNOSIS 2: NORMAL
CONVERTED CLINICAL DIAGNOSIS-HISTORY: NORMAL
CONVERTED FINAL DIAGNOSIS: NORMAL
CONVERTED FINAL REPORT PDF LINK TO COPY AND PASTE: NORMAL
CONVERTED GROSS DESCRIPTION: NORMAL

## 2023-07-31 RX ORDER — SERTRALINE HYDROCHLORIDE 100 MG/1
100 TABLET, FILM COATED ORAL 2 TIMES DAILY
Qty: 180 TABLET | Refills: 3 | Status: SHIPPED | OUTPATIENT
Start: 2023-07-31

## 2023-07-31 RX ORDER — SERTRALINE HYDROCHLORIDE 100 MG/1
TABLET, FILM COATED ORAL
Qty: 180 TABLET | Refills: 3 | OUTPATIENT
Start: 2023-07-31

## 2023-07-31 RX ORDER — CARVEDILOL 3.12 MG/1
TABLET ORAL
Qty: 90 TABLET | Refills: 3 | OUTPATIENT
Start: 2023-07-31

## 2023-07-31 RX ORDER — CARVEDILOL 3.12 MG/1
TABLET ORAL
Qty: 90 TABLET | Refills: 1 | Status: SHIPPED | OUTPATIENT
Start: 2023-07-31

## 2023-07-31 NOTE — TELEPHONE ENCOUNTER
Shellie pt needs refill  Carvedilol 3.125mg, 1 tab twice daily  Sertraline 100mg, 1 tab twice daily  DM Jeferson, 90 day supply  Pt's # [phone]

## 2023-07-31 NOTE — TELEPHONE ENCOUNTER
Requesting medication refill.  Please approve or deny this request.    Rx requested:  Requested Prescriptions     Pending Prescriptions Disp Refills    carvedilol (COREG) 3.125 MG tablet 90 tablet 1     Sig: TAKE 1 TABLET BY MOUTH TWICE DAILY WITH A MEAL    sertraline (ZOLOFT) 100 MG tablet 180 tablet 3     Sig: Take 1 tablet by mouth 2 times daily         Last Office Visit:   9/16/2022      Next Visit Date:  Future Appointments   Date Time Provider [address]   9/15/2023 11:30 AM Feliciano Cline, [facility]

## 2023-08-04 LAB
ALANINE AMINOTRANSFERASE (SGPT) (U/L) IN SER/PLAS: 16 U/L (ref 7–45)
ALBUMIN (G/DL) IN SER/PLAS: 4 G/DL (ref 3.4–5)
ALKALINE PHOSPHATASE (U/L) IN SER/PLAS: 87 U/L (ref 33–136)
ANION GAP IN SER/PLAS: 15 MMOL/L (ref 10–20)
ASPARTATE AMINOTRANSFERASE (SGOT) (U/L) IN SER/PLAS: 26 U/L (ref 9–39)
BASOPHILS (10*3/UL) IN BLOOD BY AUTOMATED COUNT: 0.09 X10E9/L (ref 0–0.1)
BASOPHILS/100 LEUKOCYTES IN BLOOD BY AUTOMATED COUNT: 1.1 % (ref 0–2)
BILIRUBIN TOTAL (MG/DL) IN SER/PLAS: 0.4 MG/DL (ref 0–1.2)
CALCIUM (MG/DL) IN SER/PLAS: 9.6 MG/DL (ref 8.6–10.3)
CARBON DIOXIDE, TOTAL (MMOL/L) IN SER/PLAS: 27 MMOL/L (ref 21–32)
CHLORIDE (MMOL/L) IN SER/PLAS: 95 MMOL/L (ref 98–107)
CORTISOL (UG/DL) IN SERUM: 14.5 UG/DL (ref 2.5–20)
CREATININE (MG/DL) IN SER/PLAS: 0.73 MG/DL (ref 0.5–1.05)
EOSINOPHILS (10*3/UL) IN BLOOD BY AUTOMATED COUNT: 0.19 X10E9/L (ref 0–0.7)
EOSINOPHILS/100 LEUKOCYTES IN BLOOD BY AUTOMATED COUNT: 2.3 % (ref 0–6)
ERYTHROCYTE DISTRIBUTION WIDTH (RATIO) BY AUTOMATED COUNT: 17.1 % (ref 11.5–14.5)
ERYTHROCYTE MEAN CORPUSCULAR HEMOGLOBIN CONCENTRATION (G/DL) BY AUTOMATED: 30.3 G/DL (ref 32–36)
ERYTHROCYTE MEAN CORPUSCULAR VOLUME (FL) BY AUTOMATED COUNT: 86 FL (ref 80–100)
ERYTHROCYTES (10*6/UL) IN BLOOD BY AUTOMATED COUNT: 3.77 X10E12/L (ref 4–5.2)
GFR FEMALE: >90 ML/MIN/1.73M2
GLUCOSE (MG/DL) IN SER/PLAS: 108 MG/DL (ref 74–99)
HEMATOCRIT (%) IN BLOOD BY AUTOMATED COUNT: 32.3 % (ref 36–46)
HEMOGLOBIN (G/DL) IN BLOOD: 9.8 G/DL (ref 12–16)
IMMATURE GRANULOCYTES/100 LEUKOCYTES IN BLOOD BY AUTOMATED COUNT: 0.4 % (ref 0–0.9)
LEUKOCYTES (10*3/UL) IN BLOOD BY AUTOMATED COUNT: 8.3 X10E9/L (ref 4.4–11.3)
LYMPHOCYTES (10*3/UL) IN BLOOD BY AUTOMATED COUNT: 0.63 X10E9/L (ref 1.2–4.8)
LYMPHOCYTES/100 LEUKOCYTES IN BLOOD BY AUTOMATED COUNT: 7.6 % (ref 13–44)
MONOCYTES (10*3/UL) IN BLOOD BY AUTOMATED COUNT: 0.82 X10E9/L (ref 0.1–1)
MONOCYTES/100 LEUKOCYTES IN BLOOD BY AUTOMATED COUNT: 9.8 % (ref 2–10)
NEUTROPHILS (10*3/UL) IN BLOOD BY AUTOMATED COUNT: 6.57 X10E9/L (ref 1.2–7.7)
NEUTROPHILS/100 LEUKOCYTES IN BLOOD BY AUTOMATED COUNT: 78.8 % (ref 40–80)
PLATELETS (10*3/UL) IN BLOOD AUTOMATED COUNT: 396 X10E9/L (ref 150–450)
POTASSIUM (MMOL/L) IN SER/PLAS: 3.8 MMOL/L (ref 3.5–5.3)
PROTEIN TOTAL: 7.6 G/DL (ref 6.4–8.2)
SODIUM (MMOL/L) IN SER/PLAS: 133 MMOL/L (ref 136–145)
THYROTROPIN (MIU/L) IN SER/PLAS BY DETECTION LIMIT <= 0.05 MIU/L: 5.47 MIU/L (ref 0.44–3.98)
THYROXINE (T4) FREE (NG/DL) IN SER/PLAS: 0.94 NG/DL (ref 0.78–1.48)
UREA NITROGEN (MG/DL) IN SER/PLAS: 17 MG/DL (ref 6–23)

## 2023-08-07 LAB — ADRENOCORTICOTROPIC HORMONE: 9.8 PG/ML (ref 7.2–63.3)

## 2023-08-08 ENCOUNTER — APPOINTMENT (OUTPATIENT)
Dept: CT IMAGING | Age: 63
DRG: 720 | End: 2023-08-08
Payer: COMMERCIAL

## 2023-08-08 ENCOUNTER — APPOINTMENT (OUTPATIENT)
Dept: GENERAL RADIOLOGY | Age: 63
DRG: 720 | End: 2023-08-08
Payer: COMMERCIAL

## 2023-08-08 ENCOUNTER — HOSPITAL ENCOUNTER (INPATIENT)
Age: 63
LOS: 5 days | Discharge: HOME HEALTH | DRG: 720 | End: 2023-08-13
Attending: INTERNAL MEDICINE | Admitting: INTERNAL MEDICINE
Payer: COMMERCIAL

## 2023-08-08 DIAGNOSIS — I95.89 OTHER SPECIFIED HYPOTENSION: ICD-10-CM

## 2023-08-08 DIAGNOSIS — J18.9 PNEUMONIA OF LEFT LUNG DUE TO INFECTIOUS ORGANISM, UNSPECIFIED PART OF LUNG: Primary | ICD-10-CM

## 2023-08-08 DIAGNOSIS — G89.3 NEOPLASM RELATED PAIN: ICD-10-CM

## 2023-08-08 DIAGNOSIS — A41.9 SEPTICEMIA (HCC): ICD-10-CM

## 2023-08-08 PROBLEM — J16.8 PNEUMONIA DUE TO OTHER SPECIFIED INFECTIOUS ORGANISMS: Status: ACTIVE | Noted: 2023-08-08

## 2023-08-08 PROBLEM — J96.01 ACUTE RESPIRATORY FAILURE WITH HYPOXIA (HCC): Status: ACTIVE | Noted: 2023-08-08

## 2023-08-08 LAB
ALBUMIN SERPL-MCNC: 3.7 G/DL (ref 3.5–4.6)
ALP SERPL-CCNC: 100 U/L (ref 40–130)
ALT SERPL-CCNC: 13 U/L (ref 0–33)
ANION GAP SERPL CALCULATED.3IONS-SCNC: 11 MEQ/L (ref 9–15)
AST SERPL-CCNC: 21 U/L (ref 0–35)
B PARAP IS1001 DNA NPH QL NAA+NON-PROBE: NOT DETECTED
B PERT.PT PRMT NPH QL NAA+NON-PROBE: NOT DETECTED
BASE EXCESS ARTERIAL: 5 (ref -3–3)
BASOPHILS # BLD: 0 K/UL (ref 0–0.2)
BASOPHILS NFR BLD: 0.3 %
BILIRUB SERPL-MCNC: 0.5 MG/DL (ref 0.2–0.7)
BUN SERPL-MCNC: 19 MG/DL (ref 8–23)
C PNEUM DNA NPH QL NAA+NON-PROBE: NOT DETECTED
CALCIUM IONIZED: 1.16 MMOL/L (ref 1.12–1.32)
CALCIUM SERPL-MCNC: 9.1 MG/DL (ref 8.5–9.9)
CHLORIDE SERPL-SCNC: 90 MEQ/L (ref 95–107)
CO2 SERPL-SCNC: 28 MEQ/L (ref 20–31)
CREAT SERPL-MCNC: 0.72 MG/DL (ref 0.5–0.9)
EOSINOPHIL # BLD: 0 K/UL (ref 0–0.7)
EOSINOPHIL NFR BLD: 0.6 %
ERYTHROCYTE [DISTWIDTH] IN BLOOD BY AUTOMATED COUNT: 17.7 % (ref 11.5–14.5)
FLUAV RNA NPH QL NAA+NON-PROBE: NOT DETECTED
FLUBV RNA NPH QL NAA+NON-PROBE: NOT DETECTED
GLOBULIN SER CALC-MCNC: 3.5 G/DL (ref 2.3–3.5)
GLUCOSE BLD-MCNC: 172 MG/DL (ref 70–99)
GLUCOSE SERPL-MCNC: 188 MG/DL (ref 70–99)
HADV DNA NPH QL NAA+NON-PROBE: NOT DETECTED
HCO3 ARTERIAL: 27.4 MMOL/L (ref 21–29)
HCOV 229E RNA NPH QL NAA+NON-PROBE: NOT DETECTED
HCOV HKU1 RNA NPH QL NAA+NON-PROBE: NOT DETECTED
HCOV NL63 RNA NPH QL NAA+NON-PROBE: NOT DETECTED
HCOV OC43 RNA NPH QL NAA+NON-PROBE: NOT DETECTED
HCT VFR BLD AUTO: 32.2 % (ref 37–47)
HCT VFR BLD AUTO: 35 % (ref 36–48)
HGB BLD CALC-MCNC: 11.8 GM/DL (ref 12–16)
HGB BLD-MCNC: 10.6 G/DL (ref 12–16)
HMPV RNA NPH QL NAA+NON-PROBE: NOT DETECTED
HPIV1 RNA NPH QL NAA+NON-PROBE: NOT DETECTED
HPIV2 RNA NPH QL NAA+NON-PROBE: NOT DETECTED
HPIV3 RNA NPH QL NAA+NON-PROBE: NOT DETECTED
HPIV4 RNA NPH QL NAA+NON-PROBE: NOT DETECTED
LACTATE: 0.92 MMOL/L (ref 0.4–2)
LACTIC ACID, SEPSIS: 1 MMOL/L (ref 0.5–1.9)
LACTIC ACID, SEPSIS: 1.4 MMOL/L (ref 0.5–1.9)
LACTIC ACID, SEPSIS: 2 MMOL/L (ref 0.5–1.9)
LACTIC ACID, SEPSIS: 2.1 MMOL/L (ref 0.5–1.9)
LYMPHOCYTES # BLD: 0.4 K/UL (ref 1–4.8)
LYMPHOCYTES NFR BLD: 5.6 %
M PNEUMO DNA NPH QL NAA+NON-PROBE: NOT DETECTED
MCH RBC QN AUTO: 26.5 PG (ref 27–31.3)
MCHC RBC AUTO-ENTMCNC: 33 % (ref 33–37)
MCV RBC AUTO: 80.1 FL (ref 79.4–94.8)
MONOCYTES # BLD: 0.2 K/UL (ref 0.2–0.8)
MONOCYTES NFR BLD: 2.6 %
NEUTROPHILS # BLD: 6 K/UL (ref 1.4–6.5)
NEUTS SEG NFR BLD: 90.9 %
O2 SAT, ARTERIAL: 91 % (ref 93–100)
PCO2 ARTERIAL: 31 MM HG (ref 35–45)
PERFORMED ON: ABNORMAL
PH ARTERIAL: 7.55 (ref 7.35–7.45)
PLATELET # BLD AUTO: 329 K/UL (ref 130–400)
PO2 ARTERIAL: 52 MM HG (ref 75–108)
POC CHLORIDE: 94 MEQ/L (ref 99–110)
POC CREATININE: 0.8 MG/DL (ref 0.6–1.2)
POC FIO2: 3
POC SAMPLE TYPE: ABNORMAL
POTASSIUM SERPL-SCNC: 3.8 MEQ/L (ref 3.5–5.1)
POTASSIUM SERPL-SCNC: 4.1 MEQ/L (ref 3.4–4.9)
PROCALCITONIN SERPL IA-MCNC: 0.17 NG/ML (ref 0–0.15)
PROT SERPL-MCNC: 7.2 G/DL (ref 6.3–8)
RBC # BLD AUTO: 4.02 M/UL (ref 4.2–5.4)
RSV RNA NPH QL NAA+NON-PROBE: NOT DETECTED
RV+EV RNA NPH QL NAA+NON-PROBE: NOT DETECTED
SARS-COV-2 RNA NPH QL NAA+NON-PROBE: NOT DETECTED
SODIUM BLD-SCNC: 131 MEQ/L (ref 136–145)
SODIUM SERPL-SCNC: 129 MEQ/L (ref 135–144)
TCO2 ARTERIAL: 28 MMOL/L (ref 21–32)
TROPONIN T SERPL-MCNC: <0.01 NG/ML (ref 0–0.01)
WBC # BLD AUTO: 6.5 K/UL (ref 4.8–10.8)

## 2023-08-08 PROCEDURE — 6360000002 HC RX W HCPCS: Performed by: NURSE PRACTITIONER

## 2023-08-08 PROCEDURE — 2000000000 HC ICU R&B

## 2023-08-08 PROCEDURE — 85014 HEMATOCRIT: CPT

## 2023-08-08 PROCEDURE — 96365 THER/PROPH/DIAG IV INF INIT: CPT

## 2023-08-08 PROCEDURE — 99254 IP/OBS CNSLTJ NEW/EST MOD 60: CPT | Performed by: INTERNAL MEDICINE

## 2023-08-08 PROCEDURE — 82435 ASSAY OF BLOOD CHLORIDE: CPT

## 2023-08-08 PROCEDURE — 36415 COLL VENOUS BLD VENIPUNCTURE: CPT

## 2023-08-08 PROCEDURE — 6370000000 HC RX 637 (ALT 250 FOR IP): Performed by: NURSE PRACTITIONER

## 2023-08-08 PROCEDURE — 85025 COMPLETE CBC W/AUTO DIFF WBC: CPT

## 2023-08-08 PROCEDURE — 71045 X-RAY EXAM CHEST 1 VIEW: CPT

## 2023-08-08 PROCEDURE — 83605 ASSAY OF LACTIC ACID: CPT

## 2023-08-08 PROCEDURE — 99285 EMERGENCY DEPT VISIT HI MDM: CPT

## 2023-08-08 PROCEDURE — 80200 ASSAY OF TOBRAMYCIN: CPT

## 2023-08-08 PROCEDURE — 71275 CT ANGIOGRAPHY CHEST: CPT

## 2023-08-08 PROCEDURE — 82330 ASSAY OF CALCIUM: CPT

## 2023-08-08 PROCEDURE — 99291 CRITICAL CARE FIRST HOUR: CPT | Performed by: INTERNAL MEDICINE

## 2023-08-08 PROCEDURE — 2580000003 HC RX 258: Performed by: INTERNAL MEDICINE

## 2023-08-08 PROCEDURE — 84295 ASSAY OF SERUM SODIUM: CPT

## 2023-08-08 PROCEDURE — 36600 WITHDRAWAL OF ARTERIAL BLOOD: CPT

## 2023-08-08 PROCEDURE — 84132 ASSAY OF SERUM POTASSIUM: CPT

## 2023-08-08 PROCEDURE — 92610 EVALUATE SWALLOWING FUNCTION: CPT

## 2023-08-08 PROCEDURE — 84145 PROCALCITONIN (PCT): CPT

## 2023-08-08 PROCEDURE — 80053 COMPREHEN METABOLIC PANEL: CPT

## 2023-08-08 PROCEDURE — 6370000000 HC RX 637 (ALT 250 FOR IP): Performed by: PHYSICIAN ASSISTANT

## 2023-08-08 PROCEDURE — 6370000000 HC RX 637 (ALT 250 FOR IP): Performed by: INTERNAL MEDICINE

## 2023-08-08 PROCEDURE — 6360000002 HC RX W HCPCS: Performed by: INTERNAL MEDICINE

## 2023-08-08 PROCEDURE — 6360000002 HC RX W HCPCS: Performed by: PHYSICIAN ASSISTANT

## 2023-08-08 PROCEDURE — 82803 BLOOD GASES ANY COMBINATION: CPT

## 2023-08-08 PROCEDURE — 82565 ASSAY OF CREATININE: CPT

## 2023-08-08 PROCEDURE — 93005 ELECTROCARDIOGRAM TRACING: CPT | Performed by: PHYSICIAN ASSISTANT

## 2023-08-08 PROCEDURE — 6360000004 HC RX CONTRAST MEDICATION: Performed by: NURSE PRACTITIONER

## 2023-08-08 PROCEDURE — 2580000003 HC RX 258: Performed by: PHYSICIAN ASSISTANT

## 2023-08-08 PROCEDURE — 87040 BLOOD CULTURE FOR BACTERIA: CPT

## 2023-08-08 PROCEDURE — 84484 ASSAY OF TROPONIN QUANT: CPT

## 2023-08-08 PROCEDURE — 0202U NFCT DS 22 TRGT SARS-COV-2: CPT

## 2023-08-08 RX ORDER — ACETAMINOPHEN 650 MG/1
650 SUPPOSITORY RECTAL EVERY 6 HOURS PRN
Status: DISCONTINUED | OUTPATIENT
Start: 2023-08-08 | End: 2023-08-13 | Stop reason: HOSPADM

## 2023-08-08 RX ORDER — LEVOFLOXACIN 5 MG/ML
750 INJECTION, SOLUTION INTRAVENOUS EVERY 24 HOURS
Status: DISCONTINUED | OUTPATIENT
Start: 2023-08-08 | End: 2023-08-08

## 2023-08-08 RX ORDER — LEVOTHYROXINE SODIUM 112 UG/1
112 TABLET ORAL
Status: DISCONTINUED | OUTPATIENT
Start: 2023-08-09 | End: 2023-08-13 | Stop reason: HOSPADM

## 2023-08-08 RX ORDER — FLUCONAZOLE, SODIUM CHLORIDE 2 MG/ML
100 INJECTION INTRAVENOUS EVERY 24 HOURS
Status: DISCONTINUED | OUTPATIENT
Start: 2023-08-08 | End: 2023-08-13 | Stop reason: HOSPADM

## 2023-08-08 RX ORDER — VALSARTAN 80 MG/1
80 TABLET ORAL DAILY
Status: DISCONTINUED | OUTPATIENT
Start: 2023-08-09 | End: 2023-08-13 | Stop reason: HOSPADM

## 2023-08-08 RX ORDER — ATORVASTATIN CALCIUM 40 MG/1
40 TABLET, FILM COATED ORAL DAILY
Status: DISCONTINUED | OUTPATIENT
Start: 2023-08-08 | End: 2023-08-08

## 2023-08-08 RX ORDER — SODIUM CHLORIDE 9 MG/ML
INJECTION, SOLUTION INTRAVENOUS CONTINUOUS
Status: DISCONTINUED | OUTPATIENT
Start: 2023-08-08 | End: 2023-08-09

## 2023-08-08 RX ORDER — AMITRIPTYLINE HYDROCHLORIDE 100 MG/1
100 TABLET ORAL NIGHTLY
COMMUNITY

## 2023-08-08 RX ORDER — 0.9 % SODIUM CHLORIDE 0.9 %
30 INTRAVENOUS SOLUTION INTRAVENOUS ONCE
Status: COMPLETED | OUTPATIENT
Start: 2023-08-08 | End: 2023-08-08

## 2023-08-08 RX ORDER — MIDODRINE HYDROCHLORIDE 5 MG/1
10 TABLET ORAL
Status: DISCONTINUED | OUTPATIENT
Start: 2023-08-08 | End: 2023-08-11

## 2023-08-08 RX ORDER — SODIUM CHLORIDE 0.9 % (FLUSH) 0.9 %
5-40 SYRINGE (ML) INJECTION PRN
Status: DISCONTINUED | OUTPATIENT
Start: 2023-08-08 | End: 2023-08-13 | Stop reason: HOSPADM

## 2023-08-08 RX ORDER — ACETAMINOPHEN 325 MG/1
650 TABLET ORAL EVERY 6 HOURS PRN
Status: DISCONTINUED | OUTPATIENT
Start: 2023-08-08 | End: 2023-08-13 | Stop reason: HOSPADM

## 2023-08-08 RX ORDER — CARVEDILOL 3.12 MG/1
3.12 TABLET ORAL 2 TIMES DAILY WITH MEALS
Status: DISCONTINUED | OUTPATIENT
Start: 2023-08-08 | End: 2023-08-13 | Stop reason: HOSPADM

## 2023-08-08 RX ORDER — LEVOFLOXACIN 5 MG/ML
750 INJECTION, SOLUTION INTRAVENOUS EVERY 24 HOURS
Status: DISCONTINUED | OUTPATIENT
Start: 2023-08-09 | End: 2023-08-13 | Stop reason: HOSPADM

## 2023-08-08 RX ORDER — SPIRONOLACTONE 25 MG/1
12.5 TABLET ORAL DAILY
Status: DISCONTINUED | OUTPATIENT
Start: 2023-08-09 | End: 2023-08-13 | Stop reason: HOSPADM

## 2023-08-08 RX ORDER — LEVOFLOXACIN 5 MG/ML
750 INJECTION, SOLUTION INTRAVENOUS ONCE
Status: COMPLETED | OUTPATIENT
Start: 2023-08-08 | End: 2023-08-08

## 2023-08-08 RX ORDER — SODIUM CHLORIDE 9 MG/ML
INJECTION, SOLUTION INTRAVENOUS PRN
Status: DISCONTINUED | OUTPATIENT
Start: 2023-08-08 | End: 2023-08-13 | Stop reason: HOSPADM

## 2023-08-08 RX ORDER — IBUPROFEN 600 MG/1
600 TABLET ORAL ONCE
Status: COMPLETED | OUTPATIENT
Start: 2023-08-08 | End: 2023-08-08

## 2023-08-08 RX ORDER — 0.9 % SODIUM CHLORIDE 0.9 %
1000 INTRAVENOUS SOLUTION INTRAVENOUS ONCE
Status: COMPLETED | OUTPATIENT
Start: 2023-08-08 | End: 2023-08-08

## 2023-08-08 RX ORDER — ENOXAPARIN SODIUM 100 MG/ML
30 INJECTION SUBCUTANEOUS EVERY 24 HOURS
Status: DISCONTINUED | OUTPATIENT
Start: 2023-08-08 | End: 2023-08-13 | Stop reason: HOSPADM

## 2023-08-08 RX ORDER — FOLIC ACID 1 MG/1
1000 TABLET ORAL DAILY
Status: DISCONTINUED | OUTPATIENT
Start: 2023-08-09 | End: 2023-08-13 | Stop reason: HOSPADM

## 2023-08-08 RX ORDER — SODIUM CHLORIDE 0.9 % (FLUSH) 0.9 %
5-40 SYRINGE (ML) INJECTION EVERY 12 HOURS SCHEDULED
Status: DISCONTINUED | OUTPATIENT
Start: 2023-08-08 | End: 2023-08-13 | Stop reason: HOSPADM

## 2023-08-08 RX ORDER — SERTRALINE HYDROCHLORIDE 100 MG/1
100 TABLET, FILM COATED ORAL 2 TIMES DAILY
Status: DISCONTINUED | OUTPATIENT
Start: 2023-08-08 | End: 2023-08-13 | Stop reason: HOSPADM

## 2023-08-08 RX ORDER — LEVOTHYROXINE SODIUM 112 UG/1
112 TABLET ORAL
COMMUNITY

## 2023-08-08 RX ADMIN — SODIUM CHLORIDE: 9 INJECTION, SOLUTION INTRAVENOUS at 23:16

## 2023-08-08 RX ADMIN — AMITRIPTYLINE HYDROCHLORIDE 100 MG: 75 TABLET, FILM COATED ORAL at 21:22

## 2023-08-08 RX ADMIN — SODIUM CHLORIDE 1362 ML: 9 INJECTION, SOLUTION INTRAVENOUS at 10:29

## 2023-08-08 RX ADMIN — Medication 10 ML: at 21:23

## 2023-08-08 RX ADMIN — SODIUM CHLORIDE: 9 INJECTION, SOLUTION INTRAVENOUS at 12:58

## 2023-08-08 RX ADMIN — SODIUM CHLORIDE 1000 ML: 9 INJECTION, SOLUTION INTRAVENOUS at 13:17

## 2023-08-08 RX ADMIN — FLUCONAZOLE IN SODIUM CHLORIDE 100 MG: 2 INJECTION, SOLUTION INTRAVENOUS at 17:39

## 2023-08-08 RX ADMIN — NYSTATIN 500000 UNITS: 100000 SUSPENSION ORAL at 21:23

## 2023-08-08 RX ADMIN — SERTRALINE 100 MG: 100 TABLET, FILM COATED ORAL at 21:22

## 2023-08-08 RX ADMIN — MIDODRINE HYDROCHLORIDE 10 MG: 10 TABLET ORAL at 15:41

## 2023-08-08 RX ADMIN — SODIUM CHLORIDE: 9 INJECTION, SOLUTION INTRAVENOUS at 15:42

## 2023-08-08 RX ADMIN — IBUPROFEN 600 MG: 600 TABLET, FILM COATED ORAL at 10:02

## 2023-08-08 RX ADMIN — ENOXAPARIN SODIUM 30 MG: 100 INJECTION SUBCUTANEOUS at 15:41

## 2023-08-08 RX ADMIN — NYSTATIN 500000 UNITS: 100000 SUSPENSION ORAL at 17:32

## 2023-08-08 RX ADMIN — IOPAMIDOL 75 ML: 612 INJECTION, SOLUTION INTRAVENOUS at 17:07

## 2023-08-08 RX ADMIN — LEVOFLOXACIN 750 MG: 5 INJECTION, SOLUTION INTRAVENOUS at 10:33

## 2023-08-08 ASSESSMENT — PAIN SCALES - GENERAL
PAINLEVEL_OUTOF10: 0

## 2023-08-08 ASSESSMENT — ENCOUNTER SYMPTOMS
EYES NEGATIVE: 1
PHOTOPHOBIA: 0
VOMITING: 0
ABDOMINAL PAIN: 0
SHORTNESS OF BREATH: 1
BACK PAIN: 0
EYE PAIN: 0
COUGH: 1
GASTROINTESTINAL NEGATIVE: 1
NAUSEA: 0
DIARRHEA: 0
SORE THROAT: 0
RHINORRHEA: 0

## 2023-08-08 ASSESSMENT — LIFESTYLE VARIABLES
HOW OFTEN DO YOU HAVE A DRINK CONTAINING ALCOHOL: NEVER
HOW MANY STANDARD DRINKS CONTAINING ALCOHOL DO YOU HAVE ON A TYPICAL DAY: PATIENT DOES NOT DRINK
HOW MANY STANDARD DRINKS CONTAINING ALCOHOL DO YOU HAVE ON A TYPICAL DAY: PATIENT DOES NOT DRINK
HOW OFTEN DO YOU HAVE A DRINK CONTAINING ALCOHOL: NEVER

## 2023-08-08 ASSESSMENT — PAIN - FUNCTIONAL ASSESSMENT: PAIN_FUNCTIONAL_ASSESSMENT: NONE - DENIES PAIN

## 2023-08-08 NOTE — PLAN OF CARE
Nutrition Problem #1: Severe malnutrition, In context of chronic illness  Intervention: Food and/or Nutrient Delivery: Start Tube Feeding, Continue NPO  Nutritional  Goals: tolerance to TF goal

## 2023-08-08 NOTE — ACP (ADVANCE CARE PLANNING)
Advance Care Planning     Advance Care Planning Activator (Inpatient)  Conversation Note      Date of ACP Conversation: 8/8/2023     Conversation Conducted with: Patient with Decision Making Capacity    ACP Activator: Jasmin Sanderson RN        Health Care Decision Maker:     Current Designated Health Care Decision Maker:     Primary Decision Maker: Teodoro Zheng Child - [phone]    Secondary Decision Maker: Rylee Wilhelm Child - [phone]    Care Preferences    Ventilation: \"If you were in your present state of health and suddenly became very ill and were unable to breathe on your own, what would your preference be about the use of a ventilator (breathing machine) if it were available to you? \"      Would the patient desire the use of ventilator (breathing machine)?: yes    \"If your health worsens and it becomes clear that your chance of recovery is unlikely, what would your preference be about the use of a ventilator (breathing machine) if it were available to you? \"     Would the patient desire the use of ventilator (breathing machine)?: Yes      Resuscitation  \"CPR works best to restart the heart when there is a sudden event, like a heart attack, in someone who is otherwise healthy. Unfortunately, CPR does not typically restart the heart for people who have serious health conditions or who are very sick. \"    \"In the event your heart stopped as a result of an underlying serious health condition, would you want attempts to be made to restart your heart (answer \"yes\" for attempt to resuscitate) or would you prefer a natural death (answer \"no\" for do not attempt to resuscitate)? \" yes       [x] Yes   [] No   Educated Patient / [facility] regarding differences between Advance Directives and portable DNR orders.     Length of ACP Conversation in minutes:  10    Conversation Outcomes:  ACP discussion completed    Follow-up plan:    [] Schedule follow-up conversation to continue planning  [] Referred

## 2023-08-08 NOTE — ACP (ADVANCE CARE PLANNING)
Advance Care Planning     Advance Care Planning Inpatient Note  [facility] Department    Today's Date: 8/8/2023  Unit: [facility] ICU    Received request from patient and family. Upon review of chart and communication with care team, patient's decision making abilities are not in question. . Patient and Child/Children was/were present in the room during visit. Goals of ACP Conversation:  Discuss advance care planning documents  Facilitate a discussion related to patient's goals of care as they align with the patient's values and beliefs. Health Care Decision Makers:       Primary Decision Maker: Cinthya Hanson Child - [phone]    Secondary Decision Maker: Lamonte Michel Child - [phone]  Summary:  Completed [facility]    Advance Care Planning Documents (Patient Wishes):  Healthcare Power of /Advance Directive Appointment of Health Care Agent  Living Will/Advance Directive     Assessment:  Ms. Luis Carlos Euceda was resting in bed when this  entered the room. Her children were at her bedside supporting her. Patient was enjoying their presence and conversation. Patient expressed wish to complete her AD documentation. Ms. Luis Carlos Euceda named her daughter Cinthya Hanson as her primary medical decision maker. A copy was placed in her chart for scanning into Epic. Two copies and the original were returned to the patient. Interventions:  Provided education on documents for clarity and greater understanding  Assisted in the completion of documents according to patient's wishes at this time    Care Preferences Communicated:   No    Outcomes/Plan:  New advance directive completed. Returned original document(s) to patient, as well as copies for distribution to appointed agents  Copy of advance directive given to staff to scan into medical record. Routed ACP note to attending provider or other IDT member.     Electronically signed by Ulices Carlson [facility] on 8/8/2023 at 6:22

## 2023-08-08 NOTE — H&P
17.7 (H) 11.5 - 14.5 %    Platelets [phone] - 446 K/uL    Neutrophils % 90.9 %    Lymphocytes % 5.6 %    Monocytes % 2.6 %    Eosinophils % 0.6 %    Basophils % 0.3 %    Neutrophils Absolute 6.0 1.4 - 6.5 K/uL    Lymphocytes Absolute 0.4 (L) 1.0 - 4.8 K/uL    Monocytes Absolute 0.2 0.2 - 0.8 K/uL    Eosinophils Absolute 0.0 0.0 - 0.7 K/uL    Basophils Absolute 0.0 0.0 - 0.2 K/uL   CMP w/ Reflex to MG    Collection Time: 08/08/23  9:30 AM   Result Value Ref Range    Sodium 129 (L) 135 - 144 mEq/L    Potassium reflex Magnesium 4.1 3.4 - 4.9 mEq/L    Chloride 90 (L) 95 - 107 mEq/L    CO2 28 20 - 31 mEq/L    Anion Gap 11 9 - 15 mEq/L    Glucose 188 (H) 70 - 99 mg/dL    BUN 19 8 - 23 mg/dL    Creatinine 0.72 0.50 - 0.90 mg/dL    Est, Glom Filt Rate >60.0 >60    Calcium 9.1 8.5 - 9.9 mg/dL    Total Protein 7.2 6.3 - 8.0 g/dL    Albumin 3.7 3.5 - 4.6 g/dL    Total Bilirubin 0.5 0.2 - 0.7 mg/dL    Alkaline Phosphatase 100 40 - 130 U/L    ALT 13 0 - 33 U/L    AST 21 0 - 35 U/L    Globulin 3.5 2.3 - 3.5 g/dL   Procalcitonin    Collection Time: 08/08/23  9:30 AM   Result Value Ref Range    Procalcitonin 0.17 (H) 0.00 - 0.15 ng/mL   Troponin    Collection Time: 08/08/23  9:30 AM   Result Value Ref Range    Troponin <0.010 0.000 - 0.010 ng/mL   Lactate, Sepsis    Collection Time: 08/08/23  9:44 AM   Result Value Ref Range    Lactic Acid, Sepsis 2.0 (H) 0.5 - 1.9 mmol/L   Respiratory Panel, Molecular, with COVID-19 (Restricted: peds pts or suitable admitted adults)    Collection Time: 08/08/23  9:45 AM    Specimen: Nasopharyngeal   Result Value Ref Range    Adenovirus by PCR Not Detected Not Detected    Bordetella parapertussis by PCR Not Detected Not Detected    Bordetella pertussis by PCR Not Detected Not Detected    Chlamydophilia pneumoniae by PCR Not Detected Not Detected    Coronavirus 229E by PCR Not Detected Not Detected    Coronavirus HKU1 by PCR Not Detected Not Detected    Coronavirus NL63 by PCR Not Detected Not

## 2023-08-08 NOTE — ED PROVIDER NOTES
[facility]  4W MED SURG UNIT  eMERGENCY dEPARTMENTeNCOUnter      Pt Name: Greg Archer  MRN: [id number]  [address] [facility] 1960  Date ofevaluation: 8/8/2023  Provider: Guicho Marshall PA-C    CHIEF COMPLAINT       Chief Complaint   Patient presents with    Shortness of Breath     2 duo nebs and 125 solumedrol, onset this morning         HISTORY OF PRESENT ILLNESS   (Location/Symptom, Timing/Onset,Context/Setting, Quality, Duration, Modifying Factors, Severity)  Note limiting factors. Greg Archer is a 58 y.o. female who presents to the emergency department cough and sob x2 days. Does have a history of COPD. She does not currently wear O2. EMS report gave Solu-Medrol 125 mg and DuoNeb with slight improvement. Patient denies any chest pain. HPI    NursingNotes were reviewed. REVIEW OF SYSTEMS    (2-9 systems for level 4, 10 or more for level 5)     Review of Systems   Constitutional:  Positive for activity change, appetite change, chills, fatigue and fever. Negative for diaphoresis. HENT:  Negative for congestion, rhinorrhea and sore throat. Eyes:  Negative for photophobia and pain. Respiratory:  Positive for cough and shortness of breath. Cardiovascular:  Negative for chest pain and palpitations. Gastrointestinal:  Negative for abdominal pain, diarrhea, nausea and vomiting. Genitourinary:  Negative for dysuria and flank pain. Musculoskeletal:  Negative for back pain. Skin:  Negative for rash. Neurological:  Negative for dizziness, light-headedness and headaches. Psychiatric/Behavioral: Negative. All other systems reviewed and are negative. Except as noted above the remainder of the review of systems was reviewed and negative.        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

## 2023-08-08 NOTE — ED NOTES
[address] Medication Reconciliation Note      Medication reconciliation was attempted for patient Maurice Orellana 1960. Admit date: 8/8/2023  Consult: No    Med rec status: Medrec Status: Completed    Information provided by: MED REC HISTORY: Patient Interview and Review of EMR    Pharmacy:  [facility] ([address] [place] [address]), Phone [phone]    Pharmacy Updated: Yes    MEDICATIONS     Prior to Admission medications    Medication Sig Start Date End Date Taking?  Authorizing Provider   amitriptyline (ELAVIL) 100 MG tablet Take 1 tablet by mouth nightly   Yes Historical Provider, MD   levothyroxine (SYNTHROID) 112 MCG tablet Take 1 tablet by mouth every morning (before breakfast)   Yes Historical Provider, MD   carvedilol (COREG) 3.125 MG tablet TAKE 1 TABLET BY MOUTH TWICE DAILY WITH A MEAL 7/31/23   Feliciano Garcia, DO   sertraline (ZOLOFT) 100 MG tablet Take 1 tablet by mouth 2 times daily 7/31/23   Feliciano Garcia, DO   SPIRIVA HANDIHALER 18 MCG inhalation capsule Inhale 1 capsule into the lungs daily 6/7/23   Riri Santos PA-C   atorvastatin (LIPITOR) 40 MG tablet TAKE 1 TABLET BY MOUTH DAILY 6/6/23   Riri Santos PA-C   valsartan (DIOVAN) 80 MG tablet Take 1 tablet by mouth daily 6/6/23   Riri Santos PA-C   folic acid (FOLVITE) 1 MG tablet TAKE 1 TABLET BY MOUTH EVERY DAY 5/15/23   Riri Santos PA-C   amitriptyline (ELAVIL) 100 MG tablet TAKE 1 TABLET BY MOUTH NIGHTLY 4/11/23 8/8/23  Riri Santos PA-C   levothyroxine (SYNTHROID) 112 MCG tablet Take 1 tablet by mouth Daily 1/29/23 8/8/23  Riri Santos PA-C   diclofenac (VOLTAREN) 50 MG EC tablet Take 1 tablet by mouth 2 times daily as needed for Pain 1/24/23   Riri Santos PA-C   loratadine (CLARITIN) 10 MG tablet Take 1 tablet by mouth daily 1/13/23   Riri Santos PA-C   benzonatate (TESSALON) 100 MG capsule TAKE 1 CAPSULE BY MOUTH EVERY 6

## 2023-08-08 NOTE — ED TRIAGE NOTES
Pt arrived by ems with c/o of SOB that started around 6:30 AM     Pt sanford snot wear o2 at home, currently on 2L for comfort     Pt states she feels \"a little better\" post duoneb and solumedrol by samantha

## 2023-08-09 ENCOUNTER — APPOINTMENT (OUTPATIENT)
Age: 63
DRG: 720 | End: 2023-08-09
Attending: INTERNAL MEDICINE
Payer: COMMERCIAL

## 2023-08-09 PROBLEM — Z51.5 PALLIATIVE CARE ENCOUNTER: Status: ACTIVE | Noted: 2023-08-09

## 2023-08-09 PROBLEM — F41.8 ANXIETY ABOUT HEALTH: Status: ACTIVE | Noted: 2023-08-09

## 2023-08-09 PROBLEM — Z71.89 ADVANCED CARE PLANNING/COUNSELING DISCUSSION: Status: ACTIVE | Noted: 2023-08-09

## 2023-08-09 PROBLEM — Z78.9 FULL CODE STATUS: Status: ACTIVE | Noted: 2023-08-09

## 2023-08-09 PROBLEM — Z71.89 GOALS OF CARE, COUNSELING/DISCUSSION: Status: ACTIVE | Noted: 2023-08-09

## 2023-08-09 PROBLEM — R45.89 ANXIETY ABOUT HEALTH: Status: ACTIVE | Noted: 2023-08-09

## 2023-08-09 LAB
ALBUMIN SERPL-MCNC: 2.8 G/DL (ref 3.5–4.6)
ANION GAP SERPL CALCULATED.3IONS-SCNC: 9 MEQ/L (ref 9–15)
ANISOCYTOSIS BLD QL SMEAR: ABNORMAL
BASOPHILS # BLD: 0 K/UL (ref 0–0.2)
BASOPHILS NFR BLD: 0.3 %
BUN SERPL-MCNC: 12 MG/DL (ref 8–23)
CALCIUM SERPL-MCNC: 7.9 MG/DL (ref 8.5–9.9)
CHLORIDE SERPL-SCNC: 106 MEQ/L (ref 95–107)
CO2 SERPL-SCNC: 23 MEQ/L (ref 20–31)
CREAT SERPL-MCNC: 0.59 MG/DL (ref 0.5–0.9)
ECHO AO ROOT DIAM: 2.8 CM
ECHO AO ROOT INDEX: 1.92 CM/M2
ECHO AV AREA PEAK VELOCITY: 2.6 CM2
ECHO AV AREA VTI: 2 CM2
ECHO AV AREA/BSA PEAK VELOCITY: 1.8 CM2/M2
ECHO AV AREA/BSA VTI: 1.4 CM2/M2
ECHO AV CUSP MM: 1.7 CM
ECHO AV MEAN GRADIENT: 3 MMHG
ECHO AV MEAN VELOCITY: 0.8 M/S
ECHO AV PEAK GRADIENT: 6 MMHG
ECHO AV PEAK VELOCITY: 1.3 M/S
ECHO AV VELOCITY RATIO: 0.69
ECHO AV VTI: 26.2 CM
ECHO BSA: 1.43 M2
ECHO EST RA PRESSURE: 5 MMHG
ECHO LA DIAMETER INDEX: 2.6 CM/M2
ECHO LA DIAMETER: 3.8 CM
ECHO LA TO AORTIC ROOT RATIO: 1.36
ECHO LA VOL 2C: 50 ML (ref 22–52)
ECHO LA VOL 2C: 51 ML (ref 22–52)
ECHO LA VOL 4C: 31 ML (ref 22–52)
ECHO LA VOL 4C: 35 ML (ref 22–52)
ECHO LA VOLUME AREA LENGTH: 44 ML
ECHO LA VOLUME INDEX AREA LENGTH: 30 ML/M2 (ref 16–34)
ECHO LV E' LATERAL VELOCITY: 14 CM/S
ECHO LV E' SEPTAL VELOCITY: 10 CM/S
ECHO LV EDV A2C: 68 ML
ECHO LV EDV A4C: 60 ML
ECHO LV EDV BP: 66 ML (ref 56–104)
ECHO LV EDV INDEX A4C: 41 ML/M2
ECHO LV EDV INDEX BP: 45 ML/M2
ECHO LV EDV NDEX A2C: 47 ML/M2
ECHO LV EJECTION FRACTION A2C: 52 %
ECHO LV EJECTION FRACTION A4C: 44 %
ECHO LV EJECTION FRACTION BIPLANE: 46 % (ref 55–100)
ECHO LV ESV A2C: 32 ML
ECHO LV ESV A4C: 34 ML
ECHO LV ESV BP: 35 ML (ref 19–49)
ECHO LV ESV INDEX A2C: 22 ML/M2
ECHO LV ESV INDEX A4C: 23 ML/M2
ECHO LV ESV INDEX BP: 24 ML/M2
ECHO LV FRACTIONAL SHORTENING: 31 % (ref 28–44)
ECHO LV INTERNAL DIMENSION DIASTOLE INDEX: 2.88 CM/M2
ECHO LV INTERNAL DIMENSION DIASTOLIC: 4.2 CM (ref 3.9–5.3)
ECHO LV INTERNAL DIMENSION SYSTOLIC INDEX: 1.99 CM/M2
ECHO LV INTERNAL DIMENSION SYSTOLIC: 2.9 CM
ECHO LV IVSD: 1 CM (ref 0.6–0.9)
ECHO LV IVSS: 1.1 CM
ECHO LV MASS 2D: 127.8 G (ref 67–162)
ECHO LV MASS INDEX 2D: 87.5 G/M2 (ref 43–95)
ECHO LV POSTERIOR WALL DIASTOLIC: 0.9 CM (ref 0.6–0.9)
ECHO LV POSTERIOR WALL SYSTOLIC: 1.4 CM
ECHO LV RELATIVE WALL THICKNESS RATIO: 0.43
ECHO LVOT AREA: 3.5 CM2
ECHO LVOT AV VTI INDEX: 0.58
ECHO LVOT DIAM: 2.1 CM
ECHO LVOT MEAN GRADIENT: 2 MMHG
ECHO LVOT PEAK GRADIENT: 3 MMHG
ECHO LVOT PEAK VELOCITY: 0.9 M/S
ECHO LVOT STROKE VOLUME INDEX: 36.3 ML/M2
ECHO LVOT SV: 53 ML
ECHO LVOT VTI: 15.3 CM
ECHO MV A VELOCITY: 0.74 M/S
ECHO MV E VELOCITY: 0.89 M/S
ECHO MV E/A RATIO: 1.2
ECHO MV E/E' LATERAL: 6.36
ECHO MV E/E' RATIO (AVERAGED): 7.63
ECHO MV E/E' SEPTAL: 8.9
ECHO PV MAX VELOCITY: 0.9 M/S
ECHO PV PEAK GRADIENT: 3 MMHG
ECHO RIGHT VENTRICULAR SYSTOLIC PRESSURE (RVSP): 32 MMHG
ECHO RV INTERNAL DIMENSION: 2.9 CM
ECHO RVOT PEAK GRADIENT: 2 MMHG
ECHO RVOT PEAK VELOCITY: 0.8 M/S
ECHO TV REGURGITANT MAX VELOCITY: 2.6 M/S
ECHO TV REGURGITANT PEAK GRADIENT: 27 MMHG
EKG ATRIAL RATE: 117 BPM
EKG P AXIS: 74 DEGREES
EKG Q-T INTERVAL: 366 MS
EKG QRS DURATION: 142 MS
EKG QTC CALCULATION (BAZETT): 510 MS
EKG R AXIS: -55 DEGREES
EKG T AXIS: -9 DEGREES
EKG VENTRICULAR RATE: 117 BPM
EOSINOPHIL # BLD: 0 K/UL (ref 0–0.7)
EOSINOPHIL NFR BLD: 0.2 %
ERYTHROCYTE [DISTWIDTH] IN BLOOD BY AUTOMATED COUNT: 17.8 % (ref 11.5–14.5)
GLUCOSE SERPL-MCNC: 96 MG/DL (ref 70–99)
HCT VFR BLD AUTO: 25.4 % (ref 37–47)
HGB BLD-MCNC: 8.1 G/DL (ref 12–16)
LYMPHOCYTES # BLD: 0.5 K/UL (ref 1–4.8)
LYMPHOCYTES NFR BLD: 4.2 %
MCH RBC QN AUTO: 25.8 PG (ref 27–31.3)
MCHC RBC AUTO-ENTMCNC: 32.1 % (ref 33–37)
MCV RBC AUTO: 80.4 FL (ref 79.4–94.8)
MONOCYTES # BLD: 0.7 K/UL (ref 0.2–0.8)
MONOCYTES NFR BLD: 5.9 %
NEUTROPHILS # BLD: 11 K/UL (ref 1.4–6.5)
NEUTS SEG NFR BLD: 89.4 %
OVALOCYTES BLD QL SMEAR: ABNORMAL
PHOSPHATE SERPL-MCNC: 1.8 MG/DL (ref 2.3–4.8)
PLATELET # BLD AUTO: 309 K/UL (ref 130–400)
PLATELET BLD QL SMEAR: ADEQUATE
POLYCHROMASIA BLD QL SMEAR: ABNORMAL
POTASSIUM SERPL-SCNC: 3.7 MEQ/L (ref 3.4–4.9)
RBC # BLD AUTO: 3.15 M/UL (ref 4.2–5.4)
SLIDE REVIEW: ABNORMAL
SODIUM SERPL-SCNC: 138 MEQ/L (ref 135–144)
TOBRAMYCIN PEAK DATE LAST DOSE: ABNORMAL
TOBRAMYCIN PEAK DOSE AMOUNT: ABNORMAL
TOBRAMYCIN PEAK TIME LAST DOSE: ABNORMAL
TOBRAMYCIN PEAK: <0.4 UG/ML (ref 5–10)
WBC # BLD AUTO: 12.3 K/UL (ref 4.8–10.8)

## 2023-08-09 PROCEDURE — 80069 RENAL FUNCTION PANEL: CPT

## 2023-08-09 PROCEDURE — 2700000000 HC OXYGEN THERAPY PER DAY

## 2023-08-09 PROCEDURE — 6370000000 HC RX 637 (ALT 250 FOR IP): Performed by: INTERNAL MEDICINE

## 2023-08-09 PROCEDURE — 94664 DEMO&/EVAL PT USE INHALER: CPT

## 2023-08-09 PROCEDURE — 99232 SBSQ HOSP IP/OBS MODERATE 35: CPT | Performed by: INTERNAL MEDICINE

## 2023-08-09 PROCEDURE — 1210000000 HC MED SURG R&B

## 2023-08-09 PROCEDURE — 36415 COLL VENOUS BLD VENIPUNCTURE: CPT

## 2023-08-09 PROCEDURE — 2580000003 HC RX 258: Performed by: INTERNAL MEDICINE

## 2023-08-09 PROCEDURE — 85025 COMPLETE CBC W/AUTO DIFF WBC: CPT

## 2023-08-09 PROCEDURE — 51798 US URINE CAPACITY MEASURE: CPT

## 2023-08-09 PROCEDURE — 94640 AIRWAY INHALATION TREATMENT: CPT

## 2023-08-09 PROCEDURE — 99213 OFFICE O/P EST LOW 20 MIN: CPT

## 2023-08-09 PROCEDURE — 6360000002 HC RX W HCPCS: Performed by: NURSE PRACTITIONER

## 2023-08-09 PROCEDURE — 94761 N-INVAS EAR/PLS OXIMETRY MLT: CPT

## 2023-08-09 PROCEDURE — 99223 1ST HOSP IP/OBS HIGH 75: CPT

## 2023-08-09 PROCEDURE — 6360000002 HC RX W HCPCS: Performed by: INTERNAL MEDICINE

## 2023-08-09 PROCEDURE — 6370000000 HC RX 637 (ALT 250 FOR IP): Performed by: NURSE PRACTITIONER

## 2023-08-09 PROCEDURE — 93306 TTE W/DOPPLER COMPLETE: CPT

## 2023-08-09 RX ORDER — DEXTROSE, SODIUM CHLORIDE, SODIUM LACTATE, POTASSIUM CHLORIDE, AND CALCIUM CHLORIDE 5; .6; .31; .03; .02 G/100ML; G/100ML; G/100ML; G/100ML; G/100ML
INJECTION, SOLUTION INTRAVENOUS CONTINUOUS
Status: DISCONTINUED | OUTPATIENT
Start: 2023-08-09 | End: 2023-08-11

## 2023-08-09 RX ORDER — BUSPIRONE HYDROCHLORIDE 5 MG/1
5 TABLET ORAL
Status: DISCONTINUED | OUTPATIENT
Start: 2023-08-09 | End: 2023-08-09

## 2023-08-09 RX ORDER — IPRATROPIUM BROMIDE AND ALBUTEROL SULFATE 2.5; .5 MG/3ML; MG/3ML
1 SOLUTION RESPIRATORY (INHALATION) EVERY 4 HOURS PRN
Status: DISCONTINUED | OUTPATIENT
Start: 2023-08-09 | End: 2023-08-13 | Stop reason: HOSPADM

## 2023-08-09 RX ORDER — TRAMADOL HYDROCHLORIDE 50 MG/1
50 TABLET ORAL EVERY 6 HOURS PRN
Status: DISCONTINUED | OUTPATIENT
Start: 2023-08-09 | End: 2023-08-13 | Stop reason: HOSPADM

## 2023-08-09 RX ADMIN — TIOTROPIUM BROMIDE INHALATION SPRAY 2 PUFF: 3.12 SPRAY, METERED RESPIRATORY (INHALATION) at 04:53

## 2023-08-09 RX ADMIN — SODIUM CHLORIDE: 9 INJECTION, SOLUTION INTRAVENOUS at 06:21

## 2023-08-09 RX ADMIN — AMITRIPTYLINE HYDROCHLORIDE 100 MG: 75 TABLET, FILM COATED ORAL at 23:58

## 2023-08-09 RX ADMIN — MIDODRINE HYDROCHLORIDE 10 MG: 10 TABLET ORAL at 10:47

## 2023-08-09 RX ADMIN — SODIUM CHLORIDE, SODIUM LACTATE, POTASSIUM CHLORIDE, CALCIUM CHLORIDE AND DEXTROSE MONOHYDRATE: 5; 600; 310; 30; 20 INJECTION, SOLUTION INTRAVENOUS at 10:46

## 2023-08-09 RX ADMIN — FLUCONAZOLE IN SODIUM CHLORIDE 100 MG: 2 INJECTION, SOLUTION INTRAVENOUS at 23:58

## 2023-08-09 RX ADMIN — LEVOFLOXACIN 750 MG: 5 INJECTION, SOLUTION INTRAVENOUS at 13:48

## 2023-08-09 RX ADMIN — SERTRALINE 100 MG: 100 TABLET, FILM COATED ORAL at 23:58

## 2023-08-09 RX ADMIN — LEVOTHYROXINE SODIUM 112 MCG: 0.11 TABLET ORAL at 06:19

## 2023-08-09 RX ADMIN — FOLIC ACID 1000 MCG: 1 TABLET ORAL at 10:47

## 2023-08-09 RX ADMIN — ACETAMINOPHEN 650 MG: 325 TABLET ORAL at 16:52

## 2023-08-09 RX ADMIN — NYSTATIN 500000 UNITS: 100000 SUSPENSION ORAL at 23:58

## 2023-08-09 RX ADMIN — SERTRALINE 100 MG: 100 TABLET, FILM COATED ORAL at 10:47

## 2023-08-09 RX ADMIN — ENOXAPARIN SODIUM 30 MG: 100 INJECTION SUBCUTANEOUS at 13:32

## 2023-08-09 ASSESSMENT — ENCOUNTER SYMPTOMS
TROUBLE SWALLOWING: 1
ALLERGIC/IMMUNOLOGIC NEGATIVE: 1
COUGH: 1
ABDOMINAL PAIN: 0
EYES NEGATIVE: 1
SHORTNESS OF BREATH: 1
ABDOMINAL DISTENTION: 0
VOICE CHANGE: 1
GASTROINTESTINAL NEGATIVE: 1

## 2023-08-09 ASSESSMENT — PAIN SCALES - GENERAL
PAINLEVEL_OUTOF10: 0
PAINLEVEL_OUTOF10: 0
PAINLEVEL_OUTOF10: 7

## 2023-08-09 ASSESSMENT — PULMONARY FUNCTION TESTS: PEFR_L/MIN: 99

## 2023-08-09 ASSESSMENT — PAIN DESCRIPTION - DESCRIPTORS: DESCRIPTORS: ACHING;DISCOMFORT

## 2023-08-09 ASSESSMENT — VISUAL ACUITY: OU: 1

## 2023-08-09 ASSESSMENT — PAIN DESCRIPTION - LOCATION: LOCATION: HEAD;FLANK

## 2023-08-09 ASSESSMENT — PAIN DESCRIPTION - ORIENTATION: ORIENTATION: LEFT

## 2023-08-09 NOTE — ONCOLOGY
Hematology/Oncology Consult  Encounter Date: 2023 6:50 PM    Ms. Neeta Bean is a 58 y.o. female  : 1960  MRN: [id number]  Acct Number: [de-identified]  Requesting Provider: Satira Meckel, DO    Reason for request: tongue cancer      CONSULTANT: Michelle Arroyo DO    HPI: Patient with metastatic tongue cancer. Had partial glossectomy followed by radiation in 2023 at [facility]. Now admitted with pneumonia.     Patient Active Problem List   Diagnosis    COPD (chronic obstructive pulmonary disease) ([address])    Hepatitis C virus infection    Hypothyroidism    Insomnia    Itching    Osteoarthritis    Reflux esophagitis    Mixed hyperlipidemia    Internal derangement of right knee    Lumbar radiculitis    Cardiomyopathy ([address])    Premature osteoporosis    Hypercholesterolemia    ICD (implantable cardioverter-defibrillator) battery depletion    Diastolic dysfunction    Chronic left shoulder pain    Subacromial impingement of left shoulder    Subacromial bursitis of left shoulder joint    Pneumonia due to other specified infectious organisms    Community acquired pneumonia of left upper lobe of lung    Acute respiratory failure with hypoxia (HCC)     Past Medical History:   Diagnosis Date    Anxiety     Bilateral carpal tunnel syndrome 2011    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    METS TO LEFT LYMPH NODE-SQUAMOUS    Cardiomyopathy ([address]) 3/6/2015    COPD (chronic obstructive pulmonary disease) (HCC)     COPD (chronic obstructive pulmonary disease) ([address])     Dental disease     SEVERE DENTAL PROBLEMS    Diastolic dysfunction     DJD (degenerative joint disease), lumbar     Dyslipidemia 3/6/2015    Hepatitis B infection VIRAL-    Hepatitis C without mention of hepatic coma -CHEMO    History of alcoholism ([address])     History of osteopenia     Hypothyroidism     ICD (implantable cardioverter-defibrillator) battery depletion     Tongue cancer ([address])      @PS@  Family History   Problem Relation Age of Onset    High

## 2023-08-09 NOTE — CONSULTS
Infectious Disease     Patient Name: Sharmila Paredes  Date: 2023  YOB: 1960  Medical Record Number: [id number]      Left upper lobe pneumonia        History of Present Illness:  Cardiomyopathy COPD hepatitis (alcohol abuse hypertension ICD tongue cancer    Presents emergency department with shortness of breath for 2 days  Loss of appetite fevers chills    Chest x-ray shows left upper lobe infiltrate        Review of Systems   Constitutional:  Positive for diaphoresis, fatigue and fever. Negative for chills. Eyes: Negative. Respiratory:  Positive for cough and shortness of breath. Cardiovascular: Negative. Gastrointestinal: Negative. Endocrine: Negative. Genitourinary: Negative. Neurological: Negative.       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    METS TO LEFT LYMPH NODE-SQUAMOUS    Cardiomyopathy ([address]) 3/6/2015    COPD (chronic obstructive pulmonary disease) (HCC)     COPD (chronic obstructive pulmonary disease) ([address])     Dental disease     SEVERE DENTAL PROBLEMS    Diastolic dysfunction     DJD (degenerative joint disease), lumbar     Dyslipidemia 3/6/2015    Hepatitis B infection VIRAL-    Hepatitis C without mention of hepatic coma -CHEMO    History of alcoholism ([address])     History of osteopenia 2009    Hypothyroidism     ICD (implantable cardioverter-defibrillator) battery depletion     Tongue cancer [facility])            Past Surgical History:   Procedure Laterality Date    LEG SURGERY  2009 MASS R
Pulmonary and Critical Care Medicine  Consult Note  Encounter Date: 2023 2:57 PM    Ms. Ami Duron is a 58 y.o. female  : 1960  Requesting Provider: Jarvis Layton DO   Reason for request: Shortness of breath            HISTORY OF PRESENT ILLNESS:    Ami Duron is a 58 y.o., female who has a past medical history of  has a past medical history of Anxiety, Bilateral carpal tunnel syndrome, Cancer ([address]), Cardiomyopathy ([address]), COPD (chronic obstructive pulmonary disease) ([address]), COPD (chronic obstructive pulmonary disease) ([address]), Dental disease, Diastolic dysfunction, DJD (degenerative joint disease), lumbar, Dyslipidemia, Hepatitis B infection, Hepatitis C without mention of hepatic coma, History of alcoholism ([address]), History of osteopenia, Hypothyroidism, ICD (implantable cardioverter-defibrillator) battery depletion, and Tongue cancer ([address]). that is hospitalized for shortness of breath    HPI   Patient presented to the emergency department with cough and shortness of breath x2 days. She does have a history of COPD she is currently on no oxygen at home. She was admitted to the medical floor and became hypotensive. Patient received 2.5 L of IV fluid with minimal results in blood pressure. Rapid response was called on the floor for MAP of 54. patient transferred to the ICU for hypotension. Currently her map is 80, she is on 2 L nasal cannula O2 sats 100%. Patient feels well has no complaints. States shortness of breath is better and she is just slightly thirsty. Patient has a history of tongue cancer and has a PEG tube for oral nutrition. Patient is receiving radiation states her last treatment was approximately 2 weeks ago. Has not received any chemotherapy yet. Patient is denying any pain, no dizziness, no nausea no vomiting.   Past Medical History:        Diagnosis Date    Anxiety     Bilateral carpal tunnel syndrome 2011    Cancer ([address]) -TIP OF TONGUE    METS TO LEFT LYMPH
Pulmonary and Critical Care Medicine  Consult Note  Encounter Date: 2023 4:26 PM    Ms. Matt Cervantes is a 58 y.o. female  : 1960  Requesting Provider: Isis Goncalves DO    Reason for request: ICU management            HISTORY OF PRESENT ILLNESS:    Patient is 58 y.o. presents with cough and shortness of breath, symptoms started 2 days ago, she has a history of COPD, she has history of tongue cancer, status post PEG tube, no oral intake allowed, she is on radiation therapy. She reports chest pain feels like pressure discomfort mainly when she takes deep breath, no fever or chills, she was noted to be hypotensive did not respond to fluid bolus of 2.5 L, transferred to ICU. No dizziness, no lightheadedness. She had fever in ED 39.2, desaturated to 89% at some point currently on 3.5 l O2        Past Medical History:        Diagnosis Date    Anxiety     Bilateral carpal tunnel syndrome 2011    Cancer ([address]) -TIP OF TONGUE    METS TO LEFT LYMPH NODE-SQUAMOUS    Cardiomyopathy ([address]) 3/6/2015    COPD (chronic obstructive pulmonary disease) (HCC)     COPD (chronic obstructive pulmonary disease) ([address])     Dental disease     SEVERE DENTAL PROBLEMS    Diastolic dysfunction     DJD (degenerative joint disease), lumbar     Dyslipidemia 3/6/2015    Hepatitis B infection VIRAL-    Hepatitis C without mention of hepatic coma -CHEMO    History of alcoholism ([address])     History of osteopenia     Hypothyroidism     ICD (implantable cardioverter-defibrillator) battery depletion     Tongue cancer [facility])        Past Surgical History:        Procedure Laterality Date    LEG SURGERY   MASS R LEG SOFT TISSUE    LUMBAR FUSION      Dr. Fco Don HISTORY  09/16/15    Suzie Montes MD IMPLANT ICD       Social History:     reports that she quit smoking about 18 years ago. Her smoking use included cigarettes. She has a 30.00 pack-year smoking history.  She has never used smokeless tobacco. She
contingent on treatment response and QOL. Much active listening, presence, and emotional support were given. Anxiety about health: Reviewed patient's current mood and she admits to being increasingly anxious as she is the caregiver for her mother who is demented and her current condition. Ordered BuSpar 5 mg p.o. tablet daily during interaction. Nursing staff later contacted me to inform me that patient does not tolerate BuSpar, order canceled. Palliative care will continue to monitor. Would consider additional medication hydroxyzine for anxiety but will review with patient and patient's family before incorporation. I have discussed/reviewed the patient's case with nursing staff, licensed social worker and case management.    -Patient is currently being treated for multiple medical conditions by other providers  Sepsis  Most lobar pneumonia, rule out aspiration  Acute hypoxic respiratory failure  Hyponatremia  Dysphagia status post PEG tube in the setting of mouth cancer  Protein calorie malnutrition  Possible thrush  COPD  HLD  EGD    MDM: High    Electronically signed by ALEN Joya CNP on 8/9/2023 at 10:42 PM    Collaborating physician: Dr. Babs Galvez     Please note this report is partially produced by using speech recognition hardware. It may contain errors related to the system, including grammar, punctuation and spelling as well as words and phrases that may seem inaccurate. For any questions or concerns feel free to contact me for clarification. Thanks for the opportunity you have allowed us to provide palliative care to Oral Mage. We will be in touch as care progresses. Please feel free to reach out to us should you have any questions or requests.

## 2023-08-10 ENCOUNTER — ANESTHESIA (OUTPATIENT)
Dept: OPERATING ROOM | Age: 63
End: 2023-08-10
Payer: COMMERCIAL

## 2023-08-10 ENCOUNTER — ANESTHESIA EVENT (OUTPATIENT)
Dept: OPERATING ROOM | Age: 63
End: 2023-08-10
Payer: COMMERCIAL

## 2023-08-10 PROBLEM — J18.9 PNEUMONIA OF LEFT LUNG DUE TO INFECTIOUS ORGANISM: Status: ACTIVE | Noted: 2023-08-08

## 2023-08-10 LAB
ALBUMIN SERPL-MCNC: 2.8 G/DL (ref 3.5–4.6)
ANION GAP SERPL CALCULATED.3IONS-SCNC: 13 MEQ/L (ref 9–15)
BASOPHILS # BLD: 0 K/UL (ref 0–0.2)
BASOPHILS NFR BLD: 0.4 %
BUN SERPL-MCNC: 6 MG/DL (ref 8–23)
CALCIUM SERPL-MCNC: 8.2 MG/DL (ref 8.5–9.9)
CHLORIDE SERPL-SCNC: 98 MEQ/L (ref 95–107)
CO2 SERPL-SCNC: 21 MEQ/L (ref 20–31)
CREAT SERPL-MCNC: 0.5 MG/DL (ref 0.5–0.9)
EOSINOPHIL # BLD: 0.1 K/UL (ref 0–0.7)
EOSINOPHIL NFR BLD: 1.2 %
ERYTHROCYTE [DISTWIDTH] IN BLOOD BY AUTOMATED COUNT: 18.3 % (ref 11.5–14.5)
GLUCOSE BLD-MCNC: 116 MG/DL (ref 70–99)
GLUCOSE BLD-MCNC: 123 MG/DL (ref 70–99)
GLUCOSE SERPL-MCNC: 121 MG/DL (ref 70–99)
HCT VFR BLD AUTO: 26.7 % (ref 37–47)
HGB BLD-MCNC: 8.8 G/DL (ref 12–16)
LYMPHOCYTES # BLD: 0.6 K/UL (ref 1–4.8)
LYMPHOCYTES NFR BLD: 5.4 %
MCH RBC QN AUTO: 26.4 PG (ref 27–31.3)
MCHC RBC AUTO-ENTMCNC: 32.8 % (ref 33–37)
MCV RBC AUTO: 80.4 FL (ref 79.4–94.8)
MONOCYTES # BLD: 0.7 K/UL (ref 0.2–0.8)
MONOCYTES NFR BLD: 5.9 %
NEUTROPHILS # BLD: 10.1 K/UL (ref 1.4–6.5)
NEUTS SEG NFR BLD: 87.1 %
PERFORMED ON: ABNORMAL
PERFORMED ON: ABNORMAL
PHOSPHATE SERPL-MCNC: 1.9 MG/DL (ref 2.3–4.8)
PLATELET # BLD AUTO: 328 K/UL (ref 130–400)
POTASSIUM SERPL-SCNC: 3 MEQ/L (ref 3.4–4.9)
RBC # BLD AUTO: 3.32 M/UL (ref 4.2–5.4)
SODIUM SERPL-SCNC: 132 MEQ/L (ref 135–144)
WBC # BLD AUTO: 11.6 K/UL (ref 4.8–10.8)

## 2023-08-10 PROCEDURE — 2709999900 HC NON-CHARGEABLE SUPPLY: Performed by: COLON & RECTAL SURGERY

## 2023-08-10 PROCEDURE — 2580000003 HC RX 258: Performed by: COLON & RECTAL SURGERY

## 2023-08-10 PROCEDURE — 80069 RENAL FUNCTION PANEL: CPT

## 2023-08-10 PROCEDURE — 6370000000 HC RX 637 (ALT 250 FOR IP): Performed by: COLON & RECTAL SURGERY

## 2023-08-10 PROCEDURE — 6370000000 HC RX 637 (ALT 250 FOR IP): Performed by: INTERNAL MEDICINE

## 2023-08-10 PROCEDURE — 94640 AIRWAY INHALATION TREATMENT: CPT

## 2023-08-10 PROCEDURE — 6360000002 HC RX W HCPCS: Performed by: INTERNAL MEDICINE

## 2023-08-10 PROCEDURE — 3609013300 HC EGD TUBE PLACEMENT: Performed by: COLON & RECTAL SURGERY

## 2023-08-10 PROCEDURE — 97166 OT EVAL MOD COMPLEX 45 MIN: CPT

## 2023-08-10 PROCEDURE — 2500000003 HC RX 250 WO HCPCS: Performed by: INTERNAL MEDICINE

## 2023-08-10 PROCEDURE — 7100000000 HC PACU RECOVERY - FIRST 15 MIN: Performed by: COLON & RECTAL SURGERY

## 2023-08-10 PROCEDURE — 7100000001 HC PACU RECOVERY - ADDTL 15 MIN: Performed by: COLON & RECTAL SURGERY

## 2023-08-10 PROCEDURE — 2580000003 HC RX 258: Performed by: INTERNAL MEDICINE

## 2023-08-10 PROCEDURE — 94761 N-INVAS EAR/PLS OXIMETRY MLT: CPT

## 2023-08-10 PROCEDURE — 36415 COLL VENOUS BLD VENIPUNCTURE: CPT

## 2023-08-10 PROCEDURE — 0DH63UZ INSERTION OF FEEDING DEVICE INTO STOMACH, PERCUTANEOUS APPROACH: ICD-10-PCS | Performed by: COLON & RECTAL SURGERY

## 2023-08-10 PROCEDURE — 3700000000 HC ANESTHESIA ATTENDED CARE: Performed by: COLON & RECTAL SURGERY

## 2023-08-10 PROCEDURE — 6360000002 HC RX W HCPCS: Performed by: COLON & RECTAL SURGERY

## 2023-08-10 PROCEDURE — 3700000001 HC ADD 15 MINUTES (ANESTHESIA): Performed by: COLON & RECTAL SURGERY

## 2023-08-10 PROCEDURE — 0DP68UZ REMOVAL OF FEEDING DEVICE FROM STOMACH, VIA NATURAL OR ARTIFICIAL OPENING ENDOSCOPIC: ICD-10-PCS | Performed by: COLON & RECTAL SURGERY

## 2023-08-10 PROCEDURE — 1210000000 HC MED SURG R&B

## 2023-08-10 PROCEDURE — 2500000003 HC RX 250 WO HCPCS: Performed by: NURSE ANESTHETIST, CERTIFIED REGISTERED

## 2023-08-10 PROCEDURE — 2700000000 HC OXYGEN THERAPY PER DAY

## 2023-08-10 PROCEDURE — 85025 COMPLETE CBC W/AUTO DIFF WBC: CPT

## 2023-08-10 PROCEDURE — 99232 SBSQ HOSP IP/OBS MODERATE 35: CPT | Performed by: INTERNAL MEDICINE

## 2023-08-10 PROCEDURE — 43246 EGD PLACE GASTROSTOMY TUBE: CPT | Performed by: COLON & RECTAL SURGERY

## 2023-08-10 PROCEDURE — 2720000010 HC SURG SUPPLY STERILE: Performed by: COLON & RECTAL SURGERY

## 2023-08-10 PROCEDURE — 6360000002 HC RX W HCPCS: Performed by: NURSE ANESTHETIST, CERTIFIED REGISTERED

## 2023-08-10 RX ORDER — POTASSIUM CHLORIDE 7.45 MG/ML
10 INJECTION INTRAVENOUS
Status: COMPLETED | OUTPATIENT
Start: 2023-08-10 | End: 2023-08-10

## 2023-08-10 RX ORDER — ONDANSETRON 2 MG/ML
4 INJECTION INTRAMUSCULAR; INTRAVENOUS
Status: ACTIVE | OUTPATIENT
Start: 2023-08-10 | End: 2023-08-11

## 2023-08-10 RX ORDER — SODIUM CHLORIDE 0.9 % (FLUSH) 0.9 %
5-40 SYRINGE (ML) INJECTION EVERY 12 HOURS SCHEDULED
Status: DISCONTINUED | OUTPATIENT
Start: 2023-08-10 | End: 2023-08-10 | Stop reason: HOSPADM

## 2023-08-10 RX ORDER — OXYCODONE HYDROCHLORIDE 5 MG/1
5 TABLET ORAL PRN
Status: DISCONTINUED | OUTPATIENT
Start: 2023-08-10 | End: 2023-08-10 | Stop reason: HOSPADM

## 2023-08-10 RX ORDER — SODIUM CHLORIDE 9 MG/ML
INJECTION, SOLUTION INTRAVENOUS PRN
Status: DISCONTINUED | OUTPATIENT
Start: 2023-08-10 | End: 2023-08-13 | Stop reason: HOSPADM

## 2023-08-10 RX ORDER — POTASSIUM CHLORIDE 7.45 MG/ML
10 INJECTION INTRAVENOUS ONCE
Status: CANCELLED | OUTPATIENT
Start: 2023-08-10 | End: 2023-08-10

## 2023-08-10 RX ORDER — PROPOFOL 10 MG/ML
INJECTION, EMULSION INTRAVENOUS PRN
Status: DISCONTINUED | OUTPATIENT
Start: 2023-08-10 | End: 2023-08-10 | Stop reason: SDUPTHER

## 2023-08-10 RX ORDER — MAGNESIUM HYDROXIDE 1200 MG/15ML
LIQUID ORAL PRN
Status: DISCONTINUED | OUTPATIENT
Start: 2023-08-10 | End: 2023-08-10 | Stop reason: ALTCHOICE

## 2023-08-10 RX ORDER — LEVOFLOXACIN 5 MG/ML
INJECTION, SOLUTION INTRAVENOUS PRN
Status: DISCONTINUED | OUTPATIENT
Start: 2023-08-10 | End: 2023-08-10 | Stop reason: SDUPTHER

## 2023-08-10 RX ORDER — MORPHINE SULFATE 4 MG/ML
4 INJECTION, SOLUTION INTRAMUSCULAR; INTRAVENOUS EVERY 4 HOURS PRN
Status: DISCONTINUED | OUTPATIENT
Start: 2023-08-10 | End: 2023-08-10

## 2023-08-10 RX ORDER — LIDOCAINE HYDROCHLORIDE 10 MG/ML
INJECTION, SOLUTION EPIDURAL; INFILTRATION; INTRACAUDAL; PERINEURAL PRN
Status: DISCONTINUED | OUTPATIENT
Start: 2023-08-10 | End: 2023-08-10 | Stop reason: SDUPTHER

## 2023-08-10 RX ORDER — MORPHINE SULFATE 4 MG/ML
4 INJECTION, SOLUTION INTRAMUSCULAR; INTRAVENOUS EVERY 4 HOURS PRN
Status: DISCONTINUED | OUTPATIENT
Start: 2023-08-10 | End: 2023-08-13 | Stop reason: HOSPADM

## 2023-08-10 RX ORDER — OXYCODONE HYDROCHLORIDE 5 MG/1
10 TABLET ORAL PRN
Status: DISCONTINUED | OUTPATIENT
Start: 2023-08-10 | End: 2023-08-10 | Stop reason: HOSPADM

## 2023-08-10 RX ORDER — SODIUM CHLORIDE 0.9 % (FLUSH) 0.9 %
5-40 SYRINGE (ML) INJECTION PRN
Status: DISCONTINUED | OUTPATIENT
Start: 2023-08-10 | End: 2023-08-10 | Stop reason: HOSPADM

## 2023-08-10 RX ADMIN — FLUCONAZOLE IN SODIUM CHLORIDE 100 MG: 2 INJECTION, SOLUTION INTRAVENOUS at 17:30

## 2023-08-10 RX ADMIN — POTASSIUM CHLORIDE 10 MEQ: 7.46 INJECTION, SOLUTION INTRAVENOUS at 08:39

## 2023-08-10 RX ADMIN — LEVOFLOXACIN 750 MG: 5 INJECTION, SOLUTION INTRAVENOUS at 14:22

## 2023-08-10 RX ADMIN — SODIUM CHLORIDE 1000 ML: 9 INJECTION, SOLUTION INTRAVENOUS at 13:24

## 2023-08-10 RX ADMIN — POTASSIUM CHLORIDE 10 MEQ: 7.46 INJECTION, SOLUTION INTRAVENOUS at 09:34

## 2023-08-10 RX ADMIN — MICONAZOLE NITRATE: 2 POWDER TOPICAL at 21:05

## 2023-08-10 RX ADMIN — PROPOFOL 50 MG: 10 INJECTION, EMULSION INTRAVENOUS at 14:20

## 2023-08-10 RX ADMIN — POTASSIUM CHLORIDE 10 MEQ: 7.46 INJECTION, SOLUTION INTRAVENOUS at 10:45

## 2023-08-10 RX ADMIN — PROPOFOL 50 MG: 10 INJECTION, EMULSION INTRAVENOUS at 14:23

## 2023-08-10 RX ADMIN — TRAMADOL HYDROCHLORIDE 50 MG: 50 TABLET ORAL at 15:40

## 2023-08-10 RX ADMIN — MICONAZOLE NITRATE: 2 POWDER TOPICAL at 00:07

## 2023-08-10 RX ADMIN — PROPOFOL 100 MG: 10 INJECTION, EMULSION INTRAVENOUS at 14:13

## 2023-08-10 RX ADMIN — AMITRIPTYLINE HYDROCHLORIDE 100 MG: 75 TABLET, FILM COATED ORAL at 20:20

## 2023-08-10 RX ADMIN — Medication 10 ML: at 08:33

## 2023-08-10 RX ADMIN — LIDOCAINE HYDROCHLORIDE 30 MG: 10 INJECTION, SOLUTION EPIDURAL; INFILTRATION; INTRACAUDAL; PERINEURAL at 14:13

## 2023-08-10 RX ADMIN — MORPHINE SULFATE 4 MG: 4 INJECTION, SOLUTION INTRAMUSCULAR; INTRAVENOUS at 17:32

## 2023-08-10 RX ADMIN — PROPOFOL 50 MG: 10 INJECTION, EMULSION INTRAVENOUS at 14:17

## 2023-08-10 RX ADMIN — TRAMADOL HYDROCHLORIDE 50 MG: 50 TABLET ORAL at 20:20

## 2023-08-10 RX ADMIN — POTASSIUM CHLORIDE 10 MEQ: 7.46 INJECTION, SOLUTION INTRAVENOUS at 11:40

## 2023-08-10 RX ADMIN — Medication 10 ML: at 21:06

## 2023-08-10 RX ADMIN — TRAMADOL HYDROCHLORIDE 50 MG: 50 TABLET ORAL at 00:02

## 2023-08-10 RX ADMIN — TIOTROPIUM BROMIDE INHALATION SPRAY 2 PUFF: 3.12 SPRAY, METERED RESPIRATORY (INHALATION) at 07:15

## 2023-08-10 RX ADMIN — MICONAZOLE NITRATE: 2 POWDER TOPICAL at 08:34

## 2023-08-10 RX ADMIN — Medication 10 ML: at 00:08

## 2023-08-10 RX ADMIN — LEVOTHYROXINE SODIUM 112 MCG: 0.11 TABLET ORAL at 06:17

## 2023-08-10 RX ADMIN — LEVOFLOXACIN 750 MG: 5 INJECTION, SOLUTION INTRAVENOUS at 14:21

## 2023-08-10 RX ADMIN — SERTRALINE 100 MG: 100 TABLET, FILM COATED ORAL at 20:20

## 2023-08-10 ASSESSMENT — PAIN SCALES - GENERAL
PAINLEVEL_OUTOF10: 5
PAINLEVEL_OUTOF10: 0
PAINLEVEL_OUTOF10: 0
PAINLEVEL_OUTOF10: 8
PAINLEVEL_OUTOF10: 8

## 2023-08-10 ASSESSMENT — PAIN DESCRIPTION - DESCRIPTORS
DESCRIPTORS: ACHING;BURNING;DULL
DESCRIPTORS: ACHING;SORE;SHARP

## 2023-08-10 ASSESSMENT — PAIN DESCRIPTION - LOCATION
LOCATION: ABDOMEN
LOCATION: ABDOMEN

## 2023-08-10 ASSESSMENT — PAIN DESCRIPTION - ORIENTATION: ORIENTATION: MID

## 2023-08-10 ASSESSMENT — ENCOUNTER SYMPTOMS
GASTROINTESTINAL NEGATIVE: 1
COUGH: 0
SHORTNESS OF BREATH: 1

## 2023-08-10 ASSESSMENT — PAIN - FUNCTIONAL ASSESSMENT: PAIN_FUNCTIONAL_ASSESSMENT: 0-10

## 2023-08-10 NOTE — ANESTHESIA POSTPROCEDURE EVALUATION
Department of Anesthesiology  Postprocedure Note    Patient: Keith Ramon  MRN: [id number]  YOB: 1960  Date of evaluation: 8/10/2023      Procedure Summary     Date: 08/10/23 Room / Location: [address]    Anesthesia Start: 1410 Anesthesia Stop: 2005    Procedure: EGD REPLACE GASTROSTOMY TUBE ROOM 485 Diagnosis:       Dysphagia, unspecified type      (Dysphagia, unspecified type [R13.10])    Surgeons: Jackie Goddard MD Responsible Provider: Gayla Sanders MD    Anesthesia Type: MAC ASA Status: 3          Anesthesia Type: No value filed.     Lea Phase I: Lea Score: 9    Lea Phase II:        Anesthesia Post Evaluation    Patient location during evaluation: bedside  Patient participation: complete - patient participated  Level of consciousness: awake  Airway patency: patent  Nausea & Vomiting: no nausea and no vomiting  Complications: no  Respiratory status: acceptable  Hydration status: euvolemic  Pain management: adequate Kenzie Beverly)  Pediatrics  [address], [address]  [place] [address]  Phone: [phone]  Fax: [phone]  Follow Up Time: 1-3 Days

## 2023-08-11 ENCOUNTER — APPOINTMENT (OUTPATIENT)
Dept: PRIMARY CARE | Facility: CLINIC | Age: 63
End: 2023-08-11
Payer: COMMERCIAL

## 2023-08-11 PROBLEM — G89.3 NEOPLASM RELATED PAIN: Status: ACTIVE | Noted: 2023-08-11

## 2023-08-11 PROBLEM — M54.2 NECK PAIN: Status: ACTIVE | Noted: 2023-08-11

## 2023-08-11 LAB
ALBUMIN SERPL-MCNC: 2.9 G/DL (ref 3.5–4.6)
ANION GAP SERPL CALCULATED.3IONS-SCNC: 10 MEQ/L (ref 9–15)
BASOPHILS # BLD: 0 K/UL (ref 0–0.2)
BASOPHILS NFR BLD: 0.4 %
BUN SERPL-MCNC: 6 MG/DL (ref 8–23)
CALCIUM SERPL-MCNC: 8.5 MG/DL (ref 8.5–9.9)
CHLORIDE SERPL-SCNC: 100 MEQ/L (ref 95–107)
CO2 SERPL-SCNC: 23 MEQ/L (ref 20–31)
CREAT SERPL-MCNC: 0.54 MG/DL (ref 0.5–0.9)
EOSINOPHIL # BLD: 0.2 K/UL (ref 0–0.7)
EOSINOPHIL NFR BLD: 2.1 %
ERYTHROCYTE [DISTWIDTH] IN BLOOD BY AUTOMATED COUNT: 18 % (ref 11.5–14.5)
GLUCOSE BLD-MCNC: 124 MG/DL (ref 70–99)
GLUCOSE BLD-MCNC: 128 MG/DL (ref 70–99)
GLUCOSE BLD-MCNC: 132 MG/DL (ref 70–99)
GLUCOSE SERPL-MCNC: 108 MG/DL (ref 70–99)
HCT VFR BLD AUTO: 26.8 % (ref 37–47)
HGB BLD-MCNC: 8.9 G/DL (ref 12–16)
LYMPHOCYTES # BLD: 0.7 K/UL (ref 1–4.8)
LYMPHOCYTES NFR BLD: 5.9 %
MAGNESIUM SERPL-MCNC: 1.5 MG/DL (ref 1.7–2.4)
MCH RBC QN AUTO: 26.6 PG (ref 27–31.3)
MCHC RBC AUTO-ENTMCNC: 33.3 % (ref 33–37)
MCV RBC AUTO: 80 FL (ref 79.4–94.8)
MONOCYTES # BLD: 0.9 K/UL (ref 0.2–0.8)
MONOCYTES NFR BLD: 7.7 %
NEUTROPHILS # BLD: 9.4 K/UL (ref 1.4–6.5)
NEUTS SEG NFR BLD: 83.9 %
PERFORMED ON: ABNORMAL
PHOSPHATE SERPL-MCNC: 1.7 MG/DL (ref 2.3–4.8)
PLATELET # BLD AUTO: 339 K/UL (ref 130–400)
POTASSIUM SERPL-SCNC: 3.7 MEQ/L (ref 3.4–4.9)
RBC # BLD AUTO: 3.35 M/UL (ref 4.2–5.4)
SODIUM SERPL-SCNC: 133 MEQ/L (ref 135–144)
WBC # BLD AUTO: 11.2 K/UL (ref 4.8–10.8)

## 2023-08-11 PROCEDURE — 99232 SBSQ HOSP IP/OBS MODERATE 35: CPT | Performed by: INTERNAL MEDICINE

## 2023-08-11 PROCEDURE — 85025 COMPLETE CBC W/AUTO DIFF WBC: CPT

## 2023-08-11 PROCEDURE — 99233 SBSQ HOSP IP/OBS HIGH 50: CPT

## 2023-08-11 PROCEDURE — 6360000002 HC RX W HCPCS: Performed by: COLON & RECTAL SURGERY

## 2023-08-11 PROCEDURE — 80069 RENAL FUNCTION PANEL: CPT

## 2023-08-11 PROCEDURE — 1210000000 HC MED SURG R&B

## 2023-08-11 PROCEDURE — 6370000000 HC RX 637 (ALT 250 FOR IP): Performed by: COLON & RECTAL SURGERY

## 2023-08-11 PROCEDURE — 36415 COLL VENOUS BLD VENIPUNCTURE: CPT

## 2023-08-11 PROCEDURE — 6360000002 HC RX W HCPCS: Performed by: INTERNAL MEDICINE

## 2023-08-11 PROCEDURE — 6370000000 HC RX 637 (ALT 250 FOR IP)

## 2023-08-11 PROCEDURE — 2700000000 HC OXYGEN THERAPY PER DAY

## 2023-08-11 PROCEDURE — 2580000003 HC RX 258: Performed by: COLON & RECTAL SURGERY

## 2023-08-11 PROCEDURE — 2500000003 HC RX 250 WO HCPCS: Performed by: INTERNAL MEDICINE

## 2023-08-11 PROCEDURE — 94761 N-INVAS EAR/PLS OXIMETRY MLT: CPT

## 2023-08-11 PROCEDURE — 97162 PT EVAL MOD COMPLEX 30 MIN: CPT

## 2023-08-11 PROCEDURE — 2580000003 HC RX 258: Performed by: INTERNAL MEDICINE

## 2023-08-11 PROCEDURE — 83735 ASSAY OF MAGNESIUM: CPT

## 2023-08-11 PROCEDURE — 94640 AIRWAY INHALATION TREATMENT: CPT

## 2023-08-11 RX ORDER — HYDROCODONE BITARTRATE AND ACETAMINOPHEN 5; 325 MG/1; MG/1
1 TABLET ORAL EVERY 6 HOURS PRN
Status: DISCONTINUED | OUTPATIENT
Start: 2023-08-11 | End: 2023-08-13 | Stop reason: HOSPADM

## 2023-08-11 RX ORDER — MIDODRINE HYDROCHLORIDE 5 MG/1
5 TABLET ORAL
Status: DISCONTINUED | OUTPATIENT
Start: 2023-08-11 | End: 2023-08-11

## 2023-08-11 RX ORDER — POTASSIUM CHLORIDE 20 MEQ/1
40 TABLET, EXTENDED RELEASE ORAL PRN
Status: DISCONTINUED | OUTPATIENT
Start: 2023-08-11 | End: 2023-08-13 | Stop reason: HOSPADM

## 2023-08-11 RX ORDER — MIDODRINE HYDROCHLORIDE 5 MG/1
2.5 TABLET ORAL
Status: DISCONTINUED | OUTPATIENT
Start: 2023-08-11 | End: 2023-08-12

## 2023-08-11 RX ORDER — MAGNESIUM SULFATE IN WATER 40 MG/ML
2000 INJECTION, SOLUTION INTRAVENOUS PRN
Status: DISCONTINUED | OUTPATIENT
Start: 2023-08-11 | End: 2023-08-13 | Stop reason: HOSPADM

## 2023-08-11 RX ORDER — POTASSIUM CHLORIDE 7.45 MG/ML
10 INJECTION INTRAVENOUS PRN
Status: DISCONTINUED | OUTPATIENT
Start: 2023-08-11 | End: 2023-08-13 | Stop reason: HOSPADM

## 2023-08-11 RX ORDER — LEVOFLOXACIN 500 MG/1
500 TABLET, FILM COATED ORAL DAILY
Qty: 10 TABLET | Refills: 0 | Status: SHIPPED | OUTPATIENT
Start: 2023-08-11 | End: 2023-08-21

## 2023-08-11 RX ADMIN — MAGNESIUM SULFATE HEPTAHYDRATE 2000 MG: 40 INJECTION, SOLUTION INTRAVENOUS at 22:18

## 2023-08-11 RX ADMIN — ENOXAPARIN SODIUM 30 MG: 100 INJECTION SUBCUTANEOUS at 13:10

## 2023-08-11 RX ADMIN — SODIUM PHOSPHATE, MONOBASIC, MONOHYDRATE AND SODIUM PHOSPHATE, DIBASIC, ANHYDROUS 15 MMOL: 276; 142 INJECTION, SOLUTION INTRAVENOUS at 15:07

## 2023-08-11 RX ADMIN — NYSTATIN 500000 UNITS: 100000 SUSPENSION ORAL at 20:08

## 2023-08-11 RX ADMIN — MICONAZOLE NITRATE: 2 POWDER TOPICAL at 20:10

## 2023-08-11 RX ADMIN — HYDROCODONE BITARTRATE AND ACETAMINOPHEN 1 TABLET: 5; 325 TABLET ORAL at 20:08

## 2023-08-11 RX ADMIN — AMITRIPTYLINE HYDROCHLORIDE 100 MG: 75 TABLET, FILM COATED ORAL at 20:08

## 2023-08-11 RX ADMIN — SERTRALINE 100 MG: 100 TABLET, FILM COATED ORAL at 08:13

## 2023-08-11 RX ADMIN — LEVOFLOXACIN 750 MG: 5 INJECTION, SOLUTION INTRAVENOUS at 13:11

## 2023-08-11 RX ADMIN — MORPHINE SULFATE 4 MG: 4 INJECTION, SOLUTION INTRAMUSCULAR; INTRAVENOUS at 15:52

## 2023-08-11 RX ADMIN — MIDODRINE HYDROCHLORIDE 10 MG: 10 TABLET ORAL at 08:15

## 2023-08-11 RX ADMIN — Medication 10 ML: at 20:08

## 2023-08-11 RX ADMIN — FLUCONAZOLE IN SODIUM CHLORIDE 100 MG: 2 INJECTION, SOLUTION INTRAVENOUS at 19:33

## 2023-08-11 RX ADMIN — TRAMADOL HYDROCHLORIDE 50 MG: 50 TABLET ORAL at 13:16

## 2023-08-11 RX ADMIN — LEVOTHYROXINE SODIUM 112 MCG: 0.11 TABLET ORAL at 06:29

## 2023-08-11 RX ADMIN — MICONAZOLE NITRATE: 2 POWDER TOPICAL at 08:15

## 2023-08-11 RX ADMIN — FOLIC ACID 1000 MCG: 1 TABLET ORAL at 08:14

## 2023-08-11 RX ADMIN — SERTRALINE 100 MG: 100 TABLET, FILM COATED ORAL at 20:08

## 2023-08-11 RX ADMIN — TIOTROPIUM BROMIDE INHALATION SPRAY 2 PUFF: 3.12 SPRAY, METERED RESPIRATORY (INHALATION) at 07:47

## 2023-08-11 RX ADMIN — Medication 10 ML: at 08:14

## 2023-08-11 ASSESSMENT — PAIN DESCRIPTION - DESCRIPTORS
DESCRIPTORS: ACHING;SORE
DESCRIPTORS: ACHING
DESCRIPTORS: ACHING;SORE;SHARP

## 2023-08-11 ASSESSMENT — ENCOUNTER SYMPTOMS
TROUBLE SWALLOWING: 1
GASTROINTESTINAL NEGATIVE: 1
COUGH: 0
ABDOMINAL PAIN: 0
COUGH: 1
EYES NEGATIVE: 1
VOICE CHANGE: 1
ABDOMINAL DISTENTION: 0
ALLERGIC/IMMUNOLOGIC NEGATIVE: 1
SHORTNESS OF BREATH: 1

## 2023-08-11 ASSESSMENT — PAIN DESCRIPTION - LOCATION
LOCATION: ABDOMEN
LOCATION: JAW
LOCATION: ABDOMEN;THROAT

## 2023-08-11 ASSESSMENT — PAIN SCALES - GENERAL
PAINLEVEL_OUTOF10: 7
PAINLEVEL_OUTOF10: 4
PAINLEVEL_OUTOF10: 7

## 2023-08-11 ASSESSMENT — PAIN DESCRIPTION - ORIENTATION
ORIENTATION: MID
ORIENTATION: MID
ORIENTATION: RIGHT

## 2023-08-11 ASSESSMENT — VISUAL ACUITY: OU: 1

## 2023-08-11 NOTE — ACP (ADVANCE CARE PLANNING)
Had a discussion about advance care planning with son and patient both present. Both understand the prognosis. They have opted to be changed to [facility] .     Sarah Faith

## 2023-08-11 NOTE — PLAN OF CARE
Nutrition Problem #1: Severe malnutrition, In context of chronic illness  Intervention: Food and/or Nutrient Delivery: Continue Current Tube Feeding

## 2023-08-12 LAB
ALBUMIN SERPL-MCNC: 2.9 G/DL (ref 3.5–4.6)
ANION GAP SERPL CALCULATED.3IONS-SCNC: 11 MEQ/L (ref 9–15)
BASOPHILS # BLD: 0.1 K/UL (ref 0–0.2)
BASOPHILS NFR BLD: 0.6 %
BUN SERPL-MCNC: 9 MG/DL (ref 8–23)
CALCIUM SERPL-MCNC: 8.3 MG/DL (ref 8.5–9.9)
CHLORIDE SERPL-SCNC: 100 MEQ/L (ref 95–107)
CO2 SERPL-SCNC: 23 MEQ/L (ref 20–31)
CREAT SERPL-MCNC: 0.53 MG/DL (ref 0.5–0.9)
EOSINOPHIL # BLD: 0.3 K/UL (ref 0–0.7)
EOSINOPHIL NFR BLD: 3.1 %
ERYTHROCYTE [DISTWIDTH] IN BLOOD BY AUTOMATED COUNT: 18.1 % (ref 11.5–14.5)
GLUCOSE BLD-MCNC: 107 MG/DL (ref 70–99)
GLUCOSE BLD-MCNC: 113 MG/DL (ref 70–99)
GLUCOSE BLD-MCNC: 128 MG/DL (ref 70–99)
GLUCOSE BLD-MCNC: 177 MG/DL (ref 70–99)
GLUCOSE SERPL-MCNC: 163 MG/DL (ref 70–99)
HCT VFR BLD AUTO: 26 % (ref 37–47)
HGB BLD-MCNC: 8.5 G/DL (ref 12–16)
LYMPHOCYTES # BLD: 0.7 K/UL (ref 1–4.8)
LYMPHOCYTES NFR BLD: 6.5 %
MCH RBC QN AUTO: 25.9 PG (ref 27–31.3)
MCHC RBC AUTO-ENTMCNC: 32.7 % (ref 33–37)
MCV RBC AUTO: 79.3 FL (ref 79.4–94.8)
MONOCYTES # BLD: 1 K/UL (ref 0.2–0.8)
MONOCYTES NFR BLD: 9.4 %
NEUTROPHILS # BLD: 8.5 K/UL (ref 1.4–6.5)
NEUTS SEG NFR BLD: 80.4 %
PERFORMED ON: ABNORMAL
PHOSPHATE SERPL-MCNC: 2.9 MG/DL (ref 2.3–4.8)
PLATELET # BLD AUTO: 371 K/UL (ref 130–400)
POTASSIUM SERPL-SCNC: 3.4 MEQ/L (ref 3.4–4.9)
RBC # BLD AUTO: 3.27 M/UL (ref 4.2–5.4)
SODIUM SERPL-SCNC: 134 MEQ/L (ref 135–144)
WBC # BLD AUTO: 10.6 K/UL (ref 4.8–10.8)

## 2023-08-12 PROCEDURE — 6360000002 HC RX W HCPCS: Performed by: INTERNAL MEDICINE

## 2023-08-12 PROCEDURE — 6370000000 HC RX 637 (ALT 250 FOR IP): Performed by: COLON & RECTAL SURGERY

## 2023-08-12 PROCEDURE — 6370000000 HC RX 637 (ALT 250 FOR IP): Performed by: INTERNAL MEDICINE

## 2023-08-12 PROCEDURE — 85025 COMPLETE CBC W/AUTO DIFF WBC: CPT

## 2023-08-12 PROCEDURE — 2580000003 HC RX 258: Performed by: COLON & RECTAL SURGERY

## 2023-08-12 PROCEDURE — 36415 COLL VENOUS BLD VENIPUNCTURE: CPT

## 2023-08-12 PROCEDURE — 6360000002 HC RX W HCPCS: Performed by: COLON & RECTAL SURGERY

## 2023-08-12 PROCEDURE — 99232 SBSQ HOSP IP/OBS MODERATE 35: CPT | Performed by: INTERNAL MEDICINE

## 2023-08-12 PROCEDURE — 2700000000 HC OXYGEN THERAPY PER DAY

## 2023-08-12 PROCEDURE — 6370000000 HC RX 637 (ALT 250 FOR IP)

## 2023-08-12 PROCEDURE — 80069 RENAL FUNCTION PANEL: CPT

## 2023-08-12 PROCEDURE — 94761 N-INVAS EAR/PLS OXIMETRY MLT: CPT

## 2023-08-12 PROCEDURE — 1210000000 HC MED SURG R&B

## 2023-08-12 PROCEDURE — 94640 AIRWAY INHALATION TREATMENT: CPT

## 2023-08-12 RX ORDER — MAGNESIUM SULFATE IN WATER 40 MG/ML
2000 INJECTION, SOLUTION INTRAVENOUS ONCE
Status: COMPLETED | OUTPATIENT
Start: 2023-08-12 | End: 2023-08-12

## 2023-08-12 RX ADMIN — MICONAZOLE NITRATE: 2 POWDER TOPICAL at 20:24

## 2023-08-12 RX ADMIN — HYDROCODONE BITARTRATE AND ACETAMINOPHEN 1 TABLET: 5; 325 TABLET ORAL at 20:23

## 2023-08-12 RX ADMIN — FOLIC ACID 1000 MCG: 1 TABLET ORAL at 09:59

## 2023-08-12 RX ADMIN — HYDROCODONE BITARTRATE AND ACETAMINOPHEN 1 TABLET: 5; 325 TABLET ORAL at 05:36

## 2023-08-12 RX ADMIN — SERTRALINE 100 MG: 100 TABLET, FILM COATED ORAL at 09:59

## 2023-08-12 RX ADMIN — HYDROCODONE BITARTRATE AND ACETAMINOPHEN 1 TABLET: 5; 325 TABLET ORAL at 11:54

## 2023-08-12 RX ADMIN — AMITRIPTYLINE HYDROCHLORIDE 100 MG: 75 TABLET, FILM COATED ORAL at 20:22

## 2023-08-12 RX ADMIN — ACETAMINOPHEN 650 MG: 325 TABLET ORAL at 11:54

## 2023-08-12 RX ADMIN — TIOTROPIUM BROMIDE INHALATION SPRAY 2 PUFF: 3.12 SPRAY, METERED RESPIRATORY (INHALATION) at 07:51

## 2023-08-12 RX ADMIN — SERTRALINE 100 MG: 100 TABLET, FILM COATED ORAL at 20:22

## 2023-08-12 RX ADMIN — LEVOTHYROXINE SODIUM 112 MCG: 0.11 TABLET ORAL at 05:33

## 2023-08-12 RX ADMIN — LEVOFLOXACIN 750 MG: 5 INJECTION, SOLUTION INTRAVENOUS at 12:57

## 2023-08-12 RX ADMIN — Medication 10 ML: at 09:59

## 2023-08-12 RX ADMIN — MORPHINE SULFATE 4 MG: 4 INJECTION, SOLUTION INTRAMUSCULAR; INTRAVENOUS at 19:20

## 2023-08-12 RX ADMIN — CARVEDILOL 3.12 MG: 3.12 TABLET, FILM COATED ORAL at 16:36

## 2023-08-12 RX ADMIN — ENOXAPARIN SODIUM 30 MG: 100 INJECTION SUBCUTANEOUS at 11:53

## 2023-08-12 RX ADMIN — FLUCONAZOLE IN SODIUM CHLORIDE 100 MG: 2 INJECTION, SOLUTION INTRAVENOUS at 16:40

## 2023-08-12 RX ADMIN — Medication 10 ML: at 20:22

## 2023-08-12 RX ADMIN — NYSTATIN 500000 UNITS: 100000 SUSPENSION ORAL at 20:22

## 2023-08-12 RX ADMIN — MAGNESIUM SULFATE HEPTAHYDRATE 2000 MG: 40 INJECTION, SOLUTION INTRAVENOUS at 11:52

## 2023-08-12 RX ADMIN — POTASSIUM CHLORIDE 40 MEQ: 1500 TABLET, EXTENDED RELEASE ORAL at 16:53

## 2023-08-12 RX ADMIN — MICONAZOLE NITRATE: 2 POWDER TOPICAL at 17:15

## 2023-08-12 RX ADMIN — MIDODRINE HYDROCHLORIDE 2.5 MG: 5 TABLET ORAL at 09:59

## 2023-08-12 RX ADMIN — MORPHINE SULFATE 4 MG: 4 INJECTION, SOLUTION INTRAMUSCULAR; INTRAVENOUS at 00:16

## 2023-08-12 ASSESSMENT — PAIN SCALES - GENERAL
PAINLEVEL_OUTOF10: 4
PAINLEVEL_OUTOF10: 3
PAINLEVEL_OUTOF10: 7
PAINLEVEL_OUTOF10: 7
PAINLEVEL_OUTOF10: 5
PAINLEVEL_OUTOF10: 7

## 2023-08-12 ASSESSMENT — PAIN DESCRIPTION - ORIENTATION
ORIENTATION: RIGHT

## 2023-08-12 ASSESSMENT — PAIN DESCRIPTION - LOCATION
LOCATION: BACK
LOCATION: JAW

## 2023-08-12 ASSESSMENT — PAIN DESCRIPTION - DESCRIPTORS
DESCRIPTORS: ACHING
DESCRIPTORS: ACHING;SHARP
DESCRIPTORS: ACHING;SHARP
DESCRIPTORS: SORE
DESCRIPTORS: SORE

## 2023-08-12 ASSESSMENT — ENCOUNTER SYMPTOMS
GASTROINTESTINAL NEGATIVE: 1
COUGH: 0
SHORTNESS OF BREATH: 1

## 2023-08-13 VITALS
BODY MASS INDEX: 21.34 KG/M2 | HEIGHT: 64 IN | RESPIRATION RATE: 16 BRPM | DIASTOLIC BLOOD PRESSURE: 76 MMHG | HEART RATE: 82 BPM | SYSTOLIC BLOOD PRESSURE: 129 MMHG | TEMPERATURE: 98.1 F | WEIGHT: 125 LBS | OXYGEN SATURATION: 94 %

## 2023-08-13 LAB
ALBUMIN SERPL-MCNC: 2.6 G/DL (ref 3.5–4.6)
ANION GAP SERPL CALCULATED.3IONS-SCNC: 8 MEQ/L (ref 9–15)
BACTERIA BLD CULT ORG #2: NORMAL
BACTERIA BLD CULT: NORMAL
BASOPHILS # BLD: 0.1 K/UL (ref 0–0.2)
BASOPHILS NFR BLD: 0.6 %
BUN SERPL-MCNC: 12 MG/DL (ref 8–23)
CALCIUM SERPL-MCNC: 8.4 MG/DL (ref 8.5–9.9)
CHLORIDE SERPL-SCNC: 101 MEQ/L (ref 95–107)
CO2 SERPL-SCNC: 25 MEQ/L (ref 20–31)
CREAT SERPL-MCNC: 0.52 MG/DL (ref 0.5–0.9)
EOSINOPHIL # BLD: 0.4 K/UL (ref 0–0.7)
EOSINOPHIL NFR BLD: 4.5 %
ERYTHROCYTE [DISTWIDTH] IN BLOOD BY AUTOMATED COUNT: 18.3 % (ref 11.5–14.5)
GLUCOSE BLD-MCNC: 115 MG/DL (ref 70–99)
GLUCOSE SERPL-MCNC: 129 MG/DL (ref 70–99)
HCT VFR BLD AUTO: 24.6 % (ref 37–47)
HGB BLD-MCNC: 8.3 G/DL (ref 12–16)
LYMPHOCYTES # BLD: 0.7 K/UL (ref 1–4.8)
LYMPHOCYTES NFR BLD: 7.7 %
MCH RBC QN AUTO: 26.9 PG (ref 27–31.3)
MCHC RBC AUTO-ENTMCNC: 33.9 % (ref 33–37)
MCV RBC AUTO: 79.4 FL (ref 79.4–94.8)
MONOCYTES # BLD: 1 K/UL (ref 0.2–0.8)
MONOCYTES NFR BLD: 10.7 %
NEUTROPHILS # BLD: 7.5 K/UL (ref 1.4–6.5)
NEUTS SEG NFR BLD: 76.5 %
PERFORMED ON: ABNORMAL
PHOSPHATE SERPL-MCNC: 2.9 MG/DL (ref 2.3–4.8)
PLATELET # BLD AUTO: 344 K/UL (ref 130–400)
POTASSIUM SERPL-SCNC: 4.4 MEQ/L (ref 3.4–4.9)
RBC # BLD AUTO: 3.1 M/UL (ref 4.2–5.4)
SODIUM SERPL-SCNC: 134 MEQ/L (ref 135–144)
WBC # BLD AUTO: 9.8 K/UL (ref 4.8–10.8)

## 2023-08-13 PROCEDURE — 6370000000 HC RX 637 (ALT 250 FOR IP): Performed by: COLON & RECTAL SURGERY

## 2023-08-13 PROCEDURE — 94640 AIRWAY INHALATION TREATMENT: CPT

## 2023-08-13 PROCEDURE — 2580000003 HC RX 258: Performed by: COLON & RECTAL SURGERY

## 2023-08-13 PROCEDURE — 2700000000 HC OXYGEN THERAPY PER DAY

## 2023-08-13 PROCEDURE — 85025 COMPLETE CBC W/AUTO DIFF WBC: CPT

## 2023-08-13 PROCEDURE — 36415 COLL VENOUS BLD VENIPUNCTURE: CPT

## 2023-08-13 PROCEDURE — 80069 RENAL FUNCTION PANEL: CPT

## 2023-08-13 PROCEDURE — 94761 N-INVAS EAR/PLS OXIMETRY MLT: CPT

## 2023-08-13 PROCEDURE — 6370000000 HC RX 637 (ALT 250 FOR IP)

## 2023-08-13 PROCEDURE — 99232 SBSQ HOSP IP/OBS MODERATE 35: CPT | Performed by: INTERNAL MEDICINE

## 2023-08-13 RX ORDER — TRAMADOL HYDROCHLORIDE 50 MG/1
50 TABLET ORAL EVERY 6 HOURS PRN
Qty: 12 TABLET | Refills: 0 | Status: SHIPPED | OUTPATIENT
Start: 2023-08-13 | End: 2023-08-13 | Stop reason: HOSPADM

## 2023-08-13 RX ORDER — OXYCODONE HYDROCHLORIDE 5 MG/1
5 TABLET ORAL EVERY 6 HOURS PRN
Qty: 12 TABLET | Refills: 0 | Status: SHIPPED | OUTPATIENT
Start: 2023-08-13 | End: 2023-08-16 | Stop reason: DRUGHIGH

## 2023-08-13 RX ADMIN — NYSTATIN 500000 UNITS: 100000 SUSPENSION ORAL at 08:04

## 2023-08-13 RX ADMIN — HYDROCODONE BITARTRATE AND ACETAMINOPHEN 1 TABLET: 5; 325 TABLET ORAL at 05:59

## 2023-08-13 RX ADMIN — MICONAZOLE NITRATE: 2 POWDER TOPICAL at 08:06

## 2023-08-13 RX ADMIN — HYDROCODONE BITARTRATE AND ACETAMINOPHEN 1 TABLET: 5; 325 TABLET ORAL at 12:57

## 2023-08-13 RX ADMIN — FOLIC ACID 1000 MCG: 1 TABLET ORAL at 08:03

## 2023-08-13 RX ADMIN — Medication 10 ML: at 08:06

## 2023-08-13 RX ADMIN — SERTRALINE 100 MG: 100 TABLET, FILM COATED ORAL at 08:03

## 2023-08-13 RX ADMIN — TIOTROPIUM BROMIDE INHALATION SPRAY 2 PUFF: 3.12 SPRAY, METERED RESPIRATORY (INHALATION) at 07:26

## 2023-08-13 RX ADMIN — CARVEDILOL 3.12 MG: 3.12 TABLET, FILM COATED ORAL at 08:03

## 2023-08-13 RX ADMIN — LEVOTHYROXINE SODIUM 112 MCG: 0.11 TABLET ORAL at 05:59

## 2023-08-13 ASSESSMENT — PAIN DESCRIPTION - ORIENTATION
ORIENTATION: RIGHT

## 2023-08-13 ASSESSMENT — PAIN SCALES - GENERAL
PAINLEVEL_OUTOF10: 6
PAINLEVEL_OUTOF10: 8
PAINLEVEL_OUTOF10: 8
PAINLEVEL_OUTOF10: 6

## 2023-08-13 ASSESSMENT — PAIN DESCRIPTION - LOCATION
LOCATION: JAW

## 2023-08-13 ASSESSMENT — PAIN DESCRIPTION - DESCRIPTORS
DESCRIPTORS: ACHING

## 2023-08-13 NOTE — DISCHARGE INSTR - COC
signed by Sujit Starkey RN on 8/13/23 at 12:44 PM EDT    CASE MANAGEMENT/SOCIAL WORK SECTION    Inpatient Status Date: ***    Readmission Risk Assessment Score:  Readmission Risk              Risk of Unplanned Readmission:  14           Discharging to Facility/ Agency   Name:  [facility]  Address:  Phone:  [phone]  Fax:      / signature: {Esignature:[id number]}    PHYSICIAN SECTION    Prognosis: {Prognosis:0298985902}    Condition at Discharge: [address] Patient Condition:[id number]}    Rehab Potential (if transferring to Rehab): {Prognosis:1210126478}    Recommended Labs or Other Treatments After Discharge: ***    Physician Certification: I certify the above information and transfer of Nestor Luna  is necessary for the continuing treatment of the diagnosis listed and that she requires {Admit to Appropriate Level of Care:97497} for {GREATER/LESS:412221362} 30 days.      Update Admission H&P: {CHP DME Changes in BREIH:353785393}    PHYSICIAN SIGNATURE:  {Esignature:[id number]}

## 2023-08-13 NOTE — CARE COORDINATION
1475  [address] [facility] CALLED TO LAMINE, [address].
AM TEAM QUALITY ICU ROUNDS AT BEDSIDE. NO FAMILY PRESENT. PT IS AWAKE, ALERT, AND TALKING. ON O2 NC. DISCHARGE PLAN IS TBD PENDING PROGRESS, PT/OT EVALUATION. CM/LSW TO FOLLOW FOR NEEDS. PT IS FROM HOME W/FAMILY. HAS DME AND DOES BOLUS TUBE FEEDING PER ER CM NOTES. TF LEAKING AROUND PEG TUBE SITE.  CURRENTLY NPO.
I notified Dr Ginny Chacon of the patient's report on her tube feedings, that she doesn't take PO. She states she also adds [place] instant breakfast to her feedings which I don't think we carry here. She states she is having a lot of drainage from her g tube site which she states is not new for her. I obtained some drain sponges and taught her how to place them. The patient had been using Kleenex I requested his consideration for a Dietary and ET nurse consult.
MET WITH PT TO VERIFY DC NEEDS PT HAD NEW PEG REPLACEMENT TODAY SHE HAS BEEN CARING FOR SELF AT HOME IS INDEPENDENT AND WANTS TO RETURN HOME AT TIME OF DISCHARGE DENIES ANY NEED FOR HHC WILL CONT TO FOLLOW FOR COMPLETED PT JASON TO BE COMPLETED PT ENC TO COOPERATE WITH JASON SHE VERB UNDERSTANDING
Met with patient and family to discuss dc plans. Patient requesting return home with [facility] home care. Daughter requesting new RX for Jevity 1.5 260ml/4 x daily to send to [facility] -phone number [phone]. Fax [phone]- Nurse Scout Guerrero notified. Will need home 02 eval also and patient and family requesting [facility] home care. Call placed to [facility], no answer.  Left VM
Met with patient and her family at beside. Definition of pneumonia discussed. Causes of different types of pneumonia reviewed. Symptoms discussed and pt does understand that they may have some or all of these symptoms. Testing to diagnose pneumonia reviewed as well as possible treatments. Importance of avoiding infections discussed including: taking medication as directed, washing hands, disposing of used tissues, getting the pneumonia and flu vaccines, and avoiding others who are ill. Patient states she has inhalers at home and will clean them before use. Patient is currently on 3.5 L O2 but does not use O2 at home. Patient denies anything that gets   Importance of not smoking and to avoid others who may be smoking around patient stressed. Pneumonia Zones also reviewed. \"Green\" zone is the goal, \"Yellow\" zone means to call the doctor, and \"Red\" zone means to call the doctor ASAP or call 911. Copies of Pneumonia booklet and Zone Pamphlet given to patient. Offer for patient to express any concerns or questions. Pt denies having further questions at this time.   Electronically signed by Shelbi Wong RN on 8/8/2023 at 3:24 PM
SPOKE WITH HUMBERTO [facility] CAN ACCEPT PT, WILL NEED ORDERS.
Yes  Would you like Case Management to discuss the discharge plan with any other family members/significant others, and if so, who? (P)  (need to check with the patient)  Plans to Return to Present Housing: (P) Yes  Other Identified Issues/Barriers to RETURNING to current housing: none  Potential Assistance needed at discharge: (P) Other (Comment) (pt denied any d/c needs in the ER.)            Potential DME:    Patient expects to discharge to: (P) [address] [facility] for transportation at discharge:      Financial    Payor: Rayray Suarez / Plan: Nelida Leos / Product Type: *No Product type* /     Does insurance require precert for SNF: Yes    Potential assistance Purchasing Medications: (P) No  Meds-to-Beds request:        [address] Isela Carrera [facility], [address] [facility],[address] [place] [address]  Phone: [phone] Fax: [phone]      Notes:    Factors facilitating achievement of predicted outcomes: Family support, Motivated, Cooperative, Pleasant, Sense of humor, and Good insight into deficits    Barriers to discharge: none    Additional Case Management Notes: pt denied any d/c needs. She does have drainage around the g tube site which she states is not new but I did send a message to the Dr. I didn't have a chance to ask her who we could talk to about her d/c plans before she left the ER. I did request a dietary and ET nurse consult. She would need some dressing supplies for home since she is using Kleenex. She has caresource. The Plan for Transition of Care is related to the following treatment goals of Septicemia ([address]) [A41.9]  Pneumonia due to other specified infectious organisms [J16.8]  Pneumonia of left lung due to infectious organism, unspecified part of lung [Z26.5]    IF APPLICABLE: The Patient and/or patient representative Tree Dimas and her family were provided with a choice of provider and agrees with the discharge plan.  Freedom of choice list

## 2023-08-16 ENCOUNTER — OFFICE VISIT (OUTPATIENT)
Dept: PALLATIVE CARE | Age: 63
End: 2023-08-16
Payer: COMMERCIAL

## 2023-08-16 VITALS
BODY MASS INDEX: 17.92 KG/M2 | RESPIRATION RATE: 18 BRPM | TEMPERATURE: 98.2 F | OXYGEN SATURATION: 93 % | WEIGHT: 104.4 LBS | HEART RATE: 84 BPM | SYSTOLIC BLOOD PRESSURE: 105 MMHG | DIASTOLIC BLOOD PRESSURE: 70 MMHG

## 2023-08-16 DIAGNOSIS — I95.9 HYPOTENSION, UNSPECIFIED HYPOTENSION TYPE: ICD-10-CM

## 2023-08-16 DIAGNOSIS — G89.3 CANCER RELATED PAIN: ICD-10-CM

## 2023-08-16 DIAGNOSIS — R06.02 SHORTNESS OF BREATH: ICD-10-CM

## 2023-08-16 DIAGNOSIS — Z71.89 GOALS OF CARE, COUNSELING/DISCUSSION: ICD-10-CM

## 2023-08-16 DIAGNOSIS — J44.9 CHRONIC OBSTRUCTIVE PULMONARY DISEASE, UNSPECIFIED COPD TYPE (HCC): ICD-10-CM

## 2023-08-16 DIAGNOSIS — C79.89 METASTATIC SQUAMOUS CELL CARCINOMA TO TONGUE (HCC): Primary | ICD-10-CM

## 2023-08-16 DIAGNOSIS — Z51.5 PALLIATIVE CARE ENCOUNTER: ICD-10-CM

## 2023-08-16 DIAGNOSIS — Z71.89 ADVANCED CARE PLANNING/COUNSELING DISCUSSION: ICD-10-CM

## 2023-08-16 PROCEDURE — 99205 OFFICE O/P NEW HI 60 MIN: CPT

## 2023-08-16 RX ORDER — OXYCODONE HCL 5 MG/5 ML
10 SOLUTION, ORAL ORAL EVERY 4 HOURS PRN
Qty: 300 ML | Refills: 0 | Status: SHIPPED | OUTPATIENT
Start: 2023-08-16 | End: 2023-08-21 | Stop reason: SDUPTHER

## 2023-08-16 ASSESSMENT — PATIENT HEALTH QUESTIONNAIRE - PHQ9
1. LITTLE INTEREST OR PLEASURE IN DOING THINGS: 0
SUM OF ALL RESPONSES TO PHQ QUESTIONS 1-9: 0
SUM OF ALL RESPONSES TO PHQ9 QUESTIONS 1 & 2: 0
2. FEELING DOWN, DEPRESSED OR HOPELESS: 0
SUM OF ALL RESPONSES TO PHQ QUESTIONS 1-9: 0

## 2023-08-16 NOTE — PATIENT INSTRUCTIONS
Hold BP medication tonight and tomorrow. Report BP reading when cuff is established. Make sure getting tube feeds & water flushes in  Start pain medication regimen   Oxygen machine/home oxygen is ordered.

## 2023-08-16 NOTE — PROGRESS NOTES
Subjective:      Patient Id: Seen Huber at the clinic at the  Palliative Care office in [place] , for initial [address] consult for symptom management, advance care planning, and goals of care discussion. She was accompanied to the appointment by: brother. Chief Complaint   Patient presents with    Anorexia     In January she weighed 130 lbs, today 104.4 lbs    Other     Has had PEG since March. Only sips of milkshake/water intermittently by mouth- even though she is not supposed to, was just in hospital for PNA    8/25- first appt with Dr. Crow Prince, transferring her care from [address] [place] [address] told her prognosis of life is about 3 months    Pain     8/10 right neck       HPI       Leonora Cabezas is a 58 y.o. female referred to palliative care for symptom management, advance care planning, and goals of care conversation. Huber has complex medical history that includes COPD, Metastatic tongue cancer,  status post glossectomy, she went through radiation and chemo via [facility], She has a PEG tube, dysphagia, chronic diastolic heart failure, HLD, DJD, chronic hepatitis B, hypothyroidism, ICD. Recently discharged from [facility] on 8/13 where she was admitted for sepsis pneumonia. She is currently undergoing care with Dr. Crow Prince at [facility] for medical oncology. She was recently told her prognosis of life is about 3 months. She has a left lung lower lobe mass, and right upper lobe mass. She has a partial glossectomy due to oral cancer      General: Upon entering the room I find the patient sitting, calm, cooperative and in NAD with brother at bedside. Patient is having significant pain. She reports that she is taking percocet every 4 hours PRN for pain. She is trying not to take the pain medications but is having significant pain. Her speech is garbled from glossectomy. She has noted lesion to the right side of her neck. Functional Status: She is up independently without any assisted devices. Denies any recent falls.

## 2023-08-21 DIAGNOSIS — G89.3 CANCER RELATED PAIN: ICD-10-CM

## 2023-08-21 DIAGNOSIS — C79.89 METASTATIC SQUAMOUS CELL CARCINOMA TO TONGUE (HCC): ICD-10-CM

## 2023-08-21 RX ORDER — OXYCODONE HCL 5 MG/5 ML
10 SOLUTION, ORAL ORAL EVERY 4 HOURS PRN
Qty: 300 ML | Refills: 0 | Status: SHIPPED | OUTPATIENT
Start: 2023-08-21 | End: 2023-08-22

## 2023-08-21 NOTE — TELEPHONE ENCOUNTER
Quality 226: Preventive Care And Screening: Tobacco Use: Screening And Cessation Intervention: Patient screened for tobacco use and is an ex/non-smoker Rx requested:  Requested Prescriptions     Pending Prescriptions Disp Refills    oxyCODONE (ROXICODONE) 5 MG/5ML solution 300 mL 0     Sig: Take 10 mLs by mouth every 4 hours as needed for Pain for up to 5 days.  Max Daily Amount: 60 mg       Last Office Visit:   8/16/2023      Last filled:  8/16/23    Next Visit Date:  Future Appointments   Date Time Provider [facility] [address]   8/22/2023 10:45 AM ALEN Hunt - CNP PC [facility] PHYS [facility]   9/15/2023 11:30 AM Feliciano Bradley [facility] Detail Level: Detailed Quality 130: Documentation Of Current Medications In The Medical Record: Current Medications Documented Quality 110: Preventive Care And Screening: Influenza Immunization: Influenza Immunization not Administered due to Patient Allergy. Quality 431: Preventive Care And Screening: Unhealthy Alcohol Use - Screening: Patient not identified as an unhealthy alcohol user when screened for unhealthy alcohol use using a systematic screening method

## 2023-08-22 ENCOUNTER — HOSPITAL ENCOUNTER (OUTPATIENT)
Dept: GENERAL RADIOLOGY | Age: 63
Discharge: HOME | End: 2023-08-24
Payer: COMMERCIAL

## 2023-08-22 ENCOUNTER — OFFICE VISIT (OUTPATIENT)
Dept: PALLATIVE CARE | Age: 63
End: 2023-08-22
Payer: COMMERCIAL

## 2023-08-22 VITALS
RESPIRATION RATE: 18 BRPM | HEART RATE: 100 BPM | BODY MASS INDEX: 17.78 KG/M2 | SYSTOLIC BLOOD PRESSURE: 113 MMHG | OXYGEN SATURATION: 95 % | WEIGHT: 103.6 LBS | DIASTOLIC BLOOD PRESSURE: 74 MMHG | TEMPERATURE: 98.2 F

## 2023-08-22 DIAGNOSIS — Z71.89 GOALS OF CARE, COUNSELING/DISCUSSION: ICD-10-CM

## 2023-08-22 DIAGNOSIS — M79.2 NERVE PAIN: ICD-10-CM

## 2023-08-22 DIAGNOSIS — M79.601 RIGHT ARM PAIN: ICD-10-CM

## 2023-08-22 DIAGNOSIS — M25.511 ACUTE PAIN OF RIGHT SHOULDER: ICD-10-CM

## 2023-08-22 DIAGNOSIS — M25.521 RIGHT ELBOW PAIN: ICD-10-CM

## 2023-08-22 DIAGNOSIS — G89.3 CANCER RELATED PAIN: ICD-10-CM

## 2023-08-22 DIAGNOSIS — Z51.5 PALLIATIVE CARE ENCOUNTER: ICD-10-CM

## 2023-08-22 DIAGNOSIS — C02.9 MALIGNANT NEOPLASM OF TONGUE (HCC): Primary | ICD-10-CM

## 2023-08-22 PROCEDURE — 73030 X-RAY EXAM OF SHOULDER: CPT

## 2023-08-22 PROCEDURE — 99215 OFFICE O/P EST HI 40 MIN: CPT

## 2023-08-22 PROCEDURE — 73080 X-RAY EXAM OF ELBOW: CPT

## 2023-08-22 PROCEDURE — 73060 X-RAY EXAM OF HUMERUS: CPT

## 2023-08-22 RX ORDER — OXYCODONE HCL 5 MG/5 ML
15 SOLUTION, ORAL ORAL EVERY 4 HOURS PRN
Qty: 630 ML | Refills: 0 | Status: SHIPPED | OUTPATIENT
Start: 2023-08-22 | End: 2023-08-29

## 2023-08-22 RX ORDER — FENTANYL 25 UG/1
1 PATCH TRANSDERMAL
Qty: 3 PATCH | Refills: 0 | Status: SHIPPED | OUTPATIENT
Start: 2023-08-22 | End: 2023-08-30 | Stop reason: SINTOL

## 2023-08-22 RX ORDER — GABAPENTIN 300 MG/1
300 CAPSULE ORAL NIGHTLY
Qty: 7 CAPSULE | Refills: 0 | Status: SHIPPED | OUTPATIENT
Start: 2023-08-22 | End: 2023-08-30

## 2023-08-22 NOTE — PROGRESS NOTES
PSYCHIATRY CLINIC PROGRESS NOTE   The initial diagnostic evaluation was on 11/26/08 and the last transfer of care evaluation was 06/30/17.    Pertinent Background:  This patient first experienced mental health issues in his late 20s , initially obtained care at that time and has received treatment for Schizoaffective Disorder, bipolar type.  See transfer evaluation for detailed history.  Notably, this pt has been stable on regimen of Lithium, Effexor and Risperdal since 2011. Historically, lithium dose 450 mg has been adequate for mood stabilization although an increase could reasonably be considered if needed.    Psych critical item history includes suicidal ideation, psychosis [sxs include AH and paranoia], mutiple psychotropic trials and psych hosp (>5).     INTERIM HISTORY                                                 Caden Bush is a 58 year old male who was last seen in clinic on 5/10/17 at which time patient reported stable mood. The patient reports good treatment adherence.  History was provided by patient who was a good historian.  Since the last visit:  -Two friends at [employer] [facility] passed away in the past two weeks  - Sleeping well- Taking Trazodone  mg prn 2/week. Sleeps from 9:30 pm - 10:30 am.   - Started Lantus 12 U in May, Gained 3 lb, Am light headedness improved. Fasting BG improved from 150-200s to <120.  -- Dr. Goel PCP's office  Kaylen Reynoso is trying to help with getting additional help - [facility] worker, diabetes education  - Loyda Law our clinic SW helping follow-up on HORACIO kelly, assessment done in May, he will qualify for CM if that goes through. Adult Day care is also an option  - Reports Stable mood, bored, isolating,. Feels lonely. Music, movies, word puzzles  - No AH, VH.No elevated or depressed mood    RECENT SYMPTOMS:   DEPRESSION: reports- feeling sad, isolated,  trouble falling asleep, poor appetite; trouble concentrating  DENIES- suicidal  Sleep: She does have pain related sleep disturbances. Skin: Notable raised lesion on right side of neck that has erthyema noted. GI/:Denies any urinary concerns, constipation or nausea or vomiting. I have personally reviewed the patient's most recent and available provider notes, lab work and imaging.       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    Cardiomyopathy ([address]) 3/6/2015    COPD (chronic obstructive pulmonary disease) (HCC)     COPD (chronic obstructive pulmonary disease) (HCC)     Dental disease     SEVERE DENTAL PROBLEMS    Diastolic dysfunction     DJD (degenerative joint disease), lumbar     Dyslipidemia 3/6/2015    Hepatitis B infection VIRAL-    Hepatitis C without mention of hepatic coma -CHEMO    History of alcoholism ([address])     History of osteopenia     Hypothyroidism     ICD (implantable cardioverter-defibrillator) battery depletion     Tongue cancer ([address])      Past Surgical History:   Procedure Laterality Date    GASTROSTOMY TUBE PLACEMENT N/A 8/10/2023    EGD REPLACE GASTROSTOMY TUBE ROOM 485 performed by Rojelio Anthony MD at [facility]  2009 MASS Ethyl Neither      Dr. Bright Hiss  09/16/15    Bravo Horton MD IMPLANT ICD     Social History     Socioeconomic History    Marital status:      Spouse name: Not on file    Number of children: Not on file    Years of education: Not on file    Highest education level: Not on file   Occupational History    Not on file   Tobacco Use    Smoking status: Former     Packs/day: 1.00     Years: 30.00     Pack years: 30.00     Types: Cigarettes     Quit date: 3/5/2005     Years since quittin.4    Smokeless tobacco: Never   Substance and Sexual Activity    Alcohol use: No     Comment: ALCOHOLIC    Drug use: No    Sexual activity: Not ideation  PSYCHOSIS:  reports-none;  DENIES- hallucinations and delusions  EATING DISORDER: none  KINGA: Denies elevated mood ,insomnia, racing thoughts, restlessness    RECENT SUBSTANCE USE:     ALCOHOL-  never           TOBACCO- none          CAFFEINE- 1-2 cups/day of coffee, 1-2 sodas/day     OPIOIDS- none      NARCAN KIT- N/A          CANNABIS- none            OTHER ILLICIT DRUGS- none     CURRENT SOCIAL HISTORY:  Financial Support-  SSDI for schizoaffective disorder and diabetes.       Living Situation- Currently living by self in subsidized apartment in [place] since Feb 2011.     Children- none     MEDICAL ROS:  Reports none.  Denies GI [nausea, diarrhea, constipation,  ], headache, sedation, tremor and confusion.      PSYCH AND CD CRITICAL ITEM SUMMARY     Sept 2017 - Decrease Li from 450 mg to 300 mg qhs  Nov 2017 - Decrease Li to 150 mg, Increase Risperidone to 3 mg  Feb 2018 - Stop Li  April 2018 - Cut Trazodone to 50 mg from 100 mg  June 2018 - Reduce Risperidone to 2 mg qhs ( day time sedation)    PAST PSYCH MED TRIALS     Pl see  Problem list -Hx of Psychiatric care    MEDICAL / SURGICAL HISTORY                                   CARE TEAM:   PCP- Dr. Dai with [facility]    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  MEDICATIONS                               Current Outpatient Prescriptions   Medication Sig Dispense Refill      ACE/ARB NOT PRESCRIBED, INTENTIONAL, ACE & ARB not prescribed due to Risk for drug interaction- lithium toxicity in the past       aspirin 81 MG EC tablet Take 81 mg by mouth daily.       BASAGLAR 100 UNIT/ML injection Inject 10 Units Subcutaneous daily We will make further adjustments as we see your response (Patient taking differently: Inject 12 Units Subcutaneous daily We will make further adjustments as we see your response) 15 mL 1     blood glucose monitoring (ONE TOUCH ULTRA) test strip TEST ONCE DAILY AS DIRECTED 100 each 3     cholecalciferol (VITAMIN D) 1000 UNIT tablet Take 1 tablet by mouth daily.       FISH OIL 1 capsule daily        insulin pen needle (BD BRENDAN U/F) 32G X 4 MM Use 1 daily as directed. 100 each 11     metFORMIN (GLUCOPHAGE-XR) 500 MG 24 hr tablet TAKE 2 TABLETS BY MOUTH TWICE DAILY 360 tablet 3     pioglitazone (ACTOS) 45 MG tablet TAKE 1 TABLET BY MOUTH DAILY 90 tablet 0     risperiDONE (RISPERDAL) 3 MG tablet Take 1 tablet (3 mg) by mouth At Bedtime 30 tablet 2     simvastatin (ZOCOR) 40 MG tablet TAKE ONE TABLET BY MOUTH EVERY NIGHT AT BEDTIME 90 tablet 3     venlafaxine (EFFEXOR XR) 150 MG 24 hr capsule Take 1 capsule (150 mg) by mouth daily Along with a 75 mg for a total of 225 mg daily dose. 30 capsule 2     venlafaxine (EFFEXOR XR) 75 MG 24 hr capsule Take 1 capsule (75 mg) by mouth daily Please take with Effexor 150 mg for a total dose of 225 mg daily 30 capsule 2     traZODone (DESYREL) 50 MG tablet Take 1 tablet (50 mg) by mouth At Bedtime (Patient taking differently: Take  mg by mouth At Bedtime ) 30 tablet 2     VITALS   /70  Pulse 85  Wt 63.8 kg (140 lb 9.6 oz)  BMI 19.89 kg/m2   MENTAL STATUS EXAM                                                             Alertness: alert  and oriented  Appearance: casually groomed and malodorous  Behavior/Demeanor: cooperative, pleasant and calm, with good  eye contact   Speech: normal  Language: intact and no  problems  Psychomotor: normal or unremarkable  Mood: description consistent with euthymia  Affect: full range; was congruent to mood; was congruent to content  Thought Process/Associations: unremarkable  Thought Content:  Reports none;   Denies suicidal ideation  and psychotic thought  Perception:   Reports none;  Denies auditory hallucinations (NO;  command-NO;  following command-NO and visual hallucinations (NO)  Insight: fair  Judgment: good  Cognition: does  appear grossly intact; formal cognitive testing was not done    LABS and DATA     PHQ9 TODAY = 6  PHQ-9 SCORE 3/1/2018 4/6/2018 5/10/2018   Total Score - - -   Total Score 1 4 6   Total Score - - -       RATING SCALES:  AIMS next due date needs to be determined    LITHIUM LABS     [level, renal, SG, TSH, WBC]    q6 mo  Recent Labs   Lab Test  04/17/18   1356  07/11/17   1034  12/20/16   1301  06/09/16   1030   LITHIUM  <0.1*  0.6  0.6  0.5*     Recent Labs   Lab Test  04/17/18   1356  01/30/18   0923  07/11/17   1034   06/09/16   1030  12/21/15   1413   CR  1.10  1.20  1.17   < >  1.26*  1.11   GFRESTIMATED   --   62   --    --   59*  68   NA  139  140  141   < >  140  133   PEE  9.6  9.3  9.7   < >  9.4  9.2    < > = values in this interval not displayed.   **BUN 7/11/17: 28 mg/dL (rr 6-24) - HIGH**  **BUN/Creatinine ratio 7/11/17: 24 (rr 9-20) - HIGH**     BUN 12/20/16: 21 mg/dL      BUN/Creatinine ratio 12/20/16: 17    Recent Labs   Lab Test  06/09/16   1040  12/21/15   1435   SG  1.014  1.007     Recent Labs   Lab Test  01/30/18   0923  06/09/16   1030  12/21/15   1413   TSH  0.72  1.70  1.69     Recent Labs   Lab Test  01/30/18   0923  12/20/16   1240   WBC  2.8*  3.7*     ANTIPSYCHOTIC LABS   [glu, A1C, lipids (focus LDL), liver enzymes, WBC, ANEU, Hgb, plts]    q12 mo  Recent Labs   Lab Test  04/17/18   1356  03/19/18   1116  01/30/18   0923  09/15/17   0956   GLC  150*   --   195*   --    A1C   --   8.2*   --   6.8*     Recent Labs   Lab Test   04/17/18   1356  07/11/17   1034   CHOL  139  126   TRIG  45  49   LDL  45  40   HDL  85  76     Recent Labs   Lab Test  04/17/18   1356  01/30/18   0923   AST  15  14   ALT  12  16   ALKPHOS  73  79     Recent Labs   Lab Test  01/30/18   0923  12/20/16   1240  06/09/16   1030  12/21/15   1413   WBC  2.8*  3.7*  4.7  4.6   ANEU   --    --   3.5  3.4   HGB  12.1*  13.6  14.7  13.4   PLT  179  205  175  155     DIAGNOSES                                                                                                   Depression w/ psychotic features moderate Vs.  Schizophrenia w/ predominant negative symtpoms    ASSESSMENT                                       TODAY: Caden reports stable mood, denies elevated, chronically depressed mood. No AH, VH, disorganized thoughts. Reports daytime sedation and fatigue.    He seems more energetic this visit, he is optimistic about diabetes self management now with BG checks, Lantus, and diet. He gained 3 lb in the past one month. (He had lost 25 lb in 1 year). Under close supervision by PCP.    Kaylen Reynoso helping patient with Nutrition consult, [facility] worker referral.  Loyda Miranda helping patient with checking on CADI waiver - if it comes through, he will qualify for CM, PCP. ILS, Adult day care - very interested. Patient is not interested in day treatment or PHP.    Risperidone will be reduced today to 2 mg due to concerns with sedation and fatigue. Pt encouraged to see therapist more frequently to process loss of friends.    Future considerations: Due to continued depression, and fatigue, another dopamine modulator may be considered as adjunct treatment, such as Abilify or Rexulti. They are also less implicated than Risperidone in weight gain (he has DM, on insulin) and sedation. Patient will think about it.     Med HX:  Thorough chart review of records in epic done prior to patient's appointment. Patient has records since 2009 in [facility]. Two hospitalizations 2009  (Depression, SI, Psychosis), 2012 (Li toxicity 2/2 Lisinopril use).  No mention of hypomania or wolf in chart. Since 2012 patient has been on Li  mg qhs, Risperidone 2 mg qhs, Effexor 300 mg daily. Lower dose of Risperidone was tried in the past and patient became paranoid.     Diagnostic Considerarions:  Patient has historic diagnosis of Schizoaffective disorder Bipolar type but no record of hypomanic and manic symptoms in chart nor, per patient's recollection -  - Bipolar unlikely. He has no hx of psychosis outside of depressive episodes - Schizoaffective disorder unlikely. He was diagnosed with Schizophrenia at the age of 28 when he heard voices, he was started on Lithium. He remembers that he was taken off Li and was on a trial of Wellbutrin, Zyprexa and other medications that he doesn't remember. Now he has been off of Li for > 3 months. No manic symptoms. It is reassuring. Diagnosis will have to be further clarified - Working diagnosis is  Depression recurrent, moderate, with remission of psychotic features. Schizophrenia and Bipolar spectrum seems unlikely.     [place] PRESCRIPTION MONITORING PROGRAM [] was not checked today and revealed: N/A    PSYCHOTROPIC DRUG INTERACTIONS:   VENLAFAXINE and ANTIPLATELET AGENTS (ASPIRIN) may result in an increased risk of bleeding  RISPERIDONE and SIMVASTATIN may result in increased simvastatin serum concentrations with an increased risk of myopathy or rhabdomyolysis.   TRAZODONE and VENLAFAXINE may result in an increased risk of serotonin syndrome     MANAGEMENT:  Monitoring for adverse effects, routine vitals, routine labs, minimal use of [trazodone] and patient is aware of risks      PLAN                                                                                                       1) PSYCHOTROPIC MEDICATIONS:  - Decrease risperidone from 3 mg to  2mg QHS  - Cont Effexor XR  225 mg daily  - Change to PRN - Trazodone to 50 mg qhs prn (insomnia)    2) THERAPY:   Seeing psychologist Yeny Goff at [facility] since 11/2015. Sees once per month.     3) LABS NEXT DUE: Feb 2019       RATING SCALES:  AIMS: 0 3/1/2018    4) REFERRALS [MICD, medical etc.]:  Recommended follow-up with primary care provider for further evaluation and recommendations re: renal dysfunction, in context of diabetes and Lithium regimen.      5) MTM REFERRAL [PharmD]:  none    6) :  none Previously had a CCM until 2011. Has SW consult on 10/20/15, not interested in CM or [facility] worker services at this time and seems to be managing well on his own.     7) RTC: 1 months    8) CRISIS NUMBERS:   Provided routinely in AVS    TREATMENT RISK STATEMENT:  The risks, benefits, alternatives and potential adverse effects have been discussed and are understood by the patient. The pt understands the risks of using street drugs or alcohol.  There are no medical contraindications, the pt agrees to treatment with the ability to do so.  The patient understands to call 911 or come to the nearest ED if life threatening or urgent symptoms present.    PSYCHIATRY CLINIC INDIVIDUAL PSYCHOTHERAPY NOTE                                    16   Start time - 1:44 pm     End time -2:00  Date reviewed - 5/10/18      Date next due - 5/10/19    Subjective: This supportive psychotherapy session addressed issues related to goals of therapy.  Patient's reaction: Preparatory in the context of mental status appropriate for ambulatory setting.  Progress: fair to good  Plan: RTC 1 month  Psychotherapy services during this visit included  myself and patient  TREATMENT  PLAN          SYMPTOMS;PROBLEMS   MEASURABLE GOALS;    FUNCTIONAL IMPROVEMENT INTERVENTIONS;    GAINS MADE DISCHARGE CRITERIA   Sleep 7-8 h of sleep  Feeling restful during the day Medications  Education on diet, exercise  Minimize Caffeine intake marked symptom improvement   Depression: depressed mood   get 7-8 hours of restful sleep every night   Doing 2 fun  things per day  Feeling good about self Medications  Therapy  Lifestyle changes  Increase Social support  Movies, Mall walking marked symptom improvement     Nam Kessler MD    Patient was staffed by attending Dr. Phillips who will sign the note. My supervisor is Dr. Braswell.    Supervisor Attestation:  I saw Caden Bush with the resident, and participated in key portions of the service, including the mental status examination and developing the plan of care. I reviewed key portions of the history with the resident. I agree with the findings and plan as documented in this note.  Antolin Phillips MD

## 2023-08-28 ASSESSMENT — ENCOUNTER SYMPTOMS
VOMITING: 0
TROUBLE SWALLOWING: 1
CONSTIPATION: 0
VOICE CHANGE: 1
COLOR CHANGE: 0
COUGH: 0
DIARRHEA: 0
SHORTNESS OF BREATH: 0
NAUSEA: 0
RECTAL PAIN: 0

## 2023-08-29 ENCOUNTER — OFFICE VISIT (OUTPATIENT)
Dept: PRIMARY CARE | Facility: CLINIC | Age: 63
End: 2023-08-29
Payer: COMMERCIAL

## 2023-08-29 VITALS
BODY MASS INDEX: 17.75 KG/M2 | HEART RATE: 95 BPM | TEMPERATURE: 97 F | SYSTOLIC BLOOD PRESSURE: 102 MMHG | HEIGHT: 64 IN | RESPIRATION RATE: 16 BRPM | WEIGHT: 104 LBS | OXYGEN SATURATION: 96 % | DIASTOLIC BLOOD PRESSURE: 64 MMHG

## 2023-08-29 DIAGNOSIS — C78.02: Primary | ICD-10-CM

## 2023-08-29 DIAGNOSIS — C02.9 MALIGNANT NEOPLASM OF TONGUE (MULTI): ICD-10-CM

## 2023-08-29 DIAGNOSIS — Z12.31 ENCOUNTER FOR SCREENING MAMMOGRAM FOR MALIGNANT NEOPLASM OF BREAST: ICD-10-CM

## 2023-08-29 DIAGNOSIS — J44.9 CHRONIC OBSTRUCTIVE PULMONARY DISEASE, UNSPECIFIED COPD TYPE (MULTI): ICD-10-CM

## 2023-08-29 DIAGNOSIS — I42.9 CARDIOMYOPATHY, UNSPECIFIED TYPE (MULTI): ICD-10-CM

## 2023-08-29 DIAGNOSIS — Z95.810 IMPLANTABLE CARDIOVERTER-DEFIBRILLATOR (ICD) IN SITU: ICD-10-CM

## 2023-08-29 DIAGNOSIS — F41.8 MIXED ANXIETY DEPRESSIVE DISORDER: ICD-10-CM

## 2023-08-29 DIAGNOSIS — E78.2 MIXED HYPERLIPIDEMIA: ICD-10-CM

## 2023-08-29 DIAGNOSIS — E03.9 HYPOTHYROIDISM, UNSPECIFIED TYPE: ICD-10-CM

## 2023-08-29 DIAGNOSIS — I10 ESSENTIAL (PRIMARY) HYPERTENSION: ICD-10-CM

## 2023-08-29 DIAGNOSIS — Z12.11 COLON CANCER SCREENING: ICD-10-CM

## 2023-08-29 DIAGNOSIS — G47.00 INSOMNIA, UNSPECIFIED TYPE: ICD-10-CM

## 2023-08-29 DIAGNOSIS — C80.1: Primary | ICD-10-CM

## 2023-08-29 PROCEDURE — 3008F BODY MASS INDEX DOCD: CPT | Performed by: FAMILY MEDICINE

## 2023-08-29 PROCEDURE — 99214 OFFICE O/P EST MOD 30 MIN: CPT | Performed by: FAMILY MEDICINE

## 2023-08-29 PROCEDURE — 1036F TOBACCO NON-USER: CPT | Performed by: FAMILY MEDICINE

## 2023-08-29 PROCEDURE — 3074F SYST BP LT 130 MM HG: CPT | Performed by: FAMILY MEDICINE

## 2023-08-29 PROCEDURE — 3078F DIAST BP <80 MM HG: CPT | Performed by: FAMILY MEDICINE

## 2023-08-29 RX ORDER — SERTRALINE HYDROCHLORIDE 100 MG/1
250 TABLET, FILM COATED ORAL DAILY
Qty: 270 TABLET | Refills: 3 | Status: SHIPPED | OUTPATIENT
Start: 2023-08-29

## 2023-08-29 RX ORDER — OXYCODONE HCL 5 MG/5 ML
SOLUTION, ORAL ORAL
COMMUNITY
Start: 2023-08-22

## 2023-08-29 RX ORDER — PROCHLORPERAZINE MALEATE 10 MG
TABLET ORAL
COMMUNITY
Start: 2023-08-22 | End: 2023-08-29 | Stop reason: ALTCHOICE

## 2023-08-29 RX ORDER — HYDROCODONE BITARTRATE AND ACETAMINOPHEN 5; 325 MG/1; MG/1
TABLET ORAL
COMMUNITY

## 2023-08-29 RX ORDER — ASPIRIN 325 MG
325 TABLET ORAL DAILY
COMMUNITY

## 2023-08-29 RX ORDER — NYSTATIN 100000 [USP'U]/ML
500000 SUSPENSION ORAL
COMMUNITY
Start: 2023-08-13 | End: 2023-08-29 | Stop reason: ALTCHOICE

## 2023-08-29 RX ORDER — CHLORHEXIDINE GLUCONATE ORAL RINSE 1.2 MG/ML
SOLUTION DENTAL
COMMUNITY
Start: 2023-06-23

## 2023-08-29 RX ORDER — FENTANYL 25 UG/1
PATCH TRANSDERMAL
COMMUNITY
Start: 2023-08-22 | End: 2023-08-29 | Stop reason: ALTCHOICE

## 2023-08-29 RX ORDER — TIOTROPIUM BROMIDE 18 UG/1
1 CAPSULE ORAL; RESPIRATORY (INHALATION) DAILY
COMMUNITY
Start: 2023-06-07

## 2023-08-29 ASSESSMENT — ENCOUNTER SYMPTOMS
NAUSEA: 0
SHORTNESS OF BREATH: 1
TROUBLE SWALLOWING: 1
ABDOMINAL PAIN: 0
CONSTIPATION: 0
VOICE CHANGE: 1
COLOR CHANGE: 0
COUGH: 0
VOMITING: 0
WHEEZING: 0
DIARRHEA: 0

## 2023-08-29 NOTE — PROGRESS NOTES
"Subjective   Patient ID: Jessica Cope is a 63 y.o. female who presents for Hypertension and Hyperlipidemia.  Covid vax: x 4  CRC:  Mammogram: 2022  Pap: [phone] per pt  Lmp: n/a   HAD RAD TX ended   2005 throat ca dxd  Had rad and chemo    HPI  Patient Active Problem List   Diagnosis    Acute posthemorrhagic anemia    Cardiomyopathy (CMS/HCC)    COPD (chronic obstructive pulmonary disease) (CMS/HCC)    Essential (primary) hypertension    Hepatitis C virus infection    Mixed hyperlipidemia    Hypothyroidism    Implantable cardioverter-defibrillator (ICD) in situ    Insomnia    Malignant neoplasm of tongue (CMS/HCC)    Mixed anxiety depressive disorder    Reflux esophagitis    Unspecified severe protein-calorie malnutrition (CMS/HCC)       Past Surgical History:   Procedure Laterality Date    CARDIAC DEFIBRILLATOR PLACEMENT       SECTION, CLASSIC  1983    x 4 last one      CT GUIDED PERCUTANEOUS BIOPSY LUNG  2023    CT GUIDED PERCUTANEOUS BIOPSY LUNG 2023 PAR CT    GASTROSTOMY TUBE CHANGE      LUMBAR FUSION      LYMPHADENECTOMY      CA-L neck and r neck    STOMACH SURGERY  2023    has feeding tube    TONGUE SURGERY      dr mason       Review of Systems  This patient has   NO history of recent Covid nor flu symptoms,  NO Fever nor chills,  NO Chest pain, shortness of breath nor paroxysmal nocturnal dyspnea,  NO Nausea, vomiting, nor diarrhea,  NO Hematochezia nor melena,  NO Dysuria, hematuria, nor new incontinence issues  NO new severe headaches nor neurological complaints,  NO new issues with anxiety nor depression nor new psychiatric complaints,  NO suicidal nor homicidal ideations.     OBJECTIVE:  /64   Pulse 95   Temp 36.1 °C (97 °F) (Temporal)   Resp 16   Ht 1.626 m (5' 4\")   Wt 47.2 kg (104 lb)   SpO2 96%   BMI 17.85 kg/m²      General:  alert, oriented, no acute distress.  No obvious skin rashes noted.   No gait disturbance " noted.    Mood is pleasant, not tearful, no signs of emotional distress.  Not appearing intoxicated or altered.   No voiced delusions,   Normal, appropriate behavior.  Speech afflicted by tongue CA    HEENT: Normocephalic, atraumatic,   Pupils round, reactive to light  Extraocular motions intact and wnl  Tympanic membranes normal  Several nodules r neck -protruding one 2cm(central ulceration) 1 x1cm  Several smaller ?nodes    Neck: no nuchal rigidity        Respiratory: Equal breath sounds  No wheezes,    rales,    nor rhonchi  No respiratory distress.    Heart: Regular rate and rhythm, no    murmurs  no rubs/gallops    Abdomen: no masses palpable, nontender, no rebound nor guarding.    Extremities: NO cyanosis noted, no clubbing.   No edema noted.  2+dorsalis pedis pulses.    Normal-not antalgic, steady gait.    Orders Only on 08/04/2023   Component Date Value Ref Range Status    Glucose 08/04/2023 108 (H)  74 - 99 mg/dL Final    Sodium 08/04/2023 133 (L)  136 - 145 mmol/L Final    Potassium 08/04/2023 3.8  3.5 - 5.3 mmol/L Final    Chloride 08/04/2023 95 (L)  98 - 107 mmol/L Final    Bicarbonate 08/04/2023 27  21 - 32 mmol/L Final    Anion Gap 08/04/2023 15  10 - 20 mmol/L Final    Urea Nitrogen 08/04/2023 17  6 - 23 mg/dL Final    Creatinine 08/04/2023 0.73  0.50 - 1.05 mg/dL Final    GFR Female 08/04/2023 >90  >90 mL/min/1.73m2 Final    Comment:  CALCULATIONS OF ESTIMATED GFR ARE PERFORMED   USING THE 2021 CKD-EPI STUDY REFIT EQUATION   WITHOUT THE RACE VARIABLE FOR THE IDMS-TRACEABLE   CREATININE METHODS.    https://jasn.asnjournals.org/content/early/2021/09/22/ASN.2659721958      Calcium 08/04/2023 9.6  8.6 - 10.3 mg/dL Final    Albumin 08/04/2023 4.0  3.4 - 5.0 g/dL Final    Alkaline Phosphatase 08/04/2023 87  33 - 136 U/L Final    Total Protein 08/04/2023 7.6  6.4 - 8.2 g/dL Final    AST 08/04/2023 26  9 - 39 U/L Final    Total Bilirubin 08/04/2023 0.4  0.0 - 1.2 mg/dL Final    ALT (SGPT) 08/04/2023 16  7 -  45 U/L Final    Comment:  Patients treated with Sulfasalazine may generate    falsely decreased results for ALT.      WBC 08/04/2023 8.3  4.4 - 11.3 x10E9/L Final    RBC 08/04/2023 3.77 (L)  4.00 - 5.20 x10E12/L Final    Hemoglobin 08/04/2023 9.8 (L)  12.0 - 16.0 g/dL Final    Hematocrit 08/04/2023 32.3 (L)  36.0 - 46.0 % Final    MCV 08/04/2023 86  80 - 100 fL Final    MCHC 08/04/2023 30.3 (L)  32.0 - 36.0 g/dL Final    Platelets 08/04/2023 396  150 - 450 x10E9/L Final    RDW 08/04/2023 17.1 (H)  11.5 - 14.5 % Final    Neutrophils % 08/04/2023 78.8  40.0 - 80.0 % Final    Immature Granulocytes %, Automated 08/04/2023 0.4  0.0 - 0.9 % Final    Comment:  Immature Granulocyte Count (IG) includes promyelocytes,    myelocytes and metamyelocytes but does not include bands.   Percent differential counts (%) should be interpreted in the   context of the absolute cell counts (cells/L).      Lymphocytes % 08/04/2023 7.6  13.0 - 44.0 % Final    Monocytes % 08/04/2023 9.8  2.0 - 10.0 % Final    Eosinophils % 08/04/2023 2.3  0.0 - 6.0 % Final    Basophils % 08/04/2023 1.1  0.0 - 2.0 % Final    Neutrophils Absolute 08/04/2023 6.57  1.20 - 7.70 x10E9/L Final    Lymphocytes Absolute 08/04/2023 0.63 (L)  1.20 - 4.80 x10E9/L Final    Monocytes Absolute 08/04/2023 0.82  0.10 - 1.00 x10E9/L Final    Eosinophils Absolute 08/04/2023 0.19  0.00 - 0.70 x10E9/L Final    Basophils Absolute 08/04/2023 0.09  0.00 - 0.10 x10E9/L Final    Adrenocorticotropic Hormone (ACTH) 08/04/2023 9.8  7.2 - 63.3 pg/mL Final    Comment: INTERPRETIVE INFORMATION: Adrenocorticotropic Hormone  Reference interval based on samples collected between 7 a.m. and   10 a.m.  No reference intervals established for p.m. collections.    Pediatric reference values are the same as adults (Acta Paediatr   Scand 1981;70:341-345).  This assay measures intact ACTH 1-39;   some types of synthetic ACTH and ACTH fragments are not detected   by this assay.  Performed By: [facility]  [address] [place] [id number]  : Suman Damon MD, PhD  CLIA Number: [id number]      TSH 08/04/2023 5.47 (H)  0.44 - 3.98 mIU/L Final    Comment:  TSH testing is performed using different testing    methodology at [facility] than at other    [facility]. Direct result comparisons should    only be made within the same method.      Cortisol 08/04/2023 14.5  2.5 - 20.0 ug/dL Final    Free T4 08/04/2023 0.94  0.78 - 1.48 ng/dL Final    Comment:  Thyroxine Free testing is performed using different testing    methodology at [facility] than at other    [facility]. Direct result comparisons should    only be made within the same method.     Legacy Encounter on 07/17/2023   Component Date Value Ref Range Status    Pathology Report 07/17/2023    Final                    Value:Name GIOVANI OJHNSTON                                                                                                   Accession #: [id number]            Pathologist:                   ABBEY PEARSON DMD.  Date of Procedure:    7/17/2023  Date Received:          7/17/2023  Date Reported           7/19/2023  Submitting Physician:   ALLAN WHITMAN MD  Location:                    [facility]  Other External #     Procedures/Addenda Present                                                               FINAL DIAGNOSIS  RIGHT NECK, MASS, BIOPSY:  -- MODERATELY DIFFERENTIATED KERATINIZING SQUAMOUS CELL CARCINOMA INVOLVING  DERMIS WITHOUT EPIDERMAL INVOLVEMENT, SEE NOTE.     NOTE: Connection to overlying skin surface or definitive in situ component are  not seen in the sections examined. Clinical correlation is advised.    at                                                                                                                                                                                                                                                                "                                                                                                                                                                                                                                                  Electronically Signed Out By ABBEY PEARSON DMD./AMT  By the signature on this report, the individual or group listed as making the  Final Interpretation/Diagnosis certifies that they have reviewed this case.  Diagnostic interpretation performed at [facility] [address] [facility] [address]           Addendum/Procedures:  Special Oncology Report With Image     Date Ordered:     7/31/2023     Status:   Signed Out       Date Complete:     7/31/2023             Date Reported:     7/31/2023                  Addendum Diagnosis  A complete PD-L1 22C3 FDA result issued by [employer] ([place], [place]) is on  file in the Department of Anatomic Pathology at [facility].    PD-L1 22C3 FDA (KEYTRUDA) for HNSCC (Head and Neck):   PD-L1 EXPRESSION   Combined Positive Score: 90              Electronically Signed Out By ABBEY PEARSON DMD./AMT  By the signature on this report, the individual or group listed as making the  Final Interpretation/Diagnosis certifies that they have reviewed this case.  Addendum signed out at [facility] [address] [place] [id number].  Outside Special Oncology Report  [IMAGE:attachments:c6:1]         Clinical History:  Fixative (A): FORMALIN  Clinical Diagnosis History: Lung nodule - (R91.1); Neck mass - (R22.1)    Specimens Submitted As:  A: RIGHT NECK MASS     Gross Description:  Received in formalin, labeled with the patient's name and hospital number and  \"right neck mass\", are multiple fragments of tan-white, soft tissue aggregating  to 1.3 x 0.4 x 0.3 cm. The specimen is submitted in toto in one cassette.  SBS    sbs/7/18/2023               [facility]  Department of Pathology   [address] [place] [address]            Assessment/Plan     Problem List Items Addressed This Visit       Cardiomyopathy (CMS/HCC)    Relevant Medications    sertraline (Zoloft) 100 mg tablet    COPD (chronic obstructive pulmonary disease) (CMS/HCC)    Essential (primary) hypertension    Mixed hyperlipidemia    Hypothyroidism    Implantable cardioverter-defibrillator (ICD) in situ    Insomnia    Malignant neoplasm of tongue (CMS/HCC)    Relevant Medications    sertraline (Zoloft) 100 mg tablet    Mixed anxiety depressive disorder     Other Visit Diagnoses       Metastatic squamous cell carcinoma involving left lung with unknown primary site (CMS/HCC)    -  Primary    Relevant Medications    sertraline (Zoloft) 100 mg tablet    Colon cancer screening        Relevant Orders    Colonoscopy Screening    Encounter for screening mammogram for malignant neoplasm of breast        Relevant Orders    BI mammo bilateral screening tomosynthesis          Has declines GI workup  SCC  Dx w pneumonia  CUONG nodule/LLL nodule/RUL  CUONG LLL pneumonia  Emphysema and scar  Quit tob 18y ago  Prognosis-poor d/w pt  And was told 3mo prognosis at hospital  Sees dr kvng MARCELINO  Likely still needs PET  8-10 wks recheck  Up zoloft to 250mg is aware of label dose but occ subopt mood    Hep c undetectable this yr  Cardio-issues stable (dx 2016)  Sees dr holiday

## 2023-08-30 ENCOUNTER — TELEPHONE (OUTPATIENT)
Dept: PALLATIVE CARE | Age: 63
End: 2023-08-30

## 2023-08-30 ENCOUNTER — OFFICE VISIT (OUTPATIENT)
Dept: PALLATIVE CARE | Age: 63
End: 2023-08-30
Payer: COMMERCIAL

## 2023-08-30 VITALS
RESPIRATION RATE: 19 BRPM | OXYGEN SATURATION: 97 % | HEART RATE: 89 BPM | TEMPERATURE: 97.9 F | BODY MASS INDEX: 18.07 KG/M2 | DIASTOLIC BLOOD PRESSURE: 74 MMHG | SYSTOLIC BLOOD PRESSURE: 115 MMHG | WEIGHT: 105.3 LBS

## 2023-08-30 DIAGNOSIS — Z71.89 GOALS OF CARE, COUNSELING/DISCUSSION: ICD-10-CM

## 2023-08-30 DIAGNOSIS — C02.9 MALIGNANT NEOPLASM OF TONGUE (HCC): Primary | ICD-10-CM

## 2023-08-30 DIAGNOSIS — G89.3 CANCER RELATED PAIN: ICD-10-CM

## 2023-08-30 DIAGNOSIS — Z51.5 PALLIATIVE CARE ENCOUNTER: ICD-10-CM

## 2023-08-30 DIAGNOSIS — C79.89 METASTATIC SQUAMOUS CELL CARCINOMA TO TONGUE (HCC): ICD-10-CM

## 2023-08-30 PROCEDURE — 99215 OFFICE O/P EST HI 40 MIN: CPT

## 2023-08-30 RX ORDER — OXYCODONE HCL 5 MG/5 ML
15 SOLUTION, ORAL ORAL EVERY 4 HOURS PRN
Qty: 1700 ML | Refills: 0 | Status: SHIPPED | OUTPATIENT
Start: 2023-08-30 | End: 2023-09-20

## 2023-08-30 RX ORDER — LIDOCAINE 50 MG/G
1 PATCH TOPICAL DAILY
Qty: 10 PATCH | Refills: 0 | Status: SHIPPED | OUTPATIENT
Start: 2023-08-30 | End: 2023-09-09

## 2023-08-30 ASSESSMENT — ENCOUNTER SYMPTOMS
SHORTNESS OF BREATH: 0
COUGH: 0
CONSTIPATION: 0
RECTAL PAIN: 0
COLOR CHANGE: 0
VOMITING: 0
VOICE CHANGE: 1
DIARRHEA: 0
NAUSEA: 0
TROUBLE SWALLOWING: 1

## 2023-08-30 ASSESSMENT — PATIENT HEALTH QUESTIONNAIRE - PHQ9
SUM OF ALL RESPONSES TO PHQ QUESTIONS 1-9: 8
10. IF YOU CHECKED OFF ANY PROBLEMS, HOW DIFFICULT HAVE THESE PROBLEMS MADE IT FOR YOU TO DO YOUR WORK, TAKE CARE OF THINGS AT HOME, OR GET ALONG WITH OTHER PEOPLE: 0
2. FEELING DOWN, DEPRESSED OR HOPELESS: 3
9. THOUGHTS THAT YOU WOULD BE BETTER OFF DEAD, OR OF HURTING YOURSELF: 0
3. TROUBLE FALLING OR STAYING ASLEEP: 0
SUM OF ALL RESPONSES TO PHQ QUESTIONS 1-9: 8
SUM OF ALL RESPONSES TO PHQ QUESTIONS 1-9: 8
8. MOVING OR SPEAKING SO SLOWLY THAT OTHER PEOPLE COULD HAVE NOTICED. OR THE OPPOSITE, BEING SO FIGETY OR RESTLESS THAT YOU HAVE BEEN MOVING AROUND A LOT MORE THAN USUAL: 0
4. FEELING TIRED OR HAVING LITTLE ENERGY: 1
SUM OF ALL RESPONSES TO PHQ9 QUESTIONS 1 & 2: 6
7. TROUBLE CONCENTRATING ON THINGS, SUCH AS READING THE NEWSPAPER OR WATCHING TELEVISION: 0
SUM OF ALL RESPONSES TO PHQ QUESTIONS 1-9: 8
1. LITTLE INTEREST OR PLEASURE IN DOING THINGS: 3
6. FEELING BAD ABOUT YOURSELF - OR THAT YOU ARE A FAILURE OR HAVE LET YOURSELF OR YOUR FAMILY DOWN: 0
5. POOR APPETITE OR OVEREATING: 1

## 2023-08-30 NOTE — TELEPHONE ENCOUNTER
Graciela from [address] called and stated that the pt's insurance is requesting that the pt has a face-to-face visit in order for her to get approved for the oxygen    [phone] ext [address]  Fax [phone]

## 2023-08-30 NOTE — PROGRESS NOTES
including grammar, punctuation and spelling as well as words and phrases that may seem inaccurate. For any questions or concerns feel free to contact me for clarification.

## 2023-09-19 ENCOUNTER — OFFICE VISIT (OUTPATIENT)
Dept: PALLATIVE CARE | Age: 63
End: 2023-09-19
Payer: COMMERCIAL

## 2023-09-19 ENCOUNTER — CLINICAL DOCUMENTATION (OUTPATIENT)
Dept: NUTRITION | Age: 63
End: 2023-09-19

## 2023-09-19 VITALS
WEIGHT: 95.2 LBS | RESPIRATION RATE: 18 BRPM | BODY MASS INDEX: 16.34 KG/M2 | SYSTOLIC BLOOD PRESSURE: 122 MMHG | HEART RATE: 94 BPM | DIASTOLIC BLOOD PRESSURE: 89 MMHG

## 2023-09-19 DIAGNOSIS — Z51.5 PALLIATIVE CARE ENCOUNTER: ICD-10-CM

## 2023-09-19 DIAGNOSIS — E44.0 PROTEIN-CALORIE MALNUTRITION, MODERATE (HCC): Primary | ICD-10-CM

## 2023-09-19 DIAGNOSIS — R63.4 UNINTENTIONAL WEIGHT LOSS: ICD-10-CM

## 2023-09-19 DIAGNOSIS — G89.3 CANCER RELATED PAIN: ICD-10-CM

## 2023-09-19 DIAGNOSIS — Z71.89 GOALS OF CARE, COUNSELING/DISCUSSION: ICD-10-CM

## 2023-09-19 PROCEDURE — 99215 OFFICE O/P EST HI 40 MIN: CPT

## 2023-09-19 RX ORDER — MIRTAZAPINE 7.5 MG/1
7.5 TABLET, FILM COATED ORAL NIGHTLY
Qty: 14 TABLET | Refills: 0 | Status: SHIPPED | OUTPATIENT
Start: 2023-09-19 | End: 2023-10-03

## 2023-09-19 ASSESSMENT — ENCOUNTER SYMPTOMS
COUGH: 0
VOMITING: 0
VOICE CHANGE: 1
DIARRHEA: 0
TROUBLE SWALLOWING: 1
RECTAL PAIN: 0
NAUSEA: 0
CONSTIPATION: 0
COLOR CHANGE: 0
SHORTNESS OF BREATH: 0

## 2023-09-19 NOTE — PROGRESS NOTES
Subjective:      Patient Id: Seen Yolie Obrien at the clinic at the  Palliative Care office in [facility] for initial [address] consult for symptom management, advance care planning, and goals of care discussion. She was accompanied to the appointment by: self. Chief Complaint   Patient presents with    Follow-up    Pain         Olamide Da Silva is a 58 y.o. female referred to palliative care for symptom management, advance care planning, and goals of care conversation. Yolie Obrien has complex medical history that includes COPD, Metastatic tongue cancer, PEG tube, dysphagia, chronic diastolic heart failure, HLD, DJD, chronic hepatitis B, hypothyroidism, ICD. Recently discharged from [facility] on 8/13 where she was admitted for sepsis pneumonia. General: Patient is alert and oriented x 4. She overall has had decrease in pain. She is no longer taking her pain medications since starting Keytruda. She reports that she was supposed to get treatment today but she was late. Did get it approved for patient to have treatment following this visit. She denies any symptoms today. She has lost some weight since last encounter. Dietitian will discuss PEG tube feedings today. Functional Status: She is ambulatory independently with steady gait. Pain: She reports significant improvement of symptoms. She reports that she is no longer taking her pain medications. Patient overall looks incredibly well compared to last encounter. Appetite: She has a peg tube, Jevity, she is tolerating all her feeds. Getting 3 to 4 cans in a day. She denies any nausea/vomiting. She is 95lbs 9 oz today. Which is a 10 pound loss. Consulted Dietitian to be present during encounter. Mood: Her mood has improved she is tolerating Zoloft. Sleep: Denies any sleep disturbances. Skin: Notable raised lesion on right side of neck that has erythema has significantly decreased.      GI/:Denies any urinary concerns, constipation or nausea or

## 2023-09-19 NOTE — PATIENT INSTRUCTIONS
The Dietitian, Oscar Paige, saw you today and adjusted your feeds due to your unintentional weight loss. We will add Remeron, an appetite stimulant today. This Remeron is a low dose of 7.5mg to take at bedtime.

## 2023-09-19 NOTE — PROGRESS NOTES
Ivory Palacio  9/19/2023  Wt Readings from Last 10 Encounters:   09/19/23 95 lb 3.2 oz (43.2 kg)   08/30/23 105 lb 4.8 oz (47.8 kg)   08/22/23 103 lb 9.6 oz (47 kg)   08/16/23 104 lb 6.4 oz (47.4 kg)   08/13/23 125 lb (56.7 kg)   05/13/23 120 lb (54.4 kg)   01/13/23 127 lb (57.6 kg)   09/16/22 128 lb (58.1 kg)   01/06/22 141 lb 9.6 oz (64.2 kg)   11/12/21 144 lb (65.3 kg)     Ht Readings from Last 1 Encounters:   08/09/23 5' 4\" (1.626 m)   BMI 16.3--underweight  8/13 labs noted. Weight change: 9.2# wt loss x 1 month, 9% wt loss, significant loss  Appetite:   N/V/D/C: denies  Calculated Needs if applicable:  35 kcal/kg = 1514 kcals, 1.5 g pro/kg = 65 g pro/d    Pt reports taking 3-4 cans Jevity 1.5 per day with goal rate of 4 cans per day. Was set up with [facility] for enteral supplies back in March, was being follow at [address]. Pt reports taking approx 16 oz of water with every can of TF and feels very full and uncomfortable after feedings. This past week has been taking 3 cans TF more often than 4.   4 cans TF provides: 1420 kcals, 60.4 g pro, 720 ml free water. Pt instructed on limiting water intake with feedings. Recommend one syringe (60 ml) before and after each feeding  to provide addition 480 ml free water (1200 ml free water total with TF and flushes) Then between feedings pt recommended to limit to 1-2 additional 16 oz cups throughout the day between feedings in smaller portions. Concerned pt is filling up on water and unable to take complete TF. If pt is unable to maintain wt with 4 cans TF, recommend pt increase TF to 5 cans per day, which may be for wt gain. Pt does have a scale at home. Will f/u x 3 wks. This RD provided contact information for questions or concerns prior to f/u. Pt does take addition carnation breakfast drink via PEG tube at times. Goal:  gradual wt gain. Recommendations:  [address] [facility] teaching for daily use.     In-home speech therapy per [facility]BS

## 2023-10-10 ENCOUNTER — OFFICE VISIT (OUTPATIENT)
Dept: PALLATIVE CARE | Age: 63
End: 2023-10-10
Payer: COMMERCIAL

## 2023-10-10 VITALS
WEIGHT: 100 LBS | HEART RATE: 120 BPM | OXYGEN SATURATION: 96 % | BODY MASS INDEX: 17.16 KG/M2 | DIASTOLIC BLOOD PRESSURE: 95 MMHG | RESPIRATION RATE: 17 BRPM | TEMPERATURE: 97.3 F | SYSTOLIC BLOOD PRESSURE: 130 MMHG

## 2023-10-10 DIAGNOSIS — E44.0 MODERATE PROTEIN-CALORIE MALNUTRITION (HCC): Primary | ICD-10-CM

## 2023-10-10 DIAGNOSIS — Z51.5 PALLIATIVE CARE ENCOUNTER: ICD-10-CM

## 2023-10-10 DIAGNOSIS — R63.4 UNINTENTIONAL WEIGHT LOSS: ICD-10-CM

## 2023-10-10 DIAGNOSIS — Z71.89 GOALS OF CARE, COUNSELING/DISCUSSION: ICD-10-CM

## 2023-10-10 DIAGNOSIS — C02.9 MALIGNANT NEOPLASM OF TONGUE (HCC): ICD-10-CM

## 2023-10-10 DIAGNOSIS — Z93.1 STATUS POST INSERTION OF PERCUTANEOUS ENDOSCOPIC GASTROSTOMY (PEG) TUBE (HCC): ICD-10-CM

## 2023-10-10 DIAGNOSIS — C79.89 METASTATIC SQUAMOUS CELL CARCINOMA TO TONGUE (HCC): ICD-10-CM

## 2023-10-10 DIAGNOSIS — R73.03 PRE-DIABETES: ICD-10-CM

## 2023-10-10 PROCEDURE — 99215 OFFICE O/P EST HI 40 MIN: CPT

## 2023-10-10 ASSESSMENT — ENCOUNTER SYMPTOMS
COUGH: 0
COLOR CHANGE: 0
NAUSEA: 0
DIARRHEA: 0
SHORTNESS OF BREATH: 0
CONSTIPATION: 0
VOICE CHANGE: 1
TROUBLE SWALLOWING: 1
RECTAL PAIN: 0
VOMITING: 0

## 2023-10-10 NOTE — PATIENT INSTRUCTIONS
Placed Hemoglobin A1C today. Discussed plan of care with Dietitian who was present for today's visit. Call back with name of medication that you were previously on to see if it's indicated and can come in tablet form as we discussed. Speech referral was placed today. If you do not hear from then within 3-5 days please contact me. You have gained 5lbs today which is great. Keep up the great work!

## 2023-10-10 NOTE — PROGRESS NOTES
Subjective:      Patient Id: Seen Emilia Banegas at the clinic at the  [facility] , for Palliative Care consult for symptom management, advance care planning, and goals of care discussion. She was accompanied to the appointment by: self. Chief Complaint   Patient presents with    Follow-up     Wants prescribed boosts to [facility] in [place]   I will mail her coupons     Andry Douglass is a 58 y.o. female referred to palliative care for symptom management, advance care planning, and goals of care conversation. Emilia Banegas has complex medical history that includes COPD, Metastatic tongue cancer, PEG tube, dysphagia, chronic diastolic heart failure, HLD, DJD, chronic hepatitis B, hypothyroidism, ICD. Recently discharged from [facility] on 8/13 where she was admitted for sepsis pneumonia. General: Patient is alert and oriented x 4. She overall has had decrease in pain. She is no longer taking her pain medications since starting Keytruda. She denies any symptoms today. She has lost some weight since last encounter. Dietitian will discuss PEG tube feedings today. She has gained weight today. Goal is to see if patient can tolerate PO intake. Functional Status: She is ambulatory independently with steady gait. Pain: She reports significant improvement of symptoms. She reports that she is no longer taking her pain medications. Patient overall looks incredibly well. Appetite: She has a peg tube, Jevity, she is tolerating all her feeds. Getting 3 to 4 cans in a day. She denies any nausea/vomiting. She is 100lbs. Which is a 5 pound increase. Consulted Dietitian to be present during encounter. Mood: Her mood has improved she is tolerating Zoloft. She does not want to continue with Mirtazapine. Sleep: Denies any sleep disturbances. Skin: Notable raised lesion on right side of neck that has erythema has significantly decreased.      GI/:Denies any urinary concerns, constipation or nausea or

## 2023-10-12 ENCOUNTER — CLINICAL DOCUMENTATION (OUTPATIENT)
Dept: NUTRITION | Age: 63
End: 2023-10-12

## 2023-10-12 NOTE — PROGRESS NOTES
Late Entry from 10/10/2023    Pt with gain of 4.8# x 3 wks. (100# today)  Pt seen with Palliative Care today. Pt reports she started taking up to 8 cans of Jevity 1.5 per day (2840 kcals/d) and was not gaining wt. Was taking 2 cans per feeding. Pt reports adjusting to 2 cans of Jevity 1.5 and 2-3 cans of Boost VHC ([address] kcals) and has been feeling better. Pt with question regarding DM, states she was once prescribed meds for DM but never starting taking them. Palliative care ordering HgA1c today. Will await labs results prior to changing TF to ensure appropriate TF is ordered. Pt does have several cases of Jevity 1.5. Reviewed goal of 5 cans Jevity 1.5 per day for 1775 kcals, explained CHO balance. Pt states adjustment to adding Boost VHC has caused her to not have diarrhea. 10/12/23 HgA1c not yet available. Will continue to follow for results.

## 2023-10-13 DIAGNOSIS — R73.03 PRE-DIABETES: ICD-10-CM

## 2023-10-13 LAB — HBA1C MFR BLD: 5.6 % (ref 4.8–5.9)

## 2023-10-14 ASSESSMENT — ENCOUNTER SYMPTOMS
RECTAL PAIN: 0
COLOR CHANGE: 0
SHORTNESS OF BREATH: 0
VOMITING: 0
TROUBLE SWALLOWING: 1
VOICE CHANGE: 1
NAUSEA: 0
COUGH: 0
CONSTIPATION: 0
DIARRHEA: 0

## 2023-10-14 NOTE — PROGRESS NOTES
Subjective:      Patient Id: Seen Christina Payne at the clinic at the  Palliative Care office in [facility] for initial [address] consult for symptom management, advance care planning, and goals of care discussion. She was accompanied to the appointment by: self. No chief complaint on file. Lilliana Zuniga is a 58 y.o. female referred to palliative care for symptom management, advance care planning, and goals of care conversation. Christina Payne has complex medical history that includes COPD, Metastatic tongue cancer, PEG tube, dysphagia, chronic diastolic heart failure, HLD, DJD, chronic hepatitis B, hypothyroidism, ICD. Recently discharged from [facility] on 8/13 where she was admitted for sepsis pneumonia. General: Patient is alert and oriented x 4. She overall has had decrease in pain. She is no longer taking her pain medications since starting Keytruda. She reports that she was supposed to get treatment today but she was late. Did get it approved for patient to have treatment following this visit. She denies any symptoms today. She has lost some weight since last encounter. Dietitian will discuss PEG tube feedings today. Functional Status: She is ambulatory independently with steady gait. Pain: She reports significant improvement of symptoms. She reports that she is no longer taking her pain medications. Patient overall looks incredibly well compared to last encounter. Appetite: She has a peg tube, Jevity, she is tolerating all her feeds. Getting 3 to 4 cans in a day. She denies any nausea/vomiting. She is 95lbs 9 oz today. Which is a 10 pound loss. Consulted Dietitian to be present during encounter. Mood: Her mood has improved she is tolerating Zoloft. Sleep: Denies any sleep disturbances. Skin: Notable raised lesion on right side of neck that has erythema has significantly decreased. GI/:Denies any urinary concerns, constipation or nausea or vomiting.      I have personally

## 2023-10-16 ENCOUNTER — TELEPHONE (OUTPATIENT)
Dept: PALLATIVE CARE | Age: 63
End: 2023-10-16

## 2023-10-19 ENCOUNTER — CLINICAL DOCUMENTATION (OUTPATIENT)
Dept: NUTRITION | Age: 63
End: 2023-10-19

## 2023-10-19 NOTE — PROGRESS NOTES
Call to pt  and Josselin Villalobos regarding re-ordering for pt. Pt is able to re-order at this time once new TF orders are placed. Labs noted, HgA1c WNL    Current wt:  100#, est nutritional needs:  35 kcal/kg : 3967 kcals/g for wt maintenance. Recommend 500+ calories additional per day for day for wt gain as tolerated. Recommend:  3 cans Boost VHC and 2 cans Jevity 1.5 daily via bolus feeds. New TF to provide:  2300 kcals, 96.2 g protien and 837 ml free water. Recommend one can TF per feeding, flush with 50 ml before and after each feeding to provide additional 500 ml free water per day. Pt to flush with 240 ml water between feedings four times per day. To provide 2300 ml free water.      Order signed by Dr Federico Coleman and faxed to Juan R at  [phone]

## 2023-10-24 ENCOUNTER — OFFICE VISIT (OUTPATIENT)
Dept: PALLATIVE CARE | Age: 63
End: 2023-10-24
Payer: COMMERCIAL

## 2023-10-24 ENCOUNTER — CLINICAL DOCUMENTATION (OUTPATIENT)
Dept: NUTRITION | Age: 63
End: 2023-10-24

## 2023-10-24 VITALS
DIASTOLIC BLOOD PRESSURE: 90 MMHG | SYSTOLIC BLOOD PRESSURE: 158 MMHG | WEIGHT: 100 LBS | OXYGEN SATURATION: 98 % | BODY MASS INDEX: 17.16 KG/M2 | HEART RATE: 98 BPM | RESPIRATION RATE: 18 BRPM

## 2023-10-24 DIAGNOSIS — M17.11 OSTEOARTHRITIS OF RIGHT KNEE, UNSPECIFIED OSTEOARTHRITIS TYPE: Primary | ICD-10-CM

## 2023-10-24 DIAGNOSIS — Z51.5 PALLIATIVE CARE ENCOUNTER: ICD-10-CM

## 2023-10-24 DIAGNOSIS — Z93.1 STATUS POST INSERTION OF PERCUTANEOUS ENDOSCOPIC GASTROSTOMY (PEG) TUBE (HCC): ICD-10-CM

## 2023-10-24 DIAGNOSIS — Z71.89 GOALS OF CARE, COUNSELING/DISCUSSION: ICD-10-CM

## 2023-10-24 DIAGNOSIS — R63.4 UNINTENTIONAL WEIGHT LOSS: ICD-10-CM

## 2023-10-24 DIAGNOSIS — C79.89 METASTATIC SQUAMOUS CELL CARCINOMA TO TONGUE (HCC): ICD-10-CM

## 2023-10-24 DIAGNOSIS — E44.0 PROTEIN-CALORIE MALNUTRITION, MODERATE (HCC): ICD-10-CM

## 2023-10-24 PROCEDURE — 99215 OFFICE O/P EST HI 40 MIN: CPT

## 2023-10-24 RX ORDER — TRAMADOL HYDROCHLORIDE 50 MG/1
50 TABLET ORAL EVERY 6 HOURS PRN
Qty: 28 TABLET | Refills: 0 | Status: SHIPPED | OUTPATIENT
Start: 2023-10-24 | End: 2023-10-31 | Stop reason: SDUPTHER

## 2023-10-24 ASSESSMENT — ENCOUNTER SYMPTOMS
VOMITING: 0
VOICE CHANGE: 1
SHORTNESS OF BREATH: 0
TROUBLE SWALLOWING: 1
RECTAL PAIN: 0
COLOR CHANGE: 0
COUGH: 0
NAUSEA: 0
DIARRHEA: 0
CONSTIPATION: 0

## 2023-10-24 NOTE — PROGRESS NOTES
Subjective:      Patient Id: Seen Ismael Sharma at the clinic at the  [address] [place] [address] , for Palliative Care consult for symptom management, advance care planning, and goals of care discussion. She was accompanied to the appointment by: self. No chief complaint on file. Elijah Crespo is a 58 y.o. female referred to palliative care for symptom management, advance care planning, and goals of care conversation. Ismael Sharma has complex medical history that includes COPD, Metastatic tongue cancer, PEG tube, dysphagia, chronic diastolic heart failure, HLD, DJD, chronic hepatitis B, hypothyroidism, ICD. Recently discharged from [facility] on 8/13 where she was admitted for sepsis pneumonia. General: Patient is alert and oriented x 4. She overall has had decrease in pain. She is no longer taking her pain medications since starting Keytruda. She denies any symptoms today. She has lost some weight since last encounter. Dietitian will discuss PEG tube feedings today. She has gained weight today. Goal is to see if patient can tolerate PO intake. Functional Status: She is ambulatory independently with steady gait. Pain: She reports significant improvement of symptoms. She reports that she is no longer taking her pain medications. Patient overall looks incredibly well. Appetite: She has a peg tube, Jevity, she is tolerating all her feeds. Getting 3 to 4 cans in a day. She denies any nausea/vomiting. She is 100lbs. Which is a 5 pound increase. Consulted Dietitian to be present during encounter. Mood: Her mood has improved she is tolerating Zoloft. She does not want to continue with Mirtazapine. Sleep: Denies any sleep disturbances. Skin: Notable raised lesion on right side of neck that has erythema has significantly decreased. GI/:Denies any urinary concerns, constipation or nausea or vomiting.      I have personally reviewed the patient's most recent and available provider notes, lab work and

## 2023-10-24 NOTE — PROGRESS NOTES
Increase fluid/edema   Severe  Decrease subcutaneous fat    Decrease muscle mass     Increase fluid/edema        Richard Waters, RD, LD

## 2023-10-27 ENCOUNTER — OFFICE VISIT (OUTPATIENT)
Dept: CARDIOLOGY CLINIC | Age: 63
End: 2023-10-27

## 2023-10-27 VITALS
HEIGHT: 64 IN | OXYGEN SATURATION: 96 % | WEIGHT: 102.8 LBS | SYSTOLIC BLOOD PRESSURE: 102 MMHG | DIASTOLIC BLOOD PRESSURE: 62 MMHG | HEART RATE: 108 BPM | BODY MASS INDEX: 17.55 KG/M2

## 2023-10-27 DIAGNOSIS — Z95.810 AICD (AUTOMATIC CARDIOVERTER/DEFIBRILLATOR) PRESENT: Primary | ICD-10-CM

## 2023-10-27 DIAGNOSIS — E78.2 MIXED HYPERLIPIDEMIA: ICD-10-CM

## 2023-10-27 DIAGNOSIS — B33.24 VIRAL CARDIOMYOPATHY (HCC): ICD-10-CM

## 2023-10-27 DIAGNOSIS — E78.00 HYPERCHOLESTEROLEMIA: ICD-10-CM

## 2023-10-27 RX ORDER — ATORVASTATIN CALCIUM 40 MG/1
40 TABLET, FILM COATED ORAL DAILY
COMMUNITY
Start: 2023-10-07

## 2023-10-27 NOTE — PROGRESS NOTES
to nearby emergency room immediately if symptoms get worse or do not improve. Thank you for allowing me to participate in the care of your patient, please don't hesitate to contact me if you have any further questions.

## 2023-10-31 ENCOUNTER — CLINICAL DOCUMENTATION (OUTPATIENT)
Dept: NUTRITION | Age: 63
End: 2023-10-31

## 2023-10-31 ENCOUNTER — OFFICE VISIT (OUTPATIENT)
Dept: PALLATIVE CARE | Age: 63
End: 2023-10-31

## 2023-10-31 VITALS
TEMPERATURE: 98 F | HEART RATE: 105 BPM | RESPIRATION RATE: 20 BRPM | OXYGEN SATURATION: 99 % | BODY MASS INDEX: 17.78 KG/M2 | SYSTOLIC BLOOD PRESSURE: 129 MMHG | DIASTOLIC BLOOD PRESSURE: 87 MMHG | WEIGHT: 103.6 LBS

## 2023-10-31 DIAGNOSIS — M17.12 PRIMARY OSTEOARTHRITIS OF LEFT KNEE: Primary | ICD-10-CM

## 2023-10-31 DIAGNOSIS — Z51.5 PALLIATIVE CARE ENCOUNTER: ICD-10-CM

## 2023-10-31 DIAGNOSIS — Z93.1 STATUS POST INSERTION OF PERCUTANEOUS ENDOSCOPIC GASTROSTOMY (PEG) TUBE (HCC): ICD-10-CM

## 2023-10-31 DIAGNOSIS — Z71.89 GOALS OF CARE, COUNSELING/DISCUSSION: ICD-10-CM

## 2023-10-31 DIAGNOSIS — R63.4 UNINTENTIONAL WEIGHT LOSS: ICD-10-CM

## 2023-10-31 DIAGNOSIS — C02.9 MALIGNANT NEOPLASM OF TONGUE (HCC): ICD-10-CM

## 2023-10-31 DIAGNOSIS — E44.0 MODERATE PROTEIN-CALORIE MALNUTRITION (HCC): ICD-10-CM

## 2023-10-31 RX ORDER — TRAMADOL HYDROCHLORIDE 50 MG/1
50 TABLET ORAL EVERY 6 HOURS PRN
Qty: 28 TABLET | Refills: 0 | Status: SHIPPED | OUTPATIENT
Start: 2023-10-31 | End: 2023-11-07

## 2023-10-31 ASSESSMENT — ENCOUNTER SYMPTOMS
TROUBLE SWALLOWING: 1
DIARRHEA: 0
SHORTNESS OF BREATH: 0
NAUSEA: 0
RECTAL PAIN: 0
CONSTIPATION: 0
COUGH: 0
COLOR CHANGE: 0
VOMITING: 0
VOICE CHANGE: 1

## 2023-10-31 ASSESSMENT — PATIENT HEALTH QUESTIONNAIRE - PHQ9
SUM OF ALL RESPONSES TO PHQ QUESTIONS 1-9: 1
SUM OF ALL RESPONSES TO PHQ9 QUESTIONS 1 & 2: 1
2. FEELING DOWN, DEPRESSED OR HOPELESS: 1
SUM OF ALL RESPONSES TO PHQ QUESTIONS 1-9: 1
1. LITTLE INTEREST OR PLEASURE IN DOING THINGS: 0
SUM OF ALL RESPONSES TO PHQ QUESTIONS 1-9: 1
SUM OF ALL RESPONSES TO PHQ QUESTIONS 1-9: 1

## 2023-10-31 NOTE — PROGRESS NOTES
Subjective:      Patient Id: Seen Rita Morelos at the clinic at the  [facility] , for Palliative Carefor symptom management, advance care planning, and goals of care discussion. She was accompanied to the appointment by: self. Chief Complaint   Patient presents with    Follow-up    Pain     Right knee pain 10/10           Enoch Hickman is a 58 y.o. female referred to palliative care for symptom management, advance care planning, and goals of care conversation. Rita Morelos has complex medical history that includes COPD, Metastatic tongue cancer, PEG tube, dysphagia, chronic diastolic heart failure, HLD, DJD, chronic hepatitis B, hypothyroidism, ICD. Recently discharged from [facility] on 8/13 where she was admitted for sepsis pneumonia. General: Patient is alert and oriented x 4. C/o left knee pain, no injury. Obvious swelling noted. Functional Status: She is ambulatory independently with steady gait. Pain: She reports significant improvement of cancer related pain/symptoms. She reports that she is no longer taking her pain medications. Her left knee is causing her pain with obvious swelling noted. Appetite: She has a peg tube, Jevity, she is tolerating all her feeds. Getting 3 to 4 cans in a day. She denies any nausea/vomiting. She is 103lbs. Which is a 8 pound increase. Consulted Dietitian to be present during encounter. Mood: Her mood has improved she is tolerating Zoloft. She has attempted Remeron but did not wish to continue due to insomnia when taking it. Sleep: Denies any sleep disturbances. Skin: Notable raised lesion on right side of neck that has erythema has significantly diminished. GI/:Denies any urinary concerns, constipation or nausea or vomiting. I have personally reviewed the patient's most recent and available provider notes, lab work and imaging.       Past Medical History:   Diagnosis Date    Anxiety     Bilateral carpal tunnel syndrome JAN 2011    Cancer [facility])

## 2023-10-31 NOTE — PROGRESS NOTES
Pt states she has not received her new TF as ordered. This RD called Casimiro Sweetor who states still waiting for prior authorization, this can take up to 2 wks. Notified pt. Pt is rescheduling her Speech therapy. Wt gain of 1#. Pt states no new questions or concerns at this time.

## 2023-11-01 ENCOUNTER — TELEPHONE (OUTPATIENT)
Dept: ORTHOPEDIC SURGERY | Facility: CLINIC | Age: 63
End: 2023-11-01
Payer: COMMERCIAL

## 2023-11-01 ENCOUNTER — LAB REQUISITION (OUTPATIENT)
Dept: LAB | Facility: HOSPITAL | Age: 63
End: 2023-11-01
Payer: COMMERCIAL

## 2023-11-01 ENCOUNTER — OFFICE VISIT (OUTPATIENT)
Dept: ORTHOPEDIC SURGERY | Facility: CLINIC | Age: 63
End: 2023-11-01
Payer: COMMERCIAL

## 2023-11-01 ENCOUNTER — ANCILLARY PROCEDURE (OUTPATIENT)
Dept: RADIOLOGY | Facility: CLINIC | Age: 63
End: 2023-11-01
Payer: COMMERCIAL

## 2023-11-01 VITALS — WEIGHT: 106 LBS | BODY MASS INDEX: 18.1 KG/M2 | HEIGHT: 64 IN

## 2023-11-01 DIAGNOSIS — M25.461 EFFUSION, RIGHT KNEE: ICD-10-CM

## 2023-11-01 DIAGNOSIS — G89.29 CHRONIC PAIN OF RIGHT KNEE: ICD-10-CM

## 2023-11-01 DIAGNOSIS — M17.11 PRIMARY OSTEOARTHRITIS OF RIGHT KNEE: ICD-10-CM

## 2023-11-01 DIAGNOSIS — M25.561 CHRONIC PAIN OF RIGHT KNEE: ICD-10-CM

## 2023-11-01 DIAGNOSIS — M25.461 EFFUSION OF RIGHT KNEE: Primary | ICD-10-CM

## 2023-11-01 LAB
CLARITY FLD: ABNORMAL
COLOR FLD: YELLOW
RBC # FLD AUTO: 1000 /UL
WBC # FLD AUTO: ABNORMAL /UL

## 2023-11-01 PROCEDURE — 3074F SYST BP LT 130 MM HG: CPT | Performed by: INTERNAL MEDICINE

## 2023-11-01 PROCEDURE — 89060 EXAM SYNOVIAL FLUID CRYSTALS: CPT | Performed by: PATHOLOGY

## 2023-11-01 PROCEDURE — 87070 CULTURE OTHR SPECIMN AEROBIC: CPT

## 2023-11-01 PROCEDURE — 73564 X-RAY EXAM KNEE 4 OR MORE: CPT | Mod: RT

## 2023-11-01 PROCEDURE — 99203 OFFICE O/P NEW LOW 30 MIN: CPT | Performed by: INTERNAL MEDICINE

## 2023-11-01 PROCEDURE — 89060 EXAM SYNOVIAL FLUID CRYSTALS: CPT

## 2023-11-01 PROCEDURE — 2500000004 HC RX 250 GENERAL PHARMACY W/ HCPCS (ALT 636 FOR OP/ED): Performed by: INTERNAL MEDICINE

## 2023-11-01 PROCEDURE — 87075 CULTR BACTERIA EXCEPT BLOOD: CPT

## 2023-11-01 PROCEDURE — 87102 FUNGUS ISOLATION CULTURE: CPT

## 2023-11-01 PROCEDURE — 20610 DRAIN/INJ JOINT/BURSA W/O US: CPT | Performed by: INTERNAL MEDICINE

## 2023-11-01 PROCEDURE — 3078F DIAST BP <80 MM HG: CPT | Performed by: INTERNAL MEDICINE

## 2023-11-01 PROCEDURE — 3008F BODY MASS INDEX DOCD: CPT | Performed by: INTERNAL MEDICINE

## 2023-11-01 PROCEDURE — 87206 SMEAR FLUORESCENT/ACID STAI: CPT

## 2023-11-01 PROCEDURE — 2500000005 HC RX 250 GENERAL PHARMACY W/O HCPCS: Performed by: INTERNAL MEDICINE

## 2023-11-01 PROCEDURE — 99213 OFFICE O/P EST LOW 20 MIN: CPT | Performed by: INTERNAL MEDICINE

## 2023-11-01 PROCEDURE — 87205 SMEAR GRAM STAIN: CPT

## 2023-11-01 PROCEDURE — 73564 X-RAY EXAM KNEE 4 OR MORE: CPT | Mod: RIGHT SIDE | Performed by: INTERNAL MEDICINE

## 2023-11-01 PROCEDURE — 89050 BODY FLUID CELL COUNT: CPT

## 2023-11-01 PROCEDURE — 1036F TOBACCO NON-USER: CPT | Performed by: INTERNAL MEDICINE

## 2023-11-01 PROCEDURE — 89060 EXAM SYNOVIAL FLUID CRYSTALS: CPT | Performed by: INTERNAL MEDICINE

## 2023-11-01 RX ORDER — LIDOCAINE HYDROCHLORIDE 10 MG/ML
6 INJECTION INFILTRATION; PERINEURAL
Status: COMPLETED | OUTPATIENT
Start: 2023-11-01 | End: 2023-11-01

## 2023-11-01 RX ORDER — BETAMETHASONE SODIUM PHOSPHATE AND BETAMETHASONE ACETATE 3; 3 MG/ML; MG/ML
2 INJECTION, SUSPENSION INTRA-ARTICULAR; INTRALESIONAL; INTRAMUSCULAR; SOFT TISSUE
Status: COMPLETED | OUTPATIENT
Start: 2023-11-01 | End: 2023-11-01

## 2023-11-01 RX ADMIN — BETAMETHASONE ACETATE AND BETAMETHASONE SODIUM PHOSPHATE 2 ML: 3; 3 INJECTION, SUSPENSION INTRA-ARTICULAR; INTRALESIONAL; INTRAMUSCULAR; SOFT TISSUE at 17:37

## 2023-11-01 RX ADMIN — LIDOCAINE HYDROCHLORIDE 6 ML: 10 INJECTION, SOLUTION INFILTRATION; PERINEURAL at 17:37

## 2023-11-01 NOTE — PROGRESS NOTES
Acute Injury New Patient Visit    CC:   Chief Complaint   Patient presents with    Right Knee - Pain     Rt Knee Pain - X-Rays Today - Chronic Swelling       HPI: Jessica is a 63 y.o. female presents today for right knee pain and swelling.  Has known arthritis in the right knee.  Has a past medical history significant for malignant neoplasm of the tongue currently undergoing treatment with chemotherapy.  And is here for right knee pain and swelling.  No fevers no chills, no recent infection.        Review of Systems   GENERAL: Negative for malaise, significant weight loss, fever  MUSCULOSKELETAL: See HPI  NEURO:  Negative for numbness / tingling     Past Medical History  Past Medical History:   Diagnosis Date    History of mammogram 2022    cat 2       Medication review  Medication Documentation Review Audit       Reviewed by Gretchen Hensley MD (Physician) on 23 at 1604      Medication Order Taking? Sig Documenting Provider Last Dose Status   amitriptyline (Elavil) 100 mg tablet [id number]  Take 1 tablet (100 mg) by mouth once daily at bedtime. Historical MD Michael   23 2359   aspirin 325 mg tablet [id number]  Take 1 tablet (325 mg) by mouth once daily. Historical Provider, MD  Active   atorvastatin (Lipitor) 40 mg tablet [id number] Yes Take 1 tablet (40 mg) by mouth once daily. Romina Rodriguez MD Taking Active     Discontinued 23 1558   chlorhexidine (Peridex) 0.12 % solution [id number] Yes RINSE MOUTH WITH 15ML (1 CAPFUL) FOR 30 SECONDS THREE TIMES EACH DAY. EXPECTORATE AFTER RINSING, DO NOT SWALLOW Romina Rodriguez MD Taking Active    Discontinued 23 1558   folic acid (Folvite) 1 mg tablet [id number] Yes Take 1 tablet (1 mg) by mouth once daily. Historical MD Michael Taking Active   HYDROcodone-acetaminophen (Norco) 5-325 mg tablet [id number] Yes Take by mouth. Romina Rodriguez MD Taking Active   levothyroxine (Synthroid, Levoxyl) 112 mcg tablet [id number] Yes Take 1  tablet (112 mcg) by mouth once daily in the morning. Take before meals. Historical Provider, MD Taking Active     Discontinued 23    Discontinued 23   oxyCODONE (Roxicodone) 5 mg/5 mL solution [id number] Yes Take 15 mLs by mouth every 4 hours as needed for Pain for up to 7 days. Max Daily Amount 90 mg Historical Provider, MD Taking Active    Discontinued 23   sertraline (Zoloft) 100 mg tablet [id number] Yes Take 1 tablet (100 mg) by mouth 2 times a day. Historical Provider, MD Taking Active   Spiriva with HandiHaler 18 mcg inhalation capsule [id number] Yes Place 1 capsule (18 mcg) into inhaler and inhale once daily. Historical Provider, MD Taking Active     Discontinued 23     Discontinued 23                    Allergies  Allergies   Allergen Reactions    Penicillins Anaphylaxis and Swelling    Meperidine Rash       Social History  Social History     Socioeconomic History    Marital status:      Spouse name: Not on file    Number of children: Not on file    Years of education: Not on file    Highest education level: Not on file   Occupational History    Not on file   Tobacco Use    Smoking status: Former     Types: Cigarettes     Quit date:      Years since quittin.8    Smokeless tobacco: Never   Substance and Sexual Activity    Alcohol use: Never    Drug use: Never    Sexual activity: Not on file   Other Topics Concern    Not on file   Social History Narrative    Not on file     Social Determinants of Health     Financial Resource Strain: Not on file   Food Insecurity: Not on file   Transportation Needs: Not on file   Physical Activity: Not on file   Stress: Not on file   Social Connections: Not on file   Intimate Partner Violence: Not on file   Housing Stability: Not on file       Surgical History  Past Surgical History:   Procedure Laterality Date    CARDIAC DEFIBRILLATOR PLACEMENT  2015     SECTION, CLASSIC  1983    x 4 last one       CT GUIDED PERCUTANEOUS BIOPSY LUNG  2023    CT GUIDED PERCUTANEOUS BIOPSY LUNG 2023 PAR CT    GASTROSTOMY TUBE CHANGE      LUMBAR FUSION  2015    LYMPHADENECTOMY  2005    CA-L neck and r neck    STOMACH SURGERY  2023    has feeding tube    TONGUE SURGERY      dr mason  tongue removed       Physical Exam:  GENERAL:  Patient is awake, alert, and oriented to person place and time.  Patient appears well nourished and well kept.  Affect Calm, Not Acutely Distressed.  HEENT:  Normocephalic, Atraumatic, EOMI  CARDIOVASCULAR:  Hemodynamically stable.  RESPIRATORY:  Normal respirations with unlabored breathing.  Extremity: Right knee shows 2+ effusion of the right knee.  She can flex her right knee to 120 degrees and full extension at 0 degrees.  Negative valgus and varus tests.  Pain over the medial and lateral joint line.  Patellar and quad mechanism intact.  She is able to do a satisfactory straight leg test.      Diagnostics: X-rays reviewed      Procedure: L Inj/Asp: R knee on 2023 5:37 PM  Indications: pain  Details: 18 G needle, lateral approach  Medications: 2 mL betamethasone acet,sod phos 6 mg/mL; 6 mL lidocaine 10 mg/mL (1 %)  Aspirate: 55 mL cloudy and yellow; sent for lab analysis  Outcome: tolerated well, no immediate complications  Procedure, treatment alternatives, risks and benefits explained, specific risks discussed. Consent was given by the patient. Immediately prior to procedure a time out was called to verify the correct patient, procedure, equipment, support staff and site/side marked as required. Patient was prepped and draped in the usual sterile fashion.             Assessment: Advanced end-stage arthritis of the right knee    Plan: Jessica presents today for right knee pain and swelling secondary to advanced arthritic changes of the lateral compartment of the right knee which is almost bone-on-bone.  She is not a surgical candidate due to her underlying  malignant neoplasm of the tongue.  We did offer her an aspiration and coordination of the right knee after getting approval from her oncologist today.  We aspirated about 55 cc of slightly turbid straw-colored fluid and afterward injected cortisone lidocaine.  Immediately after the aspiration and cortisone injection she got significant relief.  We discussed the possibility of future viscosupplementation injection.  We will get her measured for a lateral unloading OA brace, to prevent a recurrence.  She will follow-up in 3 to 4 weeks and reevaluate her knee.    The patient was prescribed a lateral  brace for medial compartment DJD.  Physical examination positive for mild laxity with valgus stress.  The patient is ambulatory with or without aid: But, has weakness, instability and/or deformity of their left knee which requires stabilization from this orthosis to improve their function.    Orders Placed This Encounter    Osteoarthritis Knee Brace, Lateral, Adjustable    Fungal Culture/Smear    Sterile Fluid Culture/Smear    XR knee right 4+ views    Crystal Identification and Pathologist Review Synovial Fluid    Body Fluid Cell Count      At the conclusion of the visit there were no further questions by the patient/family regarding their plan of care.  Patient was instructed to call or return with any issues, questions, or concerns regarding their injury and/or treatment plan described above.     11/01/23 at 5:33 PM - Parvin Willson MD    Office: [phone]    This note was prepared using voice recognition software.  The details of this note are correct and have been reviewed, and corrected to the best of my ability.  Some grammatical errors may persist related to the Dragon software.

## 2023-11-02 ENCOUNTER — TELEPHONE (OUTPATIENT)
Dept: ORTHOPEDIC SURGERY | Facility: CLINIC | Age: 63
End: 2023-11-02
Payer: COMMERCIAL

## 2023-11-02 LAB
CRYSTALS FLD MICRO: ABNORMAL
PATH REVIEW-CRYSTALS: NORMAL

## 2023-11-04 LAB
BACTERIA FLD CULT: NORMAL
GRAM STN SPEC: NORMAL
GRAM STN SPEC: NORMAL

## 2023-11-09 ENCOUNTER — HOSPITAL ENCOUNTER (OUTPATIENT)
Dept: SPEECH THERAPY | Age: 63
Setting detail: THERAPIES SERIES
Discharge: HOME | End: 2023-11-09
Payer: COMMERCIAL

## 2023-11-09 PROCEDURE — 92610 EVALUATE SWALLOWING FUNCTION: CPT

## 2023-11-09 NOTE — PROGRESS NOTES
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     Patient Active Problem List    Diagnosis Date Noted    Neoplasm related pain 2023    Neck pain 2023    Palliative care encounter 2023    Goals of care, counseling/discussion 2023    Full code status 2023    Advanced care planning/counseling discussion 2023    Anxiety about health 2023    Pneumonia of left lung due to infectious organism 2023    Community acquired pneumonia of left upper lobe of lung 2023    Acute respiratory failure with hypoxia ([address]) 2023    Chronic left shoulder pain 2021    Subacromial impingement of left shoulder 2021    Subacromial bursitis of left shoulder joint     Diastolic dysfunction     ICD (implantable cardioverter-defibrillator) battery depletion     Hypercholesterolemia 2019    Premature osteoporosis 2017    Cardiomyopathy ([address]) 2015    Internal derangement of right knee 2014    Lumbar radiculitis 2014    Mixed hyperlipidemia 2013    Insomnia 2012    Itching 2012    Osteoarthritis 2012    Reflux esophagitis 2012    COPD (chronic obstructive pulmonary disease) ([address])     Hepatitis C virus infection     Hypothyroidism      Past Medical History:   Diagnosis Date    Anxiety     Bilateral carpal tunnel syndrome 2011    Cancer

## 2023-11-14 ENCOUNTER — HOSPITAL ENCOUNTER (OUTPATIENT)
Dept: SPEECH THERAPY | Age: 63
Setting detail: THERAPIES SERIES
Discharge: HOME | End: 2023-11-14
Payer: COMMERCIAL

## 2023-11-14 PROCEDURE — 92526 ORAL FUNCTION THERAPY: CPT

## 2023-11-14 NOTE — PROGRESS NOTES
[address] Outpatient  Speech Language Pathology  Adult Daily Note    lOamide Da Silva  : 1960  [x]   confirmed      Date: 2023    Visit Information:  Visit Information  SLP Insurance Information: [employer]  Total # of Visits Approved: 24  Total # of Visits to Date: 1  Timeframe Approved From[de-identified] 23  Timeframe Approved To[de-identified] 24  No Show: 0  Canceled Appointment: 0       Plan of care signed (Y/N):     No  Faxed    Certification Period: 23-23  Plan of Care Visit #1      Interventions used this date:  Dysphagia Treatment    Subjective:  Pt arrived on time to scheduled session. Pt reports completing stretches that were given at evaluation at home. Pt apologized that she forgot the folder. SLP provided reminder to bring to next session. Pt reports ongoing swelling of the knee due to arthritis. Pt reports she will be getting knee brace soon to help. No other medial changes or pain. Visual feedback given during all stretches and exercises. Behavior:  Alert, Cooperative, Pleasant, and Motivated       Objective/Assessment:   Patient progressing towards goals:  Goal 1: SLP to continue to assess need for MBS study in order to more objectively assess pharyngeal phase of swallow and determine least restrictive diet consistency. Not recommended this date. Goal 2: Pt will complete oral motor ROM and strengthening exercises x10 each, given cues as needed, in order to strengthen lingual/labial/buccal musculature to promote safety and efficiency of oral phase of swallow and decrease risk for pocketing. Pt completed x10 lingual lateralization with good effort. Difficulty with ROM on R side.    Goal 3: Pt will complete lingual exercises that promote anterior to posterior propulsion of bolus and improve tongue base retraction x10 each,  in order to strengthen the muscles of the swallow to decrease risk of aspiration and  to increase ability to safely handle the least restrictive

## 2023-11-20 LAB
FUNGUS SPEC CULT: NORMAL
FUNGUS SPEC FUNGUS STN: NORMAL

## 2023-11-21 ENCOUNTER — OFFICE VISIT (OUTPATIENT)
Dept: PALLATIVE CARE | Age: 63
End: 2023-11-21
Payer: COMMERCIAL

## 2023-11-21 VITALS
WEIGHT: 95.6 LBS | HEART RATE: 126 BPM | BODY MASS INDEX: 16.41 KG/M2 | SYSTOLIC BLOOD PRESSURE: 97 MMHG | TEMPERATURE: 97.8 F | DIASTOLIC BLOOD PRESSURE: 71 MMHG | OXYGEN SATURATION: 96 % | RESPIRATION RATE: 17 BRPM

## 2023-11-21 DIAGNOSIS — R22.1 NODULE OF NECK: Primary | ICD-10-CM

## 2023-11-21 DIAGNOSIS — Z71.89 GOALS OF CARE, COUNSELING/DISCUSSION: ICD-10-CM

## 2023-11-21 DIAGNOSIS — E44.0 MODERATE PROTEIN-CALORIE MALNUTRITION (HCC): ICD-10-CM

## 2023-11-21 DIAGNOSIS — C02.9 MALIGNANT NEOPLASM OF TONGUE (HCC): ICD-10-CM

## 2023-11-21 DIAGNOSIS — Z51.5 PALLIATIVE CARE ENCOUNTER: ICD-10-CM

## 2023-11-21 DIAGNOSIS — Z93.1 STATUS POST INSERTION OF PERCUTANEOUS ENDOSCOPIC GASTROSTOMY (PEG) TUBE (HCC): ICD-10-CM

## 2023-11-21 DIAGNOSIS — R63.4 UNINTENTIONAL WEIGHT LOSS: ICD-10-CM

## 2023-11-21 PROCEDURE — G8484 FLU IMMUNIZE NO ADMIN: HCPCS

## 2023-11-21 PROCEDURE — G8419 CALC BMI OUT NRM PARAM NOF/U: HCPCS

## 2023-11-21 PROCEDURE — 1036F TOBACCO NON-USER: CPT

## 2023-11-21 PROCEDURE — G8427 DOCREV CUR MEDS BY ELIG CLIN: HCPCS

## 2023-11-21 PROCEDURE — 99215 OFFICE O/P EST HI 40 MIN: CPT

## 2023-11-21 PROCEDURE — 3017F COLORECTAL CA SCREEN DOC REV: CPT

## 2023-11-21 RX ORDER — TRAMADOL HYDROCHLORIDE 50 MG/1
50 TABLET ORAL EVERY 6 HOURS PRN
Qty: 120 TABLET | Refills: 0 | Status: SHIPPED | OUTPATIENT
Start: 2023-11-21 | End: 2023-12-21

## 2023-11-21 ASSESSMENT — PATIENT HEALTH QUESTIONNAIRE - PHQ9
3. TROUBLE FALLING OR STAYING ASLEEP: 0
5. POOR APPETITE OR OVEREATING: 1
10. IF YOU CHECKED OFF ANY PROBLEMS, HOW DIFFICULT HAVE THESE PROBLEMS MADE IT FOR YOU TO DO YOUR WORK, TAKE CARE OF THINGS AT HOME, OR GET ALONG WITH OTHER PEOPLE: 0
SUM OF ALL RESPONSES TO PHQ QUESTIONS 1-9: 5
4. FEELING TIRED OR HAVING LITTLE ENERGY: 1
2. FEELING DOWN, DEPRESSED OR HOPELESS: 2
1. LITTLE INTEREST OR PLEASURE IN DOING THINGS: 1
SUM OF ALL RESPONSES TO PHQ QUESTIONS 1-9: 5
SUM OF ALL RESPONSES TO PHQ QUESTIONS 1-9: 5
9. THOUGHTS THAT YOU WOULD BE BETTER OFF DEAD, OR OF HURTING YOURSELF: 0
SUM OF ALL RESPONSES TO PHQ QUESTIONS 1-9: 5
6. FEELING BAD ABOUT YOURSELF - OR THAT YOU ARE A FAILURE OR HAVE LET YOURSELF OR YOUR FAMILY DOWN: 0
SUM OF ALL RESPONSES TO PHQ9 QUESTIONS 1 & 2: 3
8. MOVING OR SPEAKING SO SLOWLY THAT OTHER PEOPLE COULD HAVE NOTICED. OR THE OPPOSITE, BEING SO FIGETY OR RESTLESS THAT YOU HAVE BEEN MOVING AROUND A LOT MORE THAN USUAL: 0
7. TROUBLE CONCENTRATING ON THINGS, SUCH AS READING THE NEWSPAPER OR WATCHING TELEVISION: 0

## 2023-11-21 NOTE — PROGRESS NOTES
questions were answered completely. Discussed with patient/surrogate: POC, Treatment risks/benefits, and alternatives. All questions were answered. Additionally, a printed copy of the CDC medications/opioid safety informational sheet was reviewed and given to patient for reference. Opioid contract signed:Yes      Thanks for the opportunity you have allowed us to provide palliative care to [place]. We will be in touch as care progresses. Please feel free to reach out to us should you have any questions or requests. Total Time 45           Total time includes reviewing labs and tests, reviewing history, medications, and allergies, counseling patient, performing medically appropriate evaluation and examination, ordering medications, and documenting in the medical record. ALEN Hunt - CNP    Collaborating physician: Dr. Alka Salinas     Please note this report is partially produced by using speech recognition hardware. It may contain errors related to the system, including grammar, punctuation and spelling as well as words and phrases that may seem inaccurate. For any questions or concerns feel free to contact me for clarification.

## 2023-11-22 ASSESSMENT — ENCOUNTER SYMPTOMS
NAUSEA: 0
SHORTNESS OF BREATH: 0
STRIDOR: 0
VOMITING: 0
COLOR CHANGE: 0
WHEEZING: 0
VOICE CHANGE: 1

## 2023-11-28 ENCOUNTER — HOSPITAL ENCOUNTER (OUTPATIENT)
Dept: SPEECH THERAPY | Age: 63
Setting detail: THERAPIES SERIES
Discharge: HOME | End: 2023-11-28
Payer: COMMERCIAL

## 2023-11-28 ENCOUNTER — OFFICE VISIT (OUTPATIENT)
Dept: ORTHOPEDIC SURGERY | Facility: CLINIC | Age: 63
End: 2023-11-28
Payer: COMMERCIAL

## 2023-11-28 DIAGNOSIS — M17.11 PRIMARY OSTEOARTHRITIS OF RIGHT KNEE: Primary | ICD-10-CM

## 2023-11-28 PROCEDURE — 1036F TOBACCO NON-USER: CPT | Performed by: INTERNAL MEDICINE

## 2023-11-28 PROCEDURE — 3008F BODY MASS INDEX DOCD: CPT | Performed by: INTERNAL MEDICINE

## 2023-11-28 PROCEDURE — 99213 OFFICE O/P EST LOW 20 MIN: CPT | Performed by: INTERNAL MEDICINE

## 2023-11-28 PROCEDURE — 92526 ORAL FUNCTION THERAPY: CPT

## 2023-11-28 NOTE — PROGRESS NOTES
CC:   Chief Complaint   Patient presents with    Right Knee - Follow-up     Right knee pain        HPI: Jessica is a 63 y.o. female presents today for follow-up for right knee pain secondary to known arthritis and status post aspiration and cortisone injection.  She is feeling better today, but still in some pain.  But improvement since the aspiration and cortisone injection.        Review of Systems   GENERAL: Negative for malaise, significant weight loss, fever  MUSCULOSKELETAL: See HPI  NEURO:  Negative for numbness / tingling     Past Medical History  Past Medical History:   Diagnosis Date    History of mammogram 2022    cat 2       Medication review  Medication Documentation Review Audit       Reviewed by Kisha Sunshine MA (Medical Assistant) on 23 at 1302      Medication Order Taking? Sig Documenting Provider Last Dose Status   amitriptyline (Elavil) 100 mg tablet [id number]  Take 1 tablet (100 mg) by mouth once daily at bedtime. Romina Rodriguez MD   23 2359   aspirin 325 mg tablet [id number]  Take 1 tablet (325 mg) by mouth once daily. Historical MD Michael  Active   atorvastatin (Lipitor) 40 mg tablet [id number] No Take 1 tablet (40 mg) by mouth once daily. Historical MD Michael Taking Active   chlorhexidine (Peridex) 0.12 % solution [id number] No RINSE MOUTH WITH 15ML (1 CAPFUL) FOR 30 SECONDS THREE TIMES EACH DAY. EXPECTORATE AFTER RINSING, DO NOT SWALLOW Romina Rodriguez MD Taking Active   chlorhexidine (Peridex) 0.12 % solution [id number]  RINSE WITH 15 ML FOUR TIMES A DAY AS DIRECTED Ralph Dimas MD  Active   folic acid (Folvite) 1 mg tablet [id number] No Take 1 tablet (1 mg) by mouth once daily. Historical MD Michael Taking Active   HYDROcodone-acetaminophen (Norco) 5-325 mg tablet [id number] No Take by mouth. Romina Rodriguez MD Taking Active   levothyroxine (Synthroid, Levoxyl) 112 mcg tablet [id number] No Take 1 tablet (112 mcg) by mouth once daily in the morning.  Take before meals. Historical Provider, MD Taking Active   oxyCODONE (Roxicodone) 5 mg/5 mL solution [id number] No Take 15 mLs by mouth every 4 hours as needed for Pain for up to 7 days. Max Daily Amount 90 mg Historical Provider, MD Taking Active   sertraline (Zoloft) 100 mg tablet [id number]  Take 2.5 tablets (250 mg) by mouth once daily. Gretchen Hensley MD  Active   Spiriva with HandiHaler 18 mcg inhalation capsule [id number] No Place 1 capsule (18 mcg) into inhaler and inhale once daily. Historical Provider, MD Taking Active                    Allergies  Allergies   Allergen Reactions    Penicillins Anaphylaxis and Swelling    Meperidine Rash       Social History  Social History     Socioeconomic History    Marital status:      Spouse name: Not on file    Number of children: Not on file    Years of education: Not on file    Highest education level: Not on file   Occupational History    Not on file   Tobacco Use    Smoking status: Former     Types: Cigarettes     Quit date:      Years since quittin.9    Smokeless tobacco: Never   Substance and Sexual Activity    Alcohol use: Never    Drug use: Never    Sexual activity: Not on file   Other Topics Concern    Not on file   Social History Narrative    Not on file     Social Determinants of Health     Financial Resource Strain: Not on file   Food Insecurity: Not on file   Transportation Needs: Not on file   Physical Activity: Not on file   Stress: Not on file   Social Connections: Not on file   Intimate Partner Violence: Not on file   Housing Stability: Not on file       Surgical History  Past Surgical History:   Procedure Laterality Date    CARDIAC DEFIBRILLATOR PLACEMENT       SECTION, CLASSIC  1983    x 4 last one      CT GUIDED PERCUTANEOUS BIOPSY LUNG  2023    CT GUIDED PERCUTANEOUS BIOPSY LUNG 2023 PAR CT    GASTROSTOMY TUBE CHANGE      LUMBAR FUSION      LYMPHADENECTOMY  2005    CA-L neck and r neck     STOMACH SURGERY  03/2023    has feeding tube    TONGUE SURGERY  2023    dr mason 1/2 tongue removed       Physical Exam:  GENERAL:  Patient is awake, alert, and oriented to person place and time.  Patient appears well nourished and well kept.  Affect Calm, Not Acutely Distressed.  HEENT:  Normocephalic, Atraumatic, EOMI  CARDIOVASCULAR:  Hemodynamically stable.  RESPIRATORY:  Normal respirations with unlabored breathing.  Extremity: Right knee shows trace to 1+ effusion.  She can flex right knee to 120 degrees and full extension at 0 degrees.  Negative valgus stress test pain and instability with varus test.  Negative Lachman's.  Pain over the lateral joint line.  No pain over the medial joint.  Patellar and quadricep neck intact.      Diagnostics: X-rays and lab results reviewed   XR knee right 4+ views  Interpreted By:  Parvin Lobo,   STUDY:  XR KNEE RIGHT 4+ VIEWS;  ;  11/1/2023 3:11 pm      INDICATION:  Signs/Symptoms:Pain.      ACCESSION NUMBER(S):  [id number]      ORDERING CLINICIAN:  PARVIN LOBO      FINDINGS:  Right knee x-rays four views AP, lateral, tunnel and sunrise view: No  acute fractures, no dislocation. Tricompartmental degenerative joint  disease most pronounced at the lateral compartment with significant  joint space narrowing which is almost bone-on-bone. Left knee joint  effusion.          Signed by: Parvin Lobo 11/1/2023 5:53 PM  Dictation workstation:   [id number]        Procedure: None    Assessment: Advanced end-stage arthritis of the right knee    Plan: Jessica presents today for follow-up status post aspiration and cortisone injection.  She is feeling better after the injection.  But she is still having some pain discomfort.  She is not interested in any knee replacement options this time.  Her lateral unloading OA brace was approved which was get fitted for her.  We discussed the possibility of future Visco supplement injection, she would like to try the brace first she  will call the office if she like to go forward with viscosupplementation injection.    In conclusion, this patient has OA of the knee which is symptomatic causing significant morning stiffness lasting up to an hour with popping, clicking, grinding with ROM, which is worse with prolonged standing or going up/down stairs.  This affects functional activities and ADLs.  There is radiographic evidence of OA with joint space narrowing and marginal osteophyte formation.  This patient has also failed pharmacologic treatment for OA including OTC analgesics, antiinflammatory medications, as well as intraarticular corticosteroid injections.  For these reasons, I feel the patient is a candidate for viscosupplementation injections of the right knee.     No orders of the defined types were placed in this encounter.     At the conclusion of the visit there were no further questions by the patient/family regarding their plan of care.  Patient was instructed to call or return with any issues, questions, or concerns regarding their injury and/or treatment plan described above.     11/28/23 at 1:19 PM - Parvin Willson MD    Office: [phone]    This note was prepared using voice recognition software.  The details of this note are correct and have been reviewed, and corrected to the best of my ability.  Some grammatical errors may persist related to the Dragon software.

## 2023-11-28 NOTE — PROGRESS NOTES
[address] Outpatient  Speech Language Pathology  Adult Daily Note    Guanako Stevenson  : 1960  [x]   confirmed      Date: 2023    Visit Information:  Visit Information  SLP Insurance Information: [facility]  Total # of Visits Approved: 24  Total # of Visits to Date: 2  Timeframe Approved From[de-identified] 23  Timeframe Approved To[de-identified] 24  No Show: 0  Canceled Appointment: 0       Plan of care signed (Y/N):     No  Faxed    Certification Period: 23-23  Plan of Care Visit #2      Interventions used this date:  Dysphagia Treatment    Subjective:  Pt arrived on time to scheduled session. Pt reported that she had a f/u with palliative care, Magdalena Tan on  and had increasing left side swelling below the jaw. Pain during exercises and radiating up into the ear. Noted in chart review from Magdalena Tan nodules were palpated on left side and consulted with Dr. Amarilis Gooden who recommended decreasing frequency of jaw/cervical exercises. Swelling has gone down since  appointment, but pt continues to report pain and is concerned for an infection. Is planning to f/u with palliative care. Behavior:  Alert, Cooperative, Pleasant, and Motivated       Objective/Assessment:   Patient progressing towards goals:  Goal 1: SLP to continue to assess need for MBS study in order to more objectively assess pharyngeal phase of swallow and determine least restrictive diet consistency. Not recommended this date. Goal 2: Pt will complete oral motor ROM and strengthening exercises x10 each, given cues as needed, in order to strengthen lingual/labial/buccal musculature to promote safety and efficiency of oral phase of swallow and decrease risk for pocketing. Pt completed x10 lingual press against tongue depressor with rest after 5 repetitions.  Gave handout with instructions  Goal 3: Pt will complete lingual exercises that promote anterior to posterior propulsion of bolus and improve tongue

## 2023-11-29 ENCOUNTER — TELEPHONE (OUTPATIENT)
Dept: PALLATIVE CARE | Age: 63
End: 2023-11-29

## 2023-11-29 DIAGNOSIS — Z93.1 S/P PERCUTANEOUS ENDOSCOPIC GASTROSTOMY (PEG) TUBE PLACEMENT (HCC): ICD-10-CM

## 2023-11-29 DIAGNOSIS — R13.10 DYSPHAGIA, UNSPECIFIED TYPE: ICD-10-CM

## 2023-11-29 DIAGNOSIS — K04.7 TOOTH ABSCESS: Primary | ICD-10-CM

## 2023-11-29 RX ORDER — CLINDAMYCIN HYDROCHLORIDE 300 MG/1
300 CAPSULE ORAL 3 TIMES DAILY
Qty: 21 CAPSULE | Refills: 0 | Status: SHIPPED | OUTPATIENT
Start: 2023-11-29 | End: 2023-12-06

## 2023-11-30 ENCOUNTER — HOSPITAL ENCOUNTER (OUTPATIENT)
Dept: SPEECH THERAPY | Age: 63
Setting detail: THERAPIES SERIES
Discharge: HOME | End: 2023-11-30
Payer: COMMERCIAL

## 2023-11-30 PROCEDURE — 92526 ORAL FUNCTION THERAPY: CPT

## 2023-11-30 RX ORDER — CLINDAMYCIN PALMITATE HYDROCHLORIDE 75 MG/5ML
300 SOLUTION ORAL 3 TIMES DAILY
Qty: 420 ML | Refills: 0 | Status: SHIPPED | OUTPATIENT
Start: 2023-11-30 | End: 2023-12-07

## 2023-11-30 NOTE — PROGRESS NOTES
[address] Outpatient  Speech Language Pathology  Adult Daily Note    Betty Leija  : 1960  [x]   confirmed      Date: 2023    Visit Information:  Visit Information  SLP Insurance Information: [employer]  Total # of Visits Approved: 24  Total # of Visits to Date: 3  Timeframe Approved From[de-identified] 23  Timeframe Approved To[de-identified] 24  No Show: 0  Canceled Appointment: 0       Plan of care signed (Y/N):     No  Faxed    Certification Period: 23-23  Plan of Care Visit #3      Interventions used this date:  Dysphagia Treatment    Subjective:  Pt arrived on time to scheduled session. Pt reported that she is continuing to have pain on left jaw and Amanda Anchors called out an antibiotic that patient will  after therapy today. SLP noted that left lower lip had increased swelling. Pt reported that she had put ice on to help relieve pain. Behavior:  Alert, Cooperative, Pleasant, and Motivated       Objective/Assessment:   Patient progressing towards goals:  Goal 1: SLP to continue to assess need for MBS study in order to more objectively assess pharyngeal phase of swallow and determine least restrictive diet consistency. Not recommended this date. Pt trialed thin by cup with consecutive sips with throat clears after each trial and x1 coughing. Pt reported completing oral care at home prior to session. Goal 2: Pt will complete oral motor ROM and strengthening exercises x10 each, given cues as needed, in order to strengthen lingual/labial/buccal musculature to promote safety and efficiency of oral phase of swallow and decrease risk for pocketing. Pt completed x5 lingual press with tongue depressor and mirror. Pt reported pain in left jaw with exercise so it was stopped for this session.   Goal 3: Pt will complete lingual exercises that promote anterior to posterior propulsion of bolus and improve tongue base retraction x10 each,  in order to strengthen the muscles

## 2023-11-30 NOTE — TELEPHONE ENCOUNTER
Patient has been having increased pain in left side of jaw. Patient has had this seen by oncology. Patient reports history of dental abscess to this side, reports that this pain has been on going and she feels she is running a fever now. Discussed differentials and unknown etiology. Will prescribe antibiotic via peg tube x 7 days.

## 2023-12-05 ENCOUNTER — HOSPITAL ENCOUNTER (OUTPATIENT)
Dept: SPEECH THERAPY | Age: 63
Setting detail: THERAPIES SERIES
Discharge: HOME | End: 2023-12-05
Payer: COMMERCIAL

## 2023-12-05 ENCOUNTER — OFFICE VISIT (OUTPATIENT)
Dept: PALLATIVE CARE | Age: 63
End: 2023-12-05
Payer: COMMERCIAL

## 2023-12-05 ENCOUNTER — CLINICAL DOCUMENTATION (OUTPATIENT)
Dept: NUTRITION | Age: 63
End: 2023-12-05

## 2023-12-05 DIAGNOSIS — Z51.5 PALLIATIVE CARE ENCOUNTER: Primary | ICD-10-CM

## 2023-12-05 DIAGNOSIS — E44.0 MODERATE PROTEIN-CALORIE MALNUTRITION (HCC): ICD-10-CM

## 2023-12-05 DIAGNOSIS — J41.1 MUCOPURULENT CHRONIC BRONCHITIS (HCC): ICD-10-CM

## 2023-12-05 DIAGNOSIS — Z93.1 STATUS POST INSERTION OF PERCUTANEOUS ENDOSCOPIC GASTROSTOMY (PEG) TUBE (HCC): ICD-10-CM

## 2023-12-05 DIAGNOSIS — R06.02 SHORTNESS OF BREATH: ICD-10-CM

## 2023-12-05 DIAGNOSIS — R63.4 UNINTENTIONAL WEIGHT LOSS: ICD-10-CM

## 2023-12-05 DIAGNOSIS — Z71.89 GOALS OF CARE, COUNSELING/DISCUSSION: ICD-10-CM

## 2023-12-05 PROCEDURE — G8419 CALC BMI OUT NRM PARAM NOF/U: HCPCS

## 2023-12-05 PROCEDURE — 99215 OFFICE O/P EST HI 40 MIN: CPT

## 2023-12-05 PROCEDURE — 3017F COLORECTAL CA SCREEN DOC REV: CPT

## 2023-12-05 PROCEDURE — 3023F SPIROM DOC REV: CPT

## 2023-12-05 PROCEDURE — G8484 FLU IMMUNIZE NO ADMIN: HCPCS

## 2023-12-05 PROCEDURE — 1036F TOBACCO NON-USER: CPT

## 2023-12-05 PROCEDURE — 92526 ORAL FUNCTION THERAPY: CPT

## 2023-12-05 PROCEDURE — G8428 CUR MEDS NOT DOCUMENT: HCPCS

## 2023-12-05 ASSESSMENT — ENCOUNTER SYMPTOMS
COLOR CHANGE: 0
SHORTNESS OF BREATH: 0
VOMITING: 0
VOICE CHANGE: 1
WHEEZING: 0
NAUSEA: 0
STRIDOR: 0

## 2023-12-05 NOTE — PROGRESS NOTES
Called [employer] to check status of [employer]. Originally stated prior auth did not come back yet, but eventually stated they are shipping [facility] out right now. Will make sure pt is notified.

## 2023-12-05 NOTE — PROGRESS NOTES
Subjective:      Patient Id: Seen Gloria Burgess at the clinic at the  [address] [place] [address] , for Palliative Carefor symptom management, advance care planning, and goals of care discussion. She was accompanied to the appointment by: self  Chief Complaint   Patient presents with    Medication Refill           Sharmila Paredes is a 58 y.o. female referred to palliative care for symptom management, advance care planning, and goals of care conversation. Gloria Burgess has complex medical history that includes COPD, Metastatic tongue cancer, PEG tube, dysphagia, chronic diastolic heart failure, HLD, DJD, chronic hepatitis B, hypothyroidism, ICD. [facility] where she was admitted for sepsis pneumonia. General: Patient is alert and oriented x 4. Patient is ordered to be NPO. However, patient reports she has been attempting water intake. Patient reports she is drinking \"lots of water\" and tolerating it well. Patient instructed that she is NPO. She does report increase mucus production with productive cough. Patient advised this could be related patient aspirating if she is drinking water against diet orders. Pain: Patient is having improvement of pain of left sided jaw. Appetite: She has a peg tube, Jevity, she is tolerating all her feeds. Getting 3 to 4 cans in a day. She denies any nausea/vomiting. She was 103lbs. She is down to 95lbs. Denies in any peg tube feeds. She has not received her boost supplement. Will talk to dietitian regarding this. Mood: Her mood has improved she is tolerating Zoloft. Skin: She is having improvement of left sided jaw pain and tenderness. She does not have any swelling noted. Patient having improvement of right knee pain. GI/:Denies any urinary concerns, constipation or nausea or vomiting. I have personally reviewed the patient's most recent and available provider notes, lab work and imaging.       Past Medical History:   Diagnosis Date    Anxiety

## 2023-12-05 NOTE — PROGRESS NOTES
[address] [place], [address] [facility]              Phone: [phone]           Fax: [phone]         ______________________________________________________________________    Outpatient Speech Therapy Note to Physician                                               Physician: ALEN Murray-CNP From: Meridee Meckel, SLP, MA,CC-SLP   Patient: Michi Lawson       : 1960  Diagnosis: Dysphagia     Date: 2023  Treatment Diagnosis:  ICD 10 R13.10    ALEN Horowitz-CNP:    Your patient Michi Lawson is being seen at our clinic for Dysphagia Therapy. We would like you to be aware of the following: We are requesting the following referral:  [] Hearing Evaluation  [] Physical Therapy Evaluation  [] Occupational Therapy Evaluation  [] Psychological Evaluation  [x] Modified Barium Swallow Study  [] E-stim (NMES) for   [] Other:     If you are in agreement, please fax an order with the above request, a diagnosis, the date, and a signature to [phone] or via the patient's account in [facility]. Please call with any questions, [phone]. Thank you for allowing us to participate in the care of your patient.        Electronically signed by:  Meridee Meckel, SLP, MA,CCC-SLP, 2023, 11:25 AM

## 2023-12-05 NOTE — PROGRESS NOTES
[address] Outpatient  Speech Language Pathology  Adult Daily Note    Alex Carrillo  : 1960  [x]   confirmed      Date: 2023    Visit Information:  Visit Information  SLP Insurance Information: [employer]  Total # of Visits Approved: 24  Total # of Visits to Date: 4  Timeframe Approved From[de-identified] 23  Timeframe Approved To[de-identified] 24  No Show: 0  Canceled Appointment: 0       Plan of care signed (Y/N):     No  Faxed    Certification Period: 23-23  Plan of Care Visit #4      Interventions used this date:  Dysphagia Treatment    Subjective:  Pt arrived on time to scheduled session. Pt reported that she has had increased congestion for approximately 3-4 days with coughing spells throughout the day. Pt has been taking antibiotic since  and has seen a decrease in pain on left side and decreased swelling. Also reported that her cat unfortunately passed away suddenly and she was sad about that. Pt had an episode of becoming emotional during treatment. Behavior:  Alert, Cooperative, Pleasant, and Motivated       Objective/Assessment:   Patient progressing towards goals:  Goal 1: SLP to continue to assess need for MBS study in order to more objectively assess pharyngeal phase of swallow and determine least restrictive diet consistency. Sent referral for MBS  Pt trialed thin by cup with consecutive sips with throat clears after each trial. Pt reported completing oral care at home prior to session. Goal 2: Pt will complete oral motor ROM and strengthening exercises x10 each, given cues as needed, in order to strengthen lingual/labial/buccal musculature to promote safety and efficiency of oral phase of swallow and decrease risk for pocketing. Pt completed x10 lingual press with tongue depressor and mirror. No pain noted today.  Pt reported feeling muscles on right \"activating\"  Goal 3: Pt will complete lingual exercises that promote anterior to posterior propulsion

## 2023-12-06 DIAGNOSIS — Z98.890 S/P PARTIAL GLOSSECTOMY: ICD-10-CM

## 2023-12-06 DIAGNOSIS — R13.13 PHARYNGEAL DYSPHAGIA: ICD-10-CM

## 2023-12-06 DIAGNOSIS — Z93.1 S/P PERCUTANEOUS ENDOSCOPIC GASTROSTOMY (PEG) TUBE PLACEMENT (HCC): Primary | ICD-10-CM

## 2023-12-06 DIAGNOSIS — Z90.49 STATUS POST GLOSSECTOMY: ICD-10-CM

## 2023-12-06 DIAGNOSIS — C79.89 METASTATIC SQUAMOUS CELL CARCINOMA TO TONGUE (HCC): ICD-10-CM

## 2023-12-07 ENCOUNTER — HOSPITAL ENCOUNTER (OUTPATIENT)
Dept: SPEECH THERAPY | Age: 63
Setting detail: THERAPIES SERIES
Discharge: HOME | End: 2023-12-07
Payer: COMMERCIAL

## 2023-12-07 ENCOUNTER — HOSPITAL ENCOUNTER (OUTPATIENT)
Dept: GENERAL RADIOLOGY | Age: 63
Discharge: HOME | End: 2023-12-09
Payer: COMMERCIAL

## 2023-12-07 DIAGNOSIS — J41.1 MUCOPURULENT CHRONIC BRONCHITIS (HCC): ICD-10-CM

## 2023-12-07 PROCEDURE — 71046 X-RAY EXAM CHEST 2 VIEWS: CPT

## 2023-12-07 NOTE — PROGRESS NOTES
Therapy                            Cancellation/No-show Note      Date:  2023  Patient Name:  Olamide Da Silva  :  1960   MRN:  [id number]      Visit Information:  Visit Information  SLP Insurance Information: [employer]  Total # of Visits Approved: 24  Total # of Visits to Date: 4  Timeframe Approved From[de-identified] 23  Timeframe Approved To[de-identified] 24  No Show: 0  Canceled Appointment: 1    For today's appointment patient:  Cancelled    Reason given by patient:  Other: Might have to take mother to ER.     Follow-up needed:  Pt has future appointments scheduled, no follow up needed    Comments:       Signature: Electronically signed by TIARA Harper on 23 at 9:20 AM EST

## 2023-12-11 ENCOUNTER — APPOINTMENT (OUTPATIENT)
Dept: PRIMARY CARE | Facility: CLINIC | Age: 63
End: 2023-12-11
Payer: COMMERCIAL

## 2023-12-11 ASSESSMENT — ENCOUNTER SYMPTOMS
COUGH: 1
RHINORRHEA: 1

## 2023-12-12 ENCOUNTER — HOSPITAL ENCOUNTER (OUTPATIENT)
Dept: SPEECH THERAPY | Age: 63
Setting detail: THERAPIES SERIES
Discharge: HOME | End: 2023-12-12
Payer: COMMERCIAL

## 2023-12-12 PROCEDURE — 92526 ORAL FUNCTION THERAPY: CPT

## 2023-12-12 NOTE — PROGRESS NOTES
[address] [facility]. [place], [address]               Phone: [phone]           Fax: [phone] [address] Outpatient  Speech Language Pathology  Adult Progress Note                          Physician: ALEN Jacobs-FAY From: Madhav Ro, SLP, MA,CCC-SLP   Patient: Jackie Wiley       : 1960  Diagnosis: Dysphagia     Date: 2023  Treatment Diagnosis: ICD 10 R13.10  Date of Evaluation: 23      Plan of Care/Treatment to date: Dysphagia Treatment and Instruction in Compensatory Strategies    Date range from 23 to 23. Subjective: Pt has had 1 cancel during progress period. Pt had a period of onset of left sided facial swelling and pain radiating along left jaw to ear. Pt started a course of antibiotics for treatment. Pt is still reporting fluctuating left jaw pain. Pt has had 2 cancellations during this progress period. Progress toward Short-Term Goals:   Goal 1: SLP to continue to assess need for MBS study in order to more objectively assess pharyngeal phase of swallow and determine least restrictive diet consistency. MBS order received and scheduled for  at 1:00  Goal 2: Pt will complete oral motor ROM and strengthening exercises x10 each, given cues as needed, in order to strengthen lingual/labial/buccal musculature to promote safety and efficiency of oral phase of swallow and decrease risk for pocketing. Progress made  Goal 3: Pt will complete lingual exercises that promote anterior to posterior propulsion of bolus and improve tongue base retraction x10 each,  in order to strengthen the muscles of the swallow to decrease risk of aspiration and  to increase ability to safely handle the least restrictive diet level.   Progress made  Goal 4: Pt will improve hyolaryngeal elevation by performing hyolaryngeal exercises (Mendelson, Shaker,

## 2023-12-12 NOTE — PROGRESS NOTES
[address] Outpatient  Speech Language Pathology  Adult Daily Note    Elijah Crespo  : 1960  [x]   confirmed      Date: 2023    Visit Information:  Visit Information  SLP Insurance Information: [address]  Total # of Visits Approved: 24  Total # of Visits to Date: 5  Timeframe Approved From[de-identified] 23  Timeframe Approved To[de-identified] 24  No Show: 0  Canceled Appointment: 1       Plan of care signed (Y/N):     Yes    Certification Period: 23-23  Plan of Care Visit #5      Interventions used this date:  Dysphagia Treatment    Subjective:  Pt arrived on time to scheduled session. Pt completed antibiotics two days ago. Pt reported that after therapy session on , she woke up the next day and had immense left jaw pain again and overall body fatigue that lasted until Bernardo 12/10. Pt reports she has a Keytruda treatment today at 1:45 and might see LILIANA Fonseca    Behavior:  Alert, Cooperative, Pleasant, and Motivated       Objective/Assessment:   Patient progressing towards goals:  Goal 1: SLP to continue to assess need for MBS study in order to more objectively assess pharyngeal phase of swallow and determine least restrictive diet consistency. MBS ordered received and SLP called patient access and scheduled patient for MBS on  at 1:00. Pt reports that she is only doing approximately 3-4 swallows of water about 2x daily. Pt unable to recall FFWP precautions and SLP reviewed with patient: oral care prior to water intake and small sips. Pt verbalized understanding. Goal 2: Pt will complete oral motor ROM and strengthening exercises x10 each, given cues as needed, in order to strengthen lingual/labial/buccal musculature to promote safety and efficiency of oral phase of swallow and decrease risk for pocketing.   Not targeted  Goal 3: Pt will complete lingual exercises that promote anterior to posterior propulsion of bolus and improve tongue

## 2023-12-13 DIAGNOSIS — G89.3 CANCER RELATED PAIN: ICD-10-CM

## 2023-12-13 DIAGNOSIS — C79.89 METASTATIC SQUAMOUS CELL CARCINOMA TO TONGUE (HCC): ICD-10-CM

## 2023-12-13 DIAGNOSIS — C02.9 MALIGNANT NEOPLASM OF TONGUE (HCC): ICD-10-CM

## 2023-12-13 RX ORDER — OXYCODONE HCL 5 MG/5 ML
15 SOLUTION, ORAL ORAL EVERY 4 HOURS PRN
Qty: 1700 ML | Refills: 0 | Status: CANCELLED | OUTPATIENT
Start: 2023-12-13 | End: 2024-01-03

## 2023-12-14 ENCOUNTER — HOSPITAL ENCOUNTER (OUTPATIENT)
Dept: SPEECH THERAPY | Age: 63
Setting detail: THERAPIES SERIES
Discharge: HOME | End: 2023-12-14
Payer: COMMERCIAL

## 2023-12-14 RX ORDER — OXYCODONE HCL 5 MG/5 ML
15 SOLUTION, ORAL ORAL EVERY 6 HOURS PRN
Qty: 300 ML | Refills: 0 | Status: SHIPPED | OUTPATIENT
Start: 2023-12-14 | End: 2023-12-19

## 2023-12-14 NOTE — TELEPHONE ENCOUNTER
Will order Morphine 5 mg/5 ml oral solution q6h prn for pain. Will only order 5 days worth and have patients palliative care provider follow up next week.
[address] AM [facility]   12/26/2023 11:00 AM Vin Metcalf, SLP RAVI AM [facility]   12/28/2023 11:00 AM Vin Metcalf, SLP RAVI AM [facility]   1/2/2024  1:00 PM Kailey Mayberry, LAEN - CNP [facility]   10/17/2024 12:30 PM Mahogany Garcia [facility]

## 2023-12-14 NOTE — PROGRESS NOTES
Therapy                            Cancellation/No-show Note      Date:  2023  Patient Name:  Yeison Lynn  :  1960   MRN:  [id number]      Visit Information:  Visit Information  SLP Insurance Information: [facility]  Total # of Visits Approved: 24  Total # of Visits to Date: 5  Timeframe Approved From[de-identified] 23  Timeframe Approved To[de-identified] 24  No Show: 0  Canceled Appointment: 2    For today's appointment patient:  Cancelled    Reason given by patient:  Other: Pt having too much jaw pain. SLP called to check on patient and she reported that the pain was similar to level it was during  session and had not gotten significantly worse after the speech therapy session on Tuesday, but was too much to come into therapy. Pt asked if she should start the antibiotic back up and SLP advised her to contact prescribing physician Nathalie FORD or her nurse for recommendation and she verbalized understanding.      Follow-up needed:  Pt has future appointments scheduled, no follow up needed    Comments:       Signature: Electronically signed by TIARA Hand on 23 at 11:01 AM EST

## 2023-12-26 ENCOUNTER — TELEPHONE (OUTPATIENT)
Dept: PALLATIVE CARE | Age: 63
End: 2023-12-26

## 2023-12-26 ENCOUNTER — HOSPITAL ENCOUNTER (OUTPATIENT)
Dept: SPEECH THERAPY | Age: 63
Setting detail: THERAPIES SERIES
Discharge: HOME | End: 2023-12-26
Payer: COMMERCIAL

## 2023-12-26 NOTE — PROGRESS NOTES
Therapy                            Cancellation/No-show Note      Date:  2023  Patient Name:  Carmen Cardenas  :  1960   MRN:  [id number]      Visit Information:  Visit Information  SLP Insurance Information: [employer]  Total # of Visits Approved: 24  Total # of Visits to Date: 6  Timeframe Approved From[de-identified] 23  Timeframe Approved To[de-identified] 24  No Show: 0  Canceled Appointment: 2  **error on last visit tracker, CX number updated    For today's appointment patient:  Cancelled    Reason given by patient:  Other: Pt on 30 day medical hold, CX doesn't count against patient    Follow-up needed:  Pt requests to be on hold    Comments:       Signature: Electronically signed by TIARA Riddle on 23 at 10:01 AM EST

## 2023-12-26 NOTE — TELEPHONE ENCOUNTER
Tobi Pacheco, APRN - CNP  Norberto Vasquez, JESIKA  Please advise that she her barium swallow is still showing aspiration. Remain NPO.      1106: I called Christiano Nayakshon this morning to explain that her barium swallow is still showing aspiration and to remain not eating or drinking anything by mouth. She is understanding although wants to know how long she has to remain this way.

## 2023-12-28 ENCOUNTER — HOSPITAL ENCOUNTER (OUTPATIENT)
Dept: SPEECH THERAPY | Age: 63
Setting detail: THERAPIES SERIES
Discharge: HOME | End: 2023-12-28
Payer: COMMERCIAL

## 2023-12-28 DIAGNOSIS — C79.89 METASTATIC SQUAMOUS CELL CARCINOMA TO TONGUE (HCC): ICD-10-CM

## 2023-12-28 DIAGNOSIS — C02.9 MALIGNANT NEOPLASM OF TONGUE (HCC): ICD-10-CM

## 2023-12-28 DIAGNOSIS — G89.3 CANCER RELATED PAIN: ICD-10-CM

## 2023-12-28 RX ORDER — OXYCODONE HCL 5 MG/5 ML
15 SOLUTION, ORAL ORAL EVERY 6 HOURS PRN
Qty: 300 ML | Refills: 0 | Status: SHIPPED | OUTPATIENT
Start: 2023-12-28 | End: 2024-01-02

## 2023-12-28 NOTE — PROGRESS NOTES
Therapy                            Cancellation/No-show Note      Date:  2023  Patient Name:  Greg Archer  :  1960   MRN:  [id number]      Visit Information:  Visit Information  SLP Insurance Information: [employer]  Total # of Visits Approved: 24  Total # of Visits to Date: 6  Timeframe Approved From[de-identified] 23  Timeframe Approved To[de-identified] 24  No Show: 0  Canceled Appointment: 2    For today's appointment patient:  Cancelled    Reason given by patient:  Other: Pt on 30 day Medical hold.  CX doesn't count against patient    Follow-up needed:  Pt requests to be on hold for 30 day medical    Comments:       Signature: Electronically signed by TIARA Corado on 23 at 8:30 AM EST

## 2024-01-02 ENCOUNTER — TELEMEDICINE (OUTPATIENT)
Dept: PALLATIVE CARE | Age: 64
End: 2024-01-02

## 2024-01-02 ENCOUNTER — HOSPITAL ENCOUNTER (OUTPATIENT)
Dept: SPEECH THERAPY | Age: 64
Setting detail: THERAPIES SERIES
Discharge: HOME | End: 2024-01-02

## 2024-01-02 DIAGNOSIS — C79.89 METASTATIC SQUAMOUS CELL CARCINOMA TO TONGUE (HCC): ICD-10-CM

## 2024-01-02 DIAGNOSIS — R13.12 OROPHARYNGEAL DYSPHAGIA: ICD-10-CM

## 2024-01-02 DIAGNOSIS — Z71.89 GOALS OF CARE, COUNSELING/DISCUSSION: ICD-10-CM

## 2024-01-02 DIAGNOSIS — G89.3 CANCER RELATED PAIN: Primary | ICD-10-CM

## 2024-01-02 DIAGNOSIS — Z51.5 PALLIATIVE CARE ENCOUNTER: ICD-10-CM

## 2024-01-02 NOTE — PROGRESS NOTES
Therapy                            Cancellation/No-show Note      Date:  2024  Patient Name:  Archana Melendez  :  1960   MRN:  [id number]      Visit Information:  Visit Information  SLP Insurance Information: [employer]  Total # of Visits Approved: 24  Total # of Visits to Date: 6  Timeframe Approved From:: 23  Timeframe Approved To:: 24  No Show: 0  Canceled Appointment: 2    For today's appointment patient:  Cancelled    Reason given by patient:  Other: Pt is on 30 day medical hold CX doesn't count against patient.    Follow-up needed:  Pt on 30 day hold    Comments:       Signature: Electronically signed by TIARA Domingo on 24 at 8:17 AM EST

## 2024-01-03 ASSESSMENT — ENCOUNTER SYMPTOMS
COUGH: 1
TROUBLE SWALLOWING: 1
VOICE CHANGE: 1
COLOR CHANGE: 0
VOMITING: 0
NAUSEA: 0
ABDOMINAL DISTENTION: 0
SHORTNESS OF BREATH: 0

## 2024-01-03 NOTE — PROGRESS NOTES
barium swallow 12/21.   Patient is NPO per speech    4. Goals of care, counseling/discussion  A. Disease process and goals of treatment were discussed in basic terms. Archana's goal is to optimize available comfort care measures treatment plan. We discussed the palliative care philosophy in light of those goals. We discussed all care options contingent on treatment response and QOL. Much active listening, presence, and emotional support were given.      5. Palliative care encounter  A. Explained the role of palliative care in treating patient. Discussed symptom management related to chronic disease/condition. Provided emotional support and active listening. Patient understands and is agreeable to current plan.     There are no discontinued medications.   No follow-ups on file.       Subjective   HPI  Review of Systems   Constitutional:  Positive for fatigue and unexpected weight change. Negative for activity change, appetite change and fever.   HENT:  Positive for trouble swallowing (NPO has peg) and voice change.    Respiratory:  Positive for cough. Negative for shortness of breath.    Cardiovascular:  Negative for chest pain, palpitations and leg swelling.   Gastrointestinal:  Negative for abdominal distention, nausea and vomiting.   Genitourinary:  Negative for difficulty urinating.   Musculoskeletal:  Negative for gait problem.   Skin:  Negative for color change, pallor, rash and wound.   Neurological:  Positive for speech difficulty. Negative for dizziness.   Psychiatric/Behavioral:  Negative for self-injury, sleep disturbance and suicidal ideas.           Objective   Patient-Reported Vitals  No data recorded     Physical Exam  Constitutional:       General: She is not in acute distress.  Neurological:      Mental Status: She is alert and oriented to person, place, and time.              On this date 1/2/2024 I have spent 30 minutes reviewing previous notes, test results and face to face (virtual) with the patient

## 2024-01-04 ENCOUNTER — HOSPITAL ENCOUNTER (OUTPATIENT)
Dept: SPEECH THERAPY | Age: 64
Setting detail: THERAPIES SERIES
Discharge: HOME | End: 2024-01-04

## 2024-01-04 DIAGNOSIS — C79.89 METASTATIC SQUAMOUS CELL CARCINOMA TO TONGUE (HCC): ICD-10-CM

## 2024-01-04 DIAGNOSIS — C02.9 MALIGNANT NEOPLASM OF TONGUE (HCC): ICD-10-CM

## 2024-01-04 DIAGNOSIS — G89.3 CANCER RELATED PAIN: ICD-10-CM

## 2024-01-04 RX ORDER — OXYCODONE HCL 5 MG/5 ML
15 SOLUTION, ORAL ORAL EVERY 6 HOURS PRN
Qty: 800 ML | Refills: 0 | Status: SHIPPED | OUTPATIENT
Start: 2024-01-04 | End: 2024-01-17

## 2024-01-04 NOTE — PROGRESS NOTES
Therapy                            Cancellation/No-show Note      Date:  2024  Patient Name:  Archana Melendez  :  1960   MRN:  [id number]      Visit Information:  Visit Information  SLP Insurance Information: [employer]  Total # of Visits Approved: 24  Total # of Visits to Date: 0  Timeframe Approved From:: 23  Timeframe Approved To:: 24  No Show: 0  Canceled Appointment: 2  Visits reset for new year    For today's appointment patient:  Cancelled    Reason given by patient:  Other: Pt is on 30 day medical hold. CX doesn't count against patient    Follow-up needed:  Pt on 30 day medical hold.     Comments:       Signature: Electronically signed by TIARA Domingo on 24 at 10:15 AM EST

## 2024-01-04 NOTE — TELEPHONE ENCOUNTER
Rx requested:  Requested Prescriptions     Pending Prescriptions Disp Refills    oxyCODONE (ROXICODONE) 5 MG/5ML solution 300 mL 0     Sig: Take 15 mLs by mouth every 6 hours as needed for Pain for up to 5 days. Max Daily Amount: 60 mg       Last Office Visit:   1/2/2024      Last filled:  12/28/23    Next Visit Date:  Future Appointments   Date Time Provider Department Center   1/9/2024 11:00 AM Ashlie Metcalf SLP [facility]   1/11/2024 11:00 AM Ashlie Metcalf SLP [facility]   1/12/2024  1:00 PM TOREY MAMMO ROOM 2 [facility]   1/16/2024 11:00 AM Ashlie Metcalf SLP [facility] [place]   1/18/2024 11:00 AM Ashlie Metcalf SLP [facility] [place]   1/23/2024 11:00 AM Ashlie Metcalf SLP [facility] [place]   1/25/2024 11:00 AM Ashlie Metcalf SLP [facility]   1/30/2024 11:00 AM Ashlie Metcalf SLP [facility] [place]   2/1/2024 11:00 AM Ashlie Metcalf SLP [facility]   10/17/2024 12:30 PM Feliciano Garcia DO [facility]

## 2024-01-09 ENCOUNTER — HOSPITAL ENCOUNTER (OUTPATIENT)
Dept: SPEECH THERAPY | Age: 64
Setting detail: THERAPIES SERIES
Discharge: HOME | End: 2024-01-09

## 2024-01-09 NOTE — PROGRESS NOTES
Therapy                            Cancellation/No-show Note      Date:  2024  Patient Name:  Archana Melendez  :  1960   MRN:  [id number]      Visit Information:  Visit Information  [facility]  Total # of Visits Approved: 24  Total # of Visits to Date: 0  Timeframe Approved From:: 23  Timeframe Approved To:: 24  Canceled Appointment: 2    For today's appointment patient:  Cancelled    Reason given by patient:  Other: Pt on 30 day medical hold. CX doesn't count against patient    Follow-up needed:  Pt on 30 day medical hold, will contact.    Comments:       Signature: Electronically signed by TIARA Domingo on 24 at 8:27 AM EST

## 2024-01-11 ENCOUNTER — HOSPITAL ENCOUNTER (OUTPATIENT)
Dept: SPEECH THERAPY | Age: 64
Setting detail: THERAPIES SERIES
Discharge: HOME | End: 2024-01-11

## 2024-01-12 ENCOUNTER — TELEPHONE (OUTPATIENT)
Dept: PRIMARY CARE | Facility: CLINIC | Age: 64
End: 2024-01-12
Payer: COMMERCIAL

## 2024-01-12 DIAGNOSIS — E78.00 PURE HYPERCHOLESTEROLEMIA, UNSPECIFIED: ICD-10-CM

## 2024-01-12 DIAGNOSIS — E78.2 MIXED HYPERLIPIDEMIA: ICD-10-CM

## 2024-01-12 RX ORDER — ATORVASTATIN CALCIUM 40 MG/1
40 TABLET, FILM COATED ORAL DAILY
Qty: 90 TABLET | Refills: 3 | Status: SHIPPED | OUTPATIENT
Start: 2024-01-12

## 2024-01-12 RX ORDER — ATORVASTATIN CALCIUM 40 MG/1
40 TABLET, FILM COATED ORAL DAILY
Qty: 90 TABLET | Refills: 1 | OUTPATIENT
Start: 2024-01-12

## 2024-01-16 ENCOUNTER — HOSPITAL ENCOUNTER (OUTPATIENT)
Dept: SPEECH THERAPY | Age: 64
Setting detail: THERAPIES SERIES
Discharge: HOME | End: 2024-01-16

## 2024-01-16 NOTE — PROGRESS NOTES
Therapy                            Cancellation/No-show Note      Date:  2024  Patient Name:  Archana Melendez  :  1960   MRN:  [id number]      Visit Information:  Visit Information  SLP Insurance Information: [employer]  Total # of Visits Approved: 24  Total # of Visits to Date: 0  Timeframe Approved From:: 23  Timeframe Approved To:: 24  Canceled Appointment: 2    For today's appointment patient:  Cancelled    Reason given by patient:  Other: Pt on 30 day medical hold.    Follow-up needed:  Pt requests to be on hold for medical 30 days.    Comments:       Signature: Electronically signed by TIARA Domingo on 24 at 9:51 AM EST

## 2024-01-16 NOTE — PROGRESS NOTES
Therapy                            Cancellation/No-show Note      Date:  2024  Patient Name:  Archana Melendez  :  1960   MRN:  [id number]      Visit Information:  Visit Information  SLP Insurance Information: [facility]  Total # of Visits Approved: 24  Total # of Visits to Date: 0  Timeframe Approved From:: 23  Timeframe Approved To:: 24  Canceled Appointment: 2    For today's appointment patient:  Cancelled    Reason given by patient:  Other: Pt on 30 day medical hold.    Follow-up needed:  Pt requests to be on hold for 30 days.    Comments:       Signature: Electronically signed by TIARA Domingo on 24 at 9:50 AM EST

## 2024-01-18 ENCOUNTER — HOSPITAL ENCOUNTER (OUTPATIENT)
Dept: SPEECH THERAPY | Age: 64
Setting detail: THERAPIES SERIES
Discharge: HOME | End: 2024-01-18

## 2024-01-18 NOTE — PROGRESS NOTES
Therapy                            Cancellation/No-show Note      Date:  2024  Patient Name:  Archana Melendez  :  1960   MRN:  [id number]      Visit Information:  Visit Information  SLP Insurance Information: [facility]  Total # of Visits Approved: 24  Total # of Visits to Date: 0  Timeframe Approved From:: 23  Timeframe Approved To:: 24  Canceled Appointment: 2    For today's appointment patient:  Cancelled    Reason given by patient:  Other: Pt on 30 day medical hold.     Follow-up needed:  Pt requests to be on hold. End of medical hold this week. SLP contacted patient she reported that the left facial pain and swelling was still going on. SLP recommended d/c from ST and continued f/u with medical team. Pt agreed and verbalized understanding.    Comments:       Signature: Electronically signed by TIARA Domingo on 24 at 12:46 PM EST            
complete cervical exercises x10 each Independently over 3x sessions to increase laryngeal range of motion    Unable to formally assess goals due to patient being on 30 day hold prior to d/c      ACHIEVEMENT OF GOALS:  Unable to formally assess at this time    REASON FOR DISCHARGE: Other: Pt was placed on a 30 day medical hold due to increasing left facial/jaw pain and swelling. At end of hold time SLP checked with patient and symptoms continuing. Recommending d/c at this time and f/u with medical.     DISCHARGE EDUCATION/RECOMMENDATIONS: Refer back to physician for follow-up appointment and None given at this time        Electronically signed by:  Ashlie Metcalf, SLP,M.A., CCC-SLP Date: 1/18/2024, 12:48 PM

## 2024-01-22 DIAGNOSIS — C79.89 METASTATIC SQUAMOUS CELL CARCINOMA TO TONGUE (HCC): ICD-10-CM

## 2024-01-22 DIAGNOSIS — C02.9 MALIGNANT NEOPLASM OF TONGUE (HCC): ICD-10-CM

## 2024-01-22 DIAGNOSIS — G89.3 CANCER RELATED PAIN: ICD-10-CM

## 2024-01-22 RX ORDER — OXYCODONE HCL 5 MG/5 ML
15 SOLUTION, ORAL ORAL EVERY 6 HOURS PRN
Qty: 800 ML | Refills: 0 | Status: SHIPPED | OUTPATIENT
Start: 2024-01-22 | End: 2024-02-05

## 2024-01-23 ENCOUNTER — APPOINTMENT (OUTPATIENT)
Dept: CT IMAGING | Age: 64
End: 2024-01-23
Payer: COMMERCIAL

## 2024-01-23 ENCOUNTER — OFFICE VISIT (OUTPATIENT)
Dept: PALLATIVE CARE | Age: 64
End: 2024-01-23
Payer: COMMERCIAL

## 2024-01-23 ENCOUNTER — HOSPITAL ENCOUNTER (EMERGENCY)
Age: 64
Discharge: ANOTHER HOSPITAL | End: 2024-01-23
Payer: COMMERCIAL

## 2024-01-23 VITALS
DIASTOLIC BLOOD PRESSURE: 85 MMHG | OXYGEN SATURATION: 96 % | TEMPERATURE: 98.5 F | HEIGHT: 64 IN | RESPIRATION RATE: 16 BRPM | BODY MASS INDEX: 16.73 KG/M2 | WEIGHT: 98 LBS | HEART RATE: 88 BPM | SYSTOLIC BLOOD PRESSURE: 107 MMHG

## 2024-01-23 DIAGNOSIS — C79.89 METASTATIC SQUAMOUS CELL CARCINOMA TO TONGUE (HCC): Primary | ICD-10-CM

## 2024-01-23 DIAGNOSIS — G89.3 CANCER RELATED PAIN: ICD-10-CM

## 2024-01-23 DIAGNOSIS — L02.01 FACIAL ABSCESS: Primary | ICD-10-CM

## 2024-01-23 DIAGNOSIS — R22.0 SWELLING OF MANDIBLE: ICD-10-CM

## 2024-01-23 DIAGNOSIS — L03.211 CELLULITIS OF FACE: ICD-10-CM

## 2024-01-23 DIAGNOSIS — C02.9 TONGUE CANCER (HCC): ICD-10-CM

## 2024-01-23 DIAGNOSIS — Z51.5 PALLIATIVE CARE ENCOUNTER: ICD-10-CM

## 2024-01-23 DIAGNOSIS — Z71.89 GOALS OF CARE, COUNSELING/DISCUSSION: ICD-10-CM

## 2024-01-23 LAB
ALBUMIN SERPL-MCNC: 3.4 G/DL (ref 3.5–4.6)
ALP SERPL-CCNC: 134 U/L (ref 40–130)
ALT SERPL-CCNC: 14 U/L (ref 0–33)
ANION GAP SERPL CALCULATED.3IONS-SCNC: 10 MEQ/L (ref 9–15)
AST SERPL-CCNC: 21 U/L (ref 0–35)
BASOPHILS # BLD: 0.1 K/UL (ref 0–0.2)
BASOPHILS NFR BLD: 0.9 %
BILIRUB SERPL-MCNC: 0.3 MG/DL (ref 0.2–0.7)
BUN SERPL-MCNC: 18 MG/DL (ref 8–23)
CALCIUM SERPL-MCNC: 9.1 MG/DL (ref 8.5–9.9)
CHLORIDE SERPL-SCNC: 96 MEQ/L (ref 95–107)
CO2 SERPL-SCNC: 29 MEQ/L (ref 20–31)
CREAT SERPL-MCNC: 0.43 MG/DL (ref 0.5–0.9)
EOSINOPHIL # BLD: 0.2 K/UL (ref 0–0.7)
EOSINOPHIL NFR BLD: 2.5 %
ERYTHROCYTE [DISTWIDTH] IN BLOOD BY AUTOMATED COUNT: 15.3 % (ref 11.5–14.5)
GLOBULIN SER CALC-MCNC: 3.8 G/DL (ref 2.3–3.5)
GLUCOSE SERPL-MCNC: 109 MG/DL (ref 70–99)
HCT VFR BLD AUTO: 34 % (ref 37–47)
HGB BLD-MCNC: 10.5 G/DL (ref 12–16)
LACTATE BLDV-SCNC: 0.7 MMOL/L (ref 0.5–2.2)
LYMPHOCYTES # BLD: 0.7 K/UL (ref 1–4.8)
LYMPHOCYTES NFR BLD: 7.6 %
MCH RBC QN AUTO: 27.9 PG (ref 27–31.3)
MCHC RBC AUTO-ENTMCNC: 30.9 % (ref 33–37)
MCV RBC AUTO: 90.4 FL (ref 79.4–94.8)
MONOCYTES # BLD: 0.8 K/UL (ref 0.2–0.8)
MONOCYTES NFR BLD: 8.9 %
NEUTROPHILS # BLD: 7.4 K/UL (ref 1.4–6.5)
NEUTS SEG NFR BLD: 79.8 %
PLATELET # BLD AUTO: 374 K/UL (ref 130–400)
POTASSIUM SERPL-SCNC: 3.9 MEQ/L (ref 3.4–4.9)
PROT SERPL-MCNC: 7.2 G/DL (ref 6.3–8)
RBC # BLD AUTO: 3.76 M/UL (ref 4.2–5.4)
SODIUM SERPL-SCNC: 135 MEQ/L (ref 135–144)
WBC # BLD AUTO: 9.3 K/UL (ref 4.8–10.8)

## 2024-01-23 PROCEDURE — 85025 COMPLETE CBC W/AUTO DIFF WBC: CPT

## 2024-01-23 PROCEDURE — 99215 OFFICE O/P EST HI 40 MIN: CPT

## 2024-01-23 PROCEDURE — 6360000004 HC RX CONTRAST MEDICATION: Performed by: PHYSICIAN ASSISTANT

## 2024-01-23 PROCEDURE — 96376 TX/PRO/DX INJ SAME DRUG ADON: CPT

## 2024-01-23 PROCEDURE — 1036F TOBACCO NON-USER: CPT

## 2024-01-23 PROCEDURE — 36415 COLL VENOUS BLD VENIPUNCTURE: CPT

## 2024-01-23 PROCEDURE — 80053 COMPREHEN METABOLIC PANEL: CPT

## 2024-01-23 PROCEDURE — 3017F COLORECTAL CA SCREEN DOC REV: CPT

## 2024-01-23 PROCEDURE — 83605 ASSAY OF LACTIC ACID: CPT

## 2024-01-23 PROCEDURE — G8419 CALC BMI OUT NRM PARAM NOF/U: HCPCS

## 2024-01-23 PROCEDURE — G8484 FLU IMMUNIZE NO ADMIN: HCPCS

## 2024-01-23 PROCEDURE — 96375 TX/PRO/DX INJ NEW DRUG ADDON: CPT

## 2024-01-23 PROCEDURE — 70491 CT SOFT TISSUE NECK W/DYE: CPT

## 2024-01-23 PROCEDURE — 96365 THER/PROPH/DIAG IV INF INIT: CPT

## 2024-01-23 PROCEDURE — 99285 EMERGENCY DEPT VISIT HI MDM: CPT

## 2024-01-23 PROCEDURE — 87040 BLOOD CULTURE FOR BACTERIA: CPT

## 2024-01-23 PROCEDURE — 6360000002 HC RX W HCPCS: Performed by: PHYSICIAN ASSISTANT

## 2024-01-23 PROCEDURE — G8427 DOCREV CUR MEDS BY ELIG CLIN: HCPCS

## 2024-01-23 RX ORDER — CLINDAMYCIN PHOSPHATE 600 MG/50ML
600 INJECTION, SOLUTION INTRAVENOUS ONCE
Status: COMPLETED | OUTPATIENT
Start: 2024-01-23 | End: 2024-01-23

## 2024-01-23 RX ORDER — ONDANSETRON HYDROCHLORIDE 4 MG/5ML
4 SOLUTION ORAL ONCE
Status: DISCONTINUED | OUTPATIENT
Start: 2024-01-23 | End: 2024-01-23

## 2024-01-23 RX ORDER — DEXAMETHASONE SODIUM PHOSPHATE 10 MG/ML
10 INJECTION, SOLUTION INTRAMUSCULAR; INTRAVENOUS ONCE
Status: COMPLETED | OUTPATIENT
Start: 2024-01-23 | End: 2024-01-23

## 2024-01-23 RX ORDER — HYDROMORPHONE HYDROCHLORIDE 1 MG/ML
0.5 INJECTION, SOLUTION INTRAMUSCULAR; INTRAVENOUS; SUBCUTANEOUS ONCE
Status: COMPLETED | OUTPATIENT
Start: 2024-01-23 | End: 2024-01-23

## 2024-01-23 RX ORDER — HYDROMORPHONE HYDROCHLORIDE 1 MG/ML
1 INJECTION, SOLUTION INTRAMUSCULAR; INTRAVENOUS; SUBCUTANEOUS ONCE
Status: DISCONTINUED | OUTPATIENT
Start: 2024-01-23 | End: 2024-01-23

## 2024-01-23 RX ORDER — ONDANSETRON 2 MG/ML
4 INJECTION INTRAMUSCULAR; INTRAVENOUS ONCE
Status: COMPLETED | OUTPATIENT
Start: 2024-01-23 | End: 2024-01-23

## 2024-01-23 RX ADMIN — CLINDAMYCIN IN 5 PERCENT DEXTROSE 600 MG: 12 INJECTION, SOLUTION INTRAVENOUS at 18:07

## 2024-01-23 RX ADMIN — HYDROMORPHONE HYDROCHLORIDE 0.5 MG: 1 INJECTION, SOLUTION INTRAMUSCULAR; INTRAVENOUS; SUBCUTANEOUS at 20:15

## 2024-01-23 RX ADMIN — DEXAMETHASONE SODIUM PHOSPHATE 10 MG: 10 INJECTION, SOLUTION INTRAMUSCULAR; INTRAVENOUS at 18:15

## 2024-01-23 RX ADMIN — HYDROMORPHONE HYDROCHLORIDE 0.5 MG: 1 INJECTION, SOLUTION INTRAMUSCULAR; INTRAVENOUS; SUBCUTANEOUS at 15:49

## 2024-01-23 RX ADMIN — IOPAMIDOL 75 ML: 612 INJECTION, SOLUTION INTRAVENOUS at 17:15

## 2024-01-23 RX ADMIN — ONDANSETRON 4 MG: 2 INJECTION INTRAMUSCULAR; INTRAVENOUS at 15:48

## 2024-01-23 ASSESSMENT — ENCOUNTER SYMPTOMS
VOICE CHANGE: 0
ABDOMINAL PAIN: 0
EYE DISCHARGE: 0
APNEA: 0
NAUSEA: 0
FACIAL SWELLING: 1
VOMITING: 0
ABDOMINAL DISTENTION: 0

## 2024-01-23 ASSESSMENT — PAIN DESCRIPTION - ORIENTATION
ORIENTATION: LEFT
ORIENTATION: LEFT

## 2024-01-23 ASSESSMENT — PAIN DESCRIPTION - DESCRIPTORS
DESCRIPTORS: DISCOMFORT
DESCRIPTORS: ACHING

## 2024-01-23 ASSESSMENT — PAIN DESCRIPTION - FREQUENCY: FREQUENCY: CONTINUOUS

## 2024-01-23 ASSESSMENT — PAIN DESCRIPTION - LOCATION
LOCATION: NECK
LOCATION: FACE
LOCATION: FACE

## 2024-01-23 ASSESSMENT — PAIN SCALES - GENERAL
PAINLEVEL_OUTOF10: 9
PAINLEVEL_OUTOF10: 7
PAINLEVEL_OUTOF10: 10

## 2024-01-23 ASSESSMENT — PAIN - FUNCTIONAL ASSESSMENT
PAIN_FUNCTIONAL_ASSESSMENT: 0-10
PAIN_FUNCTIONAL_ASSESSMENT: NONE - DENIES PAIN

## 2024-01-23 ASSESSMENT — PAIN DESCRIPTION - PAIN TYPE: TYPE: ACUTE PAIN

## 2024-01-23 NOTE — ED PROVIDER NOTES
General: She is not in acute distress.     Appearance: She is well-developed.   HENT:      Head: Normocephalic and atraumatic.        Comments: Edema noted along with slight erythema from corner of mouth to mandible.     Right Ear: External ear normal.      Left Ear: External ear normal.      Nose: Nose normal.      Mouth/Throat:      Mouth: Mucous membranes are moist.      Comments: No drainable fluid collections or abscess noted in mouth  Eyes:      General:         Right eye: No discharge.         Left eye: No discharge.      Pupils: Pupils are equal, round, and reactive to light.   Cardiovascular:      Rate and Rhythm: Normal rate and regular rhythm.      Pulses: Normal pulses.      Heart sounds: Normal heart sounds.   Pulmonary:      Effort: Pulmonary effort is normal. No respiratory distress.      Breath sounds: Normal breath sounds. No stridor.   Abdominal:      General: Bowel sounds are normal. There is no distension.      Palpations: Abdomen is soft.   Musculoskeletal:         General: Normal range of motion.      Cervical back: Normal range of motion and neck supple.   Skin:     General: Skin is warm.      Findings: No erythema.   Neurological:      Mental Status: She is alert and oriented to person, place, and time.   Psychiatric:         Mood and Affect: Mood normal.         DIAGNOSTIC RESULTS     EKG: All EKG's are interpreted by the Emergency Department Physician who either signs or Co-signs this chart in the absence of a cardiologist.         RADIOLOGY:   Non-plain film images such as CT, Ultrasound and MRI are read by the radiologist. Plain radiographic images are visualized and preliminarily interpreted by the emergency physician with the below findings:         Interpretation per the Radiologist below, if available at the time of this note:    CT SOFT TISSUE NECK W CONTRAST   Final Result   1. Multiloculated abscess filling the region of the left tongue resection.   This abscess measures 3.8 x 2.5

## 2024-01-24 ENCOUNTER — CLINICAL SUPPORT (OUTPATIENT)
Dept: EMERGENCY MEDICINE | Facility: HOSPITAL | Age: 64
End: 2024-01-24
Payer: COMMERCIAL

## 2024-01-24 ENCOUNTER — TELEPHONE (OUTPATIENT)
Dept: PALLATIVE CARE | Age: 64
End: 2024-01-24

## 2024-01-24 ENCOUNTER — HOSPITAL ENCOUNTER (EMERGENCY)
Facility: HOSPITAL | Age: 64
Discharge: HOME | End: 2024-01-24
Attending: STUDENT IN AN ORGANIZED HEALTH CARE EDUCATION/TRAINING PROGRAM
Payer: COMMERCIAL

## 2024-01-24 VITALS
OXYGEN SATURATION: 98 % | TEMPERATURE: 98.5 F | DIASTOLIC BLOOD PRESSURE: 107 MMHG | HEIGHT: 64 IN | WEIGHT: 98 LBS | RESPIRATION RATE: 18 BRPM | BODY MASS INDEX: 16.73 KG/M2 | HEART RATE: 89 BPM | SYSTOLIC BLOOD PRESSURE: 150 MMHG

## 2024-01-24 DIAGNOSIS — K12.2 MOUTH ABSCESS: Primary | ICD-10-CM

## 2024-01-24 LAB
ALBUMIN SERPL BCP-MCNC: 3.3 G/DL (ref 3.4–5)
ALP SERPL-CCNC: 97 U/L (ref 33–136)
ALT SERPL W P-5'-P-CCNC: 13 U/L (ref 7–45)
ANION GAP SERPL CALC-SCNC: 13 MMOL/L (ref 10–20)
AST SERPL W P-5'-P-CCNC: 22 U/L (ref 9–39)
ATRIAL RATE: 93 BPM
BASOPHILS # BLD AUTO: 0.06 X10*3/UL (ref 0–0.1)
BASOPHILS NFR BLD AUTO: 0.7 %
BILIRUB SERPL-MCNC: 0.5 MG/DL (ref 0–1.2)
BUN SERPL-MCNC: 16 MG/DL (ref 6–23)
CALCIUM SERPL-MCNC: 9.2 MG/DL (ref 8.6–10.6)
CHLORIDE SERPL-SCNC: 98 MMOL/L (ref 98–107)
CO2 SERPL-SCNC: 28 MMOL/L (ref 21–32)
CREAT SERPL-MCNC: 0.51 MG/DL (ref 0.5–1.05)
EGFRCR SERPLBLD CKD-EPI 2021: >90 ML/MIN/1.73M*2
EOSINOPHIL # BLD AUTO: 0.04 X10*3/UL (ref 0–0.7)
EOSINOPHIL NFR BLD AUTO: 0.5 %
ERYTHROCYTE [DISTWIDTH] IN BLOOD BY AUTOMATED COUNT: 14.9 % (ref 11.5–14.5)
GLUCOSE SERPL-MCNC: 97 MG/DL (ref 74–99)
HCT VFR BLD AUTO: 29.3 % (ref 36–46)
HGB BLD-MCNC: 9.7 G/DL (ref 12–16)
IMM GRANULOCYTES # BLD AUTO: 0.03 X10*3/UL (ref 0–0.7)
IMM GRANULOCYTES NFR BLD AUTO: 0.4 % (ref 0–0.9)
LACTATE SERPL-SCNC: 0.6 MMOL/L (ref 0.4–2)
LYMPHOCYTES # BLD AUTO: 0.77 X10*3/UL (ref 1.2–4.8)
LYMPHOCYTES NFR BLD AUTO: 9.4 %
MCH RBC QN AUTO: 28.2 PG (ref 26–34)
MCHC RBC AUTO-ENTMCNC: 33.1 G/DL (ref 32–36)
MCV RBC AUTO: 85 FL (ref 80–100)
MONOCYTES # BLD AUTO: 0.61 X10*3/UL (ref 0.1–1)
MONOCYTES NFR BLD AUTO: 7.5 %
NEUTROPHILS # BLD AUTO: 6.66 X10*3/UL (ref 1.2–7.7)
NEUTROPHILS NFR BLD AUTO: 81.5 %
NRBC BLD-RTO: 0 /100 WBCS (ref 0–0)
P AXIS: 71 DEGREES
P OFFSET: 183 MS
P ONSET: 124 MS
PERFORMED ON: ABNORMAL
PLATELET # BLD AUTO: 397 X10*3/UL (ref 150–450)
POC CREATININE: 0.4 MG/DL (ref 0.6–1.2)
POC SAMPLE TYPE: ABNORMAL
POTASSIUM SERPL-SCNC: 3.9 MMOL/L (ref 3.5–5.3)
PR INTERVAL: 154 MS
PROT SERPL-MCNC: 7.2 G/DL (ref 6.4–8.2)
Q ONSET: 201 MS
QRS COUNT: 15 BEATS
QRS DURATION: 130 MS
QT INTERVAL: 412 MS
QTC CALCULATION(BAZETT): 512 MS
QTC FREDERICIA: 477 MS
R AXIS: 156 DEGREES
RBC # BLD AUTO: 3.44 X10*6/UL (ref 4–5.2)
SODIUM SERPL-SCNC: 135 MMOL/L (ref 136–145)
T AXIS: 42 DEGREES
T OFFSET: 407 MS
VENTRICULAR RATE: 93 BPM
WBC # BLD AUTO: 8.2 X10*3/UL (ref 4.4–11.3)

## 2024-01-24 PROCEDURE — 84075 ASSAY ALKALINE PHOSPHATASE: CPT

## 2024-01-24 PROCEDURE — 83605 ASSAY OF LACTIC ACID: CPT

## 2024-01-24 PROCEDURE — 99284 EMERGENCY DEPT VISIT MOD MDM: CPT | Performed by: STUDENT IN AN ORGANIZED HEALTH CARE EDUCATION/TRAINING PROGRAM

## 2024-01-24 PROCEDURE — 99222 1ST HOSP IP/OBS MODERATE 55: CPT | Performed by: OTOLARYNGOLOGY

## 2024-01-24 PROCEDURE — 96374 THER/PROPH/DIAG INJ IV PUSH: CPT

## 2024-01-24 PROCEDURE — 99285 EMERGENCY DEPT VISIT HI MDM: CPT

## 2024-01-24 PROCEDURE — 36415 COLL VENOUS BLD VENIPUNCTURE: CPT

## 2024-01-24 PROCEDURE — 85025 COMPLETE CBC W/AUTO DIFF WBC: CPT

## 2024-01-24 PROCEDURE — 93005 ELECTROCARDIOGRAM TRACING: CPT

## 2024-01-24 PROCEDURE — 2500000004 HC RX 250 GENERAL PHARMACY W/ HCPCS (ALT 636 FOR OP/ED): Mod: SE

## 2024-01-24 PROCEDURE — 99284 EMERGENCY DEPT VISIT MOD MDM: CPT | Mod: 25

## 2024-01-24 RX ORDER — CLINDAMYCIN PALMITATE HYDROCHLORIDE (PEDIATRIC) 75 MG/5ML
450 SOLUTION ORAL 3 TIMES DAILY
Qty: 900 ML | Refills: 0 | Status: SHIPPED | OUTPATIENT
Start: 2024-01-24 | End: 2024-02-03

## 2024-01-24 RX ORDER — CLINDAMYCIN HYDROCHLORIDE 150 MG/1
450 CAPSULE ORAL 3 TIMES DAILY
Qty: 90 CAPSULE | Refills: 0 | Status: SHIPPED | OUTPATIENT
Start: 2024-01-24 | End: 2024-01-24 | Stop reason: ENTERED-IN-ERROR

## 2024-01-24 RX ORDER — MORPHINE SULFATE 4 MG/ML
4 INJECTION INTRAVENOUS ONCE
Status: COMPLETED | OUTPATIENT
Start: 2024-01-24 | End: 2024-01-24

## 2024-01-24 RX ORDER — METHYLPREDNISOLONE 4 MG/1
TABLET ORAL
Qty: 21 TABLET | Refills: 0 | Status: SHIPPED | OUTPATIENT
Start: 2024-01-24 | End: 2024-01-24 | Stop reason: ENTERED-IN-ERROR

## 2024-01-24 RX ADMIN — MORPHINE SULFATE 4 MG: 4 INJECTION INTRAVENOUS at 03:46

## 2024-01-24 ASSESSMENT — LIFESTYLE VARIABLES
EVER FELT BAD OR GUILTY ABOUT YOUR DRINKING: NO
REASON UNABLE TO ASSESS: NO
EVER HAD A DRINK FIRST THING IN THE MORNING TO STEADY YOUR NERVES TO GET RID OF A HANGOVER: NO
HAVE PEOPLE ANNOYED YOU BY CRITICIZING YOUR DRINKING: NO
HAVE YOU EVER FELT YOU SHOULD CUT DOWN ON YOUR DRINKING: NO

## 2024-01-24 ASSESSMENT — COLUMBIA-SUICIDE SEVERITY RATING SCALE - C-SSRS
6. HAVE YOU EVER DONE ANYTHING, STARTED TO DO ANYTHING, OR PREPARED TO DO ANYTHING TO END YOUR LIFE?: NO
2. HAVE YOU ACTUALLY HAD ANY THOUGHTS OF KILLING YOURSELF?: NO
1. IN THE PAST MONTH, HAVE YOU WISHED YOU WERE DEAD OR WISHED YOU COULD GO TO SLEEP AND NOT WAKE UP?: NO

## 2024-01-24 ASSESSMENT — PAIN SCALES - GENERAL
PAINLEVEL_OUTOF10: 4
PAINLEVEL_OUTOF10: 9
PAINLEVEL_OUTOF10: 0 - NO PAIN

## 2024-01-24 ASSESSMENT — PAIN DESCRIPTION - LOCATION: LOCATION: MOUTH

## 2024-01-24 ASSESSMENT — PAIN - FUNCTIONAL ASSESSMENT: PAIN_FUNCTIONAL_ASSESSMENT: 0-10

## 2024-01-24 NOTE — PROGRESS NOTES
This patient was handed off to me at 7 AM.  For more extensive history, physical exam, and MDM, please see previous providers note.  Briefly, this is a 56-year-old female with a complex tongue cancer history including head and neck cancer primarily treated with chemoradiation followed by a left neck dissection and subsequently had a new left sided tongue cancer that was treated with hemiglossectomy and free flap reconstruction who presented to the emergency room for facial swelling.  Patient was found to have multiple abscesses on CT and was sent to [facility] for further evaluation.  Patient remained hemodynamically stable throughout my entire care.  Patient was handed off to me with ENT recommendations still pending.  On my reevaluation, patient has no new complaints.  She is handling her own secretions.  She is able to swallow effectively and has no tenderness to palpation over the trachea.  ENT did a scope.  I did personally speak with the attending physician Dr. Berry Motta.  He does know this patient very well and is recommending discharge home with a Medrol Dosepak and a prescription for clindamycin.  Patient was given strict return precautions.  Patient was agreeable this plan had no further questions.  I did provide patient with number for the [facility] as well as ENT clinic for outpatient follow-up.  Patient will be discharged home.  Patient understands that if symptoms worsen or change in any way, they should return for immediate medical attention.  Patient understands that they should follow-up with their primary care physician within the next week to follow-up for mouth abscess.  Patient was stable upon discharge.

## 2024-01-24 NOTE — ED NOTES
[facility]  at bedside to take pt to [facility].  Pt stable, 0 c/o, 0 distress, a&ox4, skin w/d/pink, 0 problems.

## 2024-01-24 NOTE — ED NOTES
Taking over the care of pt.   Pt a&ox4, skin  w/d/pink, pulses palp, 0 distress, airway patent, pt c/o l sided facial pain 9/10, taya Boswell notified.

## 2024-01-24 NOTE — CONSULTS
CC/Reason for Consult: hx of head and neck cancer    Consulted by: ED    HPI: Patient is a 63-year-old female with a complex history of head and neck cancer, initially with base of tongue cancer in 2005, primarily treated with chemoradiation followed by a left neck dissection, and subsequently had a new left-sided tongue cancer that was treated with hemiglossectomy and free flap reconstruction, with positive margins, positive pathologic lymph nodes, 14 mm DOI, and perineural spread, with recommendations for adjuvant radiation.  Patient completed adjuvant radiation in May 2023, but subsequent CT imaging revealed progression of disease in the lung suspicious of metastasis.  Patient presented to Dr. Mnuoz in July most recently with a new right neck nodule near her neck incision suspicious for dermal metastasis.  Patient has been following with palliative care for pain and symptom optimization.  Currently CODE STATUS is DNR CCA.  Patient presented yesterday afternoon with a 1 month history of facial edema, found on CT imaging to have multiple abscesses of the tongue and floor of mouth with cellulitic changes, suspicious for necrotic disease persistence.  Patient was recommended transfer to [facility] for management with antibiotics and further goals of care discussion.  Upon arrival, patient does not have a leukocytosis, WBC 8.2.  CT imaging demonstrates a multiloculated abscess in the left posterior lateral tongue measuring 4.1 cm in maximal diameter extending into the soft tissues through the floor of mouth, with concerning right tongue disease burden; her upper airway is concerning for diffuse edema concerning for cellulitic changes locally.  ENT consulted for scope exam to evaluate upper airway and possible culture. Upon evaluation, patient reporting increased fullness of the mouth and neck for the last 3 months, with left neck/mandibular pain. Patient says that her symptoms wax and wane with speech therapy exercises.  Denies fevers/sweats/chills. She is currently on immunotherapy.    Past medical history: COPD, PEG dependence, chronic diastolic heart failure, HLD, DJD, chronic hepatitis B, hypothyroidism, ICD    Past surgical history: As above    Social history:  Smoking: Denies  Alcohol: Denies  Independent at baseline    Family history:   Not relevant to the presenting complaint    Current medications: Reviewed as noted in current orders    Allergies: Penicillins, meperidine    ROS:  A full review of systems was obtained and all other systems are negative for complaint    Physical Exam:  CONSTITUTIONAL:  No acute distress  VOICE:  No hoarseness or other abnormality  RESPIRATION:  Breathing comfortably, no stridor  CV:  No clubbing/cyanosis/edema in hands  EYES:  EOM intact, sclera normal  NEURO:  Alert and oriented times 3  HEAD AND FACE: L facial/mandibular/neck fullness and firmness, slightly tender to palpation neck > jaw  EARS:  Normal external ears, normal hearing to whispered voice.  NOSE:  External nose midline, no bleeding or drainage  ORAL CAVITY/OROPHARYNX/LIPS: Postsurgical changes from free flap reconstruction of left tongue; submucosal firmness deep to reconstructed tissue, mucosal changes on the posterior aspect of the flap; no purulent drainage appreciated intraorally with palpation of the neck; trismus limited exam; mild-to-moderate secretion burden  NECK/LYMPH: left neck firmness consistent with post-radiation changes  SKIN:  Neck skin without evidence of dermal metastasis  PSYCH:  Alert and oriented with appropriate mood and affect    Radiology reviewed:  I personally reviewed the CT neck: multiloculated abscess of the left lateral tongue into the floor of mouth    Procedure Note: Flexible Nasolaryngoscopy  Verbal informed consent was obtained from the patient/patient's guardian. 4% lidocaine mixed with phenylephrine was prepared and dripped into the nose. It was placed in the left naris. Following an  appropriate amount of time to allow for adequate anesthesia, a flexible fiberoptic nasolaryngoscope was placed into the patient's left naris. The nasal cavity, nasopharynx, oropharynx, hypopharynx, and all endolaryngeal structures were visualized and were normal except as listed below. Significant findings included:  -watery edema of the left arytenoid, aryepiglottic fold  -widely patent glottis  -symmetrically mobile vocal cords  -mild secretion burden  -no concerning mucosal lesions or tumors    Assessment and Plan:  63-year-old female with complex head and neck cancer history, with known metastatic disease, concerns for necrotic disease progression.  Currently no evidence of leukocytosis.  Exam reveals left oral cavity submucosal firmness deep to her recent free flap reconstruction with left posterolateral mucosal changes.  Scope revealed left supraglottic watery edema. No significant respiratory distress.     -Ok for discharge from ENT perspective  -Rec'd discharge with clindamycin and medrol dosepak  -Will arrange close follow-up with attending Dr. Motta    Patient seen and discussed with attending Dr. Motta.    Rosette Fatima MD  Dept. of Otolaryngology - Head and Neck Surgery, PGY-2  ENT Adult: [id number]  ENT Peds: [id number]  ENT Outpatient scheduling number: [phone]

## 2024-01-24 NOTE — ED PROVIDER NOTES
"HPI    CC: Left-sided facial edema    HPI:   Jessica Cope is a 63-year-old female with complex PMH that includes metastatic tongue cancer s/p glossectomy with PEG tube placement and bronchial SCC of left upper lobe (on Jevity and Boost) as well as COPD, chronic diastolic HF, HLD, Chronic HCV, hypothyroidism, GERD and cardiomyopathy s/p ICD placement.  whom presented to [facility] emergency department as transfer from [facility] ([facility]) for further evaluation by ENT due to 1-month history of progressive left-sided facial swelling, jaw pain and multiple oral abscesses seen on CT imaging. States left-sided jaw pain and facial swelling are ongoing issues that comes and goes ever since she had surgical excision of tongue cancer 1 year ago. Current \"flare-up\" has been progressively worsening over the past month, onset without known trigger or recent trauma. She reports throat feels more swollen and has difficulty opening her mouth due to pain but is otherwise still tolerating her own secretions. Denies any fever, chills, N/V, chest pain, SOB or cough and no stigmata of URI. Treatment at home with oxycodone is ineffective. Patient initally presented to [facility], where basic labs and CT imaging were performed. Labs showed WBC 9.3. CT revealed multiple abscess to tongue and floor of the mouth as well as findings c/w cellulitis of airway. She was given antibiotics, decadron and analgesics before being transferred here for ENT consult and possible I&D/scope.     Patient rates current pain as 9/10 in severity and repports prior treatment with IV diluadid provided no significant pain relief.     Limitations to history: None  Independent historians interviewed: Self  External records review included outside ED provider notes, outpatient vists and records in care-everywhere    PMHx:  PSH: reviewed per EMR  Allergies: Reviewed per EMR  Medications: Reviewed per EMR  FH: Noncontributory  Social Hx: Denies tobacco. " Denies EtOH, illicit substance use.      ROS: All other systems were reviewed and negative.    Physical Exam  Vitals and nursing note reviewed.   Constitutional:       General: She is not in acute distress.     Appearance: She is not toxic-appearing.   HENT:      Head: Normocephalic and atraumatic.      Jaw: Trismus (unable to open mouth beyond 2-finger widths d/t pain), tenderness (left sided at mandibular angle), swelling and pain on movement present.      Salivary Glands: Right salivary gland is not diffusely enlarged or tender. Left salivary gland is not diffusely enlarged or tender.        Mouth/Throat:      Lips: Pink.      Mouth: Mucous membranes are moist. No injury (S/p partial glossectomy) or angioedema.      Tongue: No lesions.      Comments: Unable to visualize tonsils or posterior oropharynx seconardary to patient's inability to open mouth due to pain.  Eyes:      Extraocular Movements: Extraocular movements intact.      Conjunctiva/sclera: Conjunctivae normal.      Pupils: Pupils are equal, round, and reactive to light.   Neck:      Thyroid: No thyroid mass.      Vascular: No carotid bruit.      Trachea: Phonation normal. No abnormal tracheal secretions.   Cardiovascular:      Rate and Rhythm: Normal rate and regular rhythm.      Pulses: Normal pulses.      Heart sounds: Normal heart sounds. No murmur heard.     No friction rub. No gallop.   Pulmonary:      Effort: Pulmonary effort is normal. No respiratory distress.      Breath sounds: Normal breath sounds. No stridor. No wheezing, rhonchi or rales.   Chest:      Chest wall: No tenderness.   Abdominal:      General: Bowel sounds are normal. There is no distension.      Palpations: Abdomen is soft.      Tenderness: There is no abdominal tenderness. There is no guarding or rebound.   Musculoskeletal:         General: Normal range of motion.      Cervical back: Normal range of motion and neck supple.      Right lower leg: No edema.      Left lower leg:  No edema.   Lymphadenopathy:      Cervical: No cervical adenopathy.   Skin:     General: Skin is warm and dry.      Capillary Refill: Capillary refill takes less than 2 seconds.   Neurological:      General: No focal deficit present.      Mental Status: She is alert.   Psychiatric:         Mood and Affect: Mood normal.         Behavior: Behavior normal.       -------------------------------------------------------------------------------------------  ED Course & [facility]   Triage notes and vital signs were reviewed. History and physical exam were performed. I also reviewed recent and relevant outside records for thorough HPI.    Medical Decision Making  63-year-old female with metastatic tongue cancer status-post glossectomy in 03/2023 presented as transfer from [facility] with 1 month history of progressively worsening left-sided facial swelling and jaw pain. She initially presented to [facility] ED in [place] yesterday but was ultimately transferred here for ENT consult due to presence of abscesses to the remaining portion of her tongue extending to floor of the mouth and laryngeal cellulitis seen on CT imaging. Patient is afebrile, non-toxic appearing and currently tolerating oral secretions without difficulty. She was mildly tachycardic to 102 on arrival but otherwise HDS and in no acute distress. Physical exam notable for trismus and marked left-sided facial edema and redness along mandibular angle that is TTP. No drooling or stridor noted.   ENT consulted for further evaluation and treatment.    Amount and/or Complexity of Data Reviewed  External Data Reviewed: labs, radiology, ECG and notes.  Labs:      Details: Labs obtained at [facility] prior to transfer were grossly normal without any acute leukocytosis, shift, electrolyte abnormalities or MARIO ALBERTO. Her lactate at that times was also normal.  Radiology:  Decision-making details documented in ED Course.     Details: CT soft tissue neck with IV contrast  obtained at OSH yesterday evening revealed an abscess measuring 3.8 x 2.5 x 4.1 cm located posterior and lateral to surgical clips in the left anterior mid tongue which appears to extend through the floor of mouth and into subcutaneous tissue as well as heterogeneous low-density throughout remaining right tongue with mild swelling worrisome for myosititis and diffuse edema and stranding of laryngeal structures consistent with cellulitis.  Discussion of management or test interpretation with external provider(s): ENT     Risk  Prescription drug management.  Parenteral controlled substances.  Decision regarding hospitalization.              Disposition: Signed out to oncoming ED provider, Ar Moore at end of shift pending ENT evaluation and recommendations.  However given complicated cancer history and concerns for disease progression with upper airway showing any signs of cellulitic changes on CT imaging patient will likely need to be admitted for further treatment and evaluation.    Yana Jackson PA-C  [facility]  [facility]    Disclaimer: This note was dictated by speech recognition. Minor errors in transcription may be present. Please call if questions.  -------------------------------------------------------------------------------------------       Yana Jackson PA-C  01/24/24 0734

## 2024-01-24 NOTE — TELEPHONE ENCOUNTER
Patient's daughter, Gemma called in and left a voicemail explaining that Archana went to the ER yesterday and was prescribed an antibiotic and prednisone. She wants to know if it is OK for Archana to take the prednisone while on Keytruda. Her phone number is [phone]. Thanks!

## 2024-01-25 PROBLEM — C34.32 MALIGNANT NEOPLASM OF LOWER LOBE OF LEFT LUNG (MULTI): Status: ACTIVE | Noted: 2023-07-28

## 2024-01-28 LAB
BACTERIA BLD CULT ORG #2: NORMAL
BACTERIA BLD CULT: NORMAL

## 2024-01-29 ENCOUNTER — OFFICE VISIT (OUTPATIENT)
Dept: OTOLARYNGOLOGY | Facility: CLINIC | Age: 64
End: 2024-01-29
Payer: COMMERCIAL

## 2024-01-29 VITALS — SYSTOLIC BLOOD PRESSURE: 108 MMHG | TEMPERATURE: 98.2 F | DIASTOLIC BLOOD PRESSURE: 84 MMHG

## 2024-01-29 DIAGNOSIS — C02.9 MALIGNANT NEOPLASM OF TONGUE (MULTI): ICD-10-CM

## 2024-01-29 PROCEDURE — 3074F SYST BP LT 130 MM HG: CPT | Performed by: OTOLARYNGOLOGY

## 2024-01-29 PROCEDURE — 40808 BIOPSY OF MOUTH LESION: CPT | Performed by: OTOLARYNGOLOGY

## 2024-01-29 PROCEDURE — 88305 TISSUE EXAM BY PATHOLOGIST: CPT | Performed by: DENTIST

## 2024-01-29 PROCEDURE — 1036F TOBACCO NON-USER: CPT | Performed by: OTOLARYNGOLOGY

## 2024-01-29 PROCEDURE — 3008F BODY MASS INDEX DOCD: CPT | Performed by: OTOLARYNGOLOGY

## 2024-01-29 PROCEDURE — 88305 TISSUE EXAM BY PATHOLOGIST: CPT

## 2024-01-29 PROCEDURE — 3079F DIAST BP 80-89 MM HG: CPT | Performed by: OTOLARYNGOLOGY

## 2024-01-29 PROCEDURE — 99213 OFFICE O/P EST LOW 20 MIN: CPT | Performed by: OTOLARYNGOLOGY

## 2024-01-29 PROCEDURE — 31575 DIAGNOSTIC LARYNGOSCOPY: CPT | Performed by: OTOLARYNGOLOGY

## 2024-01-29 ASSESSMENT — PATIENT HEALTH QUESTIONNAIRE - PHQ9
1. LITTLE INTEREST OR PLEASURE IN DOING THINGS: NOT AT ALL
2. FEELING DOWN, DEPRESSED OR HOPELESS: NOT AT ALL
SUM OF ALL RESPONSES TO PHQ9 QUESTIONS 1 AND 2: 0

## 2024-01-29 NOTE — PROGRESS NOTES
ENT Follow up Visit    History Of Present Illness  Jessica Cope is a 63 y.o. female presents for follow up    referred by Dr. Santacruz for evaluation of a tongue cancer. Patient states that she has a history of base of tongue cancer that was treated with chemoradiation in  followed by a left-sided neck dissection. She was recently diagnosed with a left-sided tongue cancer and is now status post hemiglossectomy and free flap reconstruction. Pathology showed 14 mm depth of invasion and perineural spread. Margins and lymph nodes positive. Radiation recommended by tumor board     On her initial CT scan of the chest there was a nodule that did not have PET activity. Short-term follow-up CT was recommended. She completed adjuvant treatment end of May 2023     Follow-up CT scan of the chest showed possible progression of lung disease.  Biopsy confirmed disease.  She also had biopsy confirmed dermal metastasis.  She started systemic therapy in 24: Patient returns for follow-up.  She was seen in the ER several days ago with concern for possible abscess.  Clinically this looks like recurrence of the oral cavity.  She is here today for follow-up exam and possible biopsy     Past Medical History  She has a past medical history of History of mammogram (2022) and Pap test, as part of routine gynecological examination.    Surgical History  She has a past surgical history that includes  section, classic (); Lumbar fusion (); Cardiac defibrillator placement (); Lymphadenectomy (); Stomach surgery (2023); Tongue surgery (); Gastrostomy tube change (); and CT guided percutaneous biopsy lung (2023).     Social History  She reports that she quit smoking about 19 years ago. Her smoking use included cigarettes. She has never used smokeless tobacco. She reports that she does not drink alcohol and does not use drugs.    Family History  Family History   Problem Relation Name Age of  Onset    Cancer Father          prostate    Hypertension Father          Allergies  Penicillins and Meperidine     Physical Exam:  CONSTITUTIONAL:  No acute distress  VOICE:  No hoarseness or other abnormality  RESPIRATION:  Breathing comfortably, no stridor  CV:  No clubbing/cyanosis/edema in hands  EYES:  EOM intact, sclera normal  NEURO:  Alert and oriented times 3, Cranial nerves II-XII grossly intact and symmetric bilaterally  HEAD AND FACE:  Symmetric facial features, no masses or lesions, sinuses non-tender to palpation  SALIVARY GLANDS:  Parotid and submandibular glands normal bilaterally  EARS:  Normal external ears, external auditory canals, and TMs to otoscopy, normal hearing to whispered voice.  NOSE:  External nose midline, anterior rhinoscopy is normal with limited visualization to the anterior aspect of the interior turbinates, no bleeding or drainage, no lesions  ORAL CAVITY/OROPHARYNX/LIPS: Trismus present.  Fullness of left floor of mouth and submandibular area.  Ulcer visualized in the posterior oral cavity on the left abutting the mandible  PHARYNGEAL WALLS:  No masses or lesions  NECK/LYMPH:  No palpable LAD, no thyroid masses, trachea midline.  No obvious dermal metastasis  SKIN:  Neck skin is without scar or injury  PSYCH:  Alert and oriented with appropriate mood and affect    Procedure Note: Flexible Nasolaryngoscopy  Verbal informed consent was obtained from the patient/patient's guardian. 4% lidocaine mixed with phenylephrine was prepared and dripped into the nose. It was placed in the right naris. Following an appropriate amount of time to allow for adequate anesthesia, a flexible fiberoptic nasolaryngoscope was placed into the patient's right naris. The nasal cavity, nasopharynx, oropharynx, hypopharynx, and all endolaryngeal structures were visualized and were normal except as listed below. Significant findings included:  -Airway patent, edema improved from her emergency room visit.   Fibrinous material visualized on the left posterior base of tongue    Procedure: Biopsy of left posterior oral cavity    Verbal consent was obtained for biopsy.  Topical anesthetic was applied.  Cup forceps were used to take 4-5 samples from the left posterior oral cavity ulcer     Last Recorded Vitals  Blood pressure 108/84, temperature 36.8 °C (98.2 °F).    Relevant Results  CT soft tissue neck w IV contrast    Result Date: 1/23/2024  EXAMINATION: CT OF THE NECK SOFT TISSUE WITH CONTRAST  1/23/2024 TECHNIQUE: CT of the neck was performed with the administration of intravenous contrast. Multiplanar reformatted images are provided for review. Automated exposure control, iterative reconstruction, and/or weight based adjustment of the mA/kV was utilized to reduce the radiation dose to as low as reasonably achievable. COMPARISON: CT pulmonary angiogram from 08/08/2023. HISTORY: ORDERING SYSTEM PROVIDED HISTORY: lt sided facial swelling TECHNOLOGIST PROVIDED HISTORY: Reason for exam:->lt sided facial swelling Additional Contrast?->1 What reading provider will be dictating this exam?->CRC FINDINGS: PHARYNX/LARYNX:  There is a multiloculated abscess filling the region of left tongue resection.  This abscess measures 3.8 x 2.5 x 4.1 cm and is located posterior and lateral to surgical clips in the left anterior and mid tongue. This appears to extend through the floor of the mouth to reach the subcutaneous tissues. There is heterogeneous low density throughout the remaining right tongue, with mild swelling, worrisome for myositis or possibly malignancy.  Recommend correlation with the clinical history. The laryngeal structures are diffusely edematous.  The aryepiglottic folds and false cords are moderately diffusely enlarged, right greater than left. This results in effacement of the valleculae and piriform sinuses.  The true cords are normal in appearance intact and normal position. SALIVARY GLANDS/THYROID:  The parotid and  submandibular glands appear unremarkable.  The thyroid gland appears unremarkable. LYMPH NODES:  No cervical or supraclavicular lymphadenopathy is seen. SOFT TISSUES:  There are multiple surgical clips in the right neck, extending from the inferior mandibular region through the laryngeal region, with no sign of any associated mass. Both sternocleidomastoid muscles are atrophic but are present, without resection. There is fullness of the left mandibular and maxillary subcutaneous fat with soft tissue stranding especially inferiorly, consistent with cellulitis. BRAIN/ORBITS/SINUSES:  The visualized portion of the intracranial contents appear unremarkable.  The visualized portion of the orbits, paranasal sinuses and mastoid air cells demonstrate no acute abnormality. LUNG APICES/SUPERIOR MEDIASTINUM:  The previously seen cavitary infiltrate in the subpleural anterior-lateral left upper lobe has prominently decreased in size, now measuring 2.3 x 1.3 cm, consistent with clearing of a cavitary pneumonia. The previously seen large rounded infiltrate in the posterior-lateral superior segment of the left lower lobe is only minimally included on today's study and measures 1.1 cm.  Since the entire extent of this region is not included on today's study, direct comparison cannot be performed. There is stable nodular, spiculated scarring in the posterior right upper lobe towards the apex. There is continued mediastinal lymphadenopathy.  The left periaortic lymph node has decreased in short axis diameter 2 1.0 cm, previously 1.2 cm.  The previously seen precarinal lymphadenopathy is incompletely examined but appears to be more prominent, now having short axis diameter of 1.5 cm, previously 1.2 cm. Again seen is moderate centrilobular emphysema. BONES:  There is fusion of C4 through C6 in anatomic alignment with absence of the disc spaces.  There is grade 1 anterior subluxation of C3 on C4 and of C7 on T1 which are probably  degenerative. There is severe C3-4, C6-7, and C7-T1 disc degenerative disease. Sclerosis seen throughout the cervical spine is probably from degenerative disease and not suggestive of metastatic disease.    1. Multiloculated abscess filling the region of the left tongue resection. This abscess measures 3.8 x 2.5 x 4.1 cm and is located posterior and lateral to surgical clips in the left anterior and mid tongue. This appears to extend through the floor of the mouth to reach the subcutaneous tissues. 2. There is heterogeneous low density throughout the remaining right tongue, with mild swelling, worrisome for myositis or possibly malignancy. Recommend correlation with the clinical history. 3. The laryngeal structures are diffusely edematous. stranding especially inferiorly, consistent with cellulitis. 4. There are multiple surgical clips in the right neck, extending from the inferior mandibular region through the laryngeal region, with no sign of any associated mass. 5. The previously seen cavitary infiltrate in the subpleural anterior-lateral left upper lobe has prominently decreased in size, consistent with clearing of a cavitary pneumonia. 6. The previously seen large rounded infiltrate in the posterior-lateral superior segment of the left lower lobe is only minimally included on today's study and measures 1.1 cm.         Assessment and Plan  63 y.o. femaleremote history of base of tongue cancer treated with chemoradiation followed by radical left neck dissection. 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 Final stage T3N0. Completed adjuvant treatment end of May 2023     She had biopsy-proven distant disease after completion of radiation.  Has been receiving systemic therapy since July 2023    Now has concern for recurrence in the oral cavity     -Biopsy obtained today from left posterior oral cavity.  I will contact her with results  -Further  imaging may be required once results return  -Discussed results with Dr. Adam who is managing her systemic therapy  -Further recommendations pending biopsy results    Berry Motta MD

## 2024-01-30 ENCOUNTER — APPOINTMENT (OUTPATIENT)
Dept: PRIMARY CARE | Facility: CLINIC | Age: 64
End: 2024-01-30
Payer: COMMERCIAL

## 2024-01-30 ENCOUNTER — APPOINTMENT (OUTPATIENT)
Dept: OTOLARYNGOLOGY | Facility: CLINIC | Age: 64
End: 2024-01-30
Payer: COMMERCIAL

## 2024-01-30 ENCOUNTER — OFFICE VISIT (OUTPATIENT)
Dept: PALLATIVE CARE | Age: 64
End: 2024-01-30
Payer: COMMERCIAL

## 2024-01-30 VITALS
SYSTOLIC BLOOD PRESSURE: 107 MMHG | DIASTOLIC BLOOD PRESSURE: 85 MMHG | BODY MASS INDEX: 16.82 KG/M2 | RESPIRATION RATE: 18 BRPM | OXYGEN SATURATION: 98 % | WEIGHT: 98 LBS | HEART RATE: 88 BPM

## 2024-01-30 DIAGNOSIS — Z51.5 PALLIATIVE CARE ENCOUNTER: ICD-10-CM

## 2024-01-30 DIAGNOSIS — R22.0 SWELLING OF MANDIBLE: ICD-10-CM

## 2024-01-30 DIAGNOSIS — C79.89 METASTATIC SQUAMOUS CELL CARCINOMA TO TONGUE (HCC): Primary | ICD-10-CM

## 2024-01-30 DIAGNOSIS — Z71.89 GOALS OF CARE, COUNSELING/DISCUSSION: ICD-10-CM

## 2024-01-30 DIAGNOSIS — G89.3 CANCER RELATED PAIN: ICD-10-CM

## 2024-01-30 PROCEDURE — 3017F COLORECTAL CA SCREEN DOC REV: CPT

## 2024-01-30 PROCEDURE — G8419 CALC BMI OUT NRM PARAM NOF/U: HCPCS

## 2024-01-30 PROCEDURE — 1036F TOBACCO NON-USER: CPT

## 2024-01-30 PROCEDURE — G8484 FLU IMMUNIZE NO ADMIN: HCPCS

## 2024-01-30 PROCEDURE — 99214 OFFICE O/P EST MOD 30 MIN: CPT

## 2024-01-30 PROCEDURE — G8427 DOCREV CUR MEDS BY ELIG CLIN: HCPCS

## 2024-01-30 ASSESSMENT — ENCOUNTER SYMPTOMS
VOICE CHANGE: 1
SHORTNESS OF BREATH: 0
COUGH: 1
STRIDOR: 0
VOMITING: 0
RHINORRHEA: 1
COLOR CHANGE: 0
WHEEZING: 0
NAUSEA: 0

## 2024-01-30 NOTE — PROGRESS NOTES
Patient understands and is agreeable to current plan.   .        - Advance Care Planning   We discussed Living Will (LW), and Health Care Power of  (HCPOA) as well as their activation. Patient choice discussed. LW/HCPOA in place Yes;Tasked  for assistance with completing paperwork No; Code status discussed Yes; signed Yes. Code DNR/CCA. All related questions were answered completely.    Discussed with patient/surrogate: POC, Treatment risks/benefits, and alternatives. All questions were answered. Additionally, a printed copy of the [facility] medications/opioid safety informational sheet was reviewed and given to patient for reference. Opioid contract signed:Yes      Thanks for the opportunity you have allowed us to provide palliative care to Archana. We will be in touch as care progresses. Please feel free to reach out to us should you have any questions or requests.    Total Time 45           Total time includes reviewing labs and tests, reviewing history, medications, and allergies, counseling patient, performing medically appropriate evaluation and examination, ordering medications, and documenting in the medical record.     Colleen Mayberry, ALEN - CNP    Collaborating physician: Dr. Brooks     Please note this report is partially produced by using speech recognition hardware.  It may contain errors related to the system, including grammar, punctuation and spelling as well as words and phrases that may seem inaccurate.  For any questions or concerns feel free to contact me for clarification.

## 2024-01-31 ENCOUNTER — TELEMEDICINE (OUTPATIENT)
Dept: PRIMARY CARE | Facility: CLINIC | Age: 64
End: 2024-01-31
Payer: COMMERCIAL

## 2024-01-31 VITALS — HEIGHT: 64 IN | BODY MASS INDEX: 17.07 KG/M2 | WEIGHT: 100 LBS | HEART RATE: 80 BPM

## 2024-01-31 DIAGNOSIS — E78.2 MIXED HYPERLIPIDEMIA: ICD-10-CM

## 2024-01-31 DIAGNOSIS — C34.32 MALIGNANT NEOPLASM OF LOWER LOBE OF LEFT LUNG (MULTI): ICD-10-CM

## 2024-01-31 DIAGNOSIS — E03.9 HYPOTHYROIDISM, UNSPECIFIED TYPE: ICD-10-CM

## 2024-01-31 DIAGNOSIS — G47.00 INSOMNIA, UNSPECIFIED TYPE: ICD-10-CM

## 2024-01-31 DIAGNOSIS — B19.20 HEPATITIS C VIRUS INFECTION WITHOUT HEPATIC COMA, UNSPECIFIED CHRONICITY: ICD-10-CM

## 2024-01-31 DIAGNOSIS — F41.8 MIXED ANXIETY DEPRESSIVE DISORDER: ICD-10-CM

## 2024-01-31 DIAGNOSIS — I42.9 CARDIOMYOPATHY, UNSPECIFIED TYPE (MULTI): ICD-10-CM

## 2024-01-31 DIAGNOSIS — J96.01 ACUTE RESPIRATORY FAILURE WITH HYPOXIA (MULTI): Primary | ICD-10-CM

## 2024-01-31 DIAGNOSIS — J44.9 CHRONIC OBSTRUCTIVE PULMONARY DISEASE, UNSPECIFIED COPD TYPE (MULTI): ICD-10-CM

## 2024-01-31 DIAGNOSIS — I10 ESSENTIAL (PRIMARY) HYPERTENSION: ICD-10-CM

## 2024-01-31 DIAGNOSIS — Z95.810 IMPLANTABLE CARDIOVERTER-DEFIBRILLATOR (ICD) IN SITU: ICD-10-CM

## 2024-01-31 DIAGNOSIS — C02.9 MALIGNANT NEOPLASM OF TONGUE (MULTI): ICD-10-CM

## 2024-01-31 PROCEDURE — 99442 PR PHYS/QHP TELEPHONE EVALUATION 11-20 MIN: CPT | Performed by: FAMILY MEDICINE

## 2024-01-31 NOTE — PROGRESS NOTES
"Subjective   Patient ID: Jessica Cope is a 63 y.o. female who presents and consented for VIRTUAL VISIT ---     VIDEO  [phone] Telephone  Not on Integrated Ordering Systemshart     C19: [id number]  Flu: Due  Pneumo: Due   Pap: UTD  Mamm: Due  ColoRect: Due  Pt currently undergoing cancer treatments    HPI  Patient Active Problem List   Diagnosis    Acute posthemorrhagic anemia    Cardiomyopathy (CMS/HCC)    COPD (chronic obstructive pulmonary disease) (CMS/HCC)    Essential (primary) hypertension    Hepatitis C virus infection    Mixed hyperlipidemia    Hypothyroidism    Implantable cardioverter-defibrillator (ICD) in situ    Insomnia    Malignant neoplasm of tongue (CMS/HCC)    Mixed anxiety depressive disorder    Reflux esophagitis    Unspecified severe protein-calorie malnutrition (CMS/HCC)    Malignant neoplasm of lower lobe of left lung (CMS/HCC)       Past Surgical History:   Procedure Laterality Date    CARDIAC DEFIBRILLATOR PLACEMENT       SECTION, CLASSIC  1983    x 4 last one      CT GUIDED PERCUTANEOUS BIOPSY LUNG  2023    CT GUIDED PERCUTANEOUS BIOPSY LUNG 2023 PAR CT    GASTROSTOMY TUBE CHANGE      LUMBAR FUSION      LYMPHADENECTOMY      CA-L neck and r neck    STOMACH SURGERY  2023    has feeding tube    TONGUE SURGERY      dr mason  tongue removed       Review of Systems  This patient has   NO history of recent Covid nor flu symptoms,      NO Fever nor chills,  NO Chest pain, shortness of breath nor paroxysmal nocturnal dyspnea,  NO Nausea, vomiting, nor diarrhea,  NO Hematochezia nor melena,  NO Dysuria, hematuria, nor new incontinence issues  NO new severe headaches nor neurological complaints,  NO new issues with anxiety nor depression nor new psychiatric complaints,  NO suicidal nor homicidal ideations.     OBJECTIVE:  Pulse 80   Ht 1.626 m (5' 4\")   Wt 45.4 kg (100 lb)   BMI 17.16 kg/m²      General:  alert, oriented, no acute distress.  Mood is " pleasant, not tearful, no signs of emotional distress.  Not appearing intoxicated or altered.   No voiced delusions,   Normal, appropriate behavior.    On phone  Speech is as baseline difficult for her due to tongue hx    Bro killed himself a few wks ago!      Admission on 01/24/2024, Discharged on 01/24/2024   Component Date Value Ref Range Status    WBC 01/24/2024 8.2  4.4 - 11.3 x10*3/uL Final    nRBC 01/24/2024 0.0  0.0 - 0.0 /100 WBCs Final    RBC 01/24/2024 3.44 (L)  4.00 - 5.20 x10*6/uL Final    Hemoglobin 01/24/2024 9.7 (L)  12.0 - 16.0 g/dL Final    Hematocrit 01/24/2024 29.3 (L)  36.0 - 46.0 % Final    MCV 01/24/2024 85  80 - 100 fL Final    MCH 01/24/2024 28.2  26.0 - 34.0 pg Final    MCHC 01/24/2024 33.1  32.0 - 36.0 g/dL Final    RDW 01/24/2024 14.9 (H)  11.5 - 14.5 % Final    Platelets 01/24/2024 397  150 - 450 x10*3/uL Final    Neutrophils % 01/24/2024 81.5  40.0 - 80.0 % Final    Immature Granulocytes %, Automated 01/24/2024 0.4  0.0 - 0.9 % Final    Immature Granulocyte Count (IG) includes promyelocytes, myelocytes and metamyelocytes but does not include bands. Percent differential counts (%) should be interpreted in the context of the absolute cell counts (cells/UL).    Lymphocytes % 01/24/2024 9.4  13.0 - 44.0 % Final    Monocytes % 01/24/2024 7.5  2.0 - 10.0 % Final    Eosinophils % 01/24/2024 0.5  0.0 - 6.0 % Final    Basophils % 01/24/2024 0.7  0.0 - 2.0 % Final    Neutrophils Absolute 01/24/2024 6.66  1.20 - 7.70 x10*3/uL Final    Percent differential counts (%) should be interpreted in the context of the absolute cell counts (cells/uL).    Immature Granulocytes Absolute, Au* 01/24/2024 0.03  0.00 - 0.70 x10*3/uL Final    Lymphocytes Absolute 01/24/2024 0.77 (L)  1.20 - 4.80 x10*3/uL Final    Monocytes Absolute 01/24/2024 0.61  0.10 - 1.00 x10*3/uL Final    Eosinophils Absolute 01/24/2024 0.04  0.00 - 0.70 x10*3/uL Final    Basophils Absolute 01/24/2024 0.06  0.00 - 0.10 x10*3/uL Final     Glucose 01/24/2024 97  74 - 99 mg/dL Final    Sodium 01/24/2024 135 (L)  136 - 145 mmol/L Final    Potassium 01/24/2024 3.9  3.5 - 5.3 mmol/L Final    Chloride 01/24/2024 98  98 - 107 mmol/L Final    Bicarbonate 01/24/2024 28  21 - 32 mmol/L Final    Anion Gap 01/24/2024 13  10 - 20 mmol/L Final    Urea Nitrogen 01/24/2024 16  6 - 23 mg/dL Final    Creatinine 01/24/2024 0.51  0.50 - 1.05 mg/dL Final    eGFR 01/24/2024 >90  >60 mL/min/1.73m*2 Final    Calculations of estimated GFR are performed using the 2021 CKD-EPI Study Refit equation without the race variable for the IDMS-Traceable creatinine methods.  https://jasn.asnjournals.org/content/early/2021/09/22/ASN.3534321259    Calcium 01/24/2024 9.2  8.6 - 10.6 mg/dL Final    Albumin 01/24/2024 3.3 (L)  3.4 - 5.0 g/dL Final    Alkaline Phosphatase 01/24/2024 97  33 - 136 U/L Final    Total Protein 01/24/2024 7.2  6.4 - 8.2 g/dL Final    AST 01/24/2024 22  9 - 39 U/L Final    Bilirubin, Total 01/24/2024 0.5  0.0 - 1.2 mg/dL Final    ALT 01/24/2024 13  7 - 45 U/L Final    Patients treated with Sulfasalazine may generate falsely decreased results for ALT.    Lactate 01/24/2024 0.6  0.4 - 2.0 mmol/L Final    Ventricular Rate 01/24/2024 93  BPM Final    Atrial Rate 01/24/2024 93  BPM Final    NM Interval 01/24/2024 154  ms Final    QRS Duration 01/24/2024 130  ms Final    QT Interval 01/24/2024 412  ms Final    QTC Calculation(Bazett) 01/24/2024 512  ms Final    P Axis 01/24/2024 71  degrees Final    R Axis 01/24/2024 156  degrees Final    T Axis 01/24/2024 42  degrees Final    QRS Count 01/24/2024 15  beats Final    Q Onset 01/24/2024 201  ms Final    P Onset 01/24/2024 124  ms Final    P Offset 01/24/2024 183  ms Final    T Offset 01/24/2024 407  ms Final    QTC Fredericia 01/24/2024 477  ms Final   Lab Requisition on 11/01/2023   Component Date Value Ref Range Status    Sterile Fluid Culture/Smear 11/01/2023 No growth aerobically and anaerobically   Final    Gram Stain  11/01/2023 (2+) Few Polymorphonuclear leukocytes   Final    Gram Stain 11/01/2023 No organisms seen   Final    Fungal Culture/Smear 11/01/2023 No fungi isolated.   Final    Fungal Smear 11/01/2023 No fungal elements seen   Final    Corrected result: Previously reported as Yeast with pseudohyphae on 11/2/2023 at 0928 EDT.    Color, Fluid 11/01/2023 Yellow  Colorless, Straw, Yellow Final    Clarity, Fluid 11/01/2023 Cloudy (A)  Clear Final    WBC, Fluid 11/01/2023 12,117  See Comment /uL Final    RBC, Fluid 11/01/2023 1,000  0  /uL /uL Final    Crystal Identification, Synovial F* 11/01/2023 Calcium Pyrophosphate Crystals (A)  No crystals seen by bright-field or first order red axis polarization microscopy. Final    Pathologist Review-Crystals 11/01/2023 Calcium pyrophosphate crystals.   Final    Electronically signed out by Fox Ferrer MD PhD on 11/2/23 at 2:51 PM.  By the signature on this report, the individual or group listed as making the Final Interpretation/Diagnosis certifies that they have reviewed this case.        Assessment/Plan     Problem List Items Addressed This Visit       COPD (chronic obstructive pulmonary disease) (CMS/HCC)    Essential (primary) hypertension    Hepatitis C virus infection    Mixed hyperlipidemia    Hypothyroidism    Implantable cardioverter-defibrillator (ICD) in situ    Insomnia    Malignant neoplasm of tongue (CMS/HCC)    Mixed anxiety depressive disorder    Malignant neoplasm of lower lobe of left lung (CMS/HCC)     Death of brother-family stress-her own guarded prognosis-all stressful    Patient is aware of LIMITATIONS of VIRTUAL VISITS and how-of course-an IN PERSON VISIT IS always ideal or preferred. IF condition deteriorates from here-ER IS DEFINITELY ADVISED.    She is aware of guarded prognosis   Declines counseling at this time  See me 3-4mo sooner if issues  Counseled supportively today

## 2024-01-31 NOTE — PROGRESS NOTES
[phone] Telephone  Not on MyChart    C19: [id number]  Flu: Due  Pneumo: Due June/July  Pap: UTD  Mamm: Due  ColoRect: Due  Pt currently undergoing cancer treatments

## 2024-02-01 LAB
LABORATORY COMMENT REPORT: NORMAL
PATH REPORT.FINAL DX SPEC: NORMAL
PATH REPORT.GROSS SPEC: NORMAL
PATH REPORT.RELEVANT HX SPEC: NORMAL
PATH REPORT.TOTAL CANCER: NORMAL

## 2024-02-02 DIAGNOSIS — C02.9 MALIGNANT NEOPLASM OF TONGUE (HCC): ICD-10-CM

## 2024-02-02 DIAGNOSIS — C79.89 METASTATIC SQUAMOUS CELL CARCINOMA TO TONGUE (HCC): ICD-10-CM

## 2024-02-02 DIAGNOSIS — G89.3 CANCER RELATED PAIN: ICD-10-CM

## 2024-02-02 RX ORDER — CHLORHEXIDINE GLUCONATE ORAL RINSE 1.2 MG/ML
15 SOLUTION DENTAL 2 TIMES DAILY
Qty: 420 ML | Refills: 0 | Status: SHIPPED | OUTPATIENT
Start: 2024-02-02 | End: 2024-02-16

## 2024-02-02 RX ORDER — OXYCODONE HCL 5 MG/5 ML
15 SOLUTION, ORAL ORAL EVERY 4 HOURS PRN
Qty: 800 ML | Refills: 0 | Status: SHIPPED | OUTPATIENT
Start: 2024-02-02 | End: 2024-02-16

## 2024-02-05 ENCOUNTER — APPOINTMENT (OUTPATIENT)
Dept: OTOLARYNGOLOGY | Facility: CLINIC | Age: 64
End: 2024-02-05
Payer: COMMERCIAL

## 2024-02-06 ENCOUNTER — HOSPITAL ENCOUNTER (OUTPATIENT)
Dept: CT IMAGING | Age: 64
Discharge: HOME | End: 2024-02-08
Payer: COMMERCIAL

## 2024-02-06 DIAGNOSIS — C34.12 SQUAMOUS CELL CARCINOMA OF BRONCHUS IN LEFT UPPER LOBE (HCC): ICD-10-CM

## 2024-02-06 DIAGNOSIS — C77.1 SECONDARY AND UNSPECIFIED MALIGNANT NEOPLASM OF INTRATHORACIC LYMPH NODES (HCC): ICD-10-CM

## 2024-02-06 DIAGNOSIS — Z85.810 PERSONAL HISTORY OF MALIGNANT NEOPLASM OF TONGUE: ICD-10-CM

## 2024-02-06 PROCEDURE — 71260 CT THORAX DX C+: CPT

## 2024-02-06 PROCEDURE — 6360000004 HC RX CONTRAST MEDICATION: Performed by: NURSE PRACTITIONER

## 2024-02-06 RX ADMIN — IOPAMIDOL 75 ML: 612 INJECTION, SOLUTION INTRAVENOUS at 08:41

## 2024-02-06 ASSESSMENT — ENCOUNTER SYMPTOMS
COUGH: 1
NAUSEA: 0
SHORTNESS OF BREATH: 0
VOICE CHANGE: 1
WHEEZING: 0
STRIDOR: 0
VOMITING: 0
COLOR CHANGE: 0
RHINORRHEA: 1

## 2024-02-07 NOTE — PROGRESS NOTES
Subjective:      Patient Id: [facility] for Palliative Carefor symptom management, advance care planning, and goals of care discussion.  She was accompanied to the appointment by: self  Chief Complaint   Patient presents with    Follow-up           Archana Melendez is a 63 y.o. female referred to palliative care for symptom management, advance care planning, and goals of care conversation. Archana has complex medical history that includes COPD, Metastatic tongue cancer, PEG tube, dysphagia, chronic diastolic heart failure, HLD, DJD, chronic hepatitis B, hypothyroidism, ICD.     General: Patient is alert and oriented x 4.Patient is ordered to be NPO. Patient was seen and evaluated in ED [facility] recently. Patient sent to  [facility] due to suspected abscess vs progressive of cancer in left jaw. Placed on antibiotics. Patient reports she has upcoming follow up. She continues to have pain and swelling. She continues to have obvious erythema to left jaw. Denies any fever/chills. Denies nausea/vomiting.     Pain: Reports intermittent pain, currently rates pain 8/10.     Appetite: She has a peg tube, Jevity, she is tolerating all her feeds. Getting 3 to 4 cans in a day. She denies any nausea/vomiting. She was 103lbs. She is down to 95lbs. Denies in any peg tube feeds. She has not received her boost supplement. Will talk to dietitian regarding this.      Mood: Her mood has improved she is tolerating Zoloft.     Skin: She is having  left sided jaw pain and tenderness. There is swelling noted to left side of jaw.     GI/:Denies any urinary concerns, constipation or nausea or vomiting.     I have personally reviewed the patient's most recent and available provider notes, lab work and imaging.      Past Medical History:   Diagnosis Date    Anxiety     Bilateral carpal tunnel syndrome JAN 2011    Cancer (HCC) 2005-TIP OF TONGUE    METS TO LEFT LYMPH NODE-SQUAMOUS    Cardiomyopathy (HCC)

## 2024-02-08 ENCOUNTER — HOSPITAL ENCOUNTER (OUTPATIENT)
Age: 64
Setting detail: OBSERVATION
Discharge: HOME | DRG: 720 | End: 2024-02-10
Attending: STUDENT IN AN ORGANIZED HEALTH CARE EDUCATION/TRAINING PROGRAM | Admitting: INTERNAL MEDICINE
Payer: COMMERCIAL

## 2024-02-08 ENCOUNTER — TELEPHONE (OUTPATIENT)
Dept: PALLATIVE CARE | Age: 64
End: 2024-02-08

## 2024-02-08 ENCOUNTER — APPOINTMENT (OUTPATIENT)
Dept: CT IMAGING | Age: 64
DRG: 720 | End: 2024-02-08
Payer: COMMERCIAL

## 2024-02-08 DIAGNOSIS — L03.211 FACIAL CELLULITIS: ICD-10-CM

## 2024-02-08 DIAGNOSIS — C02.9 TONGUE CANCER (HCC): Primary | ICD-10-CM

## 2024-02-08 DIAGNOSIS — L98.499: ICD-10-CM

## 2024-02-08 LAB
ANION GAP SERPL CALCULATED.3IONS-SCNC: 11 MEQ/L (ref 9–15)
BASOPHILS # BLD: 0.1 K/UL (ref 0–0.2)
BASOPHILS NFR BLD: 0.8 %
BUN SERPL-MCNC: 24 MG/DL (ref 8–23)
CALCIUM SERPL-MCNC: 9.2 MG/DL (ref 8.5–9.9)
CHLORIDE SERPL-SCNC: 89 MEQ/L (ref 95–107)
CO2 SERPL-SCNC: 29 MEQ/L (ref 20–31)
CREAT SERPL-MCNC: 0.53 MG/DL (ref 0.5–0.9)
CRP SERPL HS-MCNC: 72.3 MG/L (ref 0–5)
EOSINOPHIL # BLD: 0.3 K/UL (ref 0–0.7)
EOSINOPHIL NFR BLD: 2.6 %
ERYTHROCYTE [DISTWIDTH] IN BLOOD BY AUTOMATED COUNT: 16.3 % (ref 11.5–14.5)
ERYTHROCYTE [SEDIMENTATION RATE] IN BLOOD BY WESTERGREN METHOD: 64 MM (ref 0–30)
GLUCOSE SERPL-MCNC: 96 MG/DL (ref 70–99)
HCT VFR BLD AUTO: 33.5 % (ref 37–47)
HGB BLD-MCNC: 10.5 G/DL (ref 12–16)
LACTATE BLDV-SCNC: 0.6 MMOL/L (ref 0.5–2.2)
LYMPHOCYTES # BLD: 0.8 K/UL (ref 1–4.8)
LYMPHOCYTES NFR BLD: 6.4 %
MCH RBC QN AUTO: 27.6 PG (ref 27–31.3)
MCHC RBC AUTO-ENTMCNC: 31.3 % (ref 33–37)
MCV RBC AUTO: 88.2 FL (ref 79.4–94.8)
MONOCYTES # BLD: 1.1 K/UL (ref 0.2–0.8)
MONOCYTES NFR BLD: 8.4 %
NEUTROPHILS # BLD: 10.5 K/UL (ref 1.4–6.5)
NEUTS SEG NFR BLD: 81.4 %
PERFORMED ON: ABNORMAL
PLATELET # BLD AUTO: 455 K/UL (ref 130–400)
POC CREATININE: 0.5 MG/DL (ref 0.6–1.2)
POC SAMPLE TYPE: ABNORMAL
POTASSIUM SERPL-SCNC: 3.8 MEQ/L (ref 3.4–4.9)
RBC # BLD AUTO: 3.8 M/UL (ref 4.2–5.4)
SODIUM SERPL-SCNC: 129 MEQ/L (ref 135–144)
WBC # BLD AUTO: 12.9 K/UL (ref 4.8–10.8)

## 2024-02-08 PROCEDURE — 83605 ASSAY OF LACTIC ACID: CPT

## 2024-02-08 PROCEDURE — 96375 TX/PRO/DX INJ NEW DRUG ADDON: CPT

## 2024-02-08 PROCEDURE — 2580000003 HC RX 258: Performed by: STUDENT IN AN ORGANIZED HEALTH CARE EDUCATION/TRAINING PROGRAM

## 2024-02-08 PROCEDURE — 96365 THER/PROPH/DIAG IV INF INIT: CPT

## 2024-02-08 PROCEDURE — G0378 HOSPITAL OBSERVATION PER HR: HCPCS

## 2024-02-08 PROCEDURE — 6360000004 HC RX CONTRAST MEDICATION: Performed by: STUDENT IN AN ORGANIZED HEALTH CARE EDUCATION/TRAINING PROGRAM

## 2024-02-08 PROCEDURE — 86140 C-REACTIVE PROTEIN: CPT

## 2024-02-08 PROCEDURE — 96367 TX/PROPH/DG ADDL SEQ IV INF: CPT

## 2024-02-08 PROCEDURE — 85652 RBC SED RATE AUTOMATED: CPT

## 2024-02-08 PROCEDURE — 70491 CT SOFT TISSUE NECK W/DYE: CPT

## 2024-02-08 PROCEDURE — 36415 COLL VENOUS BLD VENIPUNCTURE: CPT

## 2024-02-08 PROCEDURE — 80048 BASIC METABOLIC PNL TOTAL CA: CPT

## 2024-02-08 PROCEDURE — 87040 BLOOD CULTURE FOR BACTERIA: CPT

## 2024-02-08 PROCEDURE — 6360000002 HC RX W HCPCS: Performed by: INTERNAL MEDICINE

## 2024-02-08 PROCEDURE — 85025 COMPLETE CBC W/AUTO DIFF WBC: CPT

## 2024-02-08 PROCEDURE — 96376 TX/PRO/DX INJ SAME DRUG ADON: CPT

## 2024-02-08 PROCEDURE — 99285 EMERGENCY DEPT VISIT HI MDM: CPT

## 2024-02-08 PROCEDURE — 6370000000 HC RX 637 (ALT 250 FOR IP): Performed by: INTERNAL MEDICINE

## 2024-02-08 PROCEDURE — 2580000003 HC RX 258: Performed by: INTERNAL MEDICINE

## 2024-02-08 PROCEDURE — 6360000002 HC RX W HCPCS: Performed by: STUDENT IN AN ORGANIZED HEALTH CARE EDUCATION/TRAINING PROGRAM

## 2024-02-08 RX ORDER — SODIUM CHLORIDE 0.9 % (FLUSH) 0.9 %
5-40 SYRINGE (ML) INJECTION PRN
Status: DISCONTINUED | OUTPATIENT
Start: 2024-02-08 | End: 2024-02-10 | Stop reason: HOSPADM

## 2024-02-08 RX ORDER — MAGNESIUM SULFATE IN WATER 40 MG/ML
2000 INJECTION, SOLUTION INTRAVENOUS PRN
Status: DISCONTINUED | OUTPATIENT
Start: 2024-02-08 | End: 2024-02-10 | Stop reason: HOSPADM

## 2024-02-08 RX ORDER — POLYETHYLENE GLYCOL 3350 17 G/17G
17 POWDER, FOR SOLUTION ORAL DAILY PRN
Status: DISCONTINUED | OUTPATIENT
Start: 2024-02-08 | End: 2024-02-10 | Stop reason: HOSPADM

## 2024-02-08 RX ORDER — SODIUM CHLORIDE 9 MG/ML
INJECTION, SOLUTION INTRAVENOUS PRN
Status: DISCONTINUED | OUTPATIENT
Start: 2024-02-08 | End: 2024-02-10 | Stop reason: HOSPADM

## 2024-02-08 RX ORDER — POTASSIUM CHLORIDE 20 MEQ/1
40 TABLET, EXTENDED RELEASE ORAL PRN
Status: DISCONTINUED | OUTPATIENT
Start: 2024-02-08 | End: 2024-02-10 | Stop reason: HOSPADM

## 2024-02-08 RX ORDER — KETOROLAC TROMETHAMINE 15 MG/ML
15 INJECTION, SOLUTION INTRAMUSCULAR; INTRAVENOUS EVERY 8 HOURS
Status: DISCONTINUED | OUTPATIENT
Start: 2024-02-08 | End: 2024-02-10 | Stop reason: HOSPADM

## 2024-02-08 RX ORDER — ACETAMINOPHEN 325 MG/1
650 TABLET ORAL EVERY 6 HOURS PRN
Status: DISCONTINUED | OUTPATIENT
Start: 2024-02-08 | End: 2024-02-10 | Stop reason: HOSPADM

## 2024-02-08 RX ORDER — AMITRIPTYLINE HYDROCHLORIDE 100 MG/1
100 TABLET ORAL NIGHTLY
COMMUNITY

## 2024-02-08 RX ORDER — DEXAMETHASONE SODIUM PHOSPHATE 4 MG/ML
4 INJECTION, SOLUTION INTRA-ARTICULAR; INTRALESIONAL; INTRAMUSCULAR; INTRAVENOUS; SOFT TISSUE EVERY 8 HOURS
Status: DISCONTINUED | OUTPATIENT
Start: 2024-02-08 | End: 2024-02-10 | Stop reason: HOSPADM

## 2024-02-08 RX ORDER — HYDROXYZINE HYDROCHLORIDE 10 MG/1
25 TABLET, FILM COATED ORAL EVERY 6 HOURS PRN
Status: DISCONTINUED | OUTPATIENT
Start: 2024-02-08 | End: 2024-02-10 | Stop reason: HOSPADM

## 2024-02-08 RX ORDER — ONDANSETRON 4 MG/1
4 TABLET, ORALLY DISINTEGRATING ORAL EVERY 8 HOURS PRN
Status: DISCONTINUED | OUTPATIENT
Start: 2024-02-08 | End: 2024-02-10 | Stop reason: HOSPADM

## 2024-02-08 RX ORDER — POTASSIUM CHLORIDE 7.45 MG/ML
10 INJECTION INTRAVENOUS PRN
Status: DISCONTINUED | OUTPATIENT
Start: 2024-02-08 | End: 2024-02-10 | Stop reason: HOSPADM

## 2024-02-08 RX ORDER — CLINDAMYCIN PHOSPHATE 900 MG/50ML
900 INJECTION, SOLUTION INTRAVENOUS EVERY 8 HOURS
Status: DISCONTINUED | OUTPATIENT
Start: 2024-02-08 | End: 2024-02-10 | Stop reason: HOSPADM

## 2024-02-08 RX ORDER — SODIUM CHLORIDE 0.9 % (FLUSH) 0.9 %
5-40 SYRINGE (ML) INJECTION EVERY 12 HOURS SCHEDULED
Status: DISCONTINUED | OUTPATIENT
Start: 2024-02-08 | End: 2024-02-10 | Stop reason: HOSPADM

## 2024-02-08 RX ORDER — DIPHENHYDRAMINE HYDROCHLORIDE 50 MG/ML
25 INJECTION INTRAMUSCULAR; INTRAVENOUS ONCE
Status: COMPLETED | OUTPATIENT
Start: 2024-02-08 | End: 2024-02-08

## 2024-02-08 RX ORDER — ONDANSETRON 2 MG/ML
4 INJECTION INTRAMUSCULAR; INTRAVENOUS EVERY 6 HOURS PRN
Status: DISCONTINUED | OUTPATIENT
Start: 2024-02-08 | End: 2024-02-10 | Stop reason: HOSPADM

## 2024-02-08 RX ORDER — ACETAMINOPHEN 650 MG/1
650 SUPPOSITORY RECTAL EVERY 6 HOURS PRN
Status: DISCONTINUED | OUTPATIENT
Start: 2024-02-08 | End: 2024-02-10 | Stop reason: HOSPADM

## 2024-02-08 RX ORDER — ENOXAPARIN SODIUM 100 MG/ML
30 INJECTION SUBCUTANEOUS DAILY
Status: DISCONTINUED | OUTPATIENT
Start: 2024-02-08 | End: 2024-02-10 | Stop reason: HOSPADM

## 2024-02-08 RX ORDER — HYDROXYZINE HYDROCHLORIDE 25 MG/1
25 TABLET, FILM COATED ORAL ONCE
Status: DISCONTINUED | OUTPATIENT
Start: 2024-02-08 | End: 2024-02-08

## 2024-02-08 RX ORDER — OXYCODONE HYDROCHLORIDE AND ACETAMINOPHEN 5; 325 MG/1; MG/1
1 TABLET ORAL EVERY 4 HOURS PRN
Status: DISCONTINUED | OUTPATIENT
Start: 2024-02-08 | End: 2024-02-09

## 2024-02-08 RX ORDER — KETOROLAC TROMETHAMINE 30 MG/ML
30 INJECTION, SOLUTION INTRAMUSCULAR; INTRAVENOUS ONCE
Status: DISCONTINUED | OUTPATIENT
Start: 2024-02-08 | End: 2024-02-08

## 2024-02-08 RX ORDER — MORPHINE SULFATE 4 MG/ML
4 INJECTION, SOLUTION INTRAMUSCULAR; INTRAVENOUS ONCE
Status: COMPLETED | OUTPATIENT
Start: 2024-02-08 | End: 2024-02-08

## 2024-02-08 RX ADMIN — CLINDAMYCIN PHOSPHATE 900 MG: 900 INJECTION, SOLUTION INTRAVENOUS at 17:42

## 2024-02-08 RX ADMIN — MORPHINE SULFATE 4 MG: 4 INJECTION, SOLUTION INTRAMUSCULAR; INTRAVENOUS at 14:52

## 2024-02-08 RX ADMIN — KETOROLAC TROMETHAMINE 15 MG: 15 INJECTION, SOLUTION INTRAMUSCULAR; INTRAVENOUS at 19:58

## 2024-02-08 RX ADMIN — IOPAMIDOL 75 ML: 612 INJECTION, SOLUTION INTRAVENOUS at 13:00

## 2024-02-08 RX ADMIN — DEXAMETHASONE SODIUM PHOSPHATE 4 MG: 4 INJECTION INTRA-ARTICULAR; INTRALESIONAL; INTRAMUSCULAR; INTRAVENOUS; SOFT TISSUE at 17:40

## 2024-02-08 RX ADMIN — OXYCODONE HYDROCHLORIDE AND ACETAMINOPHEN 1 TABLET: 5; 325 TABLET ORAL at 17:57

## 2024-02-08 RX ADMIN — VANCOMYCIN HYDROCHLORIDE 1000 MG: 1 INJECTION, POWDER, FOR SOLUTION INTRAVENOUS at 13:29

## 2024-02-08 RX ADMIN — Medication 10 ML: at 19:59

## 2024-02-08 RX ADMIN — DIPHENHYDRAMINE HYDROCHLORIDE 25 MG: 50 INJECTION INTRAMUSCULAR; INTRAVENOUS at 12:18

## 2024-02-08 ASSESSMENT — PAIN DESCRIPTION - ORIENTATION: ORIENTATION: LEFT

## 2024-02-08 ASSESSMENT — PAIN - FUNCTIONAL ASSESSMENT: PAIN_FUNCTIONAL_ASSESSMENT: 0-10

## 2024-02-08 ASSESSMENT — ENCOUNTER SYMPTOMS
VOMITING: 0
SHORTNESS OF BREATH: 1
ABDOMINAL PAIN: 0
COUGH: 0
NAUSEA: 0

## 2024-02-08 ASSESSMENT — PAIN SCALES - GENERAL
PAINLEVEL_OUTOF10: 9
PAINLEVEL_OUTOF10: 8

## 2024-02-08 ASSESSMENT — PAIN DESCRIPTION - LOCATION
LOCATION: FACE
LOCATION: FACE

## 2024-02-08 ASSESSMENT — PAIN DESCRIPTION - DESCRIPTORS: DESCRIPTORS: SHARP;THROBBING

## 2024-02-08 NOTE — ED NOTES
Call to ENT Dr. Weathers. Spoke with his [place], Sonam. [phone].  She state  Is in surgery but she will get a message to him.

## 2024-02-08 NOTE — H&P
reports no history of alcohol use.      Family History:       Problem Relation Age of Onset    High Blood Pressure Other     Diabetes Other     Cancer Other         UNCLE-LUNG       REVIEW OF SYSTEMS:  Ten systems reviewed and negative except for stated in HPI    Physical Exam:    Vitals: BP 99/84   Pulse 94   Temp 98.1 °F (36.7 °C) (Oral)   Resp 18   Ht 1.626 m (5' 4\")   Wt 44.5 kg (98 lb)   SpO2 100%   BMI 16.82 kg/m²   General appearance: Chronically ill-appearing.  Left-sided swelling and induration..  Thin  Skin: Skin color, texture, turgor normal. No rashes or lesions  HEENT: eomi, perrla. MMM  Neck: Ulceration noted.  Photo attached.  No warmth or tenderness to palpation.  Clear yellow drainage  Lungs: CTA bilaterally. No wheeze   Heart: RRR, no murmur or gallp  Abdomen: soft, nontender. Bsx4. No masses or organomegaly  Extremities: no edema, redness, or tenderness in calves. Cap refill <2s  Neurologic: No focal deficits     Recent Labs     02/08/24  1215   WBC 12.9*   HGB 10.5*   *     Recent Labs     02/08/24  1159 02/08/24  1215   NA  --  129*   K  --  3.8   CL  --  89*   CO2  --  29   BUN  --  24*   CREATININE 0.5* 0.53   GLUCOSE  --  96     Troponin T: No results for input(s): \"TROPONINI\" in the last 72 hours.    ABGs:   Lab Results   Component Value Date/Time    PHART 7.552 08/08/2023 09:50 AM    PO2ART 52 08/08/2023 09:50 AM    HKQ3GQL 31 08/08/2023 09:50 AM     INR: No results for input(s): \"INR\" in the last 72 hours.  URINALYSIS:No results for input(s): \"NITRITE\", \"COLORU\", \"PHUR\", \"LABCAST\", \"WBCUA\", \"RBCUA\", \"MUCUS\", \"TRICHOMONAS\", \"YEAST\", \"BACTERIA\", \"CLARITYU\", \"SPECGRAV\", \"LEUKOCYTESUR\", \"UROBILINOGEN\", \"BILIRUBINUR\", \"BLOODU\", \"GLUCOSEU\", \"AMORPHOUS\" in the last 72 hours.    Invalid input(s): \"KETONESU\"  -----------------------------------------------------------------   CT SOFT TISSUE NECK W CONTRAST    Result Date: 2/8/2024  EXAMINATION: CT OF THE NECK SOFT TISSUE WITH CONTRAST   2/8/2024 TECHNIQUE: CT of the neck was performed with the administration of intravenous contrast. Multiplanar reformatted images are provided for review. Automated exposure control, iterative reconstruction, and/or weight based adjustment of the mA/kV was utilized to reduce the radiation dose to as low as reasonably achievable. COMPARISON: 01/23/2024 HISTORY: ORDERING SYSTEM PROVIDED HISTORY: redness, wound, h/o cancer TECHNOLOGIST PROVIDED HISTORY: Reason for exam:->redness, wound, h/o cancer Additional Contrast?->1 What reading provider will be dictating this exam?->CRC FINDINGS: Loculated fluid and gas collection with enhancing margins located in left base of tongue soft tissue measures approximately 3.7 x 2.3 cm in AP and axial dimension and appears similar in size.  Multiple surgical clips are also present in the floor of the mouth and neck soft tissue.  There are new loculated fluid collections located in upper anterior neck soft tissue (axial image 53, series: 2) measuring approximately 2 x 1 cm and 1.6 x 0.9 cm.  Foci of gas present within the deeper collection.  Redemonstration of extensive edema within soft tissue of the neck extending along oral and hypopharyngeal mucosa to level of the vocal cords.  Vocal cords appear symmetric.  No retropharyngeal fluid collections.  Redemonstration of extensive facial soft tissue edema.  There is stable moderate narrowing of the donte pharyngeal and hypopharyngeal airway.  View of the upper lung fields show irregular nodular opacities in the visualized upper lobes.  Central cavitation in is present within the irregular nodular opacity in the left upper lobe appearing similar compared to prior.     1. Redemonstration of an irregular, loculated fluid and gas collection at level of left aspect of the tongue and floor of the mouth which may indicate stable abscess or postoperative seroma. 2. New loculated fluid collections are present in upper, anterior neck soft tissue

## 2024-02-08 NOTE — TELEPHONE ENCOUNTER
Patient having a few complications this morning they are in transit to the ER. Just wanted to update pall care provider.

## 2024-02-08 NOTE — ED TRIAGE NOTES
Pt comes to er  with severe facial swelling. States that she had a  biopsy of her mouth done on the 29th and  had a little swelling at that time but it has continued to increase, tongue swelling also noted but pt states she has no difficulty  breathing and does not feel throat swelling.   Pt can swallow but she eats by peg  tube.  Pt also has a  wound to her neck that is reddened with drainage and odor noted

## 2024-02-08 NOTE — ED PROVIDER NOTES
0.8 (L) 1.0 - 4.8 K/uL    Monocytes Absolute 1.1 (H) 0.2 - 0.8 K/uL    Eosinophils Absolute 0.3 0.0 - 0.7 K/uL    Basophils Absolute 0.1 0.0 - 0.2 K/uL   BMP   Result Value Ref Range    Sodium 129 (L) 135 - 144 mEq/L    Potassium 3.8 3.4 - 4.9 mEq/L    Chloride 89 (L) 95 - 107 mEq/L    CO2 29 20 - 31 mEq/L    Anion Gap 11 9 - 15 mEq/L    Glucose 96 70 - 99 mg/dL    BUN 24 (H) 8 - 23 mg/dL    Creatinine 0.53 0.50 - 0.90 mg/dL    Est, Glom Filt Rate >60.0 >60    Calcium 9.2 8.5 - 9.9 mg/dL   Lactic Acid   Result Value Ref Range    Lactic Acid 0.6 0.5 - 2.2 mmol/L   Sedimentation Rate   Result Value Ref Range    Sed Rate 64 (H) 0 - 30 mm   C-Reactive Protein   Result Value Ref Range    CRP 72.3 (H) 0.0 - 5.0 mg/L   POCT Venous   Result Value Ref Range    POC Creatinine 0.5 (L) 0.6 - 1.2 mg/dL    Est, Glom Filt Rate >60 >60    Sample Type ELYSSA     Performed on SEE BELOW          IMPRESSION/MDM/ED COURSE:  63 y.o. female presented with acutely worsening chronic neck and facial swelling, acute neck wound  Differential: Amos angina, cancer metastasis, cellulitis, ulcer, cancer erosion, not appearing septic, no current airway compromise, not pneumonia    VS: 93% on room air, normal vital signs    External documentation reviewed: Yes  -Reviewed ED note from 1/23/2024  -Reviewed  transfer note from 1/24/2024: Patient was transferred from here to  ED where ENT evaluated her, Dr. Lavell Nava who knew the patient very well, recommended discharge home with a Medrol Dosepak after the scope and a prescription for clindamycin.  -Reviewed follow-up with palliative care nurse practitioner from 1/30/2024  -Reviewed CT chest with contrast from 2/6/2024 report showed decrease in size of left upper lung cavity  -Reviewed CT soft tissue neck from 1/23/2024 report showed multiloculated abscess left tongue resection measured 3.8 x 2.5 x 4.1 cm located in the posterior and lateral surgical clips in the left anterior and mid tongue  extending through the floor of the mouth into the subcutaneous tissue.  Edema to the residual structures consistent with cellulitis        ED Course as of 02/08/24 1458   Thu Feb 08, 2024   1219 Discussed with patient and she does remain a full code would want to be indicated if needed, I do not suspect this to be the case today as the progression of symptoms has been over a few days.  She is tolerating secretions.  Will make contact with her ENT DR conrado oneill, and her oncologist dr. riley [SF]   1245 Consulted with dr conrado curiel, of ENT at [facility].  He had done the biopsy on her last transfer and is very familiar with her.  He said that this is the cancer spreading and that the mass was just below the skin and that this wound is likely the mass eroding through the skin.  He said there could be a cellulitic component and agreed with doing antibiotics if there is suspicion for that.  He said she could be likely followed outpatient in his office on Tuesday which she will be back in the office [SF]   1315 Consulted with oncology on-call for Dr. Riley, reviewed her case, he said she could be an outpatient follow-up versus inpatient stay for IV antibiotics and monitoring, he said they had considered increasing her dose of immunotherapy. [SF]   1317 Labs my interpretation: Mild hyponatremia 129 on BMP otherwise normal, CBC shows mild leukocytosis 12.9, 81.4% neutrophils, sed rate 64, CRP 72.3, lactic acid normal, blood cultures were obtained [SF]   1445 Reevaluated patient, she is not requiring supplemental oxygen, she said she feels okay but is concerned and would prefer staying giving her feeling short of breath last night, she takes Roxicodone at home so will give a dose of morphine here [SF]   1456 Consulted with hospitalist service Dr. Elizabeth, accepts patient for admission.  Patient stable at this time. [SF]   1457 CT soft tissue neck with contrast my interpretation: Significant swelling to the

## 2024-02-09 ENCOUNTER — TELEPHONE (OUTPATIENT)
Dept: ORTHOPEDIC SURGERY | Facility: CLINIC | Age: 64
End: 2024-02-09
Payer: COMMERCIAL

## 2024-02-09 PROBLEM — D84.821 IMMUNOSUPPRESSION DUE TO DRUG THERAPY (HCC): Status: ACTIVE | Noted: 2024-02-09

## 2024-02-09 PROBLEM — Z79.899 IMMUNOSUPPRESSION DUE TO DRUG THERAPY (HCC): Status: ACTIVE | Noted: 2024-02-09

## 2024-02-09 PROBLEM — Z88.0 HISTORY OF PENICILLIN ALLERGY: Status: ACTIVE | Noted: 2024-02-09

## 2024-02-09 PROBLEM — L98.499: Status: ACTIVE | Noted: 2024-02-09

## 2024-02-09 PROBLEM — C02.9 TONGUE CANCER (HCC): Status: ACTIVE | Noted: 2024-02-09

## 2024-02-09 LAB
ANION GAP SERPL CALCULATED.3IONS-SCNC: 12 MEQ/L (ref 9–15)
BACTERIA BLD CULT ORG #2: NORMAL
BACTERIA BLD CULT: NORMAL
BASOPHILS # BLD: 0.1 K/UL (ref 0–0.2)
BASOPHILS NFR BLD: 0.6 %
BUN SERPL-MCNC: 28 MG/DL (ref 8–23)
CALCIUM SERPL-MCNC: 9.1 MG/DL (ref 8.5–9.9)
CHLORIDE SERPL-SCNC: 97 MEQ/L (ref 95–107)
CO2 SERPL-SCNC: 29 MEQ/L (ref 20–31)
CREAT SERPL-MCNC: 0.59 MG/DL (ref 0.5–0.9)
EOSINOPHIL # BLD: 0 K/UL (ref 0–0.7)
EOSINOPHIL NFR BLD: 0.2 %
ERYTHROCYTE [DISTWIDTH] IN BLOOD BY AUTOMATED COUNT: 16 % (ref 11.5–14.5)
GLUCOSE SERPL-MCNC: 172 MG/DL (ref 70–99)
HCT VFR BLD AUTO: 31.4 % (ref 37–47)
HGB BLD-MCNC: 10 G/DL (ref 12–16)
LYMPHOCYTES # BLD: 0.4 K/UL (ref 1–4.8)
LYMPHOCYTES NFR BLD: 3.4 %
MCH RBC QN AUTO: 27.2 PG (ref 27–31.3)
MCHC RBC AUTO-ENTMCNC: 31.8 % (ref 33–37)
MCV RBC AUTO: 85.6 FL (ref 79.4–94.8)
MONOCYTES # BLD: 0.2 K/UL (ref 0.2–0.8)
MONOCYTES NFR BLD: 2 %
NEUTROPHILS # BLD: 10.2 K/UL (ref 1.4–6.5)
NEUTS SEG NFR BLD: 93.5 %
PLATELET # BLD AUTO: 423 K/UL (ref 130–400)
POTASSIUM SERPL-SCNC: 3.8 MEQ/L (ref 3.4–4.9)
RBC # BLD AUTO: 3.67 M/UL (ref 4.2–5.4)
SODIUM SERPL-SCNC: 138 MEQ/L (ref 135–144)
T4 FREE SERPL-MCNC: 0.9 NG/DL (ref 0.84–1.68)
TSH REFLEX: 11.56 UIU/ML (ref 0.44–3.86)
WBC # BLD AUTO: 10.9 K/UL (ref 4.8–10.8)

## 2024-02-09 PROCEDURE — 6360000002 HC RX W HCPCS: Performed by: INTERNAL MEDICINE

## 2024-02-09 PROCEDURE — 84439 ASSAY OF FREE THYROXINE: CPT

## 2024-02-09 PROCEDURE — 6360000002 HC RX W HCPCS: Performed by: PAIN MEDICINE

## 2024-02-09 PROCEDURE — 96366 THER/PROPH/DIAG IV INF ADDON: CPT

## 2024-02-09 PROCEDURE — 85025 COMPLETE CBC W/AUTO DIFF WBC: CPT

## 2024-02-09 PROCEDURE — G0378 HOSPITAL OBSERVATION PER HR: HCPCS

## 2024-02-09 PROCEDURE — 99222 1ST HOSP IP/OBS MODERATE 55: CPT | Performed by: INTERNAL MEDICINE

## 2024-02-09 PROCEDURE — 99222 1ST HOSP IP/OBS MODERATE 55: CPT | Performed by: PAIN MEDICINE

## 2024-02-09 PROCEDURE — 6370000000 HC RX 637 (ALT 250 FOR IP): Performed by: INTERNAL MEDICINE

## 2024-02-09 PROCEDURE — 84443 ASSAY THYROID STIM HORMONE: CPT

## 2024-02-09 PROCEDURE — 6370000000 HC RX 637 (ALT 250 FOR IP)

## 2024-02-09 PROCEDURE — 87075 CULTR BACTERIA EXCEPT BLOOD: CPT

## 2024-02-09 PROCEDURE — 87070 CULTURE OTHR SPECIMN AEROBIC: CPT

## 2024-02-09 PROCEDURE — 80048 BASIC METABOLIC PNL TOTAL CA: CPT

## 2024-02-09 PROCEDURE — 2580000003 HC RX 258: Performed by: INTERNAL MEDICINE

## 2024-02-09 PROCEDURE — 36415 COLL VENOUS BLD VENIPUNCTURE: CPT

## 2024-02-09 PROCEDURE — 96376 TX/PRO/DX INJ SAME DRUG ADON: CPT

## 2024-02-09 RX ORDER — OXYCODONE HCL 5 MG/5 ML
10 SOLUTION, ORAL ORAL EVERY 4 HOURS PRN
Status: DISCONTINUED | OUTPATIENT
Start: 2024-02-09 | End: 2024-02-10 | Stop reason: HOSPADM

## 2024-02-09 RX ORDER — ATORVASTATIN CALCIUM 40 MG/1
40 TABLET, FILM COATED ORAL DAILY
Status: DISCONTINUED | OUTPATIENT
Start: 2024-02-09 | End: 2024-02-10 | Stop reason: HOSPADM

## 2024-02-09 RX ORDER — LEVOTHYROXINE SODIUM 112 UG/1
112 TABLET ORAL
Status: DISCONTINUED | OUTPATIENT
Start: 2024-02-10 | End: 2024-02-10 | Stop reason: HOSPADM

## 2024-02-09 RX ORDER — BUPIVACAINE HYDROCHLORIDE 5 MG/ML
30 INJECTION, SOLUTION PERINEURAL ONCE
Status: COMPLETED | OUTPATIENT
Start: 2024-02-09 | End: 2024-02-09

## 2024-02-09 RX ORDER — AMITRIPTYLINE HYDROCHLORIDE 50 MG/1
100 TABLET, FILM COATED ORAL NIGHTLY
Status: DISCONTINUED | OUTPATIENT
Start: 2024-02-09 | End: 2024-02-10 | Stop reason: HOSPADM

## 2024-02-09 RX ORDER — FOLIC ACID 1 MG/1
1000 TABLET ORAL DAILY
Status: DISCONTINUED | OUTPATIENT
Start: 2024-02-09 | End: 2024-02-10 | Stop reason: HOSPADM

## 2024-02-09 RX ADMIN — DEXAMETHASONE SODIUM PHOSPHATE 4 MG: 4 INJECTION INTRA-ARTICULAR; INTRALESIONAL; INTRAMUSCULAR; INTRAVENOUS; SOFT TISSUE at 17:48

## 2024-02-09 RX ADMIN — CLINDAMYCIN PHOSPHATE 900 MG: 900 INJECTION, SOLUTION INTRAVENOUS at 01:53

## 2024-02-09 RX ADMIN — CLINDAMYCIN PHOSPHATE 900 MG: 900 INJECTION, SOLUTION INTRAVENOUS at 17:48

## 2024-02-09 RX ADMIN — OXYCODONE HYDROCHLORIDE AND ACETAMINOPHEN 1 TABLET: 5; 325 TABLET ORAL at 06:50

## 2024-02-09 RX ADMIN — OXYCODONE HYDROCHLORIDE AND ACETAMINOPHEN 1 TABLET: 5; 325 TABLET ORAL at 00:21

## 2024-02-09 RX ADMIN — FOLIC ACID 1000 MCG: 1 TABLET ORAL at 17:49

## 2024-02-09 RX ADMIN — OXYCODONE HYDROCHLORIDE 10 MG: 5 SOLUTION ORAL at 17:50

## 2024-02-09 RX ADMIN — Medication 10 ML: at 09:31

## 2024-02-09 RX ADMIN — Medication 10 ML: at 21:30

## 2024-02-09 RX ADMIN — KETOROLAC TROMETHAMINE 15 MG: 15 INJECTION, SOLUTION INTRAMUSCULAR; INTRAVENOUS at 21:25

## 2024-02-09 RX ADMIN — KETOROLAC TROMETHAMINE 15 MG: 15 INJECTION, SOLUTION INTRAMUSCULAR; INTRAVENOUS at 14:17

## 2024-02-09 RX ADMIN — AMITRIPTYLINE HYDROCHLORIDE 100 MG: 50 TABLET, FILM COATED ORAL at 21:20

## 2024-02-09 RX ADMIN — DEXAMETHASONE SODIUM PHOSPHATE 4 MG: 4 INJECTION INTRA-ARTICULAR; INTRALESIONAL; INTRAMUSCULAR; INTRAVENOUS; SOFT TISSUE at 09:30

## 2024-02-09 RX ADMIN — OXYCODONE HYDROCHLORIDE AND ACETAMINOPHEN 1 TABLET: 5; 325 TABLET ORAL at 10:39

## 2024-02-09 RX ADMIN — BUPIVACAINE HYDROCHLORIDE 150 MG: 5 INJECTION, SOLUTION EPIDURAL; INTRACAUDAL; PERINEURAL at 12:43

## 2024-02-09 RX ADMIN — SERTRALINE 250 MG: 100 TABLET, FILM COATED ORAL at 17:49

## 2024-02-09 RX ADMIN — KETOROLAC TROMETHAMINE 15 MG: 15 INJECTION, SOLUTION INTRAMUSCULAR; INTRAVENOUS at 05:25

## 2024-02-09 RX ADMIN — ATORVASTATIN CALCIUM 40 MG: 40 TABLET, FILM COATED ORAL at 17:50

## 2024-02-09 RX ADMIN — CLINDAMYCIN PHOSPHATE 900 MG: 900 INJECTION, SOLUTION INTRAVENOUS at 09:33

## 2024-02-09 RX ADMIN — DEXAMETHASONE SODIUM PHOSPHATE 4 MG: 4 INJECTION INTRA-ARTICULAR; INTRALESIONAL; INTRAMUSCULAR; INTRAVENOUS; SOFT TISSUE at 01:50

## 2024-02-09 ASSESSMENT — PAIN SCALES - GENERAL
PAINLEVEL_OUTOF10: 9
PAINLEVEL_OUTOF10: 7
PAINLEVEL_OUTOF10: 9
PAINLEVEL_OUTOF10: 7
PAINLEVEL_OUTOF10: 10
PAINLEVEL_OUTOF10: 5
PAINLEVEL_OUTOF10: 8
PAINLEVEL_OUTOF10: 9

## 2024-02-09 ASSESSMENT — PAIN DESCRIPTION - DESCRIPTORS
DESCRIPTORS: THROBBING
DESCRIPTORS: BURNING
DESCRIPTORS: BURNING;THROBBING;ACHING
DESCRIPTORS: THROBBING;SORE
DESCRIPTORS: BURNING
DESCRIPTORS: BURNING

## 2024-02-09 ASSESSMENT — PAIN DESCRIPTION - ORIENTATION
ORIENTATION: LEFT
ORIENTATION: LEFT
ORIENTATION: MID
ORIENTATION: RIGHT
ORIENTATION: LEFT

## 2024-02-09 ASSESSMENT — PAIN DESCRIPTION - LOCATION
LOCATION: NECK
LOCATION: FACE
LOCATION: OTHER (COMMENT)
LOCATION: FACE
LOCATION: FACE
LOCATION: NECK
LOCATION: FACE

## 2024-02-09 NOTE — PLAN OF CARE
7/1- 7 /12 - A1c Nutrition Problem #1: Severe malnutrition  Intervention: Food and/or Nutrient Delivery: Modify Oral Nutrition Supplement (Pt prefers to use her TF supplement from home as she has had good results   Boost VHC oral supplement, 5 cartons/day recommend 260 ml water flush with 5 times daily)

## 2024-02-09 NOTE — PROGRESS NOTES
Physician Progress Note    2/9/2024   4:42 PM    Name:  Archana Melendez  MRN:    [id number]      Day: 0     Admit Date: 2/8/2024 11:35 AM  PCP: Ivone Barnard MD    Code Status:  DNR-CCA    Subjective:     Pain well-controlled.  Swelling improved.  Not as much drainage from neck ulceration.    Current Facility-Administered Medications   Medication Dose Route Frequency Provider Last Rate Last Admin    pantoprazole (PROTONIX) 40 mg in sodium chloride (PF) 0.9 % 10 mL injection  40 mg IntraVENous Daily Livia Courtney APRN - CNP        oxyCODONE (ROXICODONE) 5 MG/5ML solution 10 mg  10 mg Oral Q4H PRN Colleen Mayberry, ALEN - CNP        sodium chloride flush 0.9 % injection 5-40 mL  5-40 mL IntraVENous 2 times per day Glenn, Rick R, DO   10 mL at 02/09/24 0931    sodium chloride flush 0.9 % injection 5-40 mL  5-40 mL IntraVENous PRN Glenn, Rick R, DO        0.9 % sodium chloride infusion   IntraVENous PRN Glenn, Rick R, DO        potassium chloride (KLOR-CON M) extended release tablet 40 mEq  40 mEq Oral PRN Glenn, Rick R, DO        Or    potassium bicarb-citric acid (EFFER-K) effervescent tablet 40 mEq  40 mEq Oral PRN Glenn, Rick R, DO        Or    potassium chloride 10 mEq/100 mL IVPB (Peripheral Line)  10 mEq IntraVENous PRN Glenn, Rick R, DO        magnesium sulfate 2000 mg in 50 mL IVPB premix  2,000 mg IntraVENous PRN Glenn, Rick R, DO        enoxaparin Sodium (LOVENOX) injection 30 mg  30 mg SubCUTAneous Daily Glenn, Rick R, DO        ondansetron (ZOFRAN-ODT) disintegrating tablet 4 mg  4 mg Oral Q8H PRN Glenn, Rick R, DO        Or    ondansetron (ZOFRAN) injection 4 mg  4 mg IntraVENous Q6H PRN Glenn, Rick R, DO        polyethylene glycol (GLYCOLAX) packet 17 g  17 g Oral Daily PRN Glenn, Rick R, DO        acetaminophen (TYLENOL) tablet 650 mg  650 mg Oral Q6H PRN Rick Elizabeth,         Or    acetaminophen (TYLENOL) suppository 650 mg  650 mg Rectal Q6H PRN  needed Percocet     Dysphagia related to tongue cancer: Tube feeds  Hypothyroidism     Reorder home medications once verified by RN     Lovenox prophylaxis  DNR CCA-confirmed with patient and friend family at bedside 2/8    37 minutes in total care time    Electronically signed by Rick Elizabeth DO on 2/9/2024 at 4:42 PM

## 2024-02-09 NOTE — TELEPHONE ENCOUNTER
2/9/24 - lateral mei - per email from Fátima ross/frankie - pt has had to cancel a few appointments with Fátima due to continued health issues.  Pt stated she wants to put the brace on hold currently and is going to call her is she decides to move forward in the future.

## 2024-02-09 NOTE — PROGRESS NOTES
Comprehensive Nutrition Assessment    Type and Reason for Visit:  Initial, Consult, Wound (TF order and manage)    Nutrition Recommendations/Plan:   Pt prefers to use her TF supplement from home as she has had good results   Boost VHC oral supplement, 5 cartons/day   Recommend 260 ml water flush, 5 times daily   If pt home formula not available, may use Osmolite 1.2 (Standard without fiber formula), 430 ml 5 x daily.     Malnutrition Assessment:  Malnutrition Status:  Severe malnutrition (02/09/24 1306)    Context:  Chronic Illness     Findings of the 6 clinical characteristics of malnutrition:  Energy Intake:  No significant decrease in energy intake  Weight Loss:  Greater than 20% over 1 year     Body Fat Loss:  Severe body fat loss Triceps   Muscle Mass Loss:  Severe muscle mass loss Clavicles (pectoralis & deltoids), Temples (temporalis), Calf (gastrocnemius)  Fluid Accumulation:  No significant fluid accumulation     Strength:  Not Performed    Nutrition Assessment:    Pt presents with severe protein calorie malnutrition in the chronic setting related to tongue cancer.  Pt is strictly NPO and has been maintained on PEG tube feedings at home.  She had been combining Jevity 1.5 with Boost VHC oral supplement for her feeding regimen, however she was experiencing multiple daily loose stools so she discontinued the Jevity and now only taking the Boost, 5-6 cans/day and has had good results for the past few weeks.  Pt may use her TF formula from home, as it is not available on this facility's formulary. If pt formula unavailable, may substitute with Osmolite 1.2 (standard without fiber formula), 430 ml 5 times daily    Nutrition Related Findings:    PMH: Tongue cancer, etoh, hepatitis. Has a PEG tube for nutrition (3/2023-replaced 8/2023), followed at cancer center. Labs: hyperglycemia, meds include decadron. Was taking a combination of Jevity 1.5 and VHC Boost, however she reported incresed loose stool volume

## 2024-02-09 NOTE — PLAN OF CARE
Problem: Pain  Goal: Verbalizes/displays adequate comfort level or baseline comfort level  Outcome: Not Progressing  Flowsheets (Taken 2/9/2024 7423)  Verbalizes/displays adequate comfort level or baseline comfort level:   Encourage patient to monitor pain and request assistance   Assess pain using appropriate pain scale   Administer analgesics based on type and severity of pain and evaluate response   Consider cultural and social influences on pain and pain management   Implement non-pharmacological measures as appropriate and evaluate response   Notify Licensed Independent Practitioner if interventions unsuccessful or patient reports new pain

## 2024-02-09 NOTE — CONSULTS
[place] [address]                                  CONSULTATION    PATIENT NAME: JELANI ADDISON                      :        1960  MED REC NO:   [id number]                            ROOM:       W274  ACCOUNT NO:   [id number]                           ADMIT DATE: 2024  PROVIDER:     Jayesh Iverson MD    CONSULT DATE:  2024    REASON FOR CONSULTATION:  Consultation request placed per primary care  service for evaluation of possible abscess.    HISTORY OF PRESENT ILLNESS:  The patient is a 63-year-old female with  prior history of squamous cell carcinoma of base of tongue, status post  chemoradiation in .  More recently, she has shown evidence of  definitive tumor recurrence and has been on KEYTRUDA for evidence of  both oral cavity and metastatic disease to the lungs.  She follows with  our colleague Dr. Holley in Hematology/Oncology and I did discuss the  case with him today.  This patient also has had this case discussed with  my colleague Dr. Lavell Nava at [facility], who  also has seen this patient in a self-care for her.  In review, this  patient has had evidence of further tumor progression with concern for  facial cellulitis, which more than likely than not has represented tumor  burden extruding through the skin with evidence of dermal metastasis.   The patient is now being seen for assessment of this to determine, if  any sort of incision, drainage, procedure etc., is warranted.  The  remaining ENT inquiry is otherwise clear.  Her chart is reviewed and  this examination is performed at the bedside, bed space 274.    Medications and allergies have been reviewed as well.    PHYSICAL EXAMINATION:  GENERAL:  She is not any acute distress.  VITAL SIGNS:  Pulse 72 and respirations 16.  HEENT:  Tympanic membrane canal and external ear are normal bilaterally.  The nasal exam is

## 2024-02-09 NOTE — CONSULTS
Department of  Chronic Pain Managment  Attending Progress Note      SUBJECTIVE  Left sided Facial pain.    OBJECTIVE: Archana Melendez is a 63 y.o. female with PMH of tongue cancer status post partial left-sided removal, metastasis, hepatitis C, COPD, hepatitis B, ICD, hypothyroidism presents with worsening facial swelling and redness to the neck and face.  Patient is followed closely by oncology Dr. Riley and has seen Dr. Lavell Nava of ENT.  [facility] with concern for worsening redness and a wound on the anterior neck.  The wound started appearing several days ago.  They thought there might be some drainage from the area.  They said at night patient had complained of some shortness of breath and she wore her oxygen for couple hours and seem to improve.  Currently she denies shortness of breath.  Has not been on antibiotics recently.      Medications  Current Facility-Administered Medications: pantoprazole (PROTONIX) 40 mg in sodium chloride (PF) 0.9 % 10 mL injection, 40 mg, IntraVENous, Daily  oxyCODONE (ROXICODONE) 5 MG/5ML solution 10 mg, 10 mg, Oral, Q4H PRN  sodium chloride flush 0.9 % injection 5-40 mL, 5-40 mL, IntraVENous, 2 times per day  sodium chloride flush 0.9 % injection 5-40 mL, 5-40 mL, IntraVENous, PRN  0.9 % sodium chloride infusion, , IntraVENous, PRN  potassium chloride (KLOR-CON M) extended release tablet 40 mEq, 40 mEq, Oral, PRN **OR** potassium bicarb-citric acid (EFFER-K) effervescent tablet 40 mEq, 40 mEq, Oral, PRN **OR** potassium chloride 10 mEq/100 mL IVPB (Peripheral Line), 10 mEq, IntraVENous, PRN  magnesium sulfate 2000 mg in 50 mL IVPB premix, 2,000 mg, IntraVENous, PRN  enoxaparin Sodium (LOVENOX) injection 30 mg, 30 mg, SubCUTAneous, Daily  ondansetron (ZOFRAN-ODT) disintegrating tablet 4 mg, 4 mg, Oral, Q8H PRN **OR** ondansetron (ZOFRAN) injection 4 mg, 4 mg, IntraVENous, Q6H PRN  polyethylene glycol (GLYCOLAX) packet 17 g, 17 g, Oral, Daily PRN  acetaminophen (TYLENOL)  tablet 650 mg, 650 mg, Oral, Q6H PRN **OR** acetaminophen (TYLENOL) suppository 650 mg, 650 mg, Rectal, Q6H PRN  clindamycin (CLEOCIN) 900 mg in dextrose 5 % 50 mL IVPB, 900 mg, IntraVENous, Q8H  ketorolac (TORADOL) injection 15 mg, 15 mg, IntraVENous, q8h  hydrOXYzine HCl (ATARAX) tablet 25 mg, 25 mg, Oral, Q6H PRN  diphenhydrAMINE (BENADRYL) 25 mg in sodium chloride 0.9 % 50 mL IVPB, 25 mg, IntraVENous, Nightly PRN  dexAMETHasone (DECADRON) injection 4 mg, 4 mg, IntraVENous, Q8H  ROS:  Review of Systems   Constitutional:  Negative for chills and fever.   Respiratory:  Positive for shortness of breath. Negative for cough.    Cardiovascular:  Negative for chest pain.   Gastrointestinal:  Negative for abdominal pain, nausea and vomiting.   Skin:  Positive for rash and wound.   Allergic/Immunologic: Positive for immunocompromised state.   Neurological:  Negative for headaches.   Psychiatric/Behavioral:  Negative for confusion.    Physical  VITALS:  /73   Pulse 99   Temp 97.9 °F (36.6 °C) (Oral)   Resp 16   Ht 1.626 m (5' 4\")   Wt 46.3 kg (102 lb)   SpO2 96%   BMI 17.51 kg/m²   CONSTITUTIONAL:  awake, alert, cooperative, no apparent distress, and appears stated age  EYES:  Lids and lashes normal, pupils equal, round and reactive to light, extra ocular muscles intact, sclera clear, conjunctiva normal  ENT:  normocepalic, with surgery and Glossectomy   NECK:  Supple, symmetrical, trachea midline, no adenopathy, thyroid symmetric, not enlarged and no tenderness, skin normal  HEMATOLOGIC/LYMPHATICS:  no cervical lymphadenopathy and no supraclavicular lymphadenopathy  BACK:  Symmetric, no curvature, spinous processes are non-tender on palpation, paraspinous muscles are non-tender on palpation, no costal vertebral tenderness  LUNGS:  No increased work of breathing, good air exchange, clear to auscultation bilaterally, no crackles or wheezing  CARDIOVASCULAR:  Normal apical impulse, regular rate and rhythm,

## 2024-02-09 NOTE — CONSULTS
Infectious Diseases Inpatient Consult Note      Reason for Consult:   Antibiotics management for facial cellulitis  Requesting Physician:   Dr. Elizabeth  Primary Care Physician:  Ivone Barnard MD  History Obtained From:   Pt, EPIC    Admit Date: 2/8/2024  Hospital Day: 2      HISTORY OF PRESENT ILLNESS:  This is a 63 y.o. female with past medical history of tongue cancer status post partial resection, proton Rx that was completed last April, on Keytruda,  being followed up by Dr. Rivera, chronic dysphagia with PEG tube placement.  Does not take anything by mouth.   Patient came to the emergency room with 2 weeks history of facial swelling and redness.  She reported neck ulcer that started in January after she had a biopsy done with lack of wound healing.  Biopsy of left posterior oral cavity done on January 29 came back to be positive for squamous cell carcinoma.  Patient denies any fevers or chills.  She denies any significant drainage from the wound.  Positive fatigue.  Tolerating tube feeding through the PEG.  No weakness.     Past medical surgical and social history were reviewed and as detailed below  Past Medical History:   Diagnosis Date    Anxiety     Bilateral carpal tunnel syndrome JAN 2011    Cancer (HCC) 2005-TIP OF TONGUE    METS TO LEFT LYMPH NODE-SQUAMOUS    Cardiomyopathy (HCC) 3/6/2015    COPD (chronic obstructive pulmonary disease) (HCC)     COPD (chronic obstructive pulmonary disease) (HCC)     Dental disease     SEVERE DENTAL PROBLEMS    Diastolic dysfunction     DJD (degenerative joint disease), lumbar     Dyslipidemia 3/6/2015    Hepatitis B infection VIRAL-2006    Hepatitis C without mention of hepatic coma 2007-CHEMO    History of alcoholism (HCC)     History of osteopenia 2009    Hypothyroidism     ICD (implantable cardioverter-defibrillator) battery depletion     Tongue cancer (HCC)        Past Surgical History:   Procedure Laterality Date    CT NEEDLE BIOPSY LUNG PERCUTANEOUS

## 2024-02-09 NOTE — CARE COORDINATION
Case Management Assessment  Initial Evaluation    Date/Time of Evaluation: 2/9/2024 11:07 AM  Assessment Completed by: Mita Clay RN    If patient is discharged prior to next notation, then this note serves as note for discharge by case management.    Patient Name: Archana Melendez                   YOB: 1960  Diagnosis: Tongue cancer (HCC) [C02.9]  Facial cellulitis [L03.211]  Skin ulcer of neck, unspecified ulcer stage (HCC) [L98.499]                   Date / Time: 2/8/2024 11:35 AM    Patient Admission Status: Observation   Readmission Risk (Low < 19, Mod (19-27), High > 27): Readmission Risk Score: 14.3    Current PCP: Ivone Barnard MD  PCP verified by CM? Yes    Chart Reviewed: Yes      History Provided by: Patient  Patient Orientation: Alert and Oriented    Patient Cognition: Alert    Hospitalization in the last 30 days (Readmission):  No    If yes, Readmission Assessment in CM Navigator will be completed.    Advance Directives:      Code Status: DNR-CCA   Patient's Primary Decision Maker is: Named in Scanned ACP Document    Primary Decision Maker: Weston Boothe - Child - [phone]    Secondary Decision Maker: Cyril Melendez - Child - [phone]    Discharge Planning:    Patient lives with: Alone Type of Home: House  Primary Care Giver: Self  Patient Support Systems include: Children   Current Financial resources:    Current community resources:    Current services prior to admission: Other (Comment) ([facility])            Current DME:  PEG/TF ([facility])            Type of Home Care services:  None    ADLS  Prior functional level: Independent in ADLs/IADLs  Current functional level: Independent in ADLs/IADLs    PT AM-PAC:   /24  OT AM-PAC:   /24    Family can provide assistance at DC: Yes  Would you like Case Management to discuss the discharge plan with any other family members/significant others, and if so, who? Yes (WESTON ROBERTS AND CYRIL)  Plans to Return to Present Housing:  Yes  Other Identified Issues/Barriers to RETURNING to current housing: MEDICAL CLEARANCE  Potential Assistance needed at discharge: N/A            Potential DME:    Patient expects to discharge to: House  Plan for transportation at discharge:      Financial    [facility] / Plan: [facility] / Product Type: *No Product type* /     Does insurance require precert for SNF: Yes    Potential assistance Purchasing Medications:    Meds-to-Beds request: Yes      "[facility] - [place], [place] - [address] - P [phone] - F [phone]  [address]  [place] [address]  Phone: [phone] Fax: [phone]      Notes:    Factors facilitating achievement of predicted outcomes: Family support, Motivated, Cooperative, and Pleasant    Barriers to discharge: Medical complications    Additional Case Management Notes: MET WITH PT AND FAMILY AT BEDSIDE. PT FROM HOME ALONE. INDEPENDENT. HAS PEG, MANAGES TF WITH [facility] INDEPENDENTLY. FOLLOWS WITH [facility]. NO DME, DRIVES. NO O2, NO HD, NO VA.  DC PLAN TO RETURN HOME. FOLLOW ID PLAN. SPOKE WITH DR. ROY- DO NOT ANTICIPATE IV ABX AT DC. WILL CONTINUE TO FOLLOW.     The Plan for Transition of Care is related to the following treatment goals of Tongue cancer (HCC) [C02.9]  Facial cellulitis [L03.211]  Skin ulcer of neck, unspecified ulcer stage (HCC) [L98.499]    IF APPLICABLE: The Patient and/or patient representative Archana and her family were provided with a choice of provider and agrees with the discharge plan. Freedom of choice list with basic dialogue that supports the patient's individualized plan of care/goals and shares the quality data associated with the providers was provided to:     Patient Representative Name:       The Patient and/or Patient Representative Agree with the Discharge Plan?      Mita Clay RN  Case Management Department  Ph:  Fax:

## 2024-02-10 VITALS
HEART RATE: 76 BPM | RESPIRATION RATE: 18 BRPM | HEIGHT: 64 IN | DIASTOLIC BLOOD PRESSURE: 68 MMHG | SYSTOLIC BLOOD PRESSURE: 142 MMHG | TEMPERATURE: 97.9 F | WEIGHT: 102 LBS | BODY MASS INDEX: 17.42 KG/M2 | OXYGEN SATURATION: 94 %

## 2024-02-10 LAB
ANION GAP SERPL CALCULATED.3IONS-SCNC: 11 MEQ/L (ref 9–15)
BASOPHILS # BLD: 0.1 K/UL (ref 0–0.2)
BASOPHILS NFR BLD: 0.4 %
BUN SERPL-MCNC: 27 MG/DL (ref 8–23)
CALCIUM SERPL-MCNC: 8.6 MG/DL (ref 8.5–9.9)
CHLORIDE SERPL-SCNC: 100 MEQ/L (ref 95–107)
CO2 SERPL-SCNC: 27 MEQ/L (ref 20–31)
CREAT SERPL-MCNC: 0.45 MG/DL (ref 0.5–0.9)
EOSINOPHIL # BLD: 0 K/UL (ref 0–0.7)
EOSINOPHIL NFR BLD: 0.1 %
ERYTHROCYTE [DISTWIDTH] IN BLOOD BY AUTOMATED COUNT: 16.2 % (ref 11.5–14.5)
GLUCOSE SERPL-MCNC: 116 MG/DL (ref 70–99)
HCT VFR BLD AUTO: 31.6 % (ref 37–47)
HGB BLD-MCNC: 10.2 G/DL (ref 12–16)
LYMPHOCYTES # BLD: 0.6 K/UL (ref 1–4.8)
LYMPHOCYTES NFR BLD: 4.6 %
MCH RBC QN AUTO: 27.3 PG (ref 27–31.3)
MCHC RBC AUTO-ENTMCNC: 32.3 % (ref 33–37)
MCV RBC AUTO: 84.7 FL (ref 79.4–94.8)
MONOCYTES # BLD: 0.4 K/UL (ref 0.2–0.8)
MONOCYTES NFR BLD: 2.7 %
NEUTROPHILS # BLD: 12.5 K/UL (ref 1.4–6.5)
NEUTS SEG NFR BLD: 91.6 %
PLATELET # BLD AUTO: 484 K/UL (ref 130–400)
POTASSIUM SERPL-SCNC: 3.8 MEQ/L (ref 3.4–4.9)
RBC # BLD AUTO: 3.73 M/UL (ref 4.2–5.4)
SODIUM SERPL-SCNC: 138 MEQ/L (ref 135–144)
WBC # BLD AUTO: 13.6 K/UL (ref 4.8–10.8)

## 2024-02-10 PROCEDURE — 6370000000 HC RX 637 (ALT 250 FOR IP): Performed by: INTERNAL MEDICINE

## 2024-02-10 PROCEDURE — 6360000002 HC RX W HCPCS: Performed by: NURSE PRACTITIONER

## 2024-02-10 PROCEDURE — 94640 AIRWAY INHALATION TREATMENT: CPT

## 2024-02-10 PROCEDURE — 96375 TX/PRO/DX INJ NEW DRUG ADDON: CPT

## 2024-02-10 PROCEDURE — C9113 INJ PANTOPRAZOLE SODIUM, VIA: HCPCS | Performed by: NURSE PRACTITIONER

## 2024-02-10 PROCEDURE — 2580000003 HC RX 258: Performed by: INTERNAL MEDICINE

## 2024-02-10 PROCEDURE — 80048 BASIC METABOLIC PNL TOTAL CA: CPT

## 2024-02-10 PROCEDURE — G0378 HOSPITAL OBSERVATION PER HR: HCPCS

## 2024-02-10 PROCEDURE — 96366 THER/PROPH/DIAG IV INF ADDON: CPT

## 2024-02-10 PROCEDURE — 6370000000 HC RX 637 (ALT 250 FOR IP)

## 2024-02-10 PROCEDURE — 94761 N-INVAS EAR/PLS OXIMETRY MLT: CPT

## 2024-02-10 PROCEDURE — 36415 COLL VENOUS BLD VENIPUNCTURE: CPT

## 2024-02-10 PROCEDURE — 85025 COMPLETE CBC W/AUTO DIFF WBC: CPT

## 2024-02-10 PROCEDURE — 99232 SBSQ HOSP IP/OBS MODERATE 35: CPT | Performed by: INTERNAL MEDICINE

## 2024-02-10 PROCEDURE — 2580000003 HC RX 258: Performed by: NURSE PRACTITIONER

## 2024-02-10 PROCEDURE — 96376 TX/PRO/DX INJ SAME DRUG ADON: CPT

## 2024-02-10 PROCEDURE — 6360000002 HC RX W HCPCS: Performed by: INTERNAL MEDICINE

## 2024-02-10 RX ORDER — CLINDAMYCIN HYDROCHLORIDE 300 MG/1
300 CAPSULE ORAL 3 TIMES DAILY
Qty: 30 CAPSULE | Refills: 0 | Status: ON HOLD | OUTPATIENT
Start: 2024-02-10 | End: 2024-02-14 | Stop reason: HOSPADM

## 2024-02-10 RX ORDER — SERTRALINE HYDROCHLORIDE 100 MG/1
250 TABLET, FILM COATED ORAL DAILY
COMMUNITY
Start: 2024-02-10

## 2024-02-10 RX ADMIN — Medication 5 ML: at 11:00

## 2024-02-10 RX ADMIN — OXYCODONE HYDROCHLORIDE 10 MG: 5 SOLUTION ORAL at 10:56

## 2024-02-10 RX ADMIN — CLINDAMYCIN PHOSPHATE 900 MG: 900 INJECTION, SOLUTION INTRAVENOUS at 09:52

## 2024-02-10 RX ADMIN — OXYCODONE HYDROCHLORIDE 10 MG: 5 SOLUTION ORAL at 01:39

## 2024-02-10 RX ADMIN — DEXAMETHASONE SODIUM PHOSPHATE 4 MG: 4 INJECTION INTRA-ARTICULAR; INTRALESIONAL; INTRAMUSCULAR; INTRAVENOUS; SOFT TISSUE at 08:53

## 2024-02-10 RX ADMIN — TIOTROPIUM BROMIDE INHALATION SPRAY 2 PUFF: 3.12 SPRAY, METERED RESPIRATORY (INHALATION) at 05:21

## 2024-02-10 RX ADMIN — FOLIC ACID 1000 MCG: 1 TABLET ORAL at 08:53

## 2024-02-10 RX ADMIN — KETOROLAC TROMETHAMINE 15 MG: 15 INJECTION, SOLUTION INTRAMUSCULAR; INTRAVENOUS at 06:24

## 2024-02-10 RX ADMIN — SODIUM CHLORIDE, PRESERVATIVE FREE 40 MG: 5 INJECTION INTRAVENOUS at 08:53

## 2024-02-10 RX ADMIN — LEVOTHYROXINE SODIUM 112 MCG: 0.11 TABLET ORAL at 06:24

## 2024-02-10 RX ADMIN — DEXAMETHASONE SODIUM PHOSPHATE 4 MG: 4 INJECTION INTRA-ARTICULAR; INTRALESIONAL; INTRAMUSCULAR; INTRAVENOUS; SOFT TISSUE at 01:42

## 2024-02-10 RX ADMIN — SERTRALINE 250 MG: 100 TABLET, FILM COATED ORAL at 08:53

## 2024-02-10 RX ADMIN — OXYCODONE HYDROCHLORIDE 10 MG: 5 SOLUTION ORAL at 06:25

## 2024-02-10 RX ADMIN — CLINDAMYCIN PHOSPHATE 900 MG: 900 INJECTION, SOLUTION INTRAVENOUS at 01:47

## 2024-02-10 ASSESSMENT — PAIN DESCRIPTION - DESCRIPTORS
DESCRIPTORS: THROBBING
DESCRIPTORS: ACHING;THROBBING;BURNING
DESCRIPTORS: ACHING

## 2024-02-10 ASSESSMENT — PAIN DESCRIPTION - ORIENTATION
ORIENTATION: LEFT
ORIENTATION: LEFT

## 2024-02-10 ASSESSMENT — PAIN SCALES - GENERAL
PAINLEVEL_OUTOF10: 8
PAINLEVEL_OUTOF10: 0
PAINLEVEL_OUTOF10: 10

## 2024-02-10 ASSESSMENT — PAIN DESCRIPTION - LOCATION
LOCATION: FACE
LOCATION: GENERALIZED

## 2024-02-10 NOTE — PROGRESS NOTES
leg edema  No erythema, no tenderness      DATA:    Lab Results   Component Value Date    WBC 13.6 (H) 02/10/2024    HGB 10.2 (L) 02/10/2024    HCT 31.6 (L) 02/10/2024    MCV 84.7 02/10/2024     (H) 02/10/2024     Lab Results   Component Value Date    CREATININE 0.45 (L) 02/10/2024    BUN 27 (H) 02/10/2024     02/10/2024    K 3.8 02/10/2024     02/10/2024    CO2 27 02/10/2024       Hepatic Function Panel:  Lab Results   Component Value Date/Time    ALKPHOS 134 01/23/2024 03:43 PM    ALT 14 01/23/2024 03:43 PM    AST 21 01/23/2024 03:43 PM    PROT 7.2 01/23/2024 03:43 PM    BILITOT 0.3 01/23/2024 03:43 PM    BILIDIR 0.0 07/01/2014 10:58 AM    IBILI 0.4 07/01/2014 10:58 AM    LABALBU 3.4 01/23/2024 03:43 PM       Microbiology:   Recent Labs     02/08/24  1222   BC No Growth to date.  Any change in status will be called.     Recent Labs     02/08/24  1222   BLOODCULT2 No Growth to date.  Any change in status will be called.       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  Tongue cancer status post partial resection  History of multiple drug allergies including anaphylaxis to penicillin  AICD present    Patient Active Problem List   Diagnosis    COPD (chronic obstructive pulmonary disease) (HCC)    Hepatitis C virus infection    Hypothyroidism    Insomnia    Itching    Osteoarthritis    Reflux esophagitis    Mixed hyperlipidemia    Internal derangement of right knee    Lumbar radiculitis    Cardiomyopathy (HCC)    Premature osteoporosis    Hypercholesterolemia    ICD (implantable cardioverter-defibrillator) battery depletion    Diastolic dysfunction    Chronic left shoulder pain    Subacromial impingement of left shoulder    Subacromial bursitis of left shoulder joint    Pneumonia of left lung due to infectious organism    Community acquired pneumonia of left upper lobe of lung    Acute respiratory  failure with hypoxia (HCC)    Palliative care encounter    Goals of care, counseling/discussion    Full code status    Advanced care planning/counseling discussion    Anxiety about health    Neoplasm related pain    Neck pain    Facial cellulitis    Skin ulcer of neck (HCC)    Immunosuppression due to drug therapy (HCC)    Tongue cancer (HCC)    History of penicillin allergy       PLAN:  DC IV clindamycin  Discharge on p.o. clindamycin for 10 days  Follow-up with me in 10 days  Lactobacillus  I over emphasized the possibility of C. difficile related to clindamycin.  I discussed the case with her son and the patient to call my office if liquid stool of more than 3 times daily/abdominal cramps/any worsening in condition    Discussed with patient and adult child    Tram Velasco MD

## 2024-02-10 NOTE — CONSULTS
Hematology/Oncology Consult  Encounter Date: 2024 8:46 PM    Ms. Archana Melendez is a 63 y.o. female  : 1960  MRN: [id number]  [id number]  Requesting Provider: Rick Elizabeth DO    Reason for request: tongue cancer     CONSULTANT: Quinn Coon MD    HPI: The pt was diagnosed with tongue cancer for which she underwent  left partial glossectomy and  cervical lymph node dissection in . She was also given chemotx and Radiation tx to her neck at that time.    She  was found to have recurrence of her malignancy at the base of  her tongue  in  early  She had partial glossectomy and radiation tx  at  in the spring of  2023.     She was admitted at [facility] for tx of pneumonia in 2023.  She started  Keytruda on 23 which was  given every 3 weeks.    She developed multiloculated  abscess in the region of the left tongue resection as seen on CT  of soft tissues of the neck  24.    CT chest on 24  showed multifocal areas of nodular densities decreased in size at multiple sites from prior comparison consistent with partial resolving post inflammatory findings.      The pt developed  a new draining wound on the center of her neck anteriorly  without bleeding.        Patient Active Problem List   Diagnosis    COPD (chronic obstructive pulmonary disease) (HCC)    Hepatitis C virus infection    Hypothyroidism    Insomnia    Itching    Osteoarthritis    Reflux esophagitis    Mixed hyperlipidemia    Internal derangement of right knee    Lumbar radiculitis    Cardiomyopathy (HCC)    Premature osteoporosis    Hypercholesterolemia    ICD (implantable cardioverter-defibrillator) battery depletion    Diastolic dysfunction    Chronic left shoulder pain    Subacromial impingement of left shoulder    Subacromial bursitis of left shoulder joint    Pneumonia of left lung due to infectious organism    Community acquired pneumonia of left upper lobe of lung    Acute respiratory failure with  probably degenerative. There is severe C3-4, C6-7, and C7-T1 disc degenerative disease. Sclerosis seen throughout the cervical spine is probably from degenerative disease and not suggestive of metastatic disease.     1. Multiloculated abscess filling the region of the left tongue resection. This abscess measures 3.8 x 2.5 x 4.1 cm and is located posterior and lateral to surgical clips in the left anterior and mid tongue. This appears to extend through the floor of the mouth to reach the subcutaneous tissues. 2. There is heterogeneous low density throughout the remaining right tongue, with mild swelling, worrisome for myositis or possibly malignancy. Recommend correlation with the clinical history. 3. The laryngeal structures are diffusely edematous. stranding especially inferiorly, consistent with cellulitis. 4. There are multiple surgical clips in the right neck, extending from the inferior mandibular region through the laryngeal region, with no sign of any associated mass. 5. The previously seen cavitary infiltrate in the subpleural anterior-lateral left upper lobe has prominently decreased in size, consistent with clearing of a cavitary pneumonia. 6. The previously seen large rounded infiltrate in the posterior-lateral superior segment of the left lower lobe is only minimally included on today's study and measures 1.1 cm.       ASSESSMENT AND PLAN  The pt has a new draining neck wound  consistent with abscess but possibility of  concurrent recurrence of her malignancy is also a consideration.  --culture  and biopsy results from the neck wound will be checked.    2. Remote hx of squamous cell  CA  of tongue s/p left hemiglossectomy with cervical lymph node dissection, radiation tx and chemotx in 2005.    Thank you, Dr. Elizabeth,  for this consultation.    Electronically signed by Quinn Coon MD on 2/9/2024 at 8:46 PM

## 2024-02-10 NOTE — DISCHARGE SUMMARY
[facility] Medicine Discharge Summary    Archana Melendez  :  1960  MRN:  [id number]    Admit date:  2024    Discharge date:  2/10/2024    Admitting Physician:  Rick Elizabeth DO  Primary Care Physician:  Ivone Barnard MD    Discharge Diagnoses:    Principal Problem:    Facial cellulitis  Active Problems:    Skin ulcer of neck (HCC)    Immunosuppression due to drug therapy (HCC)    Tongue cancer (HCC)    History of penicillin allergy  Resolved Problems:    * No resolved hospital problems. *    Chief Complaint   Patient presents with    Facial Swelling     And sore to neck       Condition: improved   Activity: no restrictions  Diet: NPO. 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  She was treated with NSAIDs and steroids with improvement.  She was also maintained on IV antibiotics and transition to oral clindamycin at discharge per recommendation of infectious disease.    She was seen by oncology, ENT, and pain management during the hospitalization.  Please refer to their separate documentation.      Exam on Discharge:   BP (!) 142/68   Pulse 76   Temp 97.9 °F (36.6 °C)   Resp 18   Ht 1.626 m (5' 4\")   Wt 46.3 kg (102 lb)   SpO2 94%   BMI 17.51 kg/m²   General appearance: Chronically ill-appearing. Left-sided swelling and induration.. Thin   Mental Status: oriented to person, place and time and normal affect  Lungs: clear to auscultation bilaterally, normal effort  Heart: regular rate and rhythm, no murmur  Abdomen: soft, nontender, nondistended, bowel sounds present, no masses  Extremities: no edema, redness, tenderness in the calves. Cap refill <2s    Discharge

## 2024-02-11 ENCOUNTER — HOSPITAL ENCOUNTER (INPATIENT)
Age: 64
LOS: 3 days | Discharge: HOME HEALTH | DRG: 720 | End: 2024-02-14
Attending: GENERAL PRACTICE | Admitting: INTERNAL MEDICINE
Payer: COMMERCIAL

## 2024-02-11 DIAGNOSIS — C02.9 TONGUE CANCER (HCC): ICD-10-CM

## 2024-02-11 DIAGNOSIS — L08.9 FACIAL INFECTION: Primary | ICD-10-CM

## 2024-02-11 DIAGNOSIS — L03.211 FACIAL CELLULITIS: ICD-10-CM

## 2024-02-11 DIAGNOSIS — L02.11 NECK ABSCESS: ICD-10-CM

## 2024-02-11 LAB
ANION GAP SERPL CALCULATED.3IONS-SCNC: 11 MEQ/L (ref 9–15)
BASOPHILS # BLD: 0.1 K/UL (ref 0–0.2)
BASOPHILS NFR BLD: 0.4 %
BUN SERPL-MCNC: 21 MG/DL (ref 8–23)
CALCIUM SERPL-MCNC: 8.9 MG/DL (ref 8.5–9.9)
CHLORIDE SERPL-SCNC: 95 MEQ/L (ref 95–107)
CO2 SERPL-SCNC: 27 MEQ/L (ref 20–31)
CREAT SERPL-MCNC: 0.46 MG/DL (ref 0.5–0.9)
CRP SERPL HS-MCNC: 50.4 MG/L (ref 0–5)
CULTURE WOUND: NORMAL
EOSINOPHIL # BLD: 0.3 K/UL (ref 0–0.7)
EOSINOPHIL NFR BLD: 1.3 %
ERYTHROCYTE [DISTWIDTH] IN BLOOD BY AUTOMATED COUNT: 16.9 % (ref 11.5–14.5)
ERYTHROCYTE [SEDIMENTATION RATE] IN BLOOD BY WESTERGREN METHOD: 53 MM (ref 0–30)
GLUCOSE SERPL-MCNC: 112 MG/DL (ref 70–99)
HCT VFR BLD AUTO: 34.2 % (ref 37–47)
HGB BLD-MCNC: 10.9 G/DL (ref 12–16)
LACTATE BLDV-SCNC: 0.9 MMOL/L (ref 0.5–2.2)
LYMPHOCYTES # BLD: 1.2 K/UL (ref 1–4.8)
LYMPHOCYTES NFR BLD: 5.6 %
MCH RBC QN AUTO: 27.9 PG (ref 27–31.3)
MCHC RBC AUTO-ENTMCNC: 31.9 % (ref 33–37)
MCV RBC AUTO: 87.7 FL (ref 79.4–94.8)
MONOCYTES # BLD: 1.2 K/UL (ref 0.2–0.8)
MONOCYTES NFR BLD: 5.9 %
NEUTROPHILS # BLD: 17.6 K/UL (ref 1.4–6.5)
PLATELET # BLD AUTO: 511 K/UL (ref 130–400)
POTASSIUM SERPL-SCNC: 3.5 MEQ/L (ref 3.4–4.9)
PROCALCITONIN SERPL IA-MCNC: 0.09 NG/ML (ref 0–0.15)
RBC # BLD AUTO: 3.9 M/UL (ref 4.2–5.4)
SODIUM SERPL-SCNC: 133 MEQ/L (ref 135–144)
WBC # BLD AUTO: 20.5 K/UL (ref 4.8–10.8)

## 2024-02-11 PROCEDURE — 2580000003 HC RX 258: Performed by: NURSE PRACTITIONER

## 2024-02-11 PROCEDURE — 85652 RBC SED RATE AUTOMATED: CPT

## 2024-02-11 PROCEDURE — 84145 PROCALCITONIN (PCT): CPT

## 2024-02-11 PROCEDURE — 85025 COMPLETE CBC W/AUTO DIFF WBC: CPT

## 2024-02-11 PROCEDURE — 83605 ASSAY OF LACTIC ACID: CPT

## 2024-02-11 PROCEDURE — 86140 C-REACTIVE PROTEIN: CPT

## 2024-02-11 PROCEDURE — 6360000002 HC RX W HCPCS: Performed by: NURSE PRACTITIONER

## 2024-02-11 PROCEDURE — 99285 EMERGENCY DEPT VISIT HI MDM: CPT

## 2024-02-11 PROCEDURE — 80048 BASIC METABOLIC PNL TOTAL CA: CPT

## 2024-02-11 PROCEDURE — 6370000000 HC RX 637 (ALT 250 FOR IP): Performed by: NURSE PRACTITIONER

## 2024-02-11 PROCEDURE — 36415 COLL VENOUS BLD VENIPUNCTURE: CPT

## 2024-02-11 PROCEDURE — 87040 BLOOD CULTURE FOR BACTERIA: CPT

## 2024-02-11 PROCEDURE — 1210000000 HC MED SURG R&B

## 2024-02-11 RX ORDER — ONDANSETRON 2 MG/ML
4 INJECTION INTRAMUSCULAR; INTRAVENOUS EVERY 6 HOURS PRN
Status: DISCONTINUED | OUTPATIENT
Start: 2024-02-11 | End: 2024-02-14 | Stop reason: HOSPADM

## 2024-02-11 RX ORDER — FOLIC ACID 1 MG/1
1000 TABLET ORAL DAILY
Status: DISCONTINUED | OUTPATIENT
Start: 2024-02-12 | End: 2024-02-14 | Stop reason: HOSPADM

## 2024-02-11 RX ORDER — AMITRIPTYLINE HYDROCHLORIDE 100 MG/1
100 TABLET ORAL NIGHTLY
Status: DISCONTINUED | OUTPATIENT
Start: 2024-02-11 | End: 2024-02-14 | Stop reason: HOSPADM

## 2024-02-11 RX ORDER — POLYETHYLENE GLYCOL 3350 17 G/17G
17 POWDER, FOR SOLUTION ORAL DAILY PRN
Status: DISCONTINUED | OUTPATIENT
Start: 2024-02-11 | End: 2024-02-14 | Stop reason: HOSPADM

## 2024-02-11 RX ORDER — SODIUM CHLORIDE 9 MG/ML
INJECTION, SOLUTION INTRAVENOUS PRN
Status: DISCONTINUED | OUTPATIENT
Start: 2024-02-11 | End: 2024-02-13 | Stop reason: SDUPTHER

## 2024-02-11 RX ORDER — SODIUM CHLORIDE 0.9 % (FLUSH) 0.9 %
5-40 SYRINGE (ML) INJECTION PRN
Status: DISCONTINUED | OUTPATIENT
Start: 2024-02-11 | End: 2024-02-14 | Stop reason: HOSPADM

## 2024-02-11 RX ORDER — ACETAMINOPHEN 325 MG/1
650 TABLET ORAL EVERY 6 HOURS PRN
Status: DISCONTINUED | OUTPATIENT
Start: 2024-02-11 | End: 2024-02-14 | Stop reason: HOSPADM

## 2024-02-11 RX ORDER — LEVOTHYROXINE SODIUM 0.12 MG/1
125 TABLET ORAL DAILY
Status: DISCONTINUED | OUTPATIENT
Start: 2024-02-12 | End: 2024-02-14 | Stop reason: HOSPADM

## 2024-02-11 RX ORDER — CLINDAMYCIN PHOSPHATE 900 MG/50ML
900 INJECTION, SOLUTION INTRAVENOUS EVERY 8 HOURS
Status: DISCONTINUED | OUTPATIENT
Start: 2024-02-11 | End: 2024-02-12

## 2024-02-11 RX ORDER — ENOXAPARIN SODIUM 100 MG/ML
30 INJECTION SUBCUTANEOUS DAILY
Status: DISCONTINUED | OUTPATIENT
Start: 2024-02-12 | End: 2024-02-14 | Stop reason: HOSPADM

## 2024-02-11 RX ORDER — SODIUM CHLORIDE 0.9 % (FLUSH) 0.9 %
5-40 SYRINGE (ML) INJECTION EVERY 12 HOURS SCHEDULED
Status: DISCONTINUED | OUTPATIENT
Start: 2024-02-11 | End: 2024-02-14 | Stop reason: HOSPADM

## 2024-02-11 RX ORDER — ONDANSETRON 4 MG/1
4 TABLET, ORALLY DISINTEGRATING ORAL EVERY 8 HOURS PRN
Status: DISCONTINUED | OUTPATIENT
Start: 2024-02-11 | End: 2024-02-14 | Stop reason: HOSPADM

## 2024-02-11 RX ORDER — OXYCODONE HCL 5 MG/5 ML
15 SOLUTION, ORAL ORAL EVERY 4 HOURS PRN
Status: DISCONTINUED | OUTPATIENT
Start: 2024-02-11 | End: 2024-02-13

## 2024-02-11 RX ORDER — ATORVASTATIN CALCIUM 40 MG/1
40 TABLET, FILM COATED ORAL DAILY
Status: DISCONTINUED | OUTPATIENT
Start: 2024-02-12 | End: 2024-02-14 | Stop reason: HOSPADM

## 2024-02-11 RX ORDER — CLINDAMYCIN PALMITATE HYDROCHLORIDE 75 MG/5ML
5 SOLUTION ORAL 3 TIMES DAILY
Status: ON HOLD | COMMUNITY
Start: 2024-01-24 | End: 2024-02-14 | Stop reason: HOSPADM

## 2024-02-11 RX ORDER — LEVOTHYROXINE SODIUM 0.12 MG/1
125 TABLET ORAL DAILY
COMMUNITY
Start: 2024-01-24

## 2024-02-11 RX ORDER — ACETAMINOPHEN 650 MG/1
650 SUPPOSITORY RECTAL EVERY 6 HOURS PRN
Status: DISCONTINUED | OUTPATIENT
Start: 2024-02-11 | End: 2024-02-14 | Stop reason: HOSPADM

## 2024-02-11 RX ADMIN — OXYCODONE HYDROCHLORIDE 15 MG: 5 SOLUTION ORAL at 23:05

## 2024-02-11 RX ADMIN — Medication 5 ML: at 23:33

## 2024-02-11 RX ADMIN — CLINDAMYCIN PHOSPHATE 900 MG: 900 INJECTION, SOLUTION INTRAVENOUS at 23:32

## 2024-02-12 LAB
ALBUMIN SERPL-MCNC: 3.3 G/DL (ref 3.5–4.6)
ALP SERPL-CCNC: 106 U/L (ref 40–130)
ALT SERPL-CCNC: 18 U/L (ref 0–33)
ANION GAP SERPL CALCULATED.3IONS-SCNC: 8 MEQ/L (ref 9–15)
AST SERPL-CCNC: 20 U/L (ref 0–35)
BASOPHILS # BLD: 0.1 K/UL (ref 0–0.2)
BASOPHILS NFR BLD: 0.5 %
BILIRUB SERPL-MCNC: 0.4 MG/DL (ref 0.2–0.7)
BUN SERPL-MCNC: 16 MG/DL (ref 8–23)
CALCIUM SERPL-MCNC: 8.7 MG/DL (ref 8.5–9.9)
CHLORIDE SERPL-SCNC: 100 MEQ/L (ref 95–107)
CO2 SERPL-SCNC: 26 MEQ/L (ref 20–31)
CREAT SERPL-MCNC: 0.47 MG/DL (ref 0.5–0.9)
CRP SERPL HS-MCNC: 53.2 MG/L (ref 0–5)
EOSINOPHIL # BLD: 0.3 K/UL (ref 0–0.7)
EOSINOPHIL NFR BLD: 2.5 %
ERYTHROCYTE [DISTWIDTH] IN BLOOD BY AUTOMATED COUNT: 16.6 % (ref 11.5–14.5)
GLOBULIN SER CALC-MCNC: 3.1 G/DL (ref 2.3–3.5)
GLUCOSE SERPL-MCNC: 98 MG/DL (ref 70–99)
HCT VFR BLD AUTO: 31.2 % (ref 37–47)
HGB BLD-MCNC: 9.7 G/DL (ref 12–16)
LYMPHOCYTES # BLD: 1 K/UL (ref 1–4.8)
LYMPHOCYTES NFR BLD: 8 %
MAGNESIUM SERPL-MCNC: 2.1 MG/DL (ref 1.7–2.4)
MCH RBC QN AUTO: 27.2 PG (ref 27–31.3)
MCHC RBC AUTO-ENTMCNC: 31.1 % (ref 33–37)
MCV RBC AUTO: 87.4 FL (ref 79.4–94.8)
MONOCYTES # BLD: 0.9 K/UL (ref 0.2–0.8)
MONOCYTES NFR BLD: 7 %
NEUTROPHILS # BLD: 10 K/UL (ref 1.4–6.5)
NEUTS SEG NFR BLD: 81.5 %
PLATELET # BLD AUTO: 397 K/UL (ref 130–400)
POTASSIUM SERPL-SCNC: 3.5 MEQ/L (ref 3.4–4.9)
PROT SERPL-MCNC: 6.4 G/DL (ref 6.3–8)
RBC # BLD AUTO: 3.57 M/UL (ref 4.2–5.4)
SODIUM SERPL-SCNC: 134 MEQ/L (ref 135–144)
WBC # BLD AUTO: 12.2 K/UL (ref 4.8–10.8)

## 2024-02-12 PROCEDURE — 6370000000 HC RX 637 (ALT 250 FOR IP): Performed by: NURSE PRACTITIONER

## 2024-02-12 PROCEDURE — 94761 N-INVAS EAR/PLS OXIMETRY MLT: CPT

## 2024-02-12 PROCEDURE — 86140 C-REACTIVE PROTEIN: CPT

## 2024-02-12 PROCEDURE — 6360000002 HC RX W HCPCS: Performed by: NURSE PRACTITIONER

## 2024-02-12 PROCEDURE — 2580000003 HC RX 258: Performed by: INTERNAL MEDICINE

## 2024-02-12 PROCEDURE — 6360000002 HC RX W HCPCS: Performed by: INTERNAL MEDICINE

## 2024-02-12 PROCEDURE — 2580000003 HC RX 258: Performed by: FAMILY MEDICINE

## 2024-02-12 PROCEDURE — 99254 IP/OBS CNSLTJ NEW/EST MOD 60: CPT | Performed by: INTERNAL MEDICINE

## 2024-02-12 PROCEDURE — 1210000000 HC MED SURG R&B

## 2024-02-12 PROCEDURE — 36415 COLL VENOUS BLD VENIPUNCTURE: CPT

## 2024-02-12 PROCEDURE — 2580000003 HC RX 258: Performed by: NURSE PRACTITIONER

## 2024-02-12 PROCEDURE — 80053 COMPREHEN METABOLIC PANEL: CPT

## 2024-02-12 PROCEDURE — 83735 ASSAY OF MAGNESIUM: CPT

## 2024-02-12 PROCEDURE — 99213 OFFICE O/P EST LOW 20 MIN: CPT

## 2024-02-12 PROCEDURE — 85025 COMPLETE CBC W/AUTO DIFF WBC: CPT

## 2024-02-12 PROCEDURE — 6360000002 HC RX W HCPCS: Performed by: FAMILY MEDICINE

## 2024-02-12 PROCEDURE — 94640 AIRWAY INHALATION TREATMENT: CPT

## 2024-02-12 RX ORDER — HYDROMORPHONE HYDROCHLORIDE 1 MG/ML
1 INJECTION, SOLUTION INTRAMUSCULAR; INTRAVENOUS; SUBCUTANEOUS
Status: DISCONTINUED | OUTPATIENT
Start: 2024-02-12 | End: 2024-02-14

## 2024-02-12 RX ORDER — OXYCODONE HCL 5 MG/5 ML
5 SOLUTION, ORAL ORAL EVERY 4 HOURS PRN
Status: DISCONTINUED | OUTPATIENT
Start: 2024-02-12 | End: 2024-02-12

## 2024-02-12 RX ORDER — SODIUM CHLORIDE 9 MG/ML
INJECTION, SOLUTION INTRAVENOUS CONTINUOUS
Status: DISCONTINUED | OUTPATIENT
Start: 2024-02-12 | End: 2024-02-14 | Stop reason: HOSPADM

## 2024-02-12 RX ADMIN — TIOTROPIUM BROMIDE INHALATION SPRAY 2 PUFF: 3.12 SPRAY, METERED RESPIRATORY (INHALATION) at 07:23

## 2024-02-12 RX ADMIN — HYDROMORPHONE HYDROCHLORIDE 1 MG: 1 INJECTION, SOLUTION INTRAMUSCULAR; INTRAVENOUS; SUBCUTANEOUS at 23:38

## 2024-02-12 RX ADMIN — OXYCODONE HYDROCHLORIDE 15 MG: 5 SOLUTION ORAL at 09:49

## 2024-02-12 RX ADMIN — CLINDAMYCIN PHOSPHATE 900 MG: 900 INJECTION, SOLUTION INTRAVENOUS at 05:12

## 2024-02-12 RX ADMIN — OXYCODONE HYDROCHLORIDE 15 MG: 5 SOLUTION ORAL at 05:04

## 2024-02-12 RX ADMIN — SODIUM CHLORIDE: 9 INJECTION, SOLUTION INTRAVENOUS at 18:41

## 2024-02-12 RX ADMIN — ATORVASTATIN CALCIUM 40 MG: 40 TABLET, FILM COATED ORAL at 09:31

## 2024-02-12 RX ADMIN — Medication 10 ML: at 21:03

## 2024-02-12 RX ADMIN — LEVOTHYROXINE SODIUM 125 MCG: 0.12 TABLET ORAL at 05:05

## 2024-02-12 RX ADMIN — MEROPENEM 1000 MG: 1 INJECTION, POWDER, FOR SOLUTION INTRAVENOUS at 21:09

## 2024-02-12 RX ADMIN — MEROPENEM 1000 MG: 1 INJECTION, POWDER, FOR SOLUTION INTRAVENOUS at 13:48

## 2024-02-12 RX ADMIN — FOLIC ACID 1000 MCG: 1 TABLET ORAL at 09:31

## 2024-02-12 RX ADMIN — AMITRIPTYLINE HYDROCHLORIDE 100 MG: 100 TABLET, FILM COATED ORAL at 21:03

## 2024-02-12 RX ADMIN — OXYCODONE HYDROCHLORIDE 15 MG: 5 SOLUTION ORAL at 16:55

## 2024-02-12 RX ADMIN — HYDROMORPHONE HYDROCHLORIDE 1 MG: 1 INJECTION, SOLUTION INTRAMUSCULAR; INTRAVENOUS; SUBCUTANEOUS at 19:23

## 2024-02-12 RX ADMIN — SERTRALINE 250 MG: 100 TABLET, FILM COATED ORAL at 09:31

## 2024-02-12 RX ADMIN — Medication 10 ML: at 09:31

## 2024-02-12 NOTE — ED TRIAGE NOTES
Pt presents to the ED via private vehcile accompanied by  with CO facial swelling.  Pt states she was just discharged from this hospital yesterday with ATBS d/t infection on her neck.  Pt states she has a HX of CA.  Around 7pm pt states she noticed the left side of her face becoming increasingly swollen.  Pt states she is having 9/10 pain but denies taking any pain medication.  Pt states she has never had a reaction like this before.  Pt does have some difficulty speaking at this time.  Left side of the face is red and swollen.  No airway compromise noted a this time.  100% RA.  Resps even non labored  VSS  GCS 15

## 2024-02-12 NOTE — ED PROVIDER NOTES
Question:   Skin duration of therapy     Answer:   Other     Order Specific Question:   Other Skin and Soft Tissue Infection Duration     Answer:   TBD    oxyCODONE (ROXICODONE) 5 MG/5ML solution 15 mg    atorvastatin (LIPITOR) tablet 40 mg    folic acid (FOLVITE) tablet 1,000 mcg    levothyroxine (SYNTHROID) tablet 125 mcg    sertraline (ZOLOFT) tablet 250 mg    tiotropium (SPIRIVA RESPIMAT) 2.5 MCG/ACT inhaler 2 puff    amitriptyline (ELAVIL) tablet 100 mg           DIAGNOSTIC RESULTS / EMERGENCY DEPARTMENT COURSE / MDM     LABS:  Results for orders placed or performed during the hospital encounter of 02/11/24   BMP   Result Value Ref Range    Sodium 133 (L) 135 - 144 mEq/L    Potassium 3.5 3.4 - 4.9 mEq/L    Chloride 95 95 - 107 mEq/L    CO2 27 20 - 31 mEq/L    Anion Gap 11 9 - 15 mEq/L    Glucose 112 (H) 70 - 99 mg/dL    BUN 21 8 - 23 mg/dL    Creatinine 0.46 (L) 0.50 - 0.90 mg/dL    Est, Glom Filt Rate >60.0 >60    Calcium 8.9 8.5 - 9.9 mg/dL   CBC with Auto Differential   Result Value Ref Range    WBC 20.5 (H) 4.8 - 10.8 K/uL    RBC 3.90 (L) 4.20 - 5.40 M/uL    Hemoglobin 10.9 (L) 12.0 - 16.0 g/dL    Hematocrit 34.2 (L) 37.0 - 47.0 %    MCV 87.7 79.4 - 94.8 fL    MCH 27.9 27.0 - 31.3 pg    MCHC 31.9 (L) 33.0 - 37.0 %    RDW 16.9 (H) 11.5 - 14.5 %    Platelets 511 (H) 130 - 400 K/uL    Neutrophils % 86.3 %    Lymphocytes % 5.6 %    Monocytes % 5.9 %    Eosinophils % 1.3 %    Basophils % 0.4 %    Neutrophils Absolute 17.6 (H) 1.4 - 6.5 K/uL    Lymphocytes Absolute 1.2 1.0 - 4.8 K/uL    Monocytes Absolute 1.2 (H) 0.2 - 0.8 K/uL    Eosinophils Absolute 0.3 0.0 - 0.7 K/uL    Basophils Absolute 0.1 0.0 - 0.2 K/uL   C-Reactive Protein   Result Value Ref Range    CRP 50.4 (H) 0.0 - 5.0 mg/L   Sedimentation Rate   Result Value Ref Range    Sed Rate 53 (H) 0 - 30 mm   Lactic Acid   Result Value Ref Range    Lactic Acid 0.9 0.5 - 2.2 mmol/L   Procalcitonin   Result Value Ref Range    Procalcitonin 0.09 0.00 - 0.15 ng/mL

## 2024-02-12 NOTE — H&P
[email or web address]    Smokeless tobacco: Never   Substance and Sexual Activity    Alcohol use: No     Comment: ALCOHOLIC    Drug use: No    Sexual activity: Not Currently   Other Topics Concern    Not on file   Social History Narrative    Not on file     Social Determinants of Health     Financial Resource Strain: Low Risk  (2022)    Overall Financial Resource Strain (CARDIA)     Difficulty of Paying Living Expenses: Not hard at all   Food Insecurity: No Food Insecurity (2024)    Hunger Vital Sign     Worried About Running Out of Food in the Last Year: Never true     Ran Out of Food in the Last Year: Never true   Transportation Needs: No Transportation Needs (2024)    PRAPARE - Transportation     Lack of Transportation (Medical): No     Lack of Transportation (Non-Medical): No   Physical Activity: Not on file   Stress: Not on file   Social Connections: Not on file   Intimate Partner Violence: Not on file   Housing Stability: Low Risk  (2024)    Housing Stability Vital Sign     Unable to Pay for Housing in the Last Year: No     Number of Places Lived in the Last Year: 1     Unstable Housing in the Last Year: No     MEDICATIONS:   Prior to Admission medications    Medication Sig Start Date End Date Taking? Authorizing Provider   sertraline (ZOLOFT) 100 MG tablet Take 2.5 tablets by mouth daily 2/10/24   Rick Elizabeth DO   clindamycin (CLEOCIN) 300 MG capsule Take 1 capsule by mouth 3 times daily for 10 days 2/10/24 2/20/24  Rick Elizabeth DO   amitriptyline (ELAVIL) 100 MG tablet Take 1 tablet by mouth nightly    Provider, MD Rut   oxyCODONE (ROXICODONE) 5 MG/5ML solution Take 15 mLs by mouth every 4 hours as needed for Pain for up to 14 days. Max Daily Amount: 90 mg 24  Colleen Mayberry APRN - CNP   chlorhexidine (PERIDEX) 0.12 % solution Swish and spit 15 mLs 2 times daily for 14 days 24  Colleen Mayberry APRN - CNP   atorvastatin (LIPITOR) 40 MG tablet Take 1 
lip blisters

## 2024-02-13 ENCOUNTER — APPOINTMENT (OUTPATIENT)
Dept: INTERVENTIONAL RADIOLOGY/VASCULAR | Age: 64
DRG: 720 | End: 2024-02-13
Payer: COMMERCIAL

## 2024-02-13 PROBLEM — Z71.89 ENCOUNTER FOR HOSPICE CARE DISCUSSION: Status: ACTIVE | Noted: 2024-02-13

## 2024-02-13 PROBLEM — L02.11 NECK ABSCESS: Status: ACTIVE | Noted: 2024-02-13

## 2024-02-13 PROBLEM — G89.3 CANCER RELATED PAIN: Status: ACTIVE | Noted: 2024-02-13

## 2024-02-13 PROBLEM — E43 SEVERE MALNUTRITION (HCC): Status: ACTIVE | Noted: 2024-02-13

## 2024-02-13 LAB
ALBUMIN SERPL-MCNC: 3.3 G/DL (ref 3.5–4.6)
ALP SERPL-CCNC: 107 U/L (ref 40–130)
ALT SERPL-CCNC: 16 U/L (ref 0–33)
ANION GAP SERPL CALCULATED.3IONS-SCNC: 14 MEQ/L (ref 9–15)
AST SERPL-CCNC: 17 U/L (ref 0–35)
BACTERIA BLD CULT ORG #2: NORMAL
BACTERIA BLD CULT: NORMAL
BASOPHILS # BLD: 0.1 K/UL (ref 0–0.2)
BASOPHILS NFR BLD: 0.5 %
BILIRUB SERPL-MCNC: 0.4 MG/DL (ref 0.2–0.7)
BUN SERPL-MCNC: 11 MG/DL (ref 8–23)
CALCIUM SERPL-MCNC: 8.4 MG/DL (ref 8.5–9.9)
CHLORIDE SERPL-SCNC: 102 MEQ/L (ref 95–107)
CO2 SERPL-SCNC: 23 MEQ/L (ref 20–31)
CREAT SERPL-MCNC: 0.38 MG/DL (ref 0.5–0.9)
CRP SERPL HS-MCNC: 64.1 MG/L (ref 0–5)
EOSINOPHIL # BLD: 0.3 K/UL (ref 0–0.7)
EOSINOPHIL NFR BLD: 2.1 %
ERYTHROCYTE [DISTWIDTH] IN BLOOD BY AUTOMATED COUNT: 17.2 % (ref 11.5–14.5)
GLOBULIN SER CALC-MCNC: 3.3 G/DL (ref 2.3–3.5)
GLUCOSE SERPL-MCNC: 106 MG/DL (ref 70–99)
HCT VFR BLD AUTO: 34.4 % (ref 37–47)
HGB BLD-MCNC: 10.6 G/DL (ref 12–16)
LYMPHOCYTES # BLD: 0.6 K/UL (ref 1–4.8)
LYMPHOCYTES NFR BLD: 4.6 %
MAGNESIUM SERPL-MCNC: 2.1 MG/DL (ref 1.7–2.4)
MCH RBC QN AUTO: 27.2 PG (ref 27–31.3)
MCHC RBC AUTO-ENTMCNC: 30.8 % (ref 33–37)
MCV RBC AUTO: 88.2 FL (ref 79.4–94.8)
MONOCYTES # BLD: 0.8 K/UL (ref 0.2–0.8)
MONOCYTES NFR BLD: 5.6 %
NEUTROPHILS # BLD: 12.1 K/UL (ref 1.4–6.5)
NEUTS SEG NFR BLD: 86.7 %
PLATELET # BLD AUTO: 395 K/UL (ref 130–400)
POTASSIUM SERPL-SCNC: 3.5 MEQ/L (ref 3.4–4.9)
PROT SERPL-MCNC: 6.6 G/DL (ref 6.3–8)
RBC # BLD AUTO: 3.9 M/UL (ref 4.2–5.4)
SODIUM SERPL-SCNC: 139 MEQ/L (ref 135–144)
WBC # BLD AUTO: 14 K/UL (ref 4.8–10.8)

## 2024-02-13 PROCEDURE — 2580000003 HC RX 258: Performed by: NURSE PRACTITIONER

## 2024-02-13 PROCEDURE — 2580000003 HC RX 258: Performed by: INTERNAL MEDICINE

## 2024-02-13 PROCEDURE — 36573 INSJ PICC RS&I 5 YR+: CPT

## 2024-02-13 PROCEDURE — 36415 COLL VENOUS BLD VENIPUNCTURE: CPT

## 2024-02-13 PROCEDURE — 85025 COMPLETE CBC W/AUTO DIFF WBC: CPT

## 2024-02-13 PROCEDURE — 6370000000 HC RX 637 (ALT 250 FOR IP): Performed by: NURSE PRACTITIONER

## 2024-02-13 PROCEDURE — 99223 1ST HOSP IP/OBS HIGH 75: CPT

## 2024-02-13 PROCEDURE — 99232 SBSQ HOSP IP/OBS MODERATE 35: CPT | Performed by: INTERNAL MEDICINE

## 2024-02-13 PROCEDURE — 6360000002 HC RX W HCPCS

## 2024-02-13 PROCEDURE — 83735 ASSAY OF MAGNESIUM: CPT

## 2024-02-13 PROCEDURE — 2500000003 HC RX 250 WO HCPCS: Performed by: RADIOLOGY

## 2024-02-13 PROCEDURE — 1210000000 HC MED SURG R&B

## 2024-02-13 PROCEDURE — 6360000002 HC RX W HCPCS: Performed by: INTERNAL MEDICINE

## 2024-02-13 PROCEDURE — 02HV33Z INSERTION OF INFUSION DEVICE INTO SUPERIOR VENA CAVA, PERCUTANEOUS APPROACH: ICD-10-PCS | Performed by: STUDENT IN AN ORGANIZED HEALTH CARE EDUCATION/TRAINING PROGRAM

## 2024-02-13 PROCEDURE — 80053 COMPREHEN METABOLIC PANEL: CPT

## 2024-02-13 PROCEDURE — 6360000002 HC RX W HCPCS: Performed by: FAMILY MEDICINE

## 2024-02-13 PROCEDURE — 86140 C-REACTIVE PROTEIN: CPT

## 2024-02-13 PROCEDURE — 94761 N-INVAS EAR/PLS OXIMETRY MLT: CPT

## 2024-02-13 PROCEDURE — 2580000003 HC RX 258: Performed by: FAMILY MEDICINE

## 2024-02-13 PROCEDURE — 2580000003 HC RX 258: Performed by: RADIOLOGY

## 2024-02-13 PROCEDURE — 94640 AIRWAY INHALATION TREATMENT: CPT

## 2024-02-13 PROCEDURE — C1751 CATH, INF, PER/CENT/MIDLINE: HCPCS

## 2024-02-13 PROCEDURE — 36573 INSJ PICC RS&I 5 YR+: CPT | Performed by: RADIOLOGY

## 2024-02-13 PROCEDURE — 6370000000 HC RX 637 (ALT 250 FOR IP)

## 2024-02-13 RX ORDER — SODIUM CHLORIDE 9 MG/ML
INJECTION, SOLUTION INTRAVENOUS PRN
Status: DISCONTINUED | OUTPATIENT
Start: 2024-02-13 | End: 2024-02-14 | Stop reason: HOSPADM

## 2024-02-13 RX ORDER — LIDOCAINE HYDROCHLORIDE 20 MG/ML
15 SOLUTION OROPHARYNGEAL
Status: DISCONTINUED | OUTPATIENT
Start: 2024-02-13 | End: 2024-02-14 | Stop reason: HOSPADM

## 2024-02-13 RX ORDER — HYDROMORPHONE HYDROCHLORIDE 2 MG/1
1 TABLET ORAL
Status: DISCONTINUED | OUTPATIENT
Start: 2024-02-13 | End: 2024-02-14

## 2024-02-13 RX ORDER — LIDOCAINE HYDROCHLORIDE 20 MG/ML
5 INJECTION, SOLUTION INFILTRATION; PERINEURAL ONCE
Status: COMPLETED | OUTPATIENT
Start: 2024-02-13 | End: 2024-02-13

## 2024-02-13 RX ORDER — SODIUM CHLORIDE 0.9 % (FLUSH) 0.9 %
5-40 SYRINGE (ML) INJECTION EVERY 12 HOURS SCHEDULED
Status: DISCONTINUED | OUTPATIENT
Start: 2024-02-13 | End: 2024-02-14 | Stop reason: HOSPADM

## 2024-02-13 RX ORDER — HYDROMORPHONE HYDROCHLORIDE 2 MG/1
2 TABLET ORAL
Status: DISCONTINUED | OUTPATIENT
Start: 2024-02-13 | End: 2024-02-14 | Stop reason: HOSPADM

## 2024-02-13 RX ORDER — SODIUM CHLORIDE 9 MG/ML
250 INJECTION, SOLUTION INTRAVENOUS ONCE
Status: COMPLETED | OUTPATIENT
Start: 2024-02-13 | End: 2024-02-13

## 2024-02-13 RX ORDER — SODIUM CHLORIDE 0.9 % (FLUSH) 0.9 %
5-40 SYRINGE (ML) INJECTION PRN
Status: DISCONTINUED | OUTPATIENT
Start: 2024-02-13 | End: 2024-02-14 | Stop reason: HOSPADM

## 2024-02-13 RX ADMIN — SODIUM CHLORIDE 250 ML: 9 INJECTION, SOLUTION INTRAVENOUS at 14:36

## 2024-02-13 RX ADMIN — MEROPENEM 1000 MG: 1 INJECTION, POWDER, FOR SOLUTION INTRAVENOUS at 21:16

## 2024-02-13 RX ADMIN — HYDROMORPHONE HYDROCHLORIDE 1 MG: 1 INJECTION, SOLUTION INTRAMUSCULAR; INTRAVENOUS; SUBCUTANEOUS at 03:26

## 2024-02-13 RX ADMIN — Medication 10 ML: at 07:43

## 2024-02-13 RX ADMIN — HYDROMORPHONE HYDROCHLORIDE 0.5 MG: 1 INJECTION, SOLUTION INTRAMUSCULAR; INTRAVENOUS; SUBCUTANEOUS at 16:43

## 2024-02-13 RX ADMIN — SERTRALINE 250 MG: 100 TABLET, FILM COATED ORAL at 07:44

## 2024-02-13 RX ADMIN — OXYCODONE HYDROCHLORIDE 15 MG: 5 SOLUTION ORAL at 15:30

## 2024-02-13 RX ADMIN — TIOTROPIUM BROMIDE INHALATION SPRAY 2 PUFF: 3.12 SPRAY, METERED RESPIRATORY (INHALATION) at 07:51

## 2024-02-13 RX ADMIN — HYDROMORPHONE HYDROCHLORIDE 1 MG: 1 INJECTION, SOLUTION INTRAMUSCULAR; INTRAVENOUS; SUBCUTANEOUS at 11:58

## 2024-02-13 RX ADMIN — HYDROMORPHONE HYDROCHLORIDE 1 MG: 1 INJECTION, SOLUTION INTRAMUSCULAR; INTRAVENOUS; SUBCUTANEOUS at 07:39

## 2024-02-13 RX ADMIN — FOLIC ACID 1000 MCG: 1 TABLET ORAL at 07:44

## 2024-02-13 RX ADMIN — HYDROMORPHONE HYDROCHLORIDE 1 MG: 2 TABLET ORAL at 18:27

## 2024-02-13 RX ADMIN — AMITRIPTYLINE HYDROCHLORIDE 100 MG: 100 TABLET, FILM COATED ORAL at 21:06

## 2024-02-13 RX ADMIN — Medication 10 ML: at 21:06

## 2024-02-13 RX ADMIN — LEVOTHYROXINE SODIUM 125 MCG: 0.12 TABLET ORAL at 05:53

## 2024-02-13 RX ADMIN — MEROPENEM 1000 MG: 1 INJECTION, POWDER, FOR SOLUTION INTRAVENOUS at 12:40

## 2024-02-13 RX ADMIN — LIDOCAINE HYDROCHLORIDE 5 ML: 20 INJECTION, SOLUTION INFILTRATION; PERINEURAL at 14:37

## 2024-02-13 RX ADMIN — ATORVASTATIN CALCIUM 40 MG: 40 TABLET, FILM COATED ORAL at 07:44

## 2024-02-13 RX ADMIN — HYDROMORPHONE HYDROCHLORIDE 2 MG: 2 TABLET ORAL at 21:39

## 2024-02-13 RX ADMIN — SODIUM CHLORIDE, PRESERVATIVE FREE 10 ML: 5 INJECTION INTRAVENOUS at 21:02

## 2024-02-13 RX ADMIN — MEROPENEM 1000 MG: 1 INJECTION, POWDER, FOR SOLUTION INTRAVENOUS at 05:52

## 2024-02-13 RX ADMIN — SODIUM CHLORIDE: 9 INJECTION, SOLUTION INTRAVENOUS at 03:28

## 2024-02-13 RX ADMIN — SODIUM CHLORIDE: 9 INJECTION, SOLUTION INTRAVENOUS at 12:39

## 2024-02-13 NOTE — PLAN OF CARE
Nutrition Problem #1: Severe malnutrition, In context of chronic illness  Intervention: Food and/or Nutrient Delivery: Start Tube Feeding  Nutritional

## 2024-02-14 ENCOUNTER — TELEPHONE (OUTPATIENT)
Dept: OTOLARYNGOLOGY | Facility: CLINIC | Age: 64
End: 2024-02-14
Payer: COMMERCIAL

## 2024-02-14 VITALS
WEIGHT: 95.8 LBS | DIASTOLIC BLOOD PRESSURE: 87 MMHG | RESPIRATION RATE: 18 BRPM | TEMPERATURE: 98.1 F | OXYGEN SATURATION: 93 % | BODY MASS INDEX: 16.35 KG/M2 | HEART RATE: 84 BPM | SYSTOLIC BLOOD PRESSURE: 128 MMHG | HEIGHT: 64 IN

## 2024-02-14 LAB
ALBUMIN SERPL-MCNC: 3.1 G/DL (ref 3.5–4.6)
ALP SERPL-CCNC: 117 U/L (ref 40–130)
ALT SERPL-CCNC: 16 U/L (ref 0–33)
ANION GAP SERPL CALCULATED.3IONS-SCNC: 10 MEQ/L (ref 9–15)
AST SERPL-CCNC: 20 U/L (ref 0–35)
BASOPHILS # BLD: 0.1 K/UL (ref 0–0.2)
BASOPHILS NFR BLD: 0.4 %
BILIRUB SERPL-MCNC: 0.3 MG/DL (ref 0.2–0.7)
BUN SERPL-MCNC: 14 MG/DL (ref 8–23)
CALCIUM SERPL-MCNC: 7.9 MG/DL (ref 8.5–9.9)
CHLORIDE SERPL-SCNC: 103 MEQ/L (ref 95–107)
CO2 SERPL-SCNC: 23 MEQ/L (ref 20–31)
CREAT SERPL-MCNC: 0.38 MG/DL (ref 0.5–0.9)
CRP SERPL HS-MCNC: 67 MG/L (ref 0–5)
CULTURE WOUND: NORMAL
EOSINOPHIL # BLD: 0.4 K/UL (ref 0–0.7)
EOSINOPHIL NFR BLD: 3 %
ERYTHROCYTE [DISTWIDTH] IN BLOOD BY AUTOMATED COUNT: 16.9 % (ref 11.5–14.5)
GLOBULIN SER CALC-MCNC: 3.1 G/DL (ref 2.3–3.5)
GLUCOSE SERPL-MCNC: 121 MG/DL (ref 70–99)
HCT VFR BLD AUTO: 31.6 % (ref 37–47)
HGB BLD-MCNC: 9.8 G/DL (ref 12–16)
LYMPHOCYTES # BLD: 0.9 K/UL (ref 1–4.8)
LYMPHOCYTES NFR BLD: 7.2 %
MAGNESIUM SERPL-MCNC: 2 MG/DL (ref 1.7–2.4)
MCH RBC QN AUTO: 27.2 PG (ref 27–31.3)
MCHC RBC AUTO-ENTMCNC: 31 % (ref 33–37)
MCV RBC AUTO: 87.8 FL (ref 79.4–94.8)
MONOCYTES # BLD: 0.8 K/UL (ref 0.2–0.8)
MONOCYTES NFR BLD: 6.4 %
NEUTROPHILS # BLD: 9.8 K/UL (ref 1.4–6.5)
NEUTS SEG NFR BLD: 82.7 %
PLATELET # BLD AUTO: 388 K/UL (ref 130–400)
POTASSIUM SERPL-SCNC: 3.3 MEQ/L (ref 3.4–4.9)
PROT SERPL-MCNC: 6.2 G/DL (ref 6.3–8)
RBC # BLD AUTO: 3.6 M/UL (ref 4.2–5.4)
SODIUM SERPL-SCNC: 136 MEQ/L (ref 135–144)
WBC # BLD AUTO: 11.8 K/UL (ref 4.8–10.8)

## 2024-02-14 PROCEDURE — 6360000002 HC RX W HCPCS: Performed by: FAMILY MEDICINE

## 2024-02-14 PROCEDURE — 6370000000 HC RX 637 (ALT 250 FOR IP)

## 2024-02-14 PROCEDURE — 80053 COMPREHEN METABOLIC PANEL: CPT

## 2024-02-14 PROCEDURE — 2580000003 HC RX 258: Performed by: NURSE PRACTITIONER

## 2024-02-14 PROCEDURE — 99235 HOSP IP/OBS SAME DATE MOD 70: CPT | Performed by: STUDENT IN AN ORGANIZED HEALTH CARE EDUCATION/TRAINING PROGRAM

## 2024-02-14 PROCEDURE — 36415 COLL VENOUS BLD VENIPUNCTURE: CPT

## 2024-02-14 PROCEDURE — 83735 ASSAY OF MAGNESIUM: CPT

## 2024-02-14 PROCEDURE — 6370000000 HC RX 637 (ALT 250 FOR IP): Performed by: NURSE PRACTITIONER

## 2024-02-14 PROCEDURE — 99232 SBSQ HOSP IP/OBS MODERATE 35: CPT | Performed by: INTERNAL MEDICINE

## 2024-02-14 PROCEDURE — 6370000000 HC RX 637 (ALT 250 FOR IP): Performed by: STUDENT IN AN ORGANIZED HEALTH CARE EDUCATION/TRAINING PROGRAM

## 2024-02-14 PROCEDURE — 94761 N-INVAS EAR/PLS OXIMETRY MLT: CPT

## 2024-02-14 PROCEDURE — 94640 AIRWAY INHALATION TREATMENT: CPT

## 2024-02-14 PROCEDURE — 2580000003 HC RX 258: Performed by: FAMILY MEDICINE

## 2024-02-14 PROCEDURE — 6360000002 HC RX W HCPCS: Performed by: INTERNAL MEDICINE

## 2024-02-14 PROCEDURE — 2580000003 HC RX 258: Performed by: INTERNAL MEDICINE

## 2024-02-14 PROCEDURE — 85025 COMPLETE CBC W/AUTO DIFF WBC: CPT

## 2024-02-14 PROCEDURE — 36592 COLLECT BLOOD FROM PICC: CPT

## 2024-02-14 PROCEDURE — 86140 C-REACTIVE PROTEIN: CPT

## 2024-02-14 PROCEDURE — 2580000003 HC RX 258: Performed by: RADIOLOGY

## 2024-02-14 RX ORDER — HYDROMORPHONE HYDROCHLORIDE 2 MG/1
2 TABLET ORAL EVERY 4 HOURS PRN
Qty: 42 TABLET | Refills: 0 | Status: SHIPPED | OUTPATIENT
Start: 2024-02-14 | End: 2024-02-21

## 2024-02-14 RX ORDER — HYOSCYAMINE SULFATE 0.12 MG/1
0.12 TABLET SUBLINGUAL EVERY 6 HOURS PRN
Qty: 56 EACH | Refills: 0 | Status: SHIPPED | OUTPATIENT
Start: 2024-02-14 | End: 2024-02-28

## 2024-02-14 RX ORDER — HYDROMORPHONE HYDROCHLORIDE 2 MG/1
2 TABLET ORAL EVERY 4 HOURS PRN
Qty: 42 TABLET | Refills: 0 | Status: SHIPPED | OUTPATIENT
Start: 2024-02-14 | End: 2024-02-14

## 2024-02-14 RX ADMIN — HYDROMORPHONE HYDROCHLORIDE 1 MG: 1 INJECTION, SOLUTION INTRAMUSCULAR; INTRAVENOUS; SUBCUTANEOUS at 11:55

## 2024-02-14 RX ADMIN — LEVOTHYROXINE SODIUM 125 MCG: 0.12 TABLET ORAL at 05:23

## 2024-02-14 RX ADMIN — Medication 10 ML: at 08:40

## 2024-02-14 RX ADMIN — FOLIC ACID 1000 MCG: 1 TABLET ORAL at 08:39

## 2024-02-14 RX ADMIN — POTASSIUM BICARBONATE 40 MEQ: 782 TABLET, EFFERVESCENT ORAL at 14:37

## 2024-02-14 RX ADMIN — HYDROMORPHONE HYDROCHLORIDE 1 MG: 1 INJECTION, SOLUTION INTRAMUSCULAR; INTRAVENOUS; SUBCUTANEOUS at 06:46

## 2024-02-14 RX ADMIN — HYDROMORPHONE HYDROCHLORIDE 1 MG: 1 INJECTION, SOLUTION INTRAMUSCULAR; INTRAVENOUS; SUBCUTANEOUS at 03:41

## 2024-02-14 RX ADMIN — HYDROMORPHONE HYDROCHLORIDE 2 MG: 2 TABLET ORAL at 09:47

## 2024-02-14 RX ADMIN — HYDROMORPHONE HYDROCHLORIDE 2 MG: 2 TABLET ORAL at 14:43

## 2024-02-14 RX ADMIN — ERTAPENEM SODIUM 1000 MG: 1 INJECTION INTRAMUSCULAR; INTRAVENOUS at 15:57

## 2024-02-14 RX ADMIN — SERTRALINE 250 MG: 100 TABLET, FILM COATED ORAL at 08:39

## 2024-02-14 RX ADMIN — SODIUM CHLORIDE: 9 INJECTION, SOLUTION INTRAVENOUS at 00:43

## 2024-02-14 RX ADMIN — TIOTROPIUM BROMIDE INHALATION SPRAY 2 PUFF: 3.12 SPRAY, METERED RESPIRATORY (INHALATION) at 07:55

## 2024-02-14 RX ADMIN — ATORVASTATIN CALCIUM 40 MG: 40 TABLET, FILM COATED ORAL at 08:39

## 2024-02-14 RX ADMIN — SODIUM CHLORIDE, PRESERVATIVE FREE 10 ML: 5 INJECTION INTRAVENOUS at 08:39

## 2024-02-14 RX ADMIN — HYDROMORPHONE HYDROCHLORIDE 1 MG: 1 INJECTION, SOLUTION INTRAMUSCULAR; INTRAVENOUS; SUBCUTANEOUS at 00:39

## 2024-02-14 RX ADMIN — POTASSIUM BICARBONATE 40 MEQ: 782 TABLET, EFFERVESCENT ORAL at 09:47

## 2024-02-14 RX ADMIN — MEROPENEM 1000 MG: 1 INJECTION, POWDER, FOR SOLUTION INTRAVENOUS at 05:39

## 2024-02-14 NOTE — DISCHARGE INSTR - COC
Continuity of Care Form    Patient Name: Archana Melendez   :  1960  MRN:  [id number]    Admit date:  2024  Discharge date:  24    Code Status Order: Limited   Advance Directives:     Admitting Physician:  Veda Wolf DO  PCP: Ivone Barnard MD    Discharging Nurse: Ahsan Mcdonald Rn  Discharging Hospital Unit/Room#: W483/W483-01  Discharging Unit Phone Number: [phone]    Emergency Contact:   Extended Emergency Contact Information  Primary Emergency Contact: Sol Boothe   [facility]  Home Phone: [phone]  Mobile Phone: [phone]  Relation: Child  Secondary Emergency Contact: Cyril Melendez  Mobile Phone: [phone]  Relation: Child  Preferred language: English   needed? No    Past Surgical History:  Past Surgical History:   Procedure Laterality Date    CT NEEDLE BIOPSY LUNG PERCUTANEOUS  2023    CT NEEDLE BIOPSY LUNG PERCUTANEOUS 2023    GASTROSTOMY TUBE PLACEMENT N/A 8/10/2023    EGD REPLACE GASTROSTOMY TUBE ROOM 485 performed by [place] [place] [facility]    LEG SURGERY   MASS R LEG SOFT TISSUE    LUMBAR FUSION      Dr. Rodriguez    OTHER SURGICAL HISTORY  09/16/15    FEDERICO REA MD IMPLANT ICD       Immunization History:   Immunization History   Administered Date(s) Administered    COVID-19, MODERNA BLUE border, Primary or Immunocompromised, (age 12y+), IM, 100 mcg/0.5mL 2021, 2021, 2021    COVID-19, MODERNA Bivalent, (age 12y+), IM, 50 mcg/0.5 mL 2022    Influenza 10/02/2013    Influenza Virus Vaccine 10/26/2010, 10/01/2012, 10/30/2014    Influenza Whole 10/30/2014    Influenza, AFLURIA (age 3 yrs+), FLUZONE, (age 6 mo+), MDV, 0.5mL 10/06/2020    Influenza, FLUCELVAX, (age 6 mo+), MDCK, PF, 0.5mL 10/08/2021, 2022    Pneumococcal, PPSV23, PNEUMOVAX 23, (age 2y+), SC/IM, 0.5mL 2006    TDaP, ADACEL (age 10y-64y), BOOSTRIX (age 10y+), IM, 0.5mL 2013       Active Problems:  Patient Active Problem

## 2024-02-14 NOTE — PROGRESS NOTES
Hospitalist Daily Progress Note  Name: Archana Melendez  Age: 63 y.o.  Gender: female  CodeStatus: Limited  Allergies: Pcn [Penicillins]  Demerol  Meperidine    Chief Complaint:Facial Swelling (Discharged from here yesterday d/t infection to her neck. HX of CA. Pt states about 1 hour ago her face started to become increasingly swollen. )      Primary Care Provider: Ivone Barnard MD    InpatientTreatment Team: Treatment Team: Attending Provider: Cyril Marte MD; Consulting Physician: Tram Velasco MD; : Veronique Huerta, RN; Registered Nurse: Cassandra Morales, RN; Registered Nurse: Ahsan Mcdonald, RN; Nursing Student: Jai Taylor; : Ivone Kincaid, MSW, LSW; Utilization Reviewer: Dennis Coombs RN; Wound/Ostomy Nurse: Mary Gonzalez, RN; Consulting Physician: Esteban Haynes MD    Admission Date: 2/11/2024      Subjective: No chest pain, sob, nausea.  Resting in bed.    Physical Exam  Vitals and nursing note reviewed.   Constitutional:       Appearance: Normal appearance.   Neck:      Comments: Large anterior submandibular mass open with purulent drainage, local erythema  Cardiovascular:      Rate and Rhythm: Normal rate and regular rhythm.   Pulmonary:      Effort: Pulmonary effort is normal.      Breath sounds: Normal breath sounds.   Abdominal:      General: Bowel sounds are normal.      Palpations: Abdomen is soft.   Musculoskeletal:         General: Normal range of motion.   Skin:     General: Skin is warm and dry.   Neurological:      Mental Status: She is alert and oriented to person, place, and time. Mental status is at baseline.         Medications:  Reviewed    Infusion Medications:    sodium chloride       Scheduled Medications:    sodium chloride flush  5-40 mL IntraVENous 2 times per day    enoxaparin  30 mg SubCUTAneous Daily    clindamycin (CLEOCIN) IV  900 mg IntraVENous Q8H    atorvastatin  40 mg Oral Daily    folic acid  1,000 mcg Oral Daily    
  Physician Progress Note      PATIENT:               JELANI ADDISON  CSN #:                  [id number]  :                       1960  ADMIT DATE:       2024 8:09 PM  DISCH DATE:  RESPONDING  PROVIDER #:        René Matamoros MD          QUERY TEXT:    Patient admitted with \"Facial cellulitis:?Acute on chronic edema on the left   side of her face in the setting of neck ulceration.\" Note:  on admission WBC 20.5  CRP 50.4  P .  If possible, please document in   the progress notes and discharge summary if any of the following are being   evaluated and/or treated:    The medical record reflects the following:  Risk Factors: female age 63  CHF CM  COPD  head and neck cancer with lung   metastasis  Clinical Indicators: WBC 20.5  CRP 50.4  P   /Dr Haynes: \"admitted for facial abscess. On broad spectrum antibiotics.   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 S/p   chemotherapy/.Had progression and required glossectomy\"   Dr Reece: \"Neck abscess....Patient has been evaluated and felt that this   is not a definitive abscess that could be drained though this may very well be   infected cancer acting as an abscess\"  Treatment: CT soft tissue neck  IV Merrem  ID  hematology/oncology    Braydon Meeks RN CCDS  [phone]  Options provided:  -- Sepsis, present on admission  -- Localized infection of facial cellulitis without sepsis  -- Other - I will add my own diagnosis  -- Disagree - Not applicable / Not valid  -- Disagree - Clinically unable to determine / Unknown  -- Refer to Clinical Documentation Reviewer    PROVIDER RESPONSE TEXT:    This patient has sepsis which was present on admission.    Query created by: Braydon Meeks on 2024 11:23 AM      Electronically signed by:  René Matamoros MD 2024 5:02 PM          
1250 Pt / family at bedside concern with antibiotic meropenem / stated \" My tongue swells with penicillin\". This nurse spoke with ID.  1300  At bedside, Pt / family concerns with antibiotics addressed.   
2055 patient assessment completed. Patient advised no pain relief with PO dilaudid 1 mg. Message to Colleen Mayberry NP with palliative care per the above. Patient given 2 mg po dilaudid per order.     2340 patient is crying in bed, 2 mg po dilaudid \"not touching\" her pain 10/10. Message to EN Mayberry NP. Patient still has IV dilaudid 1mg ordered, will go with that order this shift until EN Mayberry can see the patient in the morning. Patient ok with this plan. Patient informed will not be able to go home with iv pain meds and verbalized understanding.     0100.. Pain is \"slightly\" better with iv dilaudid 8/10. Will continue to monitor.   
Comprehensive Nutrition Assessment    Type and Reason for Visit:  Initial, Positive Nutrition Screen    Nutrition Recommendations/Plan:   Continue NPO  Begin tube feeding, please order as \" Adult Tube feeding  OTHER TUBE FEEDING ( specify)\"  pt's home tube feeding  Recommend continue home regimen of Boost VHC from home, 5-6 cartons per day , as tolerated via PEG  Recommend 75 ml water flushes before and after each bolus  Plan of care discussed with pt and son     Malnutrition Assessment:  Malnutrition Status:  Severe malnutrition (02/13/24 1257)    Context:  Chronic Illness     Findings of the 6 clinical characteristics of malnutrition:  Energy Intake:  75% or less estimated energy requirements for 1 month or longer  Weight Loss:  Greater than 20% over 1 year     Body Fat Loss:    Triceps   Muscle Mass Loss:  Severe muscle mass loss Clavicles (pectoralis & deltoids), Temples (temporalis), Calf (gastrocnemius)  Fluid Accumulation:  No significant fluid accumulation     Strength:  Not Performed    Nutrition Assessment:    Pt presents with severe protein calorie malnutrition in the chronic setting related to tongue cancer. Pt reports previous intolerance to formulary available tube feeding. Pt wishes to use her TF formula from home, as it is not available on this facility's formulary. Please order \" Other Formula\" when TF resumes    Nutrition Related Findings:    admitted for facial infection, PMH: Tongue cancer, etoh, hepatitis. Has a PEG tube for nutrition (3/2023-replaced 8/2023), followed at cancer center.Ct scan from 7/2023 showed pulmonary metastasis . She has been on immnunotherapy since 8/2023. She has pain in left face. Labs: noted, meds reviewed ( synthroid(, IVF = .9 NS @ 100 ml/hr,  Was taking a combination of Jevity 1.5 and VHC Boost, however she reported incresed loose stool volume and stopped using the Jevity. She reports using 5-6 Boost VHC daily (no fiber formula) Wound Type: Wound Consult Pending 
Discharge instructions provided to patient and son. Verbalized understanding of follow up appointments, diet, activity, wound care, medications and reasons to return to ED/call physician. All questions answered. Copy of discharge instructions provided. Assisted into wheelchair and taken to personal car. Denies further needs.     
Infectious Disease     Patient Name: Archana Melendez  Date: 2/14/2024  YOB: 1960  Medical Record Number: [id number]      Neck abscess          Patient was hospitalized last week because of increasing 2-week history of facial swelling redness including neck drainage from neck wound CT scan at that time was read as a multiloculated abscess feeling the region of the tongue resection measuring 3.8 x 2.5 x 4.1 cm  Patient was transferred to [facility] for evaluation there felt that this was progression of her cancer and had abscess patient was discharged only to return to the hospital here 1 day later because of progressive symptoms  worsening swelling of her face and neck   Discharged with p.o. antibiotics however the swelling and pain has gotten worse         According to palliative care nursing patient's symptoms have improved in the past with antibiotic treatment    Patient has left upper lobe cavitary lesion that has been present on prior scans and showed decrease in size I am assuming this is presumed metastatic disease                          Reading Physician Reading Date Result Priority   Amando Meza, DO  [phone] 2/8/2024      Narrative & Impression  EXAMINATION:  CT OF THE NECK SOFT TISSUE WITH CONTRAST  2/8/2024     TECHNIQUE:  CT of the neck was performed with the administration of intravenous contrast.  Multiplanar reformatted images are provided for review. Automated exposure  control, iterative reconstruction, and/or weight based adjustment of the  mA/kV was utilized to reduce the radiation dose to as low as reasonably  achievable.     COMPARISON:  01/23/2024     HISTORY:  ORDERING SYSTEM PROVIDED HISTORY: redness, wound, h/o cancer  TECHNOLOGIST PROVIDED HISTORY:  Reason for exam:->redness, wound, h/o cancer  Additional Contrast?->1  What reading provider will be dictating this exam?->CRC     FINDINGS:  Loculated fluid and gas collection with enhancing margins located in 
Shift assessments complete, see flow sheets. Pt alert and oriented x4. Medication administered per MAR, VSS. Pt able to make needs and wants known. No further needs verbalized at this time. Pt left resting with call light within reach.  
as needed for Pain 150 g 0    SPIRIVA HANDIHALER 18 MCG inhalation capsule Inhale 1 capsule into the lungs daily 90 capsule 3    folic acid (FOLVITE) 1 MG tablet TAKE 1 TABLET BY MOUTH EVERY DAY 90 tablet 2       Objective    BP (!) 128/90   Pulse 88   Temp 98.4 °F (36.9 °C)   Resp 18   Ht 1.626 m (5' 4\")   Wt 45.4 kg (100 lb)   SpO2 95%   BMI 17.16 kg/m²     LABS:  WBC:    Lab Results   Component Value Date/Time    WBC 12.2 02/12/2024 04:45 AM     H/H:    Lab Results   Component Value Date/Time    HGB 9.7 02/12/2024 04:45 AM    HCT 31.2 02/12/2024 04:45 AM     PTT:    Lab Results   Component Value Date/Time    APTT 27.9 03/09/2015 06:42 AM   [APTT}  PT/INR:    Lab Results   Component Value Date/Time    PROTIME 11.0 03/09/2015 06:42 AM    INR 1.0 03/09/2015 06:42 AM     HgBA1c:    Lab Results   Component Value Date/Time    LABA1C 5.6 10/13/2023 11:55 AM       Assessment   Oliver Risk Score: Oliver Scale Score: 21    Patient Active Problem List   Diagnosis    COPD (chronic obstructive pulmonary disease) (HCC)    Hepatitis C virus infection    Hypothyroidism    Insomnia    Itching    Osteoarthritis    Reflux esophagitis    Mixed hyperlipidemia    Internal derangement of right knee    Lumbar radiculitis    Cardiomyopathy (HCC)    Premature osteoporosis    Hypercholesterolemia    ICD (implantable cardioverter-defibrillator) battery depletion    Diastolic dysfunction    Chronic left shoulder pain    Subacromial impingement of left shoulder    Subacromial bursitis of left shoulder joint    Pneumonia of left lung due to infectious organism    Community acquired pneumonia of left upper lobe of lung    Acute respiratory failure with hypoxia (HCC)    Palliative care encounter    Goals of care, counseling/discussion    Full code status    Advanced care planning/counseling discussion    Anxiety about health    Neoplasm related pain    Neck pain    Facial cellulitis    Skin ulcer of neck (HCC)    Immunosuppression due to 
centrilobular  emphysema.    RECOMMENDATIONS:  Lung rads category 3 follow-up recommended CT chest 3-6 months with mild  suspicion remaining       WITHOUT CONTRAST: Results for orders placed in visit on 03/27/23    CT CHEST WO CONTRAST    Narrative  MRN: [id number]  Patient Name: JELANI ADDISON    STUDY:  CT CHEST WO CONTRAST;  3/27/2023 11:47 am    INDICATION:  history of tongue SCCa, evaluate for disease .    COMPARISON:  02/21/2023 PET-CT    ACCESSION NUMBER(S):  [id number]    ORDERING CLINICIAN:  ELMA HANLEY    TECHNIQUE:  Helical data acquisition of the chest was obtained without  intravenous contrast. Images were reformatted in axial, coronal, and  sagittal planes.    FINDINGS:  Pacemaker lead is detected. Upper lobe post radiation changes seen.  1.2 cm nodular density seen in the right lung apex similar to prior  examination. This was not metabolically active previously. 2 mm left  upper lobe nodule abutting the fissure on image 141. Marked emphysema  with bronchial thickening. No pneumothorax or pleural effusions. No  thoracic aneurysm.    Interposition of the colon anterior to the liver with peg tube.  Multilevel degenerative disc disease with demineralization.    IMPRESSION:  1.  Post radiation change seen in the lung apices. Marked emphysema.  No adenopathy seen in the mediastinum. Stable right upper lobe  ground-glass nodule. Continued surveillance advised.    Electronically signed by: TRISH LAO MD      CXR      2-view: Results for orders placed during the hospital encounter of 12/07/23    XR CHEST (2 VW)    Narrative  EXAMINATION:  TWO XRAY VIEWS OF THE CHEST    12/7/2023 2:03 pm    COMPARISON:  None.    HISTORY:  ORDERING SYSTEM PROVIDED HISTORY: Mucopurulent chronic bronchitis (HCC)  TECHNOLOGIST PROVIDED HISTORY:  Reason for exam:->Producive cough  What reading provider will be dictating this exam?->CRC    FINDINGS:  A left-sided dual-chamber pacer/AICD is seen.  There is mild 
some areas are remnant in the left upper lung with cavitary  involvement and in the left lower lobe lung cavitary findings are evident as  well most consistent with partial resolution with decreased opacifications in  the left lung.  Continued follow-up to resolution with CT chest recommended  3-6 months minimum  2. Hyperinflated lungs with severe upper lobe predominant centrilobular  emphysema.     RECOMMENDATIONS:  Lung rads category 3 follow-up recommended CT chest 3-6 months with mild  suspicion remaining              Specimen Collected: 02/06/24 10:29 EST Last Resulted: 02/06/24 10:33             Review of Systems   Constitutional: Negative.    HENT:  Positive for facial swelling, sore throat and trouble swallowing.         Swelling left side face neck open wound and neck with drainage   Respiratory: Negative.     Cardiovascular: Negative.    Gastrointestinal: Negative.    Skin:  Positive for wound.            Physical Exam  Constitutional:       Appearance: She is ill-appearing.   Eyes:      Pupils: Pupils are equal, round, and reactive to light.   Neck:      Comments: Swelling left side of face neck is tender to palpation somewhat reddened drainage from neck wound    Soft tissues of the neck are very firm and hard either secondary to cancer or radiation treatment  Drainage from the neck does not appear purulent  Cardiovascular:      Heart sounds: Normal heart sounds.   Pulmonary:      Effort: Pulmonary effort is normal. No respiratory distress.      Breath sounds: Normal breath sounds. No wheezing, rhonchi or rales.   Abdominal:      General: Abdomen is flat. Bowel sounds are normal. There is no distension.      Palpations: Abdomen is soft. There is no mass.      Tenderness: There is no abdominal tenderness. There is no guarding.   Musculoskeletal:      Cervical back: Tenderness present.         Blood pressure (!) 122/93, pulse (!) 106, temperature 97.3 °F (36.3 °C), temperature source Oral, resp. rate 18,

## 2024-02-14 NOTE — DISCHARGE SUMMARY
Hospital Medicine Discharge Summary    Archana Melendez  :  1960  MRN:  [id number]    Admit date:  2024  Discharge date:  2024    Admitting Physician:  Veda Wolf DO  Primary Care Physician:  Ivone Barnard MD      Discharge Diagnoses:    Facial cellulitis   Oral squamous cell carcinoma    Chief Complaint   Patient presents with    Facial Swelling     Discharged from here yesterday d/t infection to her neck. HX of CA. Pt states about 1 hour ago her face started to become increasingly swollen.      Hospital Course:     64 yo F with PMH oral SCC s/p chemotherapy, glossectomy, and XRT with pulmonary mets who presented with severe facial pain and found to have facial cellulitis. She was treated with IV abx with assistance of ID with improvement in symptoms. ENT evaluated patient and said that submandibular lesion noted on CT was a progression of her cancer and not a drainable lesion for her to intervene on. Patient also shared with ENT that she did not want to undergo any surgical management for her cancer recurrence. ID recommends 3w total of IV abx and she will be discharged with home health and prescriptions in place for ertapenem per ID recommendations. Palliative followed for pain control, and patient will be discharged with short course of PO dilaudid before she meets with palliative team next week. Hem/Onc also cleared patient for dc with plans to see patient outpatient after dc.     Exam on discharge:    /87   Pulse 84   Temp 98.1 °F (36.7 °C) (Oral)   Resp 18   Ht 1.626 m (5' 4\")   Wt 43.5 kg (95 lb 12.8 oz)   SpO2 93%   BMI 16.44 kg/m²   General appearance: No apparent distress, appears stated age and cooperative.  HEENT: Pupils equal, round, and reactive to light. Conjunctivae/corneas clear.  Neck: Supple, with full range of motion. No jugular venous distention. Trachea midline.  Respiratory:  Normal respiratory effort. Clear to auscultation, bilaterally without

## 2024-02-14 NOTE — CARE COORDINATION
IV BENEFIT REQUEST FORM    FAX FROM: [address] [address]    REQUESTED BY: Electronically signed by Ivone Brooks RN on 2024 at 7:54 AM                                               RN/C3: PHONE: [phone])     DATE:/TIME OF REQUEST: 24  TIME: 7:54 AM      TO: [facility]      FAX TO: [phone]    PHONE: [phone]     THIS PATIENT HAS BEEN IDENTIFIED TO POSSIBLY NEED LONG TERM IV'S.    PLEASE CHECK INSURANCE COVERAGE FOR THE FOLLOWING PT/DRUGS.    PATIENT'S NAME: JELANI ADDISON                              [facility]   PATIENT'S : 1960  PATIENT ADDRESS: [address] [place], [place] [address]las [facility] [address]  SSN:    ()     PAYOR NAME:  Payor: [facility] / Plan: [facility] MEDICAID / Product Type: *No Product type* /     MERREM 1000 MG IV Q 8 HR    __________ CHECK HERE IF PT HAS NO INSURANCE AND REQUESTING SELF PAY COST.    *IF [facility] IS NOT A PROVIDER FOR THIS PATIENT, PLEASE FORWARD INFO VIA FAX TO [facility] @ [phone],(PHONE NUMBER: [phone]) TO RUN BENEFIT VERIFICATION AND NOTIFY THE ABOVE C3 OF THIS PLAN.    (FAX FACE SHEET WITH DEMOGRAPHICS AND INSURANCE INFO WITH THIS FORM.)  PLEASE FAX BENEFIT INFO TO: THE [facility] -[phone]    This message is intended only for the use of the individual or entity to which it is addressed and may contain information that is privileged, confidential, and exempt from disclosure under applicable law. If the reader of the notice is not intended recipient of the employee/agent responsible for delivering the message to the intended recipient, you are hereby notified than any dissemination, distribution or copying of this communication is strictly prohibited. Please contact the sender for further instructions on handling the information.           
MET W/PT TO ASSESS NEEDS AND DISCUSS DISCHARGE PLAN WHICH IS  HOME ALONE. SEES PALL CARE IN OP SETTING. PEG TUBE SUPPLIES INITIALLY FROM  AND NOW GETS THROUGH MAIL IN. PT IS INDEPENDENT AT BASELINE AND DECLINES NEEDS. WILL FOLLOW. RESTING QUIETLY IN BED VISITING FRIEND/FAMILY. DECLINES NEEDS.   
RX FOR IV INVANTZ AND SERVICE AGREEMENT FAXED TO [facility] VERIFIED HOME DELIVERY OF MEDS FOR TONIGHT PT AND SON AWARE [facility] / [facility] UPDATED   
[phone]    Discharge Planning:    Patient lives with: (P) Alone Type of Home: (P) House  Primary Care Giver: Self  Patient Support Systems include: Children   Current Financial resources: (P) Medicaid  Current community resources: (P) Other (Comment) ([facility]  Current services prior to admission: (P) Durable Medical Equipment, Other (Comment) ([facility]            Current DME: (P) Enteral Feedings            Type of Home Care services:  (P) None    ADLS  Prior functional level: (P) Independent in ADLs/IADLs  Current functional level: (P) Independent in ADLs/IADLs    PT AM-PAC:   /24  OT AM-PAC:   /24    Family can provide assistance at DC: (P) Yes  Would you like Case Management to discuss the discharge plan with any other family members/significant others, and if so, who?    Plans to Return to Present Housing: (P) Yes  Other Identified Issues/Barriers to RETURNING to current housing: none  Potential Assistance needed at discharge: (P) Other (Comment) (pt denied any d/c needs in ER.)            Potential DME:    Patient expects to discharge to: (P) House  Plan for transportation at discharge:      Financial    [facility] / Plan[facility] [place] [facility] / Product Type: *No Product type* /     Does insurance require precert for SNF: Yes    Potential assistance Purchasing Medications: (P) No  Meds-to-Beds request:        [facility] - [place], [place] - [address] - P [phone] - F [phone]  [address]  [facility] [address]  Phone: [phone] Fax: [phone]      Notes:    Factors facilitating achievement of predicted outcomes: Family support, Motivated, Cooperative, Pleasant, Sense of humor, and Good insight into deficits    Barriers to discharge: medical clearance.    Additional Case Management Notes: pt lives alone. She has [facility]. She has tube feeding supplies. She denied being on home O2 or dialysis. She denied any d/c needs in

## 2024-02-14 NOTE — TELEPHONE ENCOUNTER
Called and left a vm for Jessica's daughter, Laura to see how Jessica is doing and if she has been discharged from the hospital. Would like to make a plan for when we can set up a PET and follow up with Dr. Motta. I provided my office number for return call.

## 2024-02-14 NOTE — FLOWSHEET NOTE
Notified Dr. Carpenter of a consult from Dr. Marte from 2/12 for cancerous vs infectious lesion submandibular

## 2024-02-15 ENCOUNTER — TRANSCRIBE ORDERS (OUTPATIENT)
Dept: INTERNAL MEDICINE CLINIC | Age: 64
End: 2024-02-15

## 2024-02-15 DIAGNOSIS — C79.89 METASTATIC SQUAMOUS CELL CARCINOMA TO TONGUE (HCC): ICD-10-CM

## 2024-02-15 DIAGNOSIS — C02.9 MALIGNANT NEOPLASM OF TONGUE (HCC): ICD-10-CM

## 2024-02-15 DIAGNOSIS — G89.3 CANCER RELATED PAIN: Primary | ICD-10-CM

## 2024-02-15 DIAGNOSIS — C79.89 MALIGNANT NEOPLASM METASTATIC TO OTHER SITE (HCC): ICD-10-CM

## 2024-02-15 PROBLEM — L08.9 FACIAL INFECTION: Status: ACTIVE | Noted: 2024-02-15

## 2024-02-15 RX ORDER — FENTANYL 12.5 UG/1
1 PATCH TRANSDERMAL
Qty: 2 PATCH | Refills: 0 | Status: SHIPPED | OUTPATIENT
Start: 2024-02-15 | End: 2024-02-21

## 2024-02-15 RX ORDER — CHLORHEXIDINE GLUCONATE ORAL RINSE 1.2 MG/ML
15 SOLUTION DENTAL 2 TIMES DAILY
Qty: 420 ML | Refills: 0 | Status: SHIPPED | OUTPATIENT
Start: 2024-02-15 | End: 2024-02-29

## 2024-02-15 NOTE — CONSULTS
Infectious Disease     Patient Name: Archana Melendez  Date: 2/12/2024  YOB: 1960  Medical Record Number: [id number]      Neck abscess        History of Present Illness:  COPD diastolic dysfunction dyslipidemia hepatitis B hepatitis C alcoholism hypothyroidism ICD congestive heart failure cardiomyopathy   Squamous CARCINOMA of the tongue status post cervical lymph node dissection progressive disease    Patient was hospitalized last week because of increasing 2-week history of facial swelling redness including neck drainage from neck wound CT scan at that time was read as a multiloculated abscess feeling the region of the tongue resection measuring 3.8 x 2.5 x 4.1 cm  Patient was transferred to [facility] for evaluation there felt that this was progression of her cancer and had abscess patient was discharged only to return to the hospital here 1 day later because of progressive symptoms  worsening swelling of her face and neck   Discharged with p.o. antibiotics however the swelling and pain has gotten worse         According to palliative care nursing patient's symptoms have improved in the past with antibiotic treatment    Patient has left upper lobe cavitary lesion that has been present on prior scans and showed decrease in size I am assuming this is presumed metastatic disease                          Reading Physician Reading Date Result Priority   Amando Meza,   [phone] 2/8/2024      Narrative & Impression  EXAMINATION:  CT OF THE NECK SOFT TISSUE WITH CONTRAST  2/8/2024     TECHNIQUE:  CT of the neck was performed with the administration of intravenous contrast.  Multiplanar reformatted images are provided for review. Automated exposure  control, iterative reconstruction, and/or weight based adjustment of the  mA/kV was utilized to reduce the radiation dose to as low as reasonably  achievable.     COMPARISON:  01/23/2024     HISTORY:  ORDERING SYSTEM PROVIDED HISTORY: redness, 
Palliative Care Consult Note  Patient: Archana Melendez  Gender: female  YOB: 1960  Unit/Bed: W483/W483-01  CodeStatus: Limited  Inpatient Treatment Team: Treatment Team: Attending Provider: Cyril Marte MD; Consulting Physician: Tram Velasco MD; : Veronique Huerta RN; Utilization Reviewer: Dennis Coombs RN; Wound/Ostomy Nurse: Mary Gonzalez RN; Consulting Physician: Esteban Haynes MD; Consulting Physician: Marlyn Carpenter MD; Registered Nurse: Jazlyn Chung RN  Admit Date:  2/11/2024    Chief Complaint: Facial Swelling     History of Presenting Illness:      Archana Melendez is a 63 y.o. female on hospital day 2 with a history of COPD, Metastatic tongue cancer, PEG tube, dysphagia, chronic diastolic heart failure, HLD, DJD, chronic hepatitis B, hypothyroidism. .    Patient was brought to the emergency room with facial swelling and purulent drainage. This is patient's 3rd hospitalization. Patient was admitted in the setting of leukocytosis, cellulitis, dermal abscess .    Palliative care was consulted by Dr. Marte for goals of care, CODE STATUS discussion, family support, and symptom management.     Upon entering the room I find the patient alert and oriented x 4 with son at bedside.  Patient's previous functional status is up independently, she lives alone.  Patient usually takes oxycodone 15 mg every 4 hours for pain.  Patient is followed closely by palliative care in outpatient setting.  Patient began having left sided jaw edema November 26th, at that point she was treated with clindamycin oral solution in her PEG tube for 10 days and left sided facial swelling and redness went away.  Left-sided facial swelling began again approximately 2 to 3 weeks later.  She was advised to go to ER due to return of left-sided face redness and edema and and she did not.  She was seen and evaluated by medical oncology.  Patient had suspected findings of progression of disease.  She 
34.2 (L) 37.0 - 47.0 %    MCV 87.7 79.4 - 94.8 fL    MCH 27.9 27.0 - 31.3 pg    MCHC 31.9 (L) 33.0 - 37.0 %    RDW 16.9 (H) 11.5 - 14.5 %    Platelets 511 (H) 130 - 400 K/uL    Neutrophils % 86.3 %    Lymphocytes % 5.6 %    Monocytes % 5.9 %    Eosinophils % 1.3 %    Basophils % 0.4 %    Neutrophils Absolute 17.6 (H) 1.4 - 6.5 K/uL    Lymphocytes Absolute 1.2 1.0 - 4.8 K/uL    Monocytes Absolute 1.2 (H) 0.2 - 0.8 K/uL    Eosinophils Absolute 0.3 0.0 - 0.7 K/uL    Basophils Absolute 0.1 0.0 - 0.2 K/uL   C-Reactive Protein    Collection Time: 02/11/24  8:30 PM   Result Value Ref Range    CRP 50.4 (H) 0.0 - 5.0 mg/L   Lactic Acid    Collection Time: 02/11/24  8:30 PM   Result Value Ref Range    Lactic Acid 0.9 0.5 - 2.2 mmol/L   Procalcitonin    Collection Time: 02/11/24  8:30 PM   Result Value Ref Range    Procalcitonin 0.09 0.00 - 0.15 ng/mL   Sedimentation Rate    Collection Time: 02/11/24  8:52 PM   Result Value Ref Range    Sed Rate 53 (H) 0 - 30 mm   BMP    Collection Time: 02/11/24  9:08 PM   Result Value Ref Range    Sodium 133 (L) 135 - 144 mEq/L    Potassium 3.5 3.4 - 4.9 mEq/L    Chloride 95 95 - 107 mEq/L    CO2 27 20 - 31 mEq/L    Anion Gap 11 9 - 15 mEq/L    Glucose 112 (H) 70 - 99 mg/dL    BUN 21 8 - 23 mg/dL    Creatinine 0.46 (L) 0.50 - 0.90 mg/dL    Est, Glom Filt Rate >60.0 >60    Calcium 8.9 8.5 - 9.9 mg/dL   Comprehensive Metabolic Panel    Collection Time: 02/12/24  4:45 AM   Result Value Ref Range    Sodium 134 (L) 135 - 144 mEq/L    Potassium 3.5 3.4 - 4.9 mEq/L    Chloride 100 95 - 107 mEq/L    CO2 26 20 - 31 mEq/L    Anion Gap 8 (L) 9 - 15 mEq/L    Glucose 98 70 - 99 mg/dL    BUN 16 8 - 23 mg/dL    Creatinine 0.47 (L) 0.50 - 0.90 mg/dL    Est, Glom Filt Rate >60.0 >60    Calcium 8.7 8.5 - 9.9 mg/dL    Total Protein 6.4 6.3 - 8.0 g/dL    Albumin 3.3 (L) 3.5 - 4.6 g/dL    Total Bilirubin 0.4 0.2 - 0.7 mg/dL    Alkaline Phosphatase 106 40 - 130 U/L    ALT 18 0 - 33 U/L    AST 20 0 - 35 U/L    
Elijah but the patient declined. We discussed that she can follow with Dr Arce to see if there are any other treatment options.         Electronically signed by Marlyn Carpenter MD on 2/15/24 at 8:01 AM EST

## 2024-02-15 NOTE — PROGRESS NOTES
PDMP reviewed.   Patient having significant breakthrough pain. Rates 10/10. No relief with PRN Dilaudid 2mg PO q4h.      Pt has failed non opioid therapy and decision was made to prescribe opioids to help with daily function and improve quality of life  Pt is tolerating current pain meds without adverse effects or over sedation. Lowest effective dose used. Pt advised to call and notify palliative care for any adverse effects or sedation  OARRS reviewed. There is no indication of aberrant behavior  Pt advised to call for increasing or uncontrolled pain.  Risk vs benefit assessed.  Pt advised to take only as prescribed and not to change frequency of pain meds without consulting palliative care first.

## 2024-02-17 LAB
BACTERIA BLD CULT ORG #2: NORMAL
BACTERIA BLD CULT: NORMAL

## 2024-02-19 LAB
BASOPHILS # BLD: 0.1 K/UL (ref 0–0.2)
BASOPHILS NFR BLD: 0.6 %
EOSINOPHIL # BLD: 0.3 K/UL (ref 0–0.7)
EOSINOPHIL NFR BLD: 2.2 %
ERYTHROCYTE [DISTWIDTH] IN BLOOD BY AUTOMATED COUNT: 17.1 % (ref 11.5–14.5)
HCT VFR BLD AUTO: 32.5 % (ref 37–47)
HGB BLD-MCNC: 10.2 G/DL (ref 12–16)
LYMPHOCYTES # BLD: 1.1 K/UL (ref 1–4.8)
LYMPHOCYTES NFR BLD: 7.7 %
MCH RBC QN AUTO: 27.4 PG (ref 27–31.3)
MCHC RBC AUTO-ENTMCNC: 31.4 % (ref 33–37)
MCV RBC AUTO: 87.4 FL (ref 79.4–94.8)
MONOCYTES # BLD: 1.2 K/UL (ref 0.2–0.8)
MONOCYTES NFR BLD: 8.7 %
NEUTROPHILS # BLD: 11.4 K/UL (ref 1.4–6.5)
NEUTS SEG NFR BLD: 80.6 %
PLATELET # BLD AUTO: 514 K/UL (ref 130–400)
RBC # BLD AUTO: 3.72 M/UL (ref 4.2–5.4)
WBC # BLD AUTO: 14.1 K/UL (ref 4.8–10.8)

## 2024-02-21 DIAGNOSIS — C79.89 METASTATIC SQUAMOUS CELL CARCINOMA TO TONGUE (HCC): ICD-10-CM

## 2024-02-21 DIAGNOSIS — C79.89 MALIGNANT NEOPLASM METASTATIC TO OTHER SITE (HCC): ICD-10-CM

## 2024-02-21 DIAGNOSIS — G89.3 CANCER RELATED PAIN: ICD-10-CM

## 2024-02-21 DIAGNOSIS — C02.9 MALIGNANT NEOPLASM OF TONGUE (HCC): ICD-10-CM

## 2024-02-21 DIAGNOSIS — C02.9 TONGUE CANCER (HCC): ICD-10-CM

## 2024-02-21 LAB
ANION GAP SERPL CALCULATED.3IONS-SCNC: 14 MEQ/L (ref 9–15)
BUN SERPL-MCNC: 17 MG/DL (ref 8–23)
CALCIUM SERPL-MCNC: 9.1 MG/DL (ref 8.5–9.9)
CHLORIDE SERPL-SCNC: 98 MEQ/L (ref 95–107)
CO2 SERPL-SCNC: 26 MEQ/L (ref 20–31)
CREAT SERPL-MCNC: 0.52 MG/DL (ref 0.5–0.9)
GLUCOSE SERPL-MCNC: 139 MG/DL (ref 70–99)
POTASSIUM SERPL-SCNC: 3.3 MEQ/L (ref 3.4–4.9)
SODIUM SERPL-SCNC: 138 MEQ/L (ref 135–144)

## 2024-02-21 RX ORDER — HYDROMORPHONE HYDROCHLORIDE 2 MG/1
2 TABLET ORAL EVERY 4 HOURS PRN
Qty: 84 TABLET | Refills: 0 | Status: SHIPPED | OUTPATIENT
Start: 2024-02-21 | End: 2024-02-22 | Stop reason: SDUPTHER

## 2024-02-21 RX ORDER — FOLIC ACID 1 MG/1
TABLET ORAL
Qty: 90 TABLET | Refills: 2 | Status: CANCELLED | OUTPATIENT
Start: 2024-02-21

## 2024-02-21 RX ORDER — AMITRIPTYLINE HYDROCHLORIDE 100 MG/1
100 TABLET ORAL NIGHTLY
Qty: 90 TABLET | Refills: 2 | Status: CANCELLED | OUTPATIENT
Start: 2024-02-21

## 2024-02-21 RX ORDER — OXYCODONE HCL 5 MG/5 ML
15 SOLUTION, ORAL ORAL EVERY 4 HOURS PRN
Qty: 800 ML | Refills: 0 | Status: CANCELLED | OUTPATIENT
Start: 2024-02-21 | End: 2024-03-06

## 2024-02-21 RX ORDER — FENTANYL 12.5 UG/1
1 PATCH TRANSDERMAL
Qty: 5 PATCH | Refills: 0 | Status: SHIPPED | OUTPATIENT
Start: 2024-02-21 | End: 2024-03-07

## 2024-02-21 NOTE — TELEPHONE ENCOUNTER
If patient would call in. Patient recently hospitalized. During hospitalization she indicated that oxycodone no longer worked. D/C Oxycodone. Started patient on Fentanyl patch & Hydromorphone 2mg.

## 2024-02-22 ENCOUNTER — TELEPHONE (OUTPATIENT)
Dept: PALLATIVE CARE | Age: 64
End: 2024-02-22

## 2024-02-22 DIAGNOSIS — C02.9 TONGUE CANCER (HCC): ICD-10-CM

## 2024-02-22 RX ORDER — HYDROMORPHONE HYDROCHLORIDE 4 MG/1
2 TABLET ORAL EVERY 4 HOURS PRN
Qty: 42 TABLET | Refills: 0 | Status: SHIPPED | OUTPATIENT
Start: 2024-02-22 | End: 2024-03-07

## 2024-02-22 NOTE — TELEPHONE ENCOUNTER
The pharmacist called to make you aware that they cannot fill the Hydromorphone 2 mg because it is out of stock and has been on back order. She checked all the local [facility] and no one has this. She says they do have the 4 mg tablets if you want to order this and have her split in in half. Let me know what you decide and I will update pt.    Rx requested:  Requested Prescriptions     Pending Prescriptions Disp Refills    HYDROmorphone (DILAUDID) 2 MG tablet 84 tablet 0     Si tablet by PEG Tube route every 4 hours as needed for Pain for up to 14 days. Max Daily Amount: 12 mg       Last Office Visit:   2024      Last filled:       Next Visit Date:  Future Appointments   Date Time Provider Department Center   3/7/2024  1:15 PM Alexander Mendiola MD [facility]   10/17/2024 12:30 PM Feliciano Garcia DO [facility]

## 2024-02-26 ENCOUNTER — TELEPHONE (OUTPATIENT)
Dept: PRIMARY CARE | Facility: CLINIC | Age: 64
End: 2024-02-26
Payer: COMMERCIAL

## 2024-02-26 DIAGNOSIS — G47.00 INSOMNIA, UNSPECIFIED TYPE: ICD-10-CM

## 2024-02-26 LAB
ANION GAP SERPL CALCULATED.3IONS-SCNC: 18 MEQ/L (ref 9–15)
BASOPHILS # BLD: 0.1 K/UL (ref 0–0.2)
BASOPHILS NFR BLD: 0.6 %
BUN SERPL-MCNC: 20 MG/DL (ref 8–23)
CALCIUM SERPL-MCNC: 9.4 MG/DL (ref 8.5–9.9)
CHLORIDE SERPL-SCNC: 97 MEQ/L (ref 95–107)
CO2 SERPL-SCNC: 25 MEQ/L (ref 20–31)
CREAT SERPL-MCNC: 0.57 MG/DL (ref 0.5–0.9)
EOSINOPHIL # BLD: 0.3 K/UL (ref 0–0.7)
EOSINOPHIL NFR BLD: 1.9 %
ERYTHROCYTE [DISTWIDTH] IN BLOOD BY AUTOMATED COUNT: 17.3 % (ref 11.5–14.5)
GLUCOSE SERPL-MCNC: 101 MG/DL (ref 70–99)
HCT VFR BLD AUTO: 36.1 % (ref 37–47)
HGB BLD-MCNC: 11 G/DL (ref 12–16)
LYMPHOCYTES # BLD: 1 K/UL (ref 1–4.8)
LYMPHOCYTES NFR BLD: 5.5 %
MCH RBC QN AUTO: 27.1 PG (ref 27–31.3)
MCHC RBC AUTO-ENTMCNC: 30.5 % (ref 33–37)
MCV RBC AUTO: 88.9 FL (ref 79.4–94.8)
MONOCYTES # BLD: 1.3 K/UL (ref 0.2–0.8)
MONOCYTES NFR BLD: 7 %
NEUTROPHILS # BLD: 15.2 K/UL (ref 1.4–6.5)
NEUTS SEG NFR BLD: 84.6 %
PLATELET # BLD AUTO: 530 K/UL (ref 130–400)
POTASSIUM SERPL-SCNC: 3.3 MEQ/L (ref 3.4–4.9)
RBC # BLD AUTO: 4.06 M/UL (ref 4.2–5.4)
SODIUM SERPL-SCNC: 140 MEQ/L (ref 135–144)
WBC # BLD AUTO: 18 K/UL (ref 4.8–10.8)

## 2024-02-26 RX ORDER — AMITRIPTYLINE HYDROCHLORIDE 100 MG/1
100 TABLET ORAL NIGHTLY
Qty: 90 TABLET | Refills: 3 | Status: SHIPPED | OUTPATIENT
Start: 2024-02-26

## 2024-02-26 NOTE — TELEPHONE ENCOUNTER
Patient phones the office today w/ request to refill her Amitriptyline (Elavil) 100mg 1 TAB at bedtime to be sent to [facility] in [place] on [address].     Thank You.

## 2024-02-27 ENCOUNTER — TELEPHONE (OUTPATIENT)
Dept: PALLATIVE CARE | Age: 64
End: 2024-02-27

## 2024-02-27 ENCOUNTER — TELEPHONE (OUTPATIENT)
Dept: PRIMARY CARE | Facility: CLINIC | Age: 64
End: 2024-02-27
Payer: COMMERCIAL

## 2024-02-27 DIAGNOSIS — B19.20 HEPATITIS C VIRUS INFECTION WITHOUT HEPATIC COMA, UNSPECIFIED CHRONICITY: ICD-10-CM

## 2024-02-27 RX ORDER — FOLIC ACID 1 MG/1
1 TABLET ORAL DAILY
Qty: 90 TABLET | Refills: 3 | Status: SHIPPED | OUTPATIENT
Start: 2024-02-27

## 2024-02-27 RX ORDER — AMITRIPTYLINE HYDROCHLORIDE 100 MG/1
100 TABLET ORAL NIGHTLY
Qty: 90 TABLET | Refills: 2 | Status: CANCELLED | OUTPATIENT
Start: 2024-02-27

## 2024-02-27 NOTE — TELEPHONE ENCOUNTER
The nurse with Dr. Mendiola, [facility] called to make us aware that Archana's potassium is 3.3. Dr. Mendiola wanted this to be addressed by Archana's PCP. [facility] called Dr. Barnard's office and they have not seen Archana in a long time, not able to address the potassium. [facility] asked me if she had a peg tube, if she is receiving tube feed, etc.   Are you able to address Archana's potassium of 3.3?  
Pt presented with Constipation and CT with SBO however had large BM in ED and no longer symptomatic in nature   Benign abdomen on exam  CW bowel regimen

## 2024-02-28 ENCOUNTER — OFFICE VISIT (OUTPATIENT)
Dept: PALLATIVE CARE | Age: 64
End: 2024-02-28
Payer: COMMERCIAL

## 2024-02-28 ENCOUNTER — TELEPHONE (OUTPATIENT)
Dept: PRIMARY CARE | Facility: CLINIC | Age: 64
End: 2024-02-28
Payer: COMMERCIAL

## 2024-02-28 VITALS
TEMPERATURE: 98 F | OXYGEN SATURATION: 97 % | WEIGHT: 94.4 LBS | SYSTOLIC BLOOD PRESSURE: 143 MMHG | HEART RATE: 101 BPM | BODY MASS INDEX: 16.2 KG/M2 | RESPIRATION RATE: 15 BRPM | DIASTOLIC BLOOD PRESSURE: 90 MMHG

## 2024-02-28 DIAGNOSIS — Z51.5 PALLIATIVE CARE ENCOUNTER: ICD-10-CM

## 2024-02-28 DIAGNOSIS — K11.7 HYPERSECRETION OF SALIVA: ICD-10-CM

## 2024-02-28 DIAGNOSIS — K21.9 GASTROESOPHAGEAL REFLUX DISEASE WITHOUT ESOPHAGITIS: ICD-10-CM

## 2024-02-28 DIAGNOSIS — Z71.89 GOALS OF CARE, COUNSELING/DISCUSSION: ICD-10-CM

## 2024-02-28 DIAGNOSIS — C02.9 TONGUE CANCER (HCC): Primary | ICD-10-CM

## 2024-02-28 DIAGNOSIS — C79.89 METASTATIC SQUAMOUS CELL CARCINOMA TO TONGUE (HCC): ICD-10-CM

## 2024-02-28 DIAGNOSIS — E03.9 HYPOTHYROIDISM, UNSPECIFIED TYPE: ICD-10-CM

## 2024-02-28 DIAGNOSIS — G89.3 CANCER RELATED PAIN: ICD-10-CM

## 2024-02-28 DIAGNOSIS — E87.6 HYPOKALEMIA: ICD-10-CM

## 2024-02-28 PROCEDURE — 99215 OFFICE O/P EST HI 40 MIN: CPT

## 2024-02-28 PROCEDURE — 1036F TOBACCO NON-USER: CPT

## 2024-02-28 PROCEDURE — G9709 HOSP SRV USED PT IN MSMT PER: HCPCS

## 2024-02-28 PROCEDURE — G9691 PT HOSP DUR MSMT PERIOD: HCPCS

## 2024-02-28 PROCEDURE — G8419 CALC BMI OUT NRM PARAM NOF/U: HCPCS

## 2024-02-28 PROCEDURE — G8484 FLU IMMUNIZE NO ADMIN: HCPCS

## 2024-02-28 PROCEDURE — G9710 PT PROV HOSP SRV MSMT PER: HCPCS

## 2024-02-28 PROCEDURE — G8427 DOCREV CUR MEDS BY ELIG CLIN: HCPCS

## 2024-02-28 RX ORDER — FAMOTIDINE 40 MG/5ML
20 POWDER, FOR SUSPENSION ORAL DAILY
Qty: 75 ML | Refills: 0 | Status: SHIPPED | OUTPATIENT
Start: 2024-02-28 | End: 2024-03-29

## 2024-02-28 RX ORDER — POTASSIUM CHLORIDE 750 MG/1
10 CAPSULE, EXTENDED RELEASE ORAL DAILY
Qty: 30 CAPSULE | Refills: 0 | Status: SHIPPED | OUTPATIENT
Start: 2024-02-28 | End: 2024-03-29

## 2024-02-28 RX ORDER — HYOSCYAMINE SULFATE 0.12 MG/1
0.12 TABLET SUBLINGUAL EVERY 6 HOURS PRN
Qty: 120 EACH | Refills: 0 | Status: SHIPPED | OUTPATIENT
Start: 2024-02-28 | End: 2024-03-29

## 2024-02-28 RX ORDER — FOLIC ACID 1 MG/1
TABLET ORAL
Qty: 90 TABLET | Refills: 2 | Status: CANCELLED | OUTPATIENT
Start: 2024-02-28

## 2024-02-28 NOTE — TELEPHONE ENCOUNTER
Patient's daughter Laura phones the office today w/ request to have NAILA approve a letter for [facility].     Patient (Jessica) is currently the POA for her mother Brandi Jerez, however, Jessica is now terminally ill herself and is no longer able to care for her mother. Her daughter Laura will now be taking over as POA in her place.     Jessica and her daughter Laura have an appointment tomorrow at [facility] to change things over officially, however, they will need a letter from Jessica's PCP stating she is no longer able to care for her mother due to being terminally ill at this point.     Please address the letter ATTN: [facility] and contact Laura (daughter) once ready at [phone].     Thank you.

## 2024-02-29 ENCOUNTER — TELEPHONE (OUTPATIENT)
Dept: PRIMARY CARE | Facility: CLINIC | Age: 64
End: 2024-02-29
Payer: COMMERCIAL

## 2024-02-29 NOTE — TELEPHONE ENCOUNTER
Legal advised not to prepare or send letter to [employer] as they are seeking additional information.

## 2024-03-04 LAB
ANION GAP SERPL CALCULATED.3IONS-SCNC: 12 MEQ/L (ref 9–15)
BASOPHILS # BLD: 0.1 K/UL (ref 0–0.2)
BASOPHILS NFR BLD: 1 %
BUN SERPL-MCNC: 18 MG/DL (ref 8–23)
CALCIUM SERPL-MCNC: 8.9 MG/DL (ref 8.5–9.9)
CHLORIDE SERPL-SCNC: 100 MEQ/L (ref 95–107)
CO2 SERPL-SCNC: 28 MEQ/L (ref 20–31)
CREAT SERPL-MCNC: 0.48 MG/DL (ref 0.5–0.9)
EOSINOPHIL # BLD: 0.3 K/UL (ref 0–0.7)
EOSINOPHIL NFR BLD: 2.1 %
ERYTHROCYTE [DISTWIDTH] IN BLOOD BY AUTOMATED COUNT: 16.8 % (ref 11.5–14.5)
GLUCOSE SERPL-MCNC: 97 MG/DL (ref 70–99)
HCT VFR BLD AUTO: 31.6 % (ref 37–47)
HGB BLD-MCNC: 9.7 G/DL (ref 12–16)
LYMPHOCYTES # BLD: 0.6 K/UL (ref 1–4.8)
LYMPHOCYTES NFR BLD: 5 %
MCH RBC QN AUTO: 26.9 PG (ref 27–31.3)
MCHC RBC AUTO-ENTMCNC: 30.7 % (ref 33–37)
MCV RBC AUTO: 87.5 FL (ref 79.4–94.8)
MONOCYTES # BLD: 0.8 K/UL (ref 0.2–0.8)
MONOCYTES NFR BLD: 6.6 %
NEUTROPHILS # BLD: 10.6 K/UL (ref 1.4–6.5)
NEUTS SEG NFR BLD: 84.8 %
PLATELET # BLD AUTO: 432 K/UL (ref 130–400)
POTASSIUM SERPL-SCNC: 3.4 MEQ/L (ref 3.4–4.9)
RBC # BLD AUTO: 3.61 M/UL (ref 4.2–5.4)
SODIUM SERPL-SCNC: 140 MEQ/L (ref 135–144)
WBC # BLD AUTO: 12.5 K/UL (ref 4.8–10.8)

## 2024-03-06 DIAGNOSIS — E87.6 HYPOKALEMIA: ICD-10-CM

## 2024-03-06 DIAGNOSIS — E03.9 HYPOTHYROIDISM, UNSPECIFIED TYPE: ICD-10-CM

## 2024-03-06 LAB
ALBUMIN SERPL-MCNC: 3.4 G/DL (ref 3.5–4.6)
ALP SERPL-CCNC: 156 U/L (ref 40–130)
ALT SERPL-CCNC: 27 U/L (ref 0–33)
ANION GAP SERPL CALCULATED.3IONS-SCNC: 19 MEQ/L (ref 9–15)
AST SERPL-CCNC: 29 U/L (ref 0–35)
BILIRUB SERPL-MCNC: <0.2 MG/DL (ref 0.2–0.7)
BUN SERPL-MCNC: 19 MG/DL (ref 8–23)
CALCIUM SERPL-MCNC: 9.2 MG/DL (ref 8.5–9.9)
CHLORIDE SERPL-SCNC: 96 MEQ/L (ref 95–107)
CO2 SERPL-SCNC: 22 MEQ/L (ref 20–31)
CREAT SERPL-MCNC: 0.5 MG/DL (ref 0.5–0.9)
GLOBULIN SER CALC-MCNC: 3.4 G/DL (ref 2.3–3.5)
GLUCOSE SERPL-MCNC: 84 MG/DL (ref 70–99)
POTASSIUM SERPL-SCNC: 3.4 MEQ/L (ref 3.4–4.9)
PROT SERPL-MCNC: 6.8 G/DL (ref 6.3–8)
SODIUM SERPL-SCNC: 137 MEQ/L (ref 135–144)
T4 FREE SERPL-MCNC: 1.27 NG/DL (ref 0.84–1.68)
TSH REFLEX: 11.96 UIU/ML (ref 0.44–3.86)

## 2024-03-06 ASSESSMENT — ENCOUNTER SYMPTOMS
SHORTNESS OF BREATH: 0
VOMITING: 0
COUGH: 1
COLOR CHANGE: 0
RHINORRHEA: 1
WHEEZING: 0
STRIDOR: 0
NAUSEA: 0
VOICE CHANGE: 1

## 2024-03-06 NOTE — PROGRESS NOTES
appropriate evaluation and examination, ordering medications, and documenting in the medical record.     Colleen Mayberry, ALEN - CNP    Collaborating physician: Dr. Brooks     Please note this report is partially produced by using speech recognition hardware.  It may contain errors related to the system, including grammar, punctuation and spelling as well as words and phrases that may seem inaccurate.  For any questions or concerns feel free to contact me for clarification.

## 2024-03-07 ENCOUNTER — OFFICE VISIT (OUTPATIENT)
Dept: INFECTIOUS DISEASES | Age: 64
End: 2024-03-07
Payer: COMMERCIAL

## 2024-03-07 ENCOUNTER — TELEPHONE (OUTPATIENT)
Dept: PALLATIVE CARE | Age: 64
End: 2024-03-07

## 2024-03-07 VITALS
SYSTOLIC BLOOD PRESSURE: 97 MMHG | OXYGEN SATURATION: 100 % | BODY MASS INDEX: 16.05 KG/M2 | WEIGHT: 94 LBS | TEMPERATURE: 97.8 F | RESPIRATION RATE: 16 BRPM | HEIGHT: 64 IN | HEART RATE: 126 BPM | DIASTOLIC BLOOD PRESSURE: 70 MMHG

## 2024-03-07 DIAGNOSIS — L02.11 NECK ABSCESS: Primary | ICD-10-CM

## 2024-03-07 PROCEDURE — 1036F TOBACCO NON-USER: CPT | Performed by: INTERNAL MEDICINE

## 2024-03-07 PROCEDURE — G8428 CUR MEDS NOT DOCUMENT: HCPCS | Performed by: INTERNAL MEDICINE

## 2024-03-07 PROCEDURE — G9709 HOSP SRV USED PT IN MSMT PER: HCPCS | Performed by: INTERNAL MEDICINE

## 2024-03-07 PROCEDURE — G8484 FLU IMMUNIZE NO ADMIN: HCPCS | Performed by: INTERNAL MEDICINE

## 2024-03-07 PROCEDURE — G9691 PT HOSP DUR MSMT PERIOD: HCPCS | Performed by: INTERNAL MEDICINE

## 2024-03-07 PROCEDURE — G8419 CALC BMI OUT NRM PARAM NOF/U: HCPCS | Performed by: INTERNAL MEDICINE

## 2024-03-07 PROCEDURE — 99213 OFFICE O/P EST LOW 20 MIN: CPT | Performed by: INTERNAL MEDICINE

## 2024-03-07 PROCEDURE — G9710 PT PROV HOSP SRV MSMT PER: HCPCS | Performed by: INTERNAL MEDICINE

## 2024-03-07 ASSESSMENT — PATIENT HEALTH QUESTIONNAIRE - PHQ9
SUM OF ALL RESPONSES TO PHQ QUESTIONS 1-9: 0
SUM OF ALL RESPONSES TO PHQ QUESTIONS 1-9: 0
2. FEELING DOWN, DEPRESSED OR HOPELESS: 0
SUM OF ALL RESPONSES TO PHQ QUESTIONS 1-9: 0
SUM OF ALL RESPONSES TO PHQ9 QUESTIONS 1 & 2: 0
SUM OF ALL RESPONSES TO PHQ QUESTIONS 1-9: 0
1. LITTLE INTEREST OR PLEASURE IN DOING THINGS: 0

## 2024-03-07 NOTE — PROGRESS NOTES
Infectious Disease     Patient Name: Archana Melendez  Date: 3/7/2024  YOB: 1960  Medical Record Number: [id number]      Neck abscess          Patient was hospitalized last week because of increasing 2-week history of facial swelling redness including neck drainage from neck wound CT scan at that time was read as a multiloculated abscess feeling the region of the tongue resection measuring 3.8 x 2.5 x 4.1 cm  Patient was transferred to [facility] for evaluation there felt that this was progression of her cancer and had abscess patient was discharged only to return to the hospital here 1 day later because of progressive symptoms  worsening swelling of her face and neck   Discharged with p.o. antibiotics however the swelling and pain has gotten worse         According to palliative care nursing patient's symptoms have improved in the past with antibiotic treatment    Patient has left upper lobe cavitary lesion that has been present on prior scans and showed decrease in size I am assuming this is presumed metastatic disease                          Reading Physician Reading Date Result Priority   Amando Meza, DO  [phone] 2/8/2024      Narrative & Impression  EXAMINATION:  CT OF THE NECK SOFT TISSUE WITH CONTRAST  2/8/2024     TECHNIQUE:  CT of the neck was performed with the administration of intravenous contrast.  Multiplanar reformatted images are provided for review. Automated exposure  control, iterative reconstruction, and/or weight based adjustment of the  mA/kV was utilized to reduce the radiation dose to as low as reasonably  achievable.     COMPARISON:  01/23/2024     HISTORY:  ORDERING SYSTEM PROVIDED HISTORY: redness, wound, h/o cancer  TECHNOLOGIST PROVIDED HISTORY:  Reason for exam:->redness, wound, h/o cancer  Additional Contrast?->1  What reading provider will be dictating this exam?->CRC     FINDINGS:  Loculated fluid and gas collection with enhancing margins located in

## 2024-03-07 NOTE — TELEPHONE ENCOUNTER
1314- Archana called in this afternoon to explain that her pain is not currently controlled and requesting you to order her Oxycodone again. She currently rates her pain 3/10 in her mouth area and doesn't feel that the Dilaudid is helping taking it every 4 hours PRN.       1040- Archana called me back, she explains that she is taking her Levothyroxine every day and at least 30 mins prior to eating anything. She reports taking it has she is supposed to.     ----- Message from ALEN Horowitz CNP sent at 3/7/2024  8:14 AM EST -----  Patient's TSH has increased. Could you please confirm that the patient is taking her Levothyroxine 30 minutes or 1 hour prior.   ----- Message -----  From: Susana Andrews Incoming Lab Results From Soft  Sent: 3/6/2024   7:37 PM EST  To: ALEN Horowitz CNP

## 2024-03-13 DIAGNOSIS — C02.9 TONGUE CANCER (HCC): ICD-10-CM

## 2024-03-13 RX ORDER — HYDROMORPHONE HYDROCHLORIDE 4 MG/1
2 TABLET ORAL EVERY 4 HOURS PRN
Qty: 42 TABLET | Refills: 0 | Status: SHIPPED | OUTPATIENT
Start: 2024-03-13 | End: 2024-03-27

## 2024-03-23 ENCOUNTER — HOSPITAL ENCOUNTER (INPATIENT)
Age: 64
LOS: 7 days | Discharge: HOME HEALTH | DRG: 364 | End: 2024-03-31
Attending: INTERNAL MEDICINE | Admitting: INTERNAL MEDICINE
Payer: COMMERCIAL

## 2024-03-23 ENCOUNTER — APPOINTMENT (OUTPATIENT)
Dept: CT IMAGING | Age: 64
DRG: 364 | End: 2024-03-23
Payer: COMMERCIAL

## 2024-03-23 DIAGNOSIS — D49.0 TUMOR OF ORAL CAVITY: Primary | ICD-10-CM

## 2024-03-23 LAB
ALBUMIN SERPL-MCNC: 3.7 G/DL (ref 3.5–4.6)
ALP SERPL-CCNC: 144 U/L (ref 40–130)
ALT SERPL-CCNC: 10 U/L (ref 0–33)
ANION GAP SERPL CALCULATED.3IONS-SCNC: 11 MEQ/L (ref 9–15)
AST SERPL-CCNC: 20 U/L (ref 0–35)
BASOPHILS # BLD: 0.1 K/UL (ref 0–0.2)
BASOPHILS NFR BLD: 0.5 %
BILIRUB SERPL-MCNC: 0.3 MG/DL (ref 0.2–0.7)
BUN SERPL-MCNC: 16 MG/DL (ref 8–23)
CALCIUM SERPL-MCNC: 9.9 MG/DL (ref 8.5–9.9)
CHLORIDE SERPL-SCNC: 91 MEQ/L (ref 95–107)
CO2 SERPL-SCNC: 31 MEQ/L (ref 20–31)
CREAT SERPL-MCNC: 0.56 MG/DL (ref 0.5–0.9)
EOSINOPHIL # BLD: 0.1 K/UL (ref 0–0.7)
EOSINOPHIL NFR BLD: 0.8 %
ERYTHROCYTE [DISTWIDTH] IN BLOOD BY AUTOMATED COUNT: 15.9 % (ref 11.5–14.5)
GLOBULIN SER CALC-MCNC: 4.5 G/DL (ref 2.3–3.5)
GLUCOSE SERPL-MCNC: 141 MG/DL (ref 70–99)
HCT VFR BLD AUTO: 33.5 % (ref 37–47)
HGB BLD-MCNC: 10.5 G/DL (ref 12–16)
LACTIC ACID, SEPSIS: 1.4 MMOL/L (ref 0.5–1.9)
LACTIC ACID, SEPSIS: 1.5 MMOL/L (ref 0.5–1.9)
LYMPHOCYTES # BLD: 1 K/UL (ref 1–4.8)
LYMPHOCYTES NFR BLD: 7.1 %
MCH RBC QN AUTO: 26.8 PG (ref 27–31.3)
MCHC RBC AUTO-ENTMCNC: 31.3 % (ref 33–37)
MCV RBC AUTO: 85.5 FL (ref 79.4–94.8)
MONOCYTES # BLD: 1 K/UL (ref 0.2–0.8)
MONOCYTES NFR BLD: 7.5 %
NEUTROPHILS # BLD: 11.3 K/UL (ref 1.4–6.5)
NEUTS SEG NFR BLD: 83.7 %
PLATELET # BLD AUTO: 436 K/UL (ref 130–400)
POTASSIUM SERPL-SCNC: 3.8 MEQ/L (ref 3.4–4.9)
PROT SERPL-MCNC: 8.2 G/DL (ref 6.3–8)
RBC # BLD AUTO: 3.92 M/UL (ref 4.2–5.4)
SODIUM SERPL-SCNC: 133 MEQ/L (ref 135–144)
WBC # BLD AUTO: 13.5 K/UL (ref 4.8–10.8)

## 2024-03-23 PROCEDURE — 36415 COLL VENOUS BLD VENIPUNCTURE: CPT

## 2024-03-23 PROCEDURE — 96376 TX/PRO/DX INJ SAME DRUG ADON: CPT

## 2024-03-23 PROCEDURE — 80053 COMPREHEN METABOLIC PANEL: CPT

## 2024-03-23 PROCEDURE — 6360000004 HC RX CONTRAST MEDICATION: Performed by: PERSONAL EMERGENCY RESPONSE ATTENDANT

## 2024-03-23 PROCEDURE — 96361 HYDRATE IV INFUSION ADD-ON: CPT

## 2024-03-23 PROCEDURE — 83605 ASSAY OF LACTIC ACID: CPT

## 2024-03-23 PROCEDURE — 2580000003 HC RX 258: Performed by: PERSONAL EMERGENCY RESPONSE ATTENDANT

## 2024-03-23 PROCEDURE — 6360000002 HC RX W HCPCS: Performed by: PERSONAL EMERGENCY RESPONSE ATTENDANT

## 2024-03-23 PROCEDURE — 70491 CT SOFT TISSUE NECK W/DYE: CPT

## 2024-03-23 PROCEDURE — 85025 COMPLETE CBC W/AUTO DIFF WBC: CPT

## 2024-03-23 PROCEDURE — 96375 TX/PRO/DX INJ NEW DRUG ADDON: CPT

## 2024-03-23 PROCEDURE — 96365 THER/PROPH/DIAG IV INF INIT: CPT

## 2024-03-23 PROCEDURE — 87040 BLOOD CULTURE FOR BACTERIA: CPT

## 2024-03-23 PROCEDURE — 99285 EMERGENCY DEPT VISIT HI MDM: CPT

## 2024-03-23 PROCEDURE — 84145 PROCALCITONIN (PCT): CPT

## 2024-03-23 RX ORDER — 0.9 % SODIUM CHLORIDE 0.9 %
1000 INTRAVENOUS SOLUTION INTRAVENOUS ONCE
Status: COMPLETED | OUTPATIENT
Start: 2024-03-23 | End: 2024-03-23

## 2024-03-23 RX ORDER — HYDROMORPHONE HYDROCHLORIDE 1 MG/ML
0.5 INJECTION, SOLUTION INTRAMUSCULAR; INTRAVENOUS; SUBCUTANEOUS ONCE
Status: COMPLETED | OUTPATIENT
Start: 2024-03-23 | End: 2024-03-23

## 2024-03-23 RX ORDER — HYDROMORPHONE HYDROCHLORIDE 1 MG/ML
1 INJECTION, SOLUTION INTRAMUSCULAR; INTRAVENOUS; SUBCUTANEOUS ONCE
Status: COMPLETED | OUTPATIENT
Start: 2024-03-23 | End: 2024-03-23

## 2024-03-23 RX ORDER — ONDANSETRON 2 MG/ML
4 INJECTION INTRAMUSCULAR; INTRAVENOUS ONCE
Status: COMPLETED | OUTPATIENT
Start: 2024-03-23 | End: 2024-03-23

## 2024-03-23 RX ADMIN — SODIUM CHLORIDE 1000 ML: 9 INJECTION, SOLUTION INTRAVENOUS at 21:17

## 2024-03-23 RX ADMIN — HYDROMORPHONE HYDROCHLORIDE 0.5 MG: 1 INJECTION, SOLUTION INTRAMUSCULAR; INTRAVENOUS; SUBCUTANEOUS at 21:17

## 2024-03-23 RX ADMIN — ONDANSETRON 4 MG: 2 INJECTION INTRAMUSCULAR; INTRAVENOUS at 21:17

## 2024-03-23 RX ADMIN — MEROPENEM 1000 MG: 1 INJECTION, POWDER, FOR SOLUTION INTRAVENOUS at 23:05

## 2024-03-23 RX ADMIN — IOPAMIDOL 75 ML: 755 INJECTION, SOLUTION INTRAVENOUS at 22:01

## 2024-03-23 RX ADMIN — HYDROMORPHONE HYDROCHLORIDE 1 MG: 1 INJECTION, SOLUTION INTRAMUSCULAR; INTRAVENOUS; SUBCUTANEOUS at 22:58

## 2024-03-23 ASSESSMENT — LIFESTYLE VARIABLES
HOW OFTEN DO YOU HAVE A DRINK CONTAINING ALCOHOL: NEVER
HOW MANY STANDARD DRINKS CONTAINING ALCOHOL DO YOU HAVE ON A TYPICAL DAY: PATIENT DOES NOT DRINK

## 2024-03-24 PROBLEM — C06.9 ORAL CANCER (HCC): Status: ACTIVE | Noted: 2024-03-24

## 2024-03-24 LAB
ANION GAP SERPL CALCULATED.3IONS-SCNC: 11 MEQ/L (ref 9–15)
BUN SERPL-MCNC: 16 MG/DL (ref 8–23)
CALCIUM SERPL-MCNC: 8.7 MG/DL (ref 8.5–9.9)
CHLORIDE SERPL-SCNC: 98 MEQ/L (ref 95–107)
CO2 SERPL-SCNC: 31 MEQ/L (ref 20–31)
CREAT SERPL-MCNC: 0.48 MG/DL (ref 0.5–0.9)
GLUCOSE SERPL-MCNC: 145 MG/DL (ref 70–99)
MAGNESIUM SERPL-MCNC: 1.6 MG/DL (ref 1.7–2.4)
POTASSIUM SERPL-SCNC: 3.2 MEQ/L (ref 3.4–4.9)
PROCALCITONIN SERPL IA-MCNC: 0.08 NG/ML (ref 0–0.15)
SODIUM SERPL-SCNC: 140 MEQ/L (ref 135–144)

## 2024-03-24 PROCEDURE — 83735 ASSAY OF MAGNESIUM: CPT

## 2024-03-24 PROCEDURE — 6370000000 HC RX 637 (ALT 250 FOR IP)

## 2024-03-24 PROCEDURE — 99222 1ST HOSP IP/OBS MODERATE 55: CPT | Performed by: INTERNAL MEDICINE

## 2024-03-24 PROCEDURE — 6360000002 HC RX W HCPCS

## 2024-03-24 PROCEDURE — 87075 CULTR BACTERIA EXCEPT BLOOD: CPT

## 2024-03-24 PROCEDURE — 2580000003 HC RX 258

## 2024-03-24 PROCEDURE — 1210000000 HC MED SURG R&B

## 2024-03-24 PROCEDURE — 80048 BASIC METABOLIC PNL TOTAL CA: CPT

## 2024-03-24 PROCEDURE — 6370000000 HC RX 637 (ALT 250 FOR IP): Performed by: STUDENT IN AN ORGANIZED HEALTH CARE EDUCATION/TRAINING PROGRAM

## 2024-03-24 PROCEDURE — 87070 CULTURE OTHR SPECIMN AEROBIC: CPT

## 2024-03-24 PROCEDURE — 36415 COLL VENOUS BLD VENIPUNCTURE: CPT

## 2024-03-24 RX ORDER — AMITRIPTYLINE HYDROCHLORIDE 100 MG/1
100 TABLET ORAL NIGHTLY
Status: DISCONTINUED | OUTPATIENT
Start: 2024-03-24 | End: 2024-03-31 | Stop reason: HOSPADM

## 2024-03-24 RX ORDER — MAGNESIUM SULFATE IN WATER 40 MG/ML
2000 INJECTION, SOLUTION INTRAVENOUS PRN
Status: DISCONTINUED | OUTPATIENT
Start: 2024-03-24 | End: 2024-03-31 | Stop reason: HOSPADM

## 2024-03-24 RX ORDER — FAMOTIDINE 20 MG/1
20 TABLET, FILM COATED ORAL DAILY
Status: DISCONTINUED | OUTPATIENT
Start: 2024-03-24 | End: 2024-03-31 | Stop reason: HOSPADM

## 2024-03-24 RX ORDER — ACETAMINOPHEN 650 MG/1
650 SUPPOSITORY RECTAL EVERY 6 HOURS PRN
Status: DISCONTINUED | OUTPATIENT
Start: 2024-03-24 | End: 2024-03-31 | Stop reason: HOSPADM

## 2024-03-24 RX ORDER — SODIUM CHLORIDE 0.9 % (FLUSH) 0.9 %
5-40 SYRINGE (ML) INJECTION EVERY 12 HOURS SCHEDULED
Status: DISCONTINUED | OUTPATIENT
Start: 2024-03-24 | End: 2024-03-31 | Stop reason: HOSPADM

## 2024-03-24 RX ORDER — ONDANSETRON 4 MG/1
4 TABLET, ORALLY DISINTEGRATING ORAL EVERY 8 HOURS PRN
Status: DISCONTINUED | OUTPATIENT
Start: 2024-03-24 | End: 2024-03-31 | Stop reason: HOSPADM

## 2024-03-24 RX ORDER — MORPHINE SULFATE 2 MG/ML
1 INJECTION, SOLUTION INTRAMUSCULAR; INTRAVENOUS
Status: DISCONTINUED | OUTPATIENT
Start: 2024-03-24 | End: 2024-03-25

## 2024-03-24 RX ORDER — FOLIC ACID 1 MG/1
1000 TABLET ORAL DAILY
Status: DISCONTINUED | OUTPATIENT
Start: 2024-03-24 | End: 2024-03-31 | Stop reason: HOSPADM

## 2024-03-24 RX ORDER — LEVOTHYROXINE SODIUM 0.12 MG/1
125 TABLET ORAL DAILY
Status: DISCONTINUED | OUTPATIENT
Start: 2024-03-24 | End: 2024-03-31 | Stop reason: HOSPADM

## 2024-03-24 RX ORDER — BISACODYL 10 MG
10 SUPPOSITORY, RECTAL RECTAL DAILY PRN
Status: DISCONTINUED | OUTPATIENT
Start: 2024-03-24 | End: 2024-03-31 | Stop reason: HOSPADM

## 2024-03-24 RX ORDER — SODIUM CHLORIDE 9 MG/ML
INJECTION, SOLUTION INTRAVENOUS PRN
Status: DISCONTINUED | OUTPATIENT
Start: 2024-03-24 | End: 2024-03-31 | Stop reason: HOSPADM

## 2024-03-24 RX ORDER — SODIUM CHLORIDE 0.9 % (FLUSH) 0.9 %
5-40 SYRINGE (ML) INJECTION PRN
Status: DISCONTINUED | OUTPATIENT
Start: 2024-03-24 | End: 2024-03-31 | Stop reason: HOSPADM

## 2024-03-24 RX ORDER — ONDANSETRON 2 MG/ML
4 INJECTION INTRAMUSCULAR; INTRAVENOUS EVERY 6 HOURS PRN
Status: DISCONTINUED | OUTPATIENT
Start: 2024-03-24 | End: 2024-03-31 | Stop reason: HOSPADM

## 2024-03-24 RX ORDER — ATORVASTATIN CALCIUM 40 MG/1
40 TABLET, FILM COATED ORAL DAILY
Status: DISCONTINUED | OUTPATIENT
Start: 2024-03-24 | End: 2024-03-31 | Stop reason: HOSPADM

## 2024-03-24 RX ORDER — POTASSIUM CHLORIDE 7.45 MG/ML
10 INJECTION INTRAVENOUS PRN
Status: DISCONTINUED | OUTPATIENT
Start: 2024-03-24 | End: 2024-03-31 | Stop reason: HOSPADM

## 2024-03-24 RX ORDER — SODIUM CHLORIDE 9 MG/ML
INJECTION, SOLUTION INTRAVENOUS CONTINUOUS
Status: DISPENSED | OUTPATIENT
Start: 2024-03-24 | End: 2024-03-26

## 2024-03-24 RX ORDER — ACETAMINOPHEN 325 MG/1
650 TABLET ORAL EVERY 6 HOURS PRN
Status: DISCONTINUED | OUTPATIENT
Start: 2024-03-24 | End: 2024-03-31 | Stop reason: HOSPADM

## 2024-03-24 RX ORDER — POTASSIUM CHLORIDE 20 MEQ/1
40 TABLET, EXTENDED RELEASE ORAL PRN
Status: DISCONTINUED | OUTPATIENT
Start: 2024-03-24 | End: 2024-03-31 | Stop reason: HOSPADM

## 2024-03-24 RX ADMIN — FOLIC ACID 1000 MCG: 1 TABLET ORAL at 08:18

## 2024-03-24 RX ADMIN — MORPHINE SULFATE 1 MG: 2 INJECTION, SOLUTION INTRAMUSCULAR; INTRAVENOUS at 19:53

## 2024-03-24 RX ADMIN — ATORVASTATIN CALCIUM 40 MG: 40 TABLET, FILM COATED ORAL at 23:14

## 2024-03-24 RX ADMIN — LEVOTHYROXINE SODIUM 125 MCG: 0.12 TABLET ORAL at 05:55

## 2024-03-24 RX ADMIN — MORPHINE SULFATE 1 MG: 2 INJECTION, SOLUTION INTRAMUSCULAR; INTRAVENOUS at 17:02

## 2024-03-24 RX ADMIN — Medication 10 ML: at 23:15

## 2024-03-24 RX ADMIN — POTASSIUM BICARBONATE 40 MEQ: 782 TABLET, EFFERVESCENT ORAL at 08:18

## 2024-03-24 RX ADMIN — MORPHINE SULFATE 1 MG: 2 INJECTION, SOLUTION INTRAMUSCULAR; INTRAVENOUS at 02:41

## 2024-03-24 RX ADMIN — SODIUM CHLORIDE: 9 INJECTION, SOLUTION INTRAVENOUS at 02:56

## 2024-03-24 RX ADMIN — SALINE NASAL SPRAY 1 SPRAY: 1.5 SOLUTION NASAL at 19:57

## 2024-03-24 RX ADMIN — FAMOTIDINE 20 MG: 20 TABLET, FILM COATED ORAL at 08:18

## 2024-03-24 RX ADMIN — MORPHINE SULFATE 1 MG: 2 INJECTION, SOLUTION INTRAMUSCULAR; INTRAVENOUS at 10:17

## 2024-03-24 RX ADMIN — AMITRIPTYLINE HYDROCHLORIDE 100 MG: 100 TABLET, FILM COATED ORAL at 23:12

## 2024-03-24 RX ADMIN — MORPHINE SULFATE 1 MG: 2 INJECTION, SOLUTION INTRAMUSCULAR; INTRAVENOUS at 14:58

## 2024-03-24 RX ADMIN — MORPHINE SULFATE 1 MG: 2 INJECTION, SOLUTION INTRAMUSCULAR; INTRAVENOUS at 12:31

## 2024-03-24 RX ADMIN — Medication 10 ML: at 08:15

## 2024-03-24 RX ADMIN — SERTRALINE 250 MG: 100 TABLET, FILM COATED ORAL at 08:18

## 2024-03-24 RX ADMIN — MORPHINE SULFATE 1 MG: 2 INJECTION, SOLUTION INTRAMUSCULAR; INTRAVENOUS at 21:52

## 2024-03-24 RX ADMIN — MORPHINE SULFATE 1 MG: 2 INJECTION, SOLUTION INTRAMUSCULAR; INTRAVENOUS at 05:54

## 2024-03-24 RX ADMIN — MAGNESIUM SULFATE HEPTAHYDRATE 2000 MG: 40 INJECTION, SOLUTION INTRAVENOUS at 08:17

## 2024-03-24 RX ADMIN — MEROPENEM 1000 MG: 1 INJECTION, POWDER, FOR SOLUTION INTRAVENOUS at 08:14

## 2024-03-24 RX ADMIN — MORPHINE SULFATE 1 MG: 2 INJECTION, SOLUTION INTRAMUSCULAR; INTRAVENOUS at 08:11

## 2024-03-24 RX ADMIN — ACETAMINOPHEN 325MG 650 MG: 325 TABLET ORAL at 23:12

## 2024-03-24 RX ADMIN — MEROPENEM 1000 MG: 1 INJECTION, POWDER, FOR SOLUTION INTRAVENOUS at 16:36

## 2024-03-24 RX ADMIN — MEROPENEM 1000 MG: 1 INJECTION, POWDER, FOR SOLUTION INTRAVENOUS at 23:17

## 2024-03-24 RX ADMIN — SODIUM CHLORIDE: 9 INJECTION, SOLUTION INTRAVENOUS at 19:59

## 2024-03-24 ASSESSMENT — PAIN DESCRIPTION - LOCATION
LOCATION: JAW
LOCATION: FACE
LOCATION: FACE;NECK
LOCATION: FACE
LOCATION: MOUTH
LOCATION: FACE;NECK

## 2024-03-24 ASSESSMENT — PAIN SCALES - GENERAL
PAINLEVEL_OUTOF10: 9
PAINLEVEL_OUTOF10: 10
PAINLEVEL_OUTOF10: 7
PAINLEVEL_OUTOF10: 9
PAINLEVEL_OUTOF10: 10
PAINLEVEL_OUTOF10: 9
PAINLEVEL_OUTOF10: 10
PAINLEVEL_OUTOF10: 9
PAINLEVEL_OUTOF10: 6
PAINLEVEL_OUTOF10: 9
PAINLEVEL_OUTOF10: 7
PAINLEVEL_OUTOF10: 9

## 2024-03-24 ASSESSMENT — PAIN DESCRIPTION - ORIENTATION
ORIENTATION: LEFT;MID
ORIENTATION: LEFT

## 2024-03-24 NOTE — FLOWSHEET NOTE
Am nursing  assessment completed.    Pt :  awake and sitting at bedside.              Alert and oriented.      Diet: NPO  Peg tube : boost high calorie 5x day per self supply and water flush as at home  RESP:               Lung sounds:          Oxygen: room air  Complaints of: oral/ mouth/ear/ throat/ neck discomfort                        Pain: prn morphine per order  IV:  IV NS 75ml/hr            patent/ flushed/ capped, no signs of infiltration noted, dressing clean/dry/intact.  TELE:  SR               Dressings:                           Precautions:              Falls:  35      Oliver: 20  Chart and meds reviewed.           Noted Labs: na 140 k 3.2 bun 16 cr0.48 mag 1.6  Plan for today:    Bed wheels locked and in lowest position. Call light and bedside table within reach.   NOTES: mag and K supplemented per orders. Electronically signed by Ginny Orellana RN on 3/24/2024 at 10:39 AM  1017 prn morphine request and complaints of pain. Pt also complaints of nasal congestion. Electronically signed by Ginny Orellana RN on 3/24/2024 at 10:40 AM    1231 prn morphine per request and complaints of pain. Dr Iverson updated on consult. Physician request to be updated for new additional concerns and ENT needs. Electronically signed by Ginny Orellana RN on 3/24/2024 at 12:52 PM    1330 Dr Hankins by for rounds. Would culture collected and sent per physician request. Electronically signed by Ginny Orellana RN on 3/24/2024 at 1:59 PM    1430 Ent rounds completed per Dr Iverson. Wound assessed and purulent drainage manually expressed, tolerated well. Electronically signed by Ginny Orellana RN on 3/24/2024 at 2:54 PM

## 2024-03-24 NOTE — H&P
DEPARTMENT OF HOSPITAL MEDICINE    HISTORY AND PHYSICAL EXAM    PATIENT NAME:  Archana Melendez    MRN:  [id number]  SERVICE DATE:  3/24/2024   SERVICE TIME:  12:48 AM    Primary Care Physician: Ivone Barnard MD     SUBJECTIVE  CHIEF COMPLAINT:  Cyst (Pt stated that she has has a cyst from cancer that ruptured about 2 month ago. Pt stated that she was seen here initially and has been following up with her pcp for care. 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 Pt stated that she was seen here initially and has been following up with her pcp for care. Pt stated that she has had increased drainage and bloody drainage which has her concerned )  Patient was admitted to the hospital in February 11 to 14 due to facial cellulitis and abscess in submandibular area which, per CT result was progression of cancer.  Patient was treated with Merrem and discharged on oral antibiotic.  Per patient, after finishing the antibiotic, the redness and swelling of the face got worse again.  Patient denies fever, chills, dyspnea, dizziness, headache.  She also denies N/V/D and abdominal pain.  Patient is n.p.o. with PEG tube. Patient denies using blood thinners and NSAIDs.  Patient has been started on Merrem and admitted to the hospital with telemetry and consult to ID, ENT, oncology, and palliative care.    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      PAST MEDICAL HISTORY:    Past Medical History:   Diagnosis Date    Anxiety     Bilateral carpal tunnel syndrome JAN 2011    Cancer (HCC) 2005-TIP OF TONGUE    METS TO LEFT  Result   1. Interval increase in size of the complex abscess in the area of left   tongue resection, now measuring 6.3 x 3.2 x 7.0 cm. It now clearly extends   through the floor of the mouth, previously seen to have a loculation in the   floor of the mouth.   2. Stable heterogeneous hypodensity in the posterior belly of the tongue   consistent with myositis or possibly residual malignancy.   3. Stable thickening of the epiglottis, aryepiglottic folds, and false cords.   4. Stable cavitary regions in the anterior left upper lobe from previous   inflammatory disease.   5. Spiculated nodular densities in the anterior and posterior right upper   lobe consistent with previous inflammatory disease, although malignancy   cannot entirely be excluded.   6. Stable cellulitis of the left mandibular and submandibular subcutaneous   fat.             ASSESSMENT AND PLAN  Acute Illnesses  Tumor of the oral cavity  Cellulitis    Chronic Illnesses  Oral cancer  COPD  Diastolic heart failure  Dyslipidemia  Hep C  Hypothyroidism    Plan  Admit inpatient with telemetry  IV meropenem  Pain management: Morphine 1 mg every 2 hours as needed  IVF continuous  Diet : strict n.p.o.  PEG tube maintenance  With continuous pulse oximetry and oxygen therapy protocol in place  VS per unit routine  SCDs  Consult to oncology  Consult to infectious disease  Consult to ENT  Consult palliative care  Lab work in a.m.: CBC, BMP  We will resume home medications that appropriate once reconciled by nurse      CODE STATUS: DNR - CCA    VTE Prophylaxis: SCDs    Plan of care discussed and agreed upon with: patient    Additional work up or/and treatment plan may be added today or then after based on clinical progression. I am managing a portion of pt care. Some medical issues are handled by other specialists. Additional work up and treatment should be done in out pt setting by pt PCP and other out pt providers.      In addition to examining and evaluating pt, I

## 2024-03-24 NOTE — ED PROVIDER NOTES
[facility] ED  eMERGENCY dEPARTMENT eNCOUnter      Pt Name: Archana Melendez  MRN: [id number]  Birthdate 1960  Date of evaluation: 3/23/2024  Provider: KWAME EVERETT    My attending is Dr. Aden    HISTORY OF PRESENT ILLNESS    Archana Melendez is a 63 y.o. female with PMHx of anxiety, cardiomyopathy, COPD, diastolic dysfunction, dyslipidemia, hepatitis B and C, alcoholism, hypothyroidism, ICD, tongue cancer with mets to lung, glossectomy, PEG tube presents to the emergency department with bloody drainage from neck wounds.     Patient admitted February 11-14 for facial cellulitis and noted to find submandibular lesion noted on CT which was progression of cancer and not a drainable lesion.  She did receive meropenem IV in the hospital and discharged with Invanz which she finished a few weeks ago. She takes Dilaudid at home for pain.     Patient was taken off her Keytruda due to leukocytosis per family    Patient and family feels that last week she started with increased redness and swelling to left face and chin.  A couple days ago had pustule appearance to anterior neck wound, and noticed today that it is much worse.  Today patient did have 5 minutes of bleeding from the site.  Persistent purulent drainage since last month.  There is been no fevers, headaches, chest pain, shortness of breath, abdominal pain, nausea, vomiting, diarrhea.  No blood thinner use.    DNR-CCA-NO INTUBATION       HPI    Nursing Notes were reviewed.    REVIEW OF SYSTEMS       Review of Systems   Constitutional:  Negative for appetite change, chills and fever.   HENT:  Negative for congestion, rhinorrhea and sore throat.    Respiratory:  Negative for cough and shortness of breath.    Cardiovascular:  Negative for chest pain.   Gastrointestinal:  Negative for abdominal pain, blood in stool, diarrhea, nausea and vomiting.   Genitourinary:  Negative for difficulty urinating.   Musculoskeletal:  Negative for neck stiffness.   Skin:  Housing Stability Vital Sign     Unable to Pay for Housing in the Last Year: No     Number of Places Lived in the Last Year: 1     Unstable Housing in the Last Year: No         PHYSICAL EXAM         ED Triage Vitals [03/23/24 1912]   BP Temp Temp src Pulse Respirations SpO2 Height Weight - Scale   100/63 98 °F (36.7 °C) -- (!) 102 19 100 % 1.626 m (5' 4\") 45.4 kg (100 lb)       Physical Exam  Constitutional:       Appearance: She is well-developed.   HENT:      Head: Normocephalic and atraumatic.        Mouth/Throat:      Comments: Muffled voice with deformity/scarring/hypertrophy of tongue.  No airway compromise, no change of voice  Eyes:      Conjunctiva/sclera: Conjunctivae normal.      Pupils: Pupils are equal, round, and reactive to light.   Neck:      Trachea: No tracheal deviation.        Comments: 3 neck wounds with lateral wounds fluctuant and middle wound scabbed with dried blood. No active discharge/drainage   Cardiovascular:      Heart sounds: Normal heart sounds.   Pulmonary:      Effort: Pulmonary effort is normal. No respiratory distress.      Breath sounds: Normal breath sounds. No stridor.   Abdominal:      General: Bowel sounds are normal. There is no distension.      Palpations: Abdomen is soft. There is no mass.      Tenderness: There is no abdominal tenderness. There is no guarding or rebound.   Musculoskeletal:         General: Normal range of motion.      Cervical back: Normal range of motion and neck supple.   Skin:     General: Skin is warm and dry.      Capillary Refill: Capillary refill takes less than 2 seconds.      Findings: No rash.   Neurological:      Mental Status: She is alert and oriented to person, place, and time.      Deep Tendon Reflexes: Reflexes are normal and symmetric.   Psychiatric:         Behavior: Behavior normal.         Thought Content: Thought content normal.         Judgment: Judgment normal.         DIAGNOSTIC RESULTS     EKG:All EKG's are interpreted by the

## 2024-03-24 NOTE — PLAN OF CARE
Problem: Discharge Planning  Goal: Discharge to home or other facility with appropriate resources  Outcome: Progressing     Problem: Skin/Tissue Integrity  Goal: Absence of new skin breakdown  Description: 1.  Monitor for areas of redness and/or skin breakdown  2.  Assess vascular access sites hourly  3.  Every 4-6 hours minimum:  Change oxygen saturation probe site  4.  Every 4-6 hours:  If on nasal continuous positive airway pressure, respiratory therapy assess nares and determine need for appliance change or resting period.  Outcome: Progressing      Declines

## 2024-03-24 NOTE — PLAN OF CARE
Problem: Discharge Planning  Goal: Discharge to home or other facility with appropriate resources  3/24/2024 0803 by Jayshree Gan, RN  Outcome: Progressing  Flowsheets (Taken 3/24/2024 0802)  Discharge to home or other facility with appropriate resources: Identify barriers to discharge with patient and caregiver  3/24/2024 0517 by Jayshree Gan, RN  Outcome: Progressing     Problem: Skin/Tissue Integrity  Goal: Absence of new skin breakdown  Description: 1.  Monitor for areas of redness and/or skin breakdown  2.  Assess vascular access sites hourly  3.  Every 4-6 hours minimum:  Change oxygen saturation probe site  4.  Every 4-6 hours:  If on nasal continuous positive airway pressure, respiratory therapy assess nares and determine need for appliance change or resting period.  3/24/2024 0803 by Jayshree Gan, RN  Outcome: Progressing  3/24/2024 0517 by Jayshree Gan, RN  Outcome: Progressing

## 2024-03-24 NOTE — ONCOLOGY
Hematology/Oncology Consult  Encounter Date: 3/24/2024 9:13 AM    Ms. Archana Melendez is a 63 y.o. female  : 1960  MRN: [id number]  Acct Number: [id number]  Requesting Provider: Rneé Matamoros MD    Reason for request: oral abscess, head and neck cancer      CONSULTANT: Arcadio Holley DO    HPI: Patient has recurrent larynx cancer after previous radical surgery and radiation at  in .  Now admitted with recurrent oral abscess.    Patient Active Problem List   Diagnosis    COPD (chronic obstructive pulmonary disease) (HCC)    Hepatitis C virus infection    Hypothyroidism    Insomnia    Itching    Osteoarthritis    Reflux esophagitis    Mixed hyperlipidemia    Internal derangement of right knee    Lumbar radiculitis    Cardiomyopathy (HCC)    Premature osteoporosis    Hypercholesterolemia    ICD (implantable cardioverter-defibrillator) battery depletion    Diastolic dysfunction    Chronic left shoulder pain    Subacromial impingement of left shoulder    Subacromial bursitis of left shoulder joint    Pneumonia of left lung due to infectious organism    Community acquired pneumonia of left upper lobe of lung    Acute respiratory failure with hypoxia (HCC)    Palliative care encounter    Goals of care, counseling/discussion    Full code status    Advanced care planning/counseling discussion    Anxiety about health    Neoplasm related pain    Neck pain    Facial cellulitis    Skin ulcer of neck (HCC)    Immunosuppression due to drug therapy (HCC)    Tongue cancer (HCC)    History of penicillin allergy    Cancer related pain    Encounter for hospice care discussion    Neck abscess    Severe malnutrition (HCC)    Facial infection    Oral cancer (HCC)     Past Medical History:   Diagnosis Date    Anxiety     Bilateral carpal tunnel syndrome 2011    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

## 2024-03-24 NOTE — PROGRESS NOTES
[facility]   Pharmacy Dose Adjustment Per Protocol:  Meropenem Extended Interval Interchange    Archana Melendez is a 63 y.o. female.     The following ordered dose of Meropenem has been changed to optimize its pharmacodynamic profile per [facility] pharmacy policy approved by [facility].    Recent Labs     03/23/24 1930   CREATININE 0.56   BUN 16   WBC 13.5*     .  Height: 162.6 cm (5' 4\"), Weight - Scale: 45.4 kg (100 lb), Body mass index is 17.16 kg/m².  Estimated Creatinine Clearance: 73 mL/min (based on SCr of 0.56 mg/dL).    Ordered Dose    __ 500 mg IV every 8 hrs  (30 minute infusion)    _X_ 1 gm  IV every 8  hrs (30 minute infusion)    __ 2 gm IV every 8 hrs (30 minute infusion)    New Dose    Meropenem - Extended Infusion (3-hour infusion) - Preferred Dosing Strategy    Renal function (CrCl mL/min)  ? 50  26 - 49  10 - 25   < 10, HD, PD  CRRT    All indications - Loading dose of 7040-7722 milligrams x 1 over 30 minutes or via IV push (based on indication). Maintenance dose should begin at the next regularly scheduled dosing interval based on indication/renal function.    Maintenance dosing for all indications except as outlined below  1000mg q8h [x] 1000mg q12h ¨ 500 mg q12h ¨ 500 mg q24h ¨ 1000mg q12h^ ¨   CNS infections, Cystic fibrosis, JUAN > 4  2000mg q8h ¨ 2000mg q12h ¨ 1000mg q12h ¨ 1000mg q24h ¨ 2000mg q12h† ¨    ^Consider 1000mg q8h for CRRT effluent rates > 3L/h   †Consider 2000mg q8h for CRRT effluent rates ? 3L/h     Pharmacists should be contacted for issues concerning drug compatibility with multiple IV medications.  All doses will be prepared using 100ml bag to be infused over 3-hours at a rate of 33.3 ml/hr.      Restricted antimicrobial meropenem is limited to 72 hours per policy.    An Infectious Disease prescriber is required to order subsequent doses if deemed appropriate.  Consult to ID to prevent interruption in care is in place.        Thank You,  Bradyon Riggs, [facility]  3/24/2024

## 2024-03-24 NOTE — PROGRESS NOTES
[facility] Meropenem Restriction:  Pharmacy Review    Archana Melendez is a 63 y.o. female consulted for meropenem for facial cellulitis by     Vonda Pina PA       Allergies: Pcn [penicillins], Demerol, and Meperidine    I.  Reviewed; Criteria for use met as documented below.  Order verified with 72 hr stop date/time and provider notified that a new order by ID provider is required to continue therapy beyond 72 hrs.  Please place consult to ID to prevent interruption in care.     Empiric Criteria []   Definitive Criteria   Nosocomial and sepsis coverage in patients with:  [] History of infection with an ESBL producing organism  []  History of infection with MDR gram negative organism (i.e. pseudomonas, acinetobacter, enterobacter)  []  Clinically unstable (new or persistent fever, WBC increase, hemodynamic instability, etc) AND already on broad spectrum gram-negative agents (pip/tazo, cefepime)  []  Patient has severe allergy to BOTH cephalosporin and penicillin (PCN) Treatment of culture proven infection due to bacteria resistant to other broad-spectrum antibiotics (i.e. cefepime, piperacillin-tazobactam)         Patient was on merrem last admission per ID for cellulitis. No hx of use of cephalosporins noted rxn to pcn anaphylactic. Merrem 1x dose ED verified. Follow for admission orders.     Colleen Frederick RPH PharmD

## 2024-03-25 PROBLEM — D49.89 NEOPLASM OF FACE: Status: ACTIVE | Noted: 2024-03-25

## 2024-03-25 PROBLEM — D49.0: Status: ACTIVE | Noted: 2024-03-25

## 2024-03-25 PROBLEM — K14.0 ABSCESS OF TONGUE: Status: ACTIVE | Noted: 2024-03-25

## 2024-03-25 LAB
ANION GAP SERPL CALCULATED.3IONS-SCNC: 10 MEQ/L (ref 9–15)
ANISOCYTOSIS BLD QL SMEAR: ABNORMAL
BASOPHILS # BLD: 0.1 K/UL (ref 0–0.2)
BASOPHILS NFR BLD: 0.6 %
BUN SERPL-MCNC: 12 MG/DL (ref 8–23)
CALCIUM SERPL-MCNC: 8.6 MG/DL (ref 8.5–9.9)
CHLORIDE SERPL-SCNC: 100 MEQ/L (ref 95–107)
CO2 SERPL-SCNC: 31 MEQ/L (ref 20–31)
CREAT SERPL-MCNC: 0.4 MG/DL (ref 0.5–0.9)
EOSINOPHIL # BLD: 0.2 K/UL (ref 0–0.7)
EOSINOPHIL NFR BLD: 1.5 %
ERYTHROCYTE [DISTWIDTH] IN BLOOD BY AUTOMATED COUNT: 16 % (ref 11.5–14.5)
GLUCOSE SERPL-MCNC: 100 MG/DL (ref 70–99)
HCT VFR BLD AUTO: 30.4 % (ref 37–47)
HGB BLD-MCNC: 9.4 G/DL (ref 12–16)
HYPOCHROMIA BLD QL SMEAR: ABNORMAL
LYMPHOCYTES # BLD: 1 K/UL (ref 1–4.8)
LYMPHOCYTES NFR BLD: 8 %
MAGNESIUM SERPL-MCNC: 2 MG/DL (ref 1.7–2.4)
MCH RBC QN AUTO: 26.6 PG (ref 27–31.3)
MCHC RBC AUTO-ENTMCNC: 30.9 % (ref 33–37)
MCV RBC AUTO: 85.9 FL (ref 79.4–94.8)
MONOCYTES # BLD: 0.9 K/UL (ref 0.2–0.8)
MONOCYTES NFR BLD: 7.4 %
NEUTROPHILS # BLD: 10.5 K/UL (ref 1.4–6.5)
NEUTS SEG NFR BLD: 82.1 %
NEUTS VAC BLD QL SMEAR: ABNORMAL
PLATELET # BLD AUTO: ABNORMAL K/UL (ref 130–400)
PLATELET BLD QL SMEAR: ADEQUATE
POTASSIUM SERPL-SCNC: 3.4 MEQ/L (ref 3.4–4.9)
RBC # BLD AUTO: 3.54 M/UL (ref 4.2–5.4)
SLIDE REVIEW: ABNORMAL
SODIUM SERPL-SCNC: 141 MEQ/L (ref 135–144)
WBC # BLD AUTO: 12.8 K/UL (ref 4.8–10.8)

## 2024-03-25 PROCEDURE — 2580000003 HC RX 258: Performed by: INTERNAL MEDICINE

## 2024-03-25 PROCEDURE — 6360000002 HC RX W HCPCS: Performed by: NURSE PRACTITIONER

## 2024-03-25 PROCEDURE — 2580000003 HC RX 258

## 2024-03-25 PROCEDURE — 36415 COLL VENOUS BLD VENIPUNCTURE: CPT

## 2024-03-25 PROCEDURE — 6370000000 HC RX 637 (ALT 250 FOR IP)

## 2024-03-25 PROCEDURE — 6370000000 HC RX 637 (ALT 250 FOR IP): Performed by: NURSE PRACTITIONER

## 2024-03-25 PROCEDURE — 6360000002 HC RX W HCPCS

## 2024-03-25 PROCEDURE — 80048 BASIC METABOLIC PNL TOTAL CA: CPT

## 2024-03-25 PROCEDURE — 1210000000 HC MED SURG R&B

## 2024-03-25 PROCEDURE — 99232 SBSQ HOSP IP/OBS MODERATE 35: CPT | Performed by: INTERNAL MEDICINE

## 2024-03-25 PROCEDURE — 6360000002 HC RX W HCPCS: Performed by: INTERNAL MEDICINE

## 2024-03-25 PROCEDURE — 99223 1ST HOSP IP/OBS HIGH 75: CPT | Performed by: NURSE PRACTITIONER

## 2024-03-25 PROCEDURE — 83735 ASSAY OF MAGNESIUM: CPT

## 2024-03-25 PROCEDURE — 85025 COMPLETE CBC W/AUTO DIFF WBC: CPT

## 2024-03-25 RX ORDER — MORPHINE SULFATE 2 MG/ML
1 INJECTION, SOLUTION INTRAMUSCULAR; INTRAVENOUS
Status: DISCONTINUED | OUTPATIENT
Start: 2024-03-25 | End: 2024-03-27

## 2024-03-25 RX ORDER — HYDROMORPHONE HYDROCHLORIDE 2 MG/1
2 TABLET ORAL EVERY 4 HOURS PRN
Status: DISCONTINUED | OUTPATIENT
Start: 2024-03-25 | End: 2024-03-27

## 2024-03-25 RX ADMIN — MEROPENEM 1000 MG: 1 INJECTION, POWDER, FOR SOLUTION INTRAVENOUS at 18:35

## 2024-03-25 RX ADMIN — MORPHINE SULFATE 1 MG: 2 INJECTION, SOLUTION INTRAMUSCULAR; INTRAVENOUS at 23:38

## 2024-03-25 RX ADMIN — SERTRALINE 250 MG: 100 TABLET, FILM COATED ORAL at 10:37

## 2024-03-25 RX ADMIN — MEROPENEM 1000 MG: 1 INJECTION, POWDER, FOR SOLUTION INTRAVENOUS at 10:37

## 2024-03-25 RX ADMIN — ATORVASTATIN CALCIUM 40 MG: 40 TABLET, FILM COATED ORAL at 10:37

## 2024-03-25 RX ADMIN — MORPHINE SULFATE 1 MG: 2 INJECTION, SOLUTION INTRAMUSCULAR; INTRAVENOUS at 05:44

## 2024-03-25 RX ADMIN — MORPHINE SULFATE 1 MG: 2 INJECTION, SOLUTION INTRAMUSCULAR; INTRAVENOUS at 13:23

## 2024-03-25 RX ADMIN — MORPHINE SULFATE 1 MG: 2 INJECTION, SOLUTION INTRAMUSCULAR; INTRAVENOUS at 19:31

## 2024-03-25 RX ADMIN — MORPHINE SULFATE 1 MG: 2 INJECTION, SOLUTION INTRAMUSCULAR; INTRAVENOUS at 08:26

## 2024-03-25 RX ADMIN — MORPHINE SULFATE 1 MG: 2 INJECTION, SOLUTION INTRAMUSCULAR; INTRAVENOUS at 00:17

## 2024-03-25 RX ADMIN — FOLIC ACID 1000 MCG: 1 TABLET ORAL at 10:37

## 2024-03-25 RX ADMIN — HYDROMORPHONE HYDROCHLORIDE 2 MG: 2 TABLET ORAL at 21:15

## 2024-03-25 RX ADMIN — Medication 10 ML: at 21:32

## 2024-03-25 RX ADMIN — HYDROMORPHONE HYDROCHLORIDE 2 MG: 2 TABLET ORAL at 16:02

## 2024-03-25 RX ADMIN — AMITRIPTYLINE HYDROCHLORIDE 100 MG: 100 TABLET, FILM COATED ORAL at 21:15

## 2024-03-25 RX ADMIN — MORPHINE SULFATE 1 MG: 2 INJECTION, SOLUTION INTRAMUSCULAR; INTRAVENOUS at 02:12

## 2024-03-25 RX ADMIN — FAMOTIDINE 20 MG: 20 TABLET, FILM COATED ORAL at 10:37

## 2024-03-25 RX ADMIN — LEVOTHYROXINE SODIUM 125 MCG: 0.12 TABLET ORAL at 05:44

## 2024-03-25 RX ADMIN — HYDROMORPHONE HYDROCHLORIDE 2 MG: 2 TABLET ORAL at 10:45

## 2024-03-25 ASSESSMENT — PAIN SCALES - GENERAL
PAINLEVEL_OUTOF10: 10
PAINLEVEL_OUTOF10: 6
PAINLEVEL_OUTOF10: 9
PAINLEVEL_OUTOF10: 7
PAINLEVEL_OUTOF10: 8
PAINLEVEL_OUTOF10: 9

## 2024-03-25 ASSESSMENT — PAIN DESCRIPTION - ORIENTATION: ORIENTATION: LEFT

## 2024-03-25 ASSESSMENT — PAIN DESCRIPTION - LOCATION
LOCATION: NECK
LOCATION: THROAT;FACE
LOCATION: THROAT

## 2024-03-25 NOTE — PLAN OF CARE
Nutrition Problem #1: Severe malnutrition, In context of chronic illness  Intervention: Food and/or Nutrient Delivery: Start Tube Feeding (Boost VHC (pt has from home)  2 cartons (480 ml) TID  Recommend 180 ml  water flush before and after each bolus)

## 2024-03-25 NOTE — PROGRESS NOTES
Hospitalist Progress Note      PCP: Ivone Barnard MD    Date of Admission: 3/23/2024    Chief Complaint:  no acute events, afebrile, stable HD    Medications:  Reviewed    Infusion Medications    sodium chloride      sodium chloride Stopped (03/1960)     Scheduled Medications    sodium chloride flush  5-40 mL IntraVENous 2 times per day    atorvastatin  40 mg Oral Daily    famotidine  20 mg Per G Tube Daily    amitriptyline  100 mg Oral Nightly    folic acid  1,000 mcg Oral Daily    levothyroxine  125 mcg Oral Daily    sertraline  250 mg Oral Daily    meropenem  1,000 mg IntraVENous Q8H     PRN Meds: HYDROmorphone, morphine, sodium chloride flush, sodium chloride, potassium chloride **OR** potassium alternative oral replacement **OR** potassium chloride, magnesium sulfate, ondansetron **OR** ondansetron, acetaminophen **OR** acetaminophen, bisacodyl, sodium chloride      Intake/Output Summary (Last 24 hours) at 3/25/2024 1525  Last data filed at 3/25/2024 0550  Gross per 24 hour   Intake 3197.37 ml   Output --   Net 3197.37 ml       Exam:    BP (!) 154/94   Pulse 95   Temp 97.9 °F (36.6 °C)   Resp 20   Ht 1.626 m (5' 4\")   Wt 45.4 kg (100 lb)   SpO2 98%   BMI 17.16 kg/m²     General appearance: awake, cooperative.  Respiratory:  CTA bilaterally.  Cardiovascular: Regular rate and rhythm, S1/S2.  Abdomen: Soft, active bowel sounds, PEG tube is present.  Musculoskeletal: No edema bilaterally.    Labs:   Recent Labs     03/23/24 1930 03/25/24  0504   WBC 13.5* 12.8*   HGB 10.5* 9.4*   HCT 33.5* 30.4*   * PLATELET CLUMPS     Recent Labs     03/23/24 1930 03/24/24  0419 03/25/24  0504   * 140 141   K 3.8 3.2* 3.4   CL 91* 98 100   CO2 31 31 31   BUN 16 16 12   CREATININE 0.56 0.48* 0.40*   CALCIUM 9.9 8.7 8.6     Recent Labs     03/23/24 1930   AST 20   ALT 10   BILITOT 0.3   ALKPHOS 144*     No results for input(s): \"INR\" in the last 72 hours.  No results for input(s): \"CKTOTAL\",

## 2024-03-25 NOTE — CONSULTS
Cancer (HCC) 2005-TIP OF TONGUE    METS TO LEFT LYMPH NODE-SQUAMOUS    Cardiomyopathy (HCC) 3/6/2015    COPD (chronic obstructive pulmonary disease) (HCC)     COPD (chronic obstructive pulmonary disease) (HCC)     Dental disease     SEVERE DENTAL PROBLEMS    Diastolic dysfunction     DJD (degenerative joint disease), lumbar     Dyslipidemia 3/6/2015    Hepatitis B infection VIRAL-2006    Hepatitis C without mention of hepatic coma 2007-CHEMO    History of alcoholism (HCC)     History of osteopenia 2009    Hypothyroidism     ICD (implantable cardioverter-defibrillator) battery depletion     Tongue cancer (HCC)            Past Surgical History:   Procedure Laterality Date    CT NEEDLE BIOPSY LUNG PERCUTANEOUS  7/28/2023    CT NEEDLE BIOPSY LUNG PERCUTANEOUS 7/28/2023    GASTROSTOMY TUBE PLACEMENT N/A 8/10/2023    EGD REPLACE GASTROSTOMY TUBE ROOM 485 performed by Wale Wright MD at [facility] OR    LEG SURGERY  2009 MASS R LEG SOFT TISSUE    LUMBAR FUSION  2014    Dr. Rodriguez    OTHER SURGICAL HISTORY  09/16/15    R ÁLVARO ROBLES IMPLANT ICD         No current facility-administered medications on file prior to encounter.     Current Outpatient Medications on File Prior to Encounter   Medication Sig Dispense Refill    HYDROmorphone (DILAUDID) 4 MG tablet 0.5 tablets by PEG Tube route every 4 hours as needed for Pain for up to 14 days. Max Daily Amount: 12 mg 42 tablet 0    Hyoscyamine Sulfate SL (LEVSIN/SL) 0.125 MG SUBL Place 0.125 mg under the tongue every 6 hours as needed (secretions) 120 each 0    potassium chloride (KLOR-CON SPRINKLE) 10 MEQ extended release capsule Take 1 capsule by mouth daily (Patient not taking: Reported on 3/24/2024) 30 capsule 0    famotidine (PEPCID) 40 MG/5ML suspension 2.5 mLs by Per G Tube route daily 75 mL 0    levothyroxine (SYNTHROID) 125 MCG tablet Take 1 tablet by mouth daily      sertraline (ZOLOFT) 100 MG tablet Take 2.5 tablets by mouth daily      amitriptyline (ELAVIL) 100  MG tablet Take 1 tablet by mouth nightly      atorvastatin (LIPITOR) 40 MG tablet Take 1 tablet by mouth daily      diclofenac sodium (VOLTAREN) 1 % GEL Apply 4 g topically 2 times daily as needed for Pain (Patient not taking: Reported on 3/24/2024) 150 g 0    SPIRIVA HANDIHALER 18 MCG inhalation capsule Inhale 1 capsule into the lungs daily 90 capsule 3    folic acid (FOLVITE) 1 MG tablet TAKE 1 TABLET BY MOUTH EVERY DAY 90 tablet 2       Allergies   Allergen Reactions    Pcn [Penicillins] Anaphylaxis    Demerol Rash    Meperidine Rash         Family History   Problem Relation Age of Onset    High Blood Pressure Other     Diabetes Other     Cancer Other         UNCLE-LUNG         Physical Exam:     Blood pressure (!) 129/93, pulse 97, temperature 98.1 °F (36.7 °C), temperature source Oral, resp. rate 20, height 1.626 m (5' 4\"), weight 45.4 kg (100 lb), SpO2 94 %, not currently breastfeeding.  General: Patient appears ok at the present time. NAD  Skin: no new rashes, under chin anterior neck irregular small mass like area with some purulent drainage, surrounding induration  HEENT:  Neck is supple, No subconjunctival hemorrhages, no oral exudates  Heart: S1 S2  Lungs: clear bilaterally   Abdomen: soft, ND, NTTP,   Back :no CVA tenderness  Extrem: No edema, non tender  Neuro exam: CN II-XII intact  Psych: cooperative    Labs:  I have reviewed all lab results by electronic record, including most recent CBC, metabolic panel, and pertinent abnormalities were addressed from an infectious disease perspective.  Trends are being monitored over time.   Lab Results   Component Value Date    WBC 13.5 (H) 03/23/2024    HGB 10.5 (L) 03/23/2024    HCT 33.5 (L) 03/23/2024    MCV 85.5 03/23/2024     (H) 03/23/2024     Lab Results   Component Value Date/Time     03/24/2024 04:19 AM    K 3.2 03/24/2024 04:19 AM    CL 98 03/24/2024 04:19 AM    CO2 31 03/24/2024 04:19 AM    BUN 16 03/24/2024 04:19 AM    CREATININE 0.48

## 2024-03-25 NOTE — CONSULTS
Palliative Care Consult Note  Patient: Archana Melendez  Gender: female  YOB: 1960  Unit/Bed: W492/W492-01  CodeStatus: DNR-CCA  Inpatient Treatment Team: Treatment Team: Attending Provider: Janeth Escudero MD; Consulting Physician: Maria Eugenia Sebastian APRN - CNP; Consulting Physician: Arcadio Holley DO; Consulting Physician: Javi Hankins MD; Consulting Physician: Jayesh Iverson MD; : Veronique Huerta RN; Registered Nurse: Geeta Ocampo RN; Patient Care Tech: Brent Gale; : Ivone Kincaid LSW; Utilization Reviewer: Dennis Coombs RN  Admit Date:  3/23/2024    Chief Complaint: Cyst    History of Presenting Illness:      Archana Melendez is a 63 y.o. female on hospital day 1 with a history of HTN, HLD, COPD, diastolic HF, hep B and C, oral cancer with mets to lungs, alcoholism.    Patient was brought to the emergency room with cyst from cancer that ruptured about 2 months ago.  Patient states she has had increased drainage and bloody drainage.  Patient recently admitted to the hospital 2/11-2/14 due to facial cellulitis and abscess in the submandibular area that CT result showed progression of cancer.  Patient reports redness and swelling worsened after antibiotics.  Patient was admitted for tumor of oral cavity.    Palliative care was consulted for goals of care, CODE STATUS discussion, family support, and symptom management.  Patient follows with palliative care in the outpatient setting.     Patient lives at home alone. She is up independently. She is able to do all ADL's.   Patient admits to 30 weight loss over one year.     Upon entering room patient is in bed. She is alert and oriented x4. She denies any pain at this time. She reports the morphine IV does not help her pain. She was taking Dilaudid 2 mg q4h prn at home and that helped pain more. She denies nause,a fever, chills, sob. She has slight cough. She feels weak and fatigued.     I discussed goals of

## 2024-03-25 NOTE — PROGRESS NOTES
Comprehensive Nutrition Assessment    Type and Reason for Visit:  Initial, Consult (TF order and manage)    Nutrition Recommendations/Plan:   Boost VHC (pt has from home)   2 cartons (480 ml) TID   Recommend 180 ml water flush before and after each bolus      Malnutrition Assessment:  Malnutrition Status:  Severe malnutrition (03/25/24 1608)    Context:  Chronic Illness     Findings of the 6 clinical characteristics of malnutrition:  Energy Intake:  No significant decrease in energy intake  Weight Loss:  Greater than 20% over 1 year     Body Fat Loss:  Severe body fat loss Triceps   Muscle Mass Loss:  Severe muscle mass loss Clavicles (pectoralis & deltoids), Temples (temporalis), Calf (gastrocnemius)  Fluid Accumulation:  Unable to assess     Strength:  Not Performed    Nutrition Assessment:    Pt presents with severe protein calorie malnutrition in the chronic setting related to tongue cancer.  Noted ~25% wt loss over the past year.  Severe muscle wasting to the clavicles, temples and calf. Severe fat loss in the triceps. Pt is NPO and relies on PEG tube feedings for nutrition.  She states she is getting ~ 5-6 containers daily, which meets estimated nutrion needs. However, 7 lb (7%) wt loss over the past month noted.  Reports previous intolerance to this hospitals available standard formula. Pt wishes to use her TF formula from home    Nutrition Related Findings:    PMH of HTN, HLD, COPD, diastolic HF, hep B and C, oral cancer with mets to lungs, alcoholism, who presents to the emergency department with chief complaint of Cyst. Has PEG tube. labs reviewed, meds include synthroid, folvite, no edema noted.  Cachectic Wound Type:  (\"throat wound\")       Current Nutrition Intake & Therapies:    Average Meal Intake: NPO  Average Supplements Intake: None Ordered  Diet NPO  ADULT ORAL NUTRITION SUPPLEMENT; Breakfast, Lunch, Dinner, AM Snack, HS Snack; Other Oral Supplement; Boost  Current Tube Feeding (TF)      Sinai Schaffer, RD, LD

## 2024-03-25 NOTE — CARE COORDINATION
Case Management Assessment  Initial Evaluation    Date/Time of Evaluation: 3/25/2024 9:31 AM  Assessment Completed by: Lindsay Wolf RN    If patient is discharged prior to next notation, then this note serves as note for discharge by case management.    Patient Name: Archana Melendez                   YOB: 1960  Diagnosis: Oral cancer (HCC) [C06.9]  Tumor of oral cavity [D49.0]                   Date / Time: 3/23/2024  8:16 PM    Patient Admission Status: Inpatient   Readmission Risk (Low < 19, Mod (19-27), High > 27): Readmission Risk Score: 20.7    Current PCP: Ivone Barnard MD  PCP verified by CM? Yes    Chart Reviewed: Yes      History Provided by: Patient  Patient Orientation: Alert and Oriented    Patient Cognition: Alert    Hospitalization in the last 30 days (Readmission):  No    If yes, Readmission Assessment in CM Navigator will be completed.    Advance Directives:      Code Status: DNR-CCA   Patient's Primary Decision Maker is: Named in Scanned ACP Document    Primary Decision Maker (Active): Sol Boothe - Child - [phone]    Secondary Decision Maker: NoraCyril - Child - [phone]    Discharge Planning:    Patient lives with: Children, Family Members Type of Home: House  Primary Care Giver: Self  Patient Support Systems include: Children   Current Financial resources:    Current community resources:    Current services prior to admission: None            Current DME:              Type of Home Care services:  None    ADLS  Prior functional level: Independent in ADLs/IADLs  Current functional level: Independent in ADLs/IADLs    PT AM-PAC:   /24  OT AM-PAC:   /24    Family can provide assistance at DC: Yes  Would you like Case Management to discuss the discharge plan with any other family members/significant others, and if so, who? No  Plans to Return to Present Housing: Yes  Other Identified Issues/Barriers to RETURNING to current housing:   Potential Assistance needed

## 2024-03-26 ENCOUNTER — APPOINTMENT (OUTPATIENT)
Dept: INTERVENTIONAL RADIOLOGY/VASCULAR | Age: 64
DRG: 364 | End: 2024-03-26
Payer: COMMERCIAL

## 2024-03-26 LAB
ALBUMIN SERPL-MCNC: 3.4 G/DL (ref 3.5–4.6)
ANION GAP SERPL CALCULATED.3IONS-SCNC: 11 MEQ/L (ref 9–15)
BASOPHILS # BLD: 0.1 K/UL (ref 0–0.2)
BASOPHILS NFR BLD: 0.7 %
BUN SERPL-MCNC: 11 MG/DL (ref 8–23)
CALCIUM SERPL-MCNC: 8.9 MG/DL (ref 8.5–9.9)
CHLORIDE SERPL-SCNC: 97 MEQ/L (ref 95–107)
CO2 SERPL-SCNC: 30 MEQ/L (ref 20–31)
CREAT SERPL-MCNC: 0.39 MG/DL (ref 0.5–0.9)
CRP SERPL HS-MCNC: 57.8 MG/L (ref 0–5)
EOSINOPHIL # BLD: 0.2 K/UL (ref 0–0.7)
EOSINOPHIL NFR BLD: 1.3 %
ERYTHROCYTE [DISTWIDTH] IN BLOOD BY AUTOMATED COUNT: 15.9 % (ref 11.5–14.5)
GLUCOSE SERPL-MCNC: 116 MG/DL (ref 70–99)
HCT VFR BLD AUTO: 33.4 % (ref 37–47)
HGB BLD-MCNC: 10.4 G/DL (ref 12–16)
LYMPHOCYTES # BLD: 1 K/UL (ref 1–4.8)
LYMPHOCYTES NFR BLD: 6.5 %
MAGNESIUM SERPL-MCNC: 1.9 MG/DL (ref 1.7–2.4)
MCH RBC QN AUTO: 26.3 PG (ref 27–31.3)
MCHC RBC AUTO-ENTMCNC: 31.1 % (ref 33–37)
MCV RBC AUTO: 84.6 FL (ref 79.4–94.8)
MONOCYTES # BLD: 1.2 K/UL (ref 0.2–0.8)
MONOCYTES NFR BLD: 7.8 %
NEUTROPHILS # BLD: 12.7 K/UL (ref 1.4–6.5)
NEUTS SEG NFR BLD: 83.3 %
PHOSPHATE SERPL-MCNC: 2.2 MG/DL (ref 2.3–4.8)
PLATELET # BLD AUTO: 460 K/UL (ref 130–400)
POTASSIUM SERPL-SCNC: 3.7 MEQ/L (ref 3.4–4.9)
RBC # BLD AUTO: 3.95 M/UL (ref 4.2–5.4)
SODIUM SERPL-SCNC: 138 MEQ/L (ref 135–144)
WBC # BLD AUTO: 15.2 K/UL (ref 4.8–10.8)

## 2024-03-26 PROCEDURE — 2580000003 HC RX 258

## 2024-03-26 PROCEDURE — 36410 VNPNXR 3YR/> PHY/QHP DX/THER: CPT

## 2024-03-26 PROCEDURE — 6360000002 HC RX W HCPCS: Performed by: NURSE PRACTITIONER

## 2024-03-26 PROCEDURE — 85025 COMPLETE CBC W/AUTO DIFF WBC: CPT

## 2024-03-26 PROCEDURE — 1210000000 HC MED SURG R&B

## 2024-03-26 PROCEDURE — 2500000003 HC RX 250 WO HCPCS: Performed by: INTERNAL MEDICINE

## 2024-03-26 PROCEDURE — 0J950ZZ DRAINAGE OF LEFT NECK SUBCUTANEOUS TISSUE AND FASCIA, OPEN APPROACH: ICD-10-PCS | Performed by: OTOLARYNGOLOGY

## 2024-03-26 PROCEDURE — 99232 SBSQ HOSP IP/OBS MODERATE 35: CPT | Performed by: INTERNAL MEDICINE

## 2024-03-26 PROCEDURE — 83735 ASSAY OF MAGNESIUM: CPT

## 2024-03-26 PROCEDURE — 6370000000 HC RX 637 (ALT 250 FOR IP)

## 2024-03-26 PROCEDURE — 80069 RENAL FUNCTION PANEL: CPT

## 2024-03-26 PROCEDURE — 2709999900 IR MIDLINE CATH

## 2024-03-26 PROCEDURE — 2580000003 HC RX 258: Performed by: INTERNAL MEDICINE

## 2024-03-26 PROCEDURE — 86140 C-REACTIVE PROTEIN: CPT

## 2024-03-26 PROCEDURE — 99232 SBSQ HOSP IP/OBS MODERATE 35: CPT

## 2024-03-26 PROCEDURE — 6370000000 HC RX 637 (ALT 250 FOR IP): Performed by: NURSE PRACTITIONER

## 2024-03-26 PROCEDURE — 6360000002 HC RX W HCPCS: Performed by: INTERNAL MEDICINE

## 2024-03-26 PROCEDURE — 05HB33Z INSERTION OF INFUSION DEVICE INTO RIGHT BASILIC VEIN, PERCUTANEOUS APPROACH: ICD-10-PCS | Performed by: INTERNAL MEDICINE

## 2024-03-26 PROCEDURE — 36415 COLL VENOUS BLD VENIPUNCTURE: CPT

## 2024-03-26 RX ORDER — SODIUM CHLORIDE 9 MG/ML
INJECTION, SOLUTION INTRAVENOUS PRN
Status: DISCONTINUED | OUTPATIENT
Start: 2024-03-26 | End: 2024-03-31 | Stop reason: HOSPADM

## 2024-03-26 RX ORDER — SODIUM CHLORIDE 0.9 % (FLUSH) 0.9 %
5-40 SYRINGE (ML) INJECTION EVERY 12 HOURS SCHEDULED
Status: DISCONTINUED | OUTPATIENT
Start: 2024-03-26 | End: 2024-03-31 | Stop reason: HOSPADM

## 2024-03-26 RX ORDER — SODIUM CHLORIDE 9 MG/ML
250 INJECTION, SOLUTION INTRAVENOUS ONCE
Status: COMPLETED | OUTPATIENT
Start: 2024-03-26 | End: 2024-03-26

## 2024-03-26 RX ORDER — SODIUM CHLORIDE 0.9 % (FLUSH) 0.9 %
5-40 SYRINGE (ML) INJECTION PRN
Status: DISCONTINUED | OUTPATIENT
Start: 2024-03-26 | End: 2024-03-31 | Stop reason: HOSPADM

## 2024-03-26 RX ORDER — LIDOCAINE HYDROCHLORIDE 20 MG/ML
5 INJECTION, SOLUTION INFILTRATION; PERINEURAL ONCE
Status: COMPLETED | OUTPATIENT
Start: 2024-03-26 | End: 2024-03-26

## 2024-03-26 RX ADMIN — AMITRIPTYLINE HYDROCHLORIDE 100 MG: 100 TABLET, FILM COATED ORAL at 20:33

## 2024-03-26 RX ADMIN — Medication 10 ML: at 20:33

## 2024-03-26 RX ADMIN — HYDROMORPHONE HYDROCHLORIDE 2 MG: 2 TABLET ORAL at 23:01

## 2024-03-26 RX ADMIN — MEROPENEM 1000 MG: 1 INJECTION, POWDER, FOR SOLUTION INTRAVENOUS at 10:19

## 2024-03-26 RX ADMIN — SERTRALINE 250 MG: 100 TABLET, FILM COATED ORAL at 08:43

## 2024-03-26 RX ADMIN — HYDROMORPHONE HYDROCHLORIDE 2 MG: 2 TABLET ORAL at 18:34

## 2024-03-26 RX ADMIN — FOLIC ACID 1000 MCG: 1 TABLET ORAL at 08:44

## 2024-03-26 RX ADMIN — HYDROMORPHONE HYDROCHLORIDE 2 MG: 2 TABLET ORAL at 02:30

## 2024-03-26 RX ADMIN — MORPHINE SULFATE 1 MG: 2 INJECTION, SOLUTION INTRAMUSCULAR; INTRAVENOUS at 05:26

## 2024-03-26 RX ADMIN — HYDROMORPHONE HYDROCHLORIDE 2 MG: 2 TABLET ORAL at 11:27

## 2024-03-26 RX ADMIN — ATORVASTATIN CALCIUM 40 MG: 40 TABLET, FILM COATED ORAL at 08:44

## 2024-03-26 RX ADMIN — SODIUM CHLORIDE 250 ML: 9 INJECTION, SOLUTION INTRAVENOUS at 15:22

## 2024-03-26 RX ADMIN — Medication 10 ML: at 08:43

## 2024-03-26 RX ADMIN — MEROPENEM 1000 MG: 1 INJECTION, POWDER, FOR SOLUTION INTRAVENOUS at 02:42

## 2024-03-26 RX ADMIN — MEROPENEM 1000 MG: 1 INJECTION, POWDER, FOR SOLUTION INTRAVENOUS at 18:31

## 2024-03-26 RX ADMIN — MORPHINE SULFATE 1 MG: 2 INJECTION, SOLUTION INTRAMUSCULAR; INTRAVENOUS at 08:42

## 2024-03-26 RX ADMIN — LEVOTHYROXINE SODIUM 125 MCG: 0.12 TABLET ORAL at 06:11

## 2024-03-26 RX ADMIN — LIDOCAINE HYDROCHLORIDE 5 ML: 20 INJECTION, SOLUTION INFILTRATION; PERINEURAL at 15:23

## 2024-03-26 RX ADMIN — MORPHINE SULFATE 1 MG: 2 INJECTION, SOLUTION INTRAMUSCULAR; INTRAVENOUS at 17:28

## 2024-03-26 RX ADMIN — FAMOTIDINE 20 MG: 20 TABLET, FILM COATED ORAL at 08:44

## 2024-03-26 ASSESSMENT — PAIN SCALES - GENERAL
PAINLEVEL_OUTOF10: 7
PAINLEVEL_OUTOF10: 0
PAINLEVEL_OUTOF10: 10
PAINLEVEL_OUTOF10: 9
PAINLEVEL_OUTOF10: 10
PAINLEVEL_OUTOF10: 8
PAINLEVEL_OUTOF10: 8
PAINLEVEL_OUTOF10: 10
PAINLEVEL_OUTOF10: 8

## 2024-03-26 ASSESSMENT — PAIN DESCRIPTION - DESCRIPTORS
DESCRIPTORS: ACHING
DESCRIPTORS: DISCOMFORT
DESCRIPTORS: THROBBING

## 2024-03-26 ASSESSMENT — PAIN DESCRIPTION - LOCATION
LOCATION: JAW
LOCATION: NECK
LOCATION: JAW
LOCATION: NECK
LOCATION: FACE;JAW
LOCATION: JAW
LOCATION: FACE;JAW

## 2024-03-26 ASSESSMENT — PAIN DESCRIPTION - ORIENTATION
ORIENTATION: LEFT

## 2024-03-26 ASSESSMENT — PAIN - FUNCTIONAL ASSESSMENT
PAIN_FUNCTIONAL_ASSESSMENT: PREVENTS OR INTERFERES SOME ACTIVE ACTIVITIES AND ADLS
PAIN_FUNCTIONAL_ASSESSMENT: PREVENTS OR INTERFERES SOME ACTIVE ACTIVITIES AND ADLS

## 2024-03-26 NOTE — PROGRESS NOTES
Hospitalist Progress Note      PCP: Ivone Barnard MD    Date of Admission: 3/23/2024    Chief Complaint:  no acute events, afebrile, stable HD, purulent drainage noted during dressing change per nursing staff     Medications:  Reviewed    Infusion Medications    sodium chloride       Scheduled Medications    sodium chloride flush  5-40 mL IntraVENous 2 times per day    atorvastatin  40 mg Oral Daily    famotidine  20 mg Per G Tube Daily    amitriptyline  100 mg Oral Nightly    folic acid  1,000 mcg Oral Daily    levothyroxine  125 mcg Oral Daily    sertraline  250 mg Oral Daily    meropenem  1,000 mg IntraVENous Q8H     PRN Meds: HYDROmorphone, morphine, sodium chloride flush, sodium chloride, potassium chloride **OR** potassium alternative oral replacement **OR** potassium chloride, magnesium sulfate, ondansetron **OR** ondansetron, acetaminophen **OR** acetaminophen, bisacodyl, sodium chloride      Intake/Output Summary (Last 24 hours) at 3/26/2024 1309  Last data filed at 3/26/2024 0852  Gross per 24 hour   Intake 350 ml   Output --   Net 350 ml         Exam:    BP (!) 116/94   Pulse (!) 105   Temp 98.1 °F (36.7 °C) (Oral)   Resp 17   Ht 1.626 m (5' 4\")   Wt 45.4 kg (100 lb)   SpO2 99%   BMI 17.16 kg/m²     General appearance: awake, cooperative.  Respiratory:  CTA bilaterally.  Cardiovascular: Regular rate and rhythm, S1/S2.  Abdomen: Soft, active bowel sounds, PEG tube is present.  Musculoskeletal: No edema bilaterally.    Labs:   Recent Labs     03/23/24  1930 03/25/24  0504 03/26/24  0514   WBC 13.5* 12.8* 15.2*   HGB 10.5* 9.4* 10.4*   HCT 33.5* 30.4* 33.4*   * PLATELET CLUMPS 460*       Recent Labs     03/24/24  0419 03/25/24  0504 03/26/24  0514    141 138   K 3.2* 3.4 3.7   CL 98 100 97   CO2 31 31 30   BUN 16 12 11   CREATININE 0.48* 0.40* 0.39*   CALCIUM 8.7 8.6 8.9   PHOS  --   --  2.2*       Recent Labs     03/23/24 1930   AST 20   ALT 10   BILITOT 0.3   ALKPHOS 144*

## 2024-03-26 NOTE — CARE COORDINATION
SPOKE W/PT TO ASSESS NEEDS AND DISCUSS DISCHARGE PLAN. SHE WOULD LIKE TO RETURN HOME W/[facility]. FOC OFFERED. REFERRAL SENT TO [facility] SN IF IV ABX NEEDED. WILL FOLLOW.

## 2024-03-26 NOTE — PROGRESS NOTES
Hematology/Oncology   Progress Note        CHIEF COMPLAINT/HPI:  Follow up of head and neck cancer with abscess on broad spectrum antibiotics. Hemoglobin at 10.5.    REVIEW OF SYSTEMS:    Unremarkable except for symptoms mentioned in HPI.    Current Inpatient Medications:    Current Facility-Administered Medications   Medication Dose Route Frequency Provider Last Rate Last Admin    sodium chloride flush 0.9 % injection 5-40 mL  5-40 mL IntraVENous 2 times per day Janeth Escudero MD        sodium chloride flush 0.9 % injection 5-40 mL  5-40 mL IntraVENous PRN Janeth Escudero MD        0.9 % sodium chloride infusion   IntraVENous PRN Janeth Escudero MD        HYDROmorphone (DILAUDID) tablet 2 mg  2 mg Oral Q4H PRN Daylin Johnston APRN - CNP   2 mg at 03/26/24 1127    morphine (PF) injection 1 mg  1 mg IntraVENous Q2H PRN Daylin Johnston APRN - CNP   1 mg at 03/26/24 0842    sodium chloride flush 0.9 % injection 5-40 mL  5-40 mL IntraVENous 2 times per day Davidisov Dorene, APRN - CNP   10 mL at 03/26/24 0843    sodium chloride flush 0.9 % injection 5-40 mL  5-40 mL IntraVENous PRN Borisov Dorene, APRN - CNP        0.9 % sodium chloride infusion   IntraVENous PRN Borisov, Dorene, APRN - CNP        potassium chloride (KLOR-CON M) extended release tablet 40 mEq  40 mEq Oral PRN Borisov, Dorene, APRN - CNP        Or    potassium bicarb-citric acid (EFFER-K) effervescent tablet 40 mEq  40 mEq Oral PRN Borisov, Dorene, APRN - CNP   40 mEq at 03/24/24 0818    Or    potassium chloride 10 mEq/100 mL IVPB (Peripheral Line)  10 mEq IntraVENous PRN Borisov, Dorene, APRN - CNP        magnesium sulfate 2000 mg in 50 mL IVPB premix  2,000 mg IntraVENous PRN Borisov, Dorene, APRN - CNP   Stopped at 03/24/24 1015    ondansetron (ZOFRAN-ODT) disintegrating tablet 4 mg  4 mg Oral Q8H PRN Aaliyahv Dorene APRN - CNP        Or    ondansetron (ZOFRAN) injection 4 mg  4 mg IntraVENous Q6H PRN Dorene Yeboah, APRN - CNP         Hypothyroidism    Insomnia    Itching    Osteoarthritis    Reflux esophagitis    Mixed hyperlipidemia    Internal derangement of right knee    Lumbar radiculitis    Cardiomyopathy (HCC)    Premature osteoporosis    Hypercholesterolemia    ICD (implantable cardioverter-defibrillator) battery depletion    Diastolic dysfunction    Chronic left shoulder pain    Subacromial impingement of left shoulder    Subacromial bursitis of left shoulder joint    Pneumonia of left lung due to infectious organism    Community acquired pneumonia of left upper lobe of lung    Acute respiratory failure with hypoxia (HCC)    Palliative care encounter    Goals of care, counseling/discussion    Full code status    Advanced care planning/counseling discussion    Anxiety about health    Neoplasm related pain    Neck pain    Facial cellulitis    Skin ulcer of neck (HCC)    Immunosuppression due to drug therapy (HCC)    Tongue cancer (HCC)    Allergy to penicillin    Cancer related pain    Encounter for hospice care discussion    Neck abscess    Severe malnutrition (HCC)    Facial infection    Oral cancer (HCC)    Neoplasm of face    Abscess of tongue    Tumor of oral cavity       PLAN:  Continue antibiotics.          Electronically signed by Esteban Haynes MD on 3/26/24 at 4:33 PM EDT

## 2024-03-26 NOTE — PROGRESS NOTES
Dressing to neck changed d/t  being saturated. Large amount of purulent drainage noted. Tolerated procedure well.

## 2024-03-26 NOTE — FLOWSHEET NOTE
Call to Zakiya CANCHOLA to notify that patients midline dressing has a celox pad to it, which will need changed in 24 hrs. Also notified that pressure dressing placed to R arm midline dressing site for mild oozing despite manual pressure. Electronically signed by Sade Schofield RN on 3/26/2024 at 3:50 PM

## 2024-03-26 NOTE — CARE COORDINATION
RECEIVED UPDATE FROM CONNIE AT [facility] INFORMING THE [facility] WILL DISCUSS SNF OPTIONS WITH PT. WILL FOLLOW FOR CHOICE. PROVIDED SNF LIST. WILL FOLLOW ON CHOICES.

## 2024-03-26 NOTE — PLAN OF CARE
Problem: Discharge Planning  Goal: Discharge to home or other facility with appropriate resources  Outcome: Progressing  Flowsheets (Taken 3/26/2024 9764)  Discharge to home or other facility with appropriate resources:   Arrange for needed discharge resources and transportation as appropriate   Identify discharge learning needs (meds, wound care, etc)   Identify barriers to discharge with patient and caregiver     Problem: Skin/Tissue Integrity  Goal: Absence of new skin breakdown  Description: 1.  Monitor for areas of redness and/or skin breakdown  2.  Assess vascular access sites hourly  3.  Every 4-6 hours minimum:  Change oxygen saturation probe site  4.  Every 4-6 hours:  If on nasal continuous positive airway pressure, respiratory therapy assess nares and determine need for appliance change or resting period.  Outcome: Progressing     Problem: Safety - Adult  Goal: Free from fall injury  Outcome: Progressing     Problem: Pain  Goal: Verbalizes/displays adequate comfort level or baseline comfort level  Outcome: Progressing     Problem: Nutrition Deficit:  Goal: Optimize nutritional status  Outcome: Progressing      Home

## 2024-03-26 NOTE — PROGRESS NOTES
Infectious Disease Progress Note       3/25/2024    Patient is a followup regarding swelling left face with history of recurrent laryngeal cancer status post radical neck dissection and radiation.  Patient has allergy to penicillin, currently on meropenem.  Started on 3/23/2024.  Planning on 7 days course.  IV vancomycin was discontinued today this morning    Subjectively, complains of some subjective increased swelling.  No fevers no chills.  No nausea.          Lab Results   Component Value Date    WBC 12.8 (H) 03/25/2024    HGB 9.4 (L) 03/25/2024    HCT 30.4 (L) 03/25/2024    MCV 85.9 03/25/2024    PLT PLATELET CLUMPS 03/25/2024     Lab Results   Component Value Date/Time     03/25/2024 05:04 AM    K 3.4 03/25/2024 05:04 AM     03/25/2024 05:04 AM    CO2 31 03/25/2024 05:04 AM    BUN 12 03/25/2024 05:04 AM    CREATININE 0.40 03/25/2024 05:04 AM    GLUCOSE 100 03/25/2024 05:04 AM    CALCIUM 8.6 03/25/2024 05:04 AM    LABGLOM >60.0 03/25/2024 05:04 AM        WBC trends are being monitored. Antibiotic doses are being adjusted per most recent renal labs.     Vitals:    03/25/24 2145   BP:    Pulse:    Resp: 18   Temp:    SpO2:        Patient is awake and alert, NAD  Ears look ok.  Deformity mouth and cheek left side large draining mass left neck, left facial redness is coming down but  and very swollen.  Neck supple  Chest equal expansion, clear  Heart S1S2  Abdomen ND, NTTP  Extrem no new changes, no pain  Expressions symmetrical  No obvious rashes.   Mood and affect appropriate.       Patient Active Problem List   Diagnosis    COPD (chronic obstructive pulmonary disease) (HCC)    Hepatitis C virus infection    Hypothyroidism    Insomnia    Itching    Osteoarthritis    Reflux esophagitis    Mixed hyperlipidemia    Internal derangement of right knee    Lumbar radiculitis    Cardiomyopathy (HCC)    Premature osteoporosis    Hypercholesterolemia    ICD (implantable cardioverter-defibrillator)

## 2024-03-26 NOTE — OP NOTE
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    PROCEDURE:  Incision and drainage of neck abscess.    ANESTHESIA:  None.    INDICATION:  The patient is a 63-year-old female with a history of malignant squamous cell carcinoma of the tongue with extension into the neck with evident tumor necrosis superimposed with abscess formation, now being seen for incision and drainage of same.  She is evaluated in her bed space with prominent fullness and requirement for abscess drainage.    DESCRIPTION OF PROCEDURE:  After sterile prep and drape, the patient had the abscess area opened by opening up multiple loculated areas with general compression after opening the original site on the skin.  This was done with a combination of needle and pressure.  The patient had extensive material evacuated and a pressure dressing applied.  It is clearly evident that this is all related to malignancy.  There is clearly necrotic malignant tumor present in the specimen.  There is abscess formation, but the vast majority of this is tumor necrosis.  Therefore, after the dressing was applied, the patient was allowed to be remaining in her bed space on the 4th floor, bed space 492, and will be cared for by Nursing Service.    ESTIMATED BLOOD LOSS:  Minimal.    COMPLICATIONS:  None.          JAYESH IVERSON MD      D:  2024 14:41:39     T:  2024 17:53:59     ALINE/AQS  Job #:  [id number]     Doc#:  [id number]    CC:   Jayesh Iverson MD

## 2024-03-26 NOTE — PROGRESS NOTES
Palliative Care Progress Note  Patient: Archana Melendez  Gender: female  YOB: 1960  Unit/Bed: W492/W492-01  CodeStatus: DNR-CCA  Inpatient Treatment Team: Treatment Team: Attending Provider: Janeth Escudero MD; Consulting Physician: Maria Eugenia Sebastian APRN - CNP; Consulting Physician: Arcadio Holley DO; Consulting Physician: Javi Hankins MD; Consulting Physician: Jayesh Iverson MD; : Veronique Huerta RN; Utilization Reviewer: Dennis Coombs, RN; Patient Care Tech: Carrie Cervantes; Registered Nurse: Gin Taveras, RN; Registered Nurse: Jamia Liriano RN; : Ivone Kincaid LSW  Admit Date:  3/23/2024    Chief Complaint: Cyst    History of Presenting Illness:      Archana Melendez is a 63 y.o. 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    Patient was brought to the emergency room with cyst from cancer that ruptured about 2 months ago.  Patient states she has had increased drainage and bloody drainage.  Patient recently admitted to the hospital 2/11-2/14 due to facial cellulitis and abscess in the submandibular area that CT result showed progression of cancer.  Patient reports redness and swelling worsened after antibiotics.  Patient was admitted for tumor of oral cavity.    Palliative care was consulted for goals of care, CODE STATUS discussion, family support, and symptom management.  Patient follows with palliative care in the outpatient setting.     Patient lives at home alone. She is up independently. She is able to do all ADL's.   Patient admits to 30 weight loss over one year.     Upon entering room patient is in bed. She is alert and oriented x4. She denies any pain at this time. She reports the morphine IV does not help her pain. She was taking Dilaudid 2 mg q4h prn at home and that helped pain more. She denies nause,a fever, chills, sob. She has slight cough. She feels weak and

## 2024-03-26 NOTE — CARE COORDINATION
IV BENEFIT REQUEST FORM    FAX FROM: [address] [address]    REQUESTED BY: Electronically signed by Veronique Huerta RN on 3/26/2024 at 8:43 AM                                               RN/C3: PHONE: [phone])     DATE:/TIME OF REQUEST: 24  TIME: 8:43 AM      TO: [facility]      FAX TO: [phone]    PHONE: [phone]     THIS PATIENT HAS BEEN IDENTIFIED TO POSSIBLY NEED LONG TERM IV'S.    PLEASE CHECK INSURANCE COVERAGE FOR THE FOLLOWING PT/DRUGS.    PATIENT'S NAME: JELANI ADDISON                              ROOM: [place] [address]   PATIENT'S : 1960  PATIENT ADDRESS: [address] [place], [place] [id number]  SSN:    ([id number])     PAYOR NAME:  Payor: [facility] / Plan: [facility] MEDICAID / Product Type: *No Product type* /     DRUG: (MEROPENEM)                         DOSE: (1,000MG)            FREQUENCY: Q (8) HR    __________ CHECK HERE IF PT HAS NO INSURANCE AND REQUESTING SELF PAY COST.    *IF [facility] IS NOT A PROVIDER FOR THIS PATIENT, PLEASE FORWARD INFO VIA FAX TO [facility] @ [phone],(PHONE NUMBER: [phone]) TO RUN BENEFIT VERIFICATION AND NOTIFY THE ABOVE C3 OF THIS PLAN.    (FAX FACE SHEET WITH DEMOGRAPHICS AND INSURANCE INFO WITH THIS FORM.)  PLEASE FAX BENEFIT INFO TO: THE [facility] 4 [phone]    This message is intended only for the use of the individual or entity to which it is addressed and may contain information that is privileged, confidential, and exempt from disclosure under applicable law. If the reader of the notice is not intended recipient of the employee/agent responsible for delivering the message to the intended recipient, you are hereby notified than any dissemination, distribution or copying of this communication is strictly prohibited. Please contact the sender for further instructions on handling the

## 2024-03-26 NOTE — PROGRESS NOTES
Shift assessments complete, see flow sheets. Pt alert and oriented x 4, up independently. Medication administered per MAR, VSS. Ptable to make needs and wants known. No further needs verbalized at this time. Call light left within reach.

## 2024-03-27 LAB
ALBUMIN SERPL-MCNC: 3.4 G/DL (ref 3.5–4.6)
ANION GAP SERPL CALCULATED.3IONS-SCNC: 11 MEQ/L (ref 9–15)
BASOPHILS # BLD: 0.1 K/UL (ref 0–0.2)
BASOPHILS NFR BLD: 0.5 %
BUN SERPL-MCNC: 14 MG/DL (ref 8–23)
CALCIUM SERPL-MCNC: 8.8 MG/DL (ref 8.5–9.9)
CHLORIDE SERPL-SCNC: 92 MEQ/L (ref 95–107)
CO2 SERPL-SCNC: 31 MEQ/L (ref 20–31)
CREAT SERPL-MCNC: 0.38 MG/DL (ref 0.5–0.9)
CRP SERPL HS-MCNC: 45.4 MG/L (ref 0–5)
EOSINOPHIL # BLD: 0.3 K/UL (ref 0–0.7)
EOSINOPHIL NFR BLD: 1.9 %
ERYTHROCYTE [DISTWIDTH] IN BLOOD BY AUTOMATED COUNT: 16.1 % (ref 11.5–14.5)
GLUCOSE SERPL-MCNC: 122 MG/DL (ref 70–99)
HCT VFR BLD AUTO: 33.1 % (ref 37–47)
HGB BLD-MCNC: 10.3 G/DL (ref 12–16)
LYMPHOCYTES # BLD: 1.1 K/UL (ref 1–4.8)
LYMPHOCYTES NFR BLD: 6.6 %
MAGNESIUM SERPL-MCNC: 2 MG/DL (ref 1.7–2.4)
MCH RBC QN AUTO: 26.5 PG (ref 27–31.3)
MCHC RBC AUTO-ENTMCNC: 31.1 % (ref 33–37)
MCV RBC AUTO: 85.1 FL (ref 79.4–94.8)
MONOCYTES # BLD: 1.3 K/UL (ref 0.2–0.8)
MONOCYTES NFR BLD: 7.7 %
NEUTROPHILS # BLD: 13.9 K/UL (ref 1.4–6.5)
NEUTS SEG NFR BLD: 82.9 %
PHOSPHATE SERPL-MCNC: 1.9 MG/DL (ref 2.3–4.8)
PLATELET # BLD AUTO: 509 K/UL (ref 130–400)
POTASSIUM SERPL-SCNC: 2.9 MEQ/L (ref 3.4–4.9)
RBC # BLD AUTO: 3.89 M/UL (ref 4.2–5.4)
SODIUM SERPL-SCNC: 134 MEQ/L (ref 135–144)
WBC # BLD AUTO: 16.7 K/UL (ref 4.8–10.8)

## 2024-03-27 PROCEDURE — 6370000000 HC RX 637 (ALT 250 FOR IP): Performed by: NURSE PRACTITIONER

## 2024-03-27 PROCEDURE — 83735 ASSAY OF MAGNESIUM: CPT

## 2024-03-27 PROCEDURE — 6370000000 HC RX 637 (ALT 250 FOR IP): Performed by: INTERNAL MEDICINE

## 2024-03-27 PROCEDURE — 99232 SBSQ HOSP IP/OBS MODERATE 35: CPT

## 2024-03-27 PROCEDURE — 36415 COLL VENOUS BLD VENIPUNCTURE: CPT

## 2024-03-27 PROCEDURE — 6360000002 HC RX W HCPCS: Performed by: INTERNAL MEDICINE

## 2024-03-27 PROCEDURE — 2580000003 HC RX 258: Performed by: INTERNAL MEDICINE

## 2024-03-27 PROCEDURE — 80069 RENAL FUNCTION PANEL: CPT

## 2024-03-27 PROCEDURE — 85025 COMPLETE CBC W/AUTO DIFF WBC: CPT

## 2024-03-27 PROCEDURE — 1210000000 HC MED SURG R&B

## 2024-03-27 PROCEDURE — 6370000000 HC RX 637 (ALT 250 FOR IP)

## 2024-03-27 PROCEDURE — 6360000002 HC RX W HCPCS

## 2024-03-27 PROCEDURE — 99232 SBSQ HOSP IP/OBS MODERATE 35: CPT | Performed by: INTERNAL MEDICINE

## 2024-03-27 PROCEDURE — 6360000002 HC RX W HCPCS: Performed by: NURSE PRACTITIONER

## 2024-03-27 PROCEDURE — 86140 C-REACTIVE PROTEIN: CPT

## 2024-03-27 RX ORDER — HYDROMORPHONE HYDROCHLORIDE 2 MG/1
4 TABLET ORAL EVERY 4 HOURS PRN
Status: DISCONTINUED | OUTPATIENT
Start: 2024-03-27 | End: 2024-03-31 | Stop reason: HOSPADM

## 2024-03-27 RX ORDER — LIDOCAINE HYDROCHLORIDE 20 MG/ML
15 SOLUTION OROPHARYNGEAL
Status: DISCONTINUED | OUTPATIENT
Start: 2024-03-27 | End: 2024-03-31 | Stop reason: HOSPADM

## 2024-03-27 RX ORDER — POTASSIUM CHLORIDE 7.45 MG/ML
10 INJECTION INTRAVENOUS
Status: DISPENSED | OUTPATIENT
Start: 2024-03-27 | End: 2024-03-27

## 2024-03-27 RX ADMIN — POTASSIUM CHLORIDE 10 MEQ: 7.46 INJECTION, SOLUTION INTRAVENOUS at 14:46

## 2024-03-27 RX ADMIN — HYDROMORPHONE HYDROCHLORIDE 0.5 MG: 1 INJECTION, SOLUTION INTRAMUSCULAR; INTRAVENOUS; SUBCUTANEOUS at 22:54

## 2024-03-27 RX ADMIN — POTASSIUM CHLORIDE 10 MEQ: 7.46 INJECTION, SOLUTION INTRAVENOUS at 16:36

## 2024-03-27 RX ADMIN — HYDROMORPHONE HYDROCHLORIDE 2 MG: 2 TABLET ORAL at 14:49

## 2024-03-27 RX ADMIN — HYDROMORPHONE HYDROCHLORIDE 2 MG: 2 TABLET ORAL at 05:15

## 2024-03-27 RX ADMIN — HYDROMORPHONE HYDROCHLORIDE 2 MG: 2 TABLET ORAL at 09:45

## 2024-03-27 RX ADMIN — SERTRALINE 250 MG: 100 TABLET, FILM COATED ORAL at 08:20

## 2024-03-27 RX ADMIN — MEROPENEM 1000 MG: 1 INJECTION, POWDER, FOR SOLUTION INTRAVENOUS at 20:07

## 2024-03-27 RX ADMIN — ATORVASTATIN CALCIUM 40 MG: 40 TABLET, FILM COATED ORAL at 08:20

## 2024-03-27 RX ADMIN — FOLIC ACID 1000 MCG: 1 TABLET ORAL at 08:20

## 2024-03-27 RX ADMIN — MORPHINE SULFATE 1 MG: 2 INJECTION, SOLUTION INTRAMUSCULAR; INTRAVENOUS at 02:03

## 2024-03-27 RX ADMIN — FAMOTIDINE 20 MG: 20 TABLET, FILM COATED ORAL at 08:20

## 2024-03-27 RX ADMIN — MORPHINE SULFATE 1 MG: 2 INJECTION, SOLUTION INTRAMUSCULAR; INTRAVENOUS at 07:02

## 2024-03-27 RX ADMIN — MEROPENEM 1000 MG: 1 INJECTION, POWDER, FOR SOLUTION INTRAVENOUS at 02:07

## 2024-03-27 RX ADMIN — HYDROMORPHONE HYDROCHLORIDE 4 MG: 2 TABLET ORAL at 20:42

## 2024-03-27 RX ADMIN — AMITRIPTYLINE HYDROCHLORIDE 100 MG: 100 TABLET, FILM COATED ORAL at 20:42

## 2024-03-27 RX ADMIN — LEVOTHYROXINE SODIUM 125 MCG: 0.12 TABLET ORAL at 05:15

## 2024-03-27 RX ADMIN — MEROPENEM 1000 MG: 1 INJECTION, POWDER, FOR SOLUTION INTRAVENOUS at 09:45

## 2024-03-27 RX ADMIN — MORPHINE SULFATE 1 MG: 2 INJECTION, SOLUTION INTRAMUSCULAR; INTRAVENOUS at 12:58

## 2024-03-27 RX ADMIN — POTASSIUM CHLORIDE 10 MEQ: 7.46 INJECTION, SOLUTION INTRAVENOUS at 12:08

## 2024-03-27 RX ADMIN — POTASSIUM BICARBONATE 50 MEQ: 978 TABLET, EFFERVESCENT ORAL at 12:06

## 2024-03-27 RX ADMIN — HYDROMORPHONE HYDROCHLORIDE 0.5 MG: 1 INJECTION, SOLUTION INTRAMUSCULAR; INTRAVENOUS; SUBCUTANEOUS at 18:31

## 2024-03-27 RX ADMIN — Medication 10 ML: at 20:43

## 2024-03-27 RX ADMIN — Medication 10 ML: at 08:21

## 2024-03-27 ASSESSMENT — PAIN SCALES - GENERAL
PAINLEVEL_OUTOF10: 10
PAINLEVEL_OUTOF10: 9
PAINLEVEL_OUTOF10: 7
PAINLEVEL_OUTOF10: 9
PAINLEVEL_OUTOF10: 10
PAINLEVEL_OUTOF10: 7
PAINLEVEL_OUTOF10: 5
PAINLEVEL_OUTOF10: 10
PAINLEVEL_OUTOF10: 5
PAINLEVEL_OUTOF10: 10

## 2024-03-27 ASSESSMENT — PAIN DESCRIPTION - ORIENTATION
ORIENTATION: LEFT
ORIENTATION: LEFT
ORIENTATION: MID
ORIENTATION: RIGHT

## 2024-03-27 ASSESSMENT — PAIN DESCRIPTION - DESCRIPTORS
DESCRIPTORS: ACHING;DISCOMFORT
DESCRIPTORS: DISCOMFORT;ACHING
DESCRIPTORS: ACHING;DISCOMFORT

## 2024-03-27 ASSESSMENT — PAIN DESCRIPTION - LOCATION
LOCATION: FACE;JAW;NECK
LOCATION: NECK;FACE;JAW
LOCATION: FACE;JAW;NECK

## 2024-03-27 NOTE — PROGRESS NOTES
Hospitalist Progress Note      PCP: Ivone Barnard MD    Date of Admission: 3/23/2024    Chief Complaint:  no acute events, afebrile, stable HD     Medications:  Reviewed    Infusion Medications    sodium chloride      sodium chloride       Scheduled Medications    potassium chloride  10 mEq IntraVENous Q1H    sodium chloride flush  5-40 mL IntraVENous 2 times per day    sodium chloride flush  5-40 mL IntraVENous 2 times per day    atorvastatin  40 mg Oral Daily    famotidine  20 mg Per G Tube Daily    amitriptyline  100 mg Oral Nightly    folic acid  1,000 mcg Oral Daily    levothyroxine  125 mcg Oral Daily    sertraline  250 mg Oral Daily    meropenem  1,000 mg IntraVENous Q8H     PRN Meds: lidocaine viscous hcl, sodium chloride flush, sodium chloride, HYDROmorphone, morphine, sodium chloride flush, sodium chloride, potassium chloride **OR** potassium alternative oral replacement **OR** potassium chloride, magnesium sulfate, ondansetron **OR** ondansetron, acetaminophen **OR** acetaminophen, bisacodyl, sodium chloride      Intake/Output Summary (Last 24 hours) at 3/27/2024 1242  Last data filed at 3/27/2024 0808  Gross per 24 hour   Intake 1754.67 ml   Output --   Net 1754.67 ml         Exam:    BP (!) 146/91   Pulse 98   Temp 97.7 °F (36.5 °C) (Oral)   Resp 18   Ht 1.626 m (5' 4\")   Wt 45.4 kg (100 lb)   SpO2 92%   BMI 17.16 kg/m²     General appearance: awake, cooperative.  Respiratory:  CTA bilaterally.  Cardiovascular: Regular rate and rhythm, S1/S2.  Abdomen: Soft, active bowel sounds, PEG tube is present.  Musculoskeletal: No edema bilaterally.    Labs:   Recent Labs     03/25/24  0504 03/26/24  0514 03/27/24  0440   WBC 12.8* 15.2* 16.7*   HGB 9.4* 10.4* 10.3*   HCT 30.4* 33.4* 33.1*   PLT PLATELET CLUMPS 460* 509*       Recent Labs     03/25/24  0504 03/26/24  0514 03/27/24  0439    138 134*   K 3.4 3.7 2.9*    97 92*   CO2 31 30 31   BUN 12 11 14   CREATININE 0.40* 0.39* 0.38*

## 2024-03-27 NOTE — CARE COORDINATION
CALLED [facility] SPOKE TO ANGEL THEY CANNOT TAKE THIS PATIENT DUE TO INSURANCE    1405  MET WITH PATIENT AND HER SON UPDATED ON [facility] DENIAL, FOC GIVEN REFERRAL CALLED TO [facility] CANNOT ACCEPT THEN SENT TO [facility] AWAIT ACCEPTANCE     1520 [facility] DENIED DUE TO CAPACITY WITH MEDICAID REFERRALS TO [facility], [facility] UNABLE TO ACCEPT AS WELL     1604 REFERRAL SENT TO [facility] AWAITING ACCEPTANCE

## 2024-03-27 NOTE — PROGRESS NOTES
Hematology/Oncology   Progress Note        CHIEF COMPLAINT/HPI:  Follow up of head and nck cancer with neck abscess. Hemoglobin at 10.4. Platelets at 500. Suspect component of iron deficiency. Will receive venofor today.    REVIEW OF SYSTEMS:    Unremarkable except for symptoms mentioned in HPI.    Current Inpatient Medications:    Current Facility-Administered Medications   Medication Dose Route Frequency Provider Last Rate Last Admin    lidocaine viscous hcl (XYLOCAINE) 2 % solution 15 mL  15 mL Mouth/Throat Q3H PRN Janeth Escudero MD        potassium chloride 10 mEq/100 mL IVPB (Peripheral Line)  10 mEq IntraVENous Q1H Janeth Escudero MD        sodium chloride flush 0.9 % injection 5-40 mL  5-40 mL IntraVENous 2 times per day Janeth Escudero MD   10 mL at 03/27/24 0821    sodium chloride flush 0.9 % injection 5-40 mL  5-40 mL IntraVENous PRN Janeth Escudero MD        0.9 % sodium chloride infusion   IntraVENous PRN Janeth Escudero MD        HYDROmorphone (DILAUDID) tablet 2 mg  2 mg Oral Q4H PRN Daylin Johnston APRN - CNP   2 mg at 03/27/24 1449    morphine (PF) injection 1 mg  1 mg IntraVENous Q2H PRN Daylin Johnston APRN - CNP   1 mg at 03/27/24 1258    sodium chloride flush 0.9 % injection 5-40 mL  5-40 mL IntraVENous 2 times per day Borisov, Dorene, APRN - CNP   10 mL at 03/26/24 2033    sodium chloride flush 0.9 % injection 5-40 mL  5-40 mL IntraVENous PRN Borisov, Doerne, APRN - CNP        0.9 % sodium chloride infusion   IntraVENous PRN Borisov, Dorene, APRN - CNP        potassium chloride (KLOR-CON M) extended release tablet 40 mEq  40 mEq Oral PRN Borisov, Dorene, APRN - CNP        Or    potassium bicarb-citric acid (EFFER-K) effervescent tablet 40 mEq  40 mEq Oral PRN Borisov, Dorene, APRN - CNP   40 mEq at 03/24/24 0818    Or    potassium chloride 10 mEq/100 mL IVPB (Peripheral Line)  10 mEq IntraVENous PRN Borisov, Dorene, APRN -  mL/hr at 03/27/24 1446 10 mEq at 03/27/24 1446    magnesium

## 2024-03-27 NOTE — PROGRESS NOTES
Physician Progress Note      PATIENT:               JELANI ADDISON  [facility] #:                  [id number]  :                       1960  ADMIT DATE:       3/23/2024 8:16 PM  DISCH DATE:  RESPONDING  PROVIDER #:        Janeth CID MD          QUERY TEXT:    Patient admitted with a recurrent squamous cell carcinoma of the tongue base   with neck abscess. Per 3/25/24 clinical dietician PN \"Pt presents with severe   protein calorie malnutrition in the chronic setting related to tongue cancer.\"    If possible, please document in progress notes and discharge summary if you   are evaluating and /or treating any of the following:    The medical record reflects the following:  Risk Factors: female age 63  chronic diastolic HF  COPD  HTN  HLD  H/O   malignant neoplasm of larynx  BMI 17.16  Clinical Indicators: Severe malnutrition (24 1608)  Context:  Chronic   Illness  Findings of the 6 clinical characteristics of malnutrition:  Energy Intake:  No significant decrease in energy intake  Weight Loss:  Greater than 20% over 1 year  Body Fat Loss:  Severe body fat loss Triceps  Muscle Mass Loss:  Severe muscle mass loss Clavicles (pectoralis & deltoids),   Temples (temporalis), Calf (gastrocnemius)  Treatment: Malnutrition Assessment  Start Tube Feeding (Boost VHC (pt has from   home)  2 cartons (480 ml) TID  Recommend 180 ml  water flush before and after   each bolus)    ASPEN Criteria:    https://aspenjournals.onlinelibrary.pascal.com/doi/full/10.1177/270717622247145  5    Braydon Meeks RN [facility]  [phone]  Options provided:  -- Protein calorie malnutrition severe  -- Other - I will add my own diagnosis  -- Disagree - Not applicable / Not valid  -- Disagree - Clinically unable to determine / Unknown  -- Refer to Clinical Documentation Reviewer    PROVIDER RESPONSE TEXT:    This patient has severe protein calorie malnutrition.    Query created by: Braydon Meeks on 3/27/2024 8:20 AM      Electronically signed by:

## 2024-03-27 NOTE — PROGRESS NOTES
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  Patient has allergy to penicillin, currently on meropenem.  Started on 3/23/2024.  Planning on 7 days course.  IV vancomycin was discontinued today this morning    Subjectively, complains of some subjective increased swelling.  No fevers no chills.  No nausea.          Lab Results   Component Value Date    WBC 15.2 (H) 03/26/2024    HGB 10.4 (L) 03/26/2024    HCT 33.4 (L) 03/26/2024    MCV 84.6 03/26/2024     (H) 03/26/2024     Lab Results   Component Value Date/Time     03/26/2024 05:14 AM    K 3.7 03/26/2024 05:14 AM    K 3.4 03/25/2024 05:04 AM    CL 97 03/26/2024 05:14 AM    CO2 30 03/26/2024 05:14 AM    BUN 11 03/26/2024 05:14 AM    CREATININE 0.39 03/26/2024 05:14 AM    GLUCOSE 116 03/26/2024 05:14 AM    CALCIUM 8.9 03/26/2024 05:14 AM    LABGLOM >90.0 03/26/2024 05:14 AM        WBC trends are being monitored. Antibiotic doses are being adjusted per most recent renal labs.     Vitals:    03/27/24 0203   BP:    Pulse:    Resp: 18   Temp:    SpO2:        Patient is awake and alert, NAD  Ears look ok.  Deformity mouth and cheek left side large draining mass left neck, left facial redness is coming down but  and very swollen.  Left cheek is overall looking a little bit softer today.  Neck supple  Chest equal expansion, clear  Heart S1S2  Abdomen ND, NTTP  Extrem no new changes, no pain  Expressions symmetrical  No obvious rashes.   Mood and affect appropriate.       Patient Active Problem List   Diagnosis    COPD (chronic obstructive pulmonary disease) (HCC)    Hepatitis C virus infection    Hypothyroidism    Insomnia    Itching    Osteoarthritis    Reflux esophagitis    Mixed hyperlipidemia    Internal derangement of right knee    Lumbar radiculitis    Cardiomyopathy (HCC)    Premature

## 2024-03-27 NOTE — PROGRESS NOTES
Patient assessment and vitals complete and per flowsheets. Atient peg tube in place working well. Message to Dr. Escudero for order clarification on if he wants effer K given or Iv potassium. Awaiting response. Left side of face noted to be swollen and red, patient medicated for pain per emar.

## 2024-03-27 NOTE — PROGRESS NOTES
Palliative Care Progress Note  Patient: Archana Melendez  Gender: female  YOB: 1960  Unit/Bed: W492/W492-01  CodeStatus: DNR-CCA  Inpatient Treatment Team: Treatment Team: Attending Provider: Janeth Escudero MD; Consulting Physician: Maria Eugenia Sebastian APRN - CNP; Consulting Physician: Arcadio Holley DO; Consulting Physician: Javi Hankins MD; Consulting Physician: Jayesh Iverson MD; Utilization Reviewer: Dennis Coombs, RN; Patient Care Tech: Brook Wolfe; : Veronique Huerta, RN; Registered Nurse: Alycia Espinal RN; : Ivone Brooks, JESIKA; : Ivone Kincaid LSW; Utilization Reviewer: Archana Hughes RN; Registered Nurse: Nunu Carbone RN  Admit Date:  3/23/2024    Chief Complaint: Cyst    History of Presenting Illness:      Archana Melendez is a 63 y.o. 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    Patient was brought to the emergency room with cyst from cancer that ruptured about 2 months ago.  Patient states she has had increased drainage and bloody drainage.  Patient recently admitted to the hospital 2/11-2/14 due to facial cellulitis and abscess in the submandibular area that CT result showed progression of cancer.  Patient reports redness and swelling worsened after antibiotics.  Patient was admitted for tumor of oral cavity.    Palliative care was consulted for goals of care, CODE STATUS discussion, family support, and symptom management.  Patient follows with palliative care in the outpatient setting.     Patient lives at home alone. She is up independently. She is able to do all ADL's.   Patient admits to 30 weight loss over one year.     Upon entering room patient is in bed. She is alert and oriented x4. She denies any pain at this time. She reports the morphine IV does not help her pain. She was taking Dilaudid 2 mg q4h prn at home and that  currently follows with patient in outpatient setting.   -Continue with current plan of care.   -Palliative care will continue to follow while hospitalized.     Hospice: Patient is hospice eligible in the setting of recurrent SCC of larynx sp radical neck dissection and radiation with recurrent oral abscess and poor prognosis per oncology. Hospice does not align with current goals of care.       3. Advance Care Planning  Discussed goals of care with patient. Explained in extensive detail nuances between full code, DNR CCA and DNR CC. Patient has made the decision to be DNRCCA.     Patients daughter is POA. Patient declined me calling to update her.       4. Goals of Care Discussion:  Disease process and goals of treatment were discussed in basic terms. Archana's goal is to optimize available comfort care measures and all aggressive treatment. We discussed the palliative care philosophy in light of those goals. We discussed all care options contingent on treatment response and QOL. Much active listening, presence, and emotional support were given.   Plan is to go home at discharge.     5. Cancer related pain  Will order patients home dose of Dilaudid 2 mg q4h prn through peg tube.  Use Morphine 1 mg q2h prn for breakthrough pain.     I have discussed/reviewed the patient's case with nurse.    3/26/2024:  -Cancer related pain: Stable/managed, encouraged patient to continue to use current regimen consistent of: Tylenol 650 mg tablet every 6 hours as needed for pain, Dilaudid 2 mg  tablet every 4 hours as needed for severe pain and morphine injection 1 mg IV push every 2 hours as needed as needed.  Reviewed MAR report and patient sparsely using current medications.  -Palliative care will continue to follow while patient is hospitalized.    3/27/24:  Continues to have breakthrough pain, rates jaw pain 7/10 1 hour after PO Dilaudid. Patient has notable malignant necrosis noted by ENT during abscess drainage.    D/C Morphine

## 2024-03-28 LAB
ALBUMIN SERPL-MCNC: 3.3 G/DL (ref 3.5–4.6)
ANION GAP SERPL CALCULATED.3IONS-SCNC: 8 MEQ/L (ref 9–15)
BACTERIA BLD CULT ORG #2: NORMAL
BACTERIA BLD CULT: NORMAL
BASOPHILS # BLD: 0.1 K/UL (ref 0–0.2)
BASOPHILS NFR BLD: 0.6 %
BUN SERPL-MCNC: 12 MG/DL (ref 8–23)
CALCIUM SERPL-MCNC: 9.2 MG/DL (ref 8.5–9.9)
CHLORIDE SERPL-SCNC: 91 MEQ/L (ref 95–107)
CO2 SERPL-SCNC: 33 MEQ/L (ref 20–31)
CREAT SERPL-MCNC: 0.37 MG/DL (ref 0.5–0.9)
CRP SERPL HS-MCNC: 47.6 MG/L (ref 0–5)
EOSINOPHIL # BLD: 0.3 K/UL (ref 0–0.7)
EOSINOPHIL NFR BLD: 1.5 %
ERYTHROCYTE [DISTWIDTH] IN BLOOD BY AUTOMATED COUNT: 15.8 % (ref 11.5–14.5)
GLUCOSE SERPL-MCNC: 137 MG/DL (ref 70–99)
HCT VFR BLD AUTO: 32.9 % (ref 37–47)
HGB BLD-MCNC: 10.2 G/DL (ref 12–16)
LYMPHOCYTES # BLD: 1 K/UL (ref 1–4.8)
LYMPHOCYTES NFR BLD: 6 %
MAGNESIUM SERPL-MCNC: 1.9 MG/DL (ref 1.7–2.4)
MCH RBC QN AUTO: 26.4 PG (ref 27–31.3)
MCHC RBC AUTO-ENTMCNC: 31 % (ref 33–37)
MCV RBC AUTO: 85 FL (ref 79.4–94.8)
MONOCYTES # BLD: 1.5 K/UL (ref 0.2–0.8)
MONOCYTES NFR BLD: 9.1 %
NEUTROPHILS # BLD: 13.3 K/UL (ref 1.4–6.5)
NEUTS SEG NFR BLD: 82.3 %
PHOSPHATE SERPL-MCNC: 2.4 MG/DL (ref 2.3–4.8)
PLATELET # BLD AUTO: 436 K/UL (ref 130–400)
POTASSIUM SERPL-SCNC: 3.9 MEQ/L (ref 3.4–4.9)
RBC # BLD AUTO: 3.87 M/UL (ref 4.2–5.4)
SODIUM SERPL-SCNC: 132 MEQ/L (ref 135–144)
WBC # BLD AUTO: 16.1 K/UL (ref 4.8–10.8)

## 2024-03-28 PROCEDURE — 97161 PT EVAL LOW COMPLEX 20 MIN: CPT

## 2024-03-28 PROCEDURE — 83735 ASSAY OF MAGNESIUM: CPT

## 2024-03-28 PROCEDURE — 6370000000 HC RX 637 (ALT 250 FOR IP)

## 2024-03-28 PROCEDURE — 2580000003 HC RX 258: Performed by: INTERNAL MEDICINE

## 2024-03-28 PROCEDURE — 86140 C-REACTIVE PROTEIN: CPT

## 2024-03-28 PROCEDURE — 6360000002 HC RX W HCPCS

## 2024-03-28 PROCEDURE — 80069 RENAL FUNCTION PANEL: CPT

## 2024-03-28 PROCEDURE — 1210000000 HC MED SURG R&B

## 2024-03-28 PROCEDURE — 99232 SBSQ HOSP IP/OBS MODERATE 35: CPT | Performed by: NURSE PRACTITIONER

## 2024-03-28 PROCEDURE — 97165 OT EVAL LOW COMPLEX 30 MIN: CPT

## 2024-03-28 PROCEDURE — 85025 COMPLETE CBC W/AUTO DIFF WBC: CPT

## 2024-03-28 PROCEDURE — 6360000002 HC RX W HCPCS: Performed by: INTERNAL MEDICINE

## 2024-03-28 PROCEDURE — 36415 COLL VENOUS BLD VENIPUNCTURE: CPT

## 2024-03-28 PROCEDURE — 99232 SBSQ HOSP IP/OBS MODERATE 35: CPT | Performed by: INTERNAL MEDICINE

## 2024-03-28 RX ORDER — SODIUM FERRIC GLUCONATE COMPLEX IN SUCROSE 12.5 MG/ML
125 INJECTION INTRAVENOUS ONCE
Status: DISCONTINUED | OUTPATIENT
Start: 2024-03-28 | End: 2024-03-28 | Stop reason: ALTCHOICE

## 2024-03-28 RX ADMIN — HYDROMORPHONE HYDROCHLORIDE 0.5 MG: 1 INJECTION, SOLUTION INTRAMUSCULAR; INTRAVENOUS; SUBCUTANEOUS at 09:15

## 2024-03-28 RX ADMIN — MEROPENEM 1000 MG: 1 INJECTION, POWDER, FOR SOLUTION INTRAVENOUS at 04:07

## 2024-03-28 RX ADMIN — ATORVASTATIN CALCIUM 40 MG: 40 TABLET, FILM COATED ORAL at 09:15

## 2024-03-28 RX ADMIN — FAMOTIDINE 20 MG: 20 TABLET, FILM COATED ORAL at 09:15

## 2024-03-28 RX ADMIN — Medication 10 ML: at 09:16

## 2024-03-28 RX ADMIN — SODIUM CHLORIDE 125 MG: 9 INJECTION, SOLUTION INTRAVENOUS at 16:03

## 2024-03-28 RX ADMIN — LEVOTHYROXINE SODIUM 125 MCG: 0.12 TABLET ORAL at 09:15

## 2024-03-28 RX ADMIN — MEROPENEM 1000 MG: 1 INJECTION, POWDER, FOR SOLUTION INTRAVENOUS at 20:49

## 2024-03-28 RX ADMIN — HYDROMORPHONE HYDROCHLORIDE 4 MG: 2 TABLET ORAL at 18:49

## 2024-03-28 RX ADMIN — HYDROMORPHONE HYDROCHLORIDE 0.5 MG: 1 INJECTION, SOLUTION INTRAMUSCULAR; INTRAVENOUS; SUBCUTANEOUS at 16:47

## 2024-03-28 RX ADMIN — SERTRALINE 250 MG: 100 TABLET, FILM COATED ORAL at 09:14

## 2024-03-28 RX ADMIN — HYDROMORPHONE HYDROCHLORIDE 4 MG: 2 TABLET ORAL at 01:59

## 2024-03-28 RX ADMIN — HYDROMORPHONE HYDROCHLORIDE 0.5 MG: 1 INJECTION, SOLUTION INTRAMUSCULAR; INTRAVENOUS; SUBCUTANEOUS at 04:46

## 2024-03-28 RX ADMIN — HYDROMORPHONE HYDROCHLORIDE 4 MG: 2 TABLET ORAL at 12:12

## 2024-03-28 RX ADMIN — MEROPENEM 1000 MG: 1 INJECTION, POWDER, FOR SOLUTION INTRAVENOUS at 12:16

## 2024-03-28 RX ADMIN — AMITRIPTYLINE HYDROCHLORIDE 100 MG: 100 TABLET, FILM COATED ORAL at 20:47

## 2024-03-28 RX ADMIN — FOLIC ACID 1000 MCG: 1 TABLET ORAL at 09:15

## 2024-03-28 RX ADMIN — HYDROMORPHONE HYDROCHLORIDE 0.5 MG: 1 INJECTION, SOLUTION INTRAMUSCULAR; INTRAVENOUS; SUBCUTANEOUS at 20:46

## 2024-03-28 ASSESSMENT — PAIN SCALES - GENERAL
PAINLEVEL_OUTOF10: 6
PAINLEVEL_OUTOF10: 9
PAINLEVEL_OUTOF10: 7
PAINLEVEL_OUTOF10: 10
PAINLEVEL_OUTOF10: 8
PAINLEVEL_OUTOF10: 8
PAINLEVEL_OUTOF10: 1
PAINLEVEL_OUTOF10: 7
PAINLEVEL_OUTOF10: 9

## 2024-03-28 ASSESSMENT — PAIN DESCRIPTION - LOCATION
LOCATION: FACE;JAW;NECK
LOCATION: JAW
LOCATION: JAW
LOCATION: FACE;NECK;JAW
LOCATION: FACE
LOCATION: JAW

## 2024-03-28 ASSESSMENT — PAIN DESCRIPTION - DESCRIPTORS
DESCRIPTORS: ACHING;DISCOMFORT
DESCRIPTORS: ACHING;SHARP;STABBING
DESCRIPTORS: POUNDING;DISCOMFORT
DESCRIPTORS: STABBING;SHARP
DESCRIPTORS: ACHING;DISCOMFORT
DESCRIPTORS: SHARP

## 2024-03-28 ASSESSMENT — PAIN SCALES - WONG BAKER: WONGBAKER_NUMERICALRESPONSE: NO HURT

## 2024-03-28 ASSESSMENT — PAIN DESCRIPTION - ORIENTATION
ORIENTATION: LEFT
ORIENTATION: LEFT
ORIENTATION: MID;LOWER
ORIENTATION: LEFT

## 2024-03-28 NOTE — PROGRESS NOTES
2040 Patient assessment completed. Patient is awake and alert. Pain is 7/10. PO dilaudid given per order. VSS. Rest pm meds given per order. Peg tube is intact, flushed well, no residual. Patient instructed to call for any assistance. Call light is in reach, bed is low and locked.

## 2024-03-28 NOTE — PROGRESS NOTES
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]   Patient Active Problem List    Diagnosis Date Noted    Neoplasm of face 2024    Abscess of tongue 2024    Tumor of oral cavity 2024    Oral cancer (HCC) 2024    Facial infection 02/15/2024    Cancer related pain 2024    Encounter for hospice care discussion 2024    Neck abscess 2024    Severe malnutrition (HCC) 2024    Skin ulcer of neck (HCC) 2024    Immunosuppression due to drug therapy (HCC) 2024    Tongue cancer (HCC) 2024    Allergy to penicillin 2024    Facial cellulitis 2024    Neoplasm related pain 2023    Neck pain 2023    Palliative care encounter 2023    Goals of care, counseling/discussion 2023    Full code status 2023    Advanced care planning/counseling discussion 2023    Anxiety about health 2023    Pneumonia of left lung due to infectious organism 2023    Community acquired pneumonia of left upper lobe of lung 2023    Acute respiratory failure with hypoxia (HCC) 2023    Chronic left shoulder pain 2021    Subacromial impingement of left shoulder 2021    Subacromial bursitis of left shoulder joint 2021    Diastolic dysfunction     ICD (implantable cardioverter-defibrillator) battery depletion     Hypercholesterolemia 2019    Premature osteoporosis 2017    Cardiomyopathy (HCC) 2015    Internal derangement of right knee 2014    Lumbar radiculitis 2014    Mixed hyperlipidemia 2013    Insomnia 2012    Itching 2012    Osteoarthritis 2012    Reflux esophagitis 2012    COPD (chronic obstructive pulmonary disease) ([facility])      COORDINATION:  Coordination: Within functional limits    UE TONE:  Tone: Normal    UE SENSATION:  Sensation: Intact    Hand Dominance:  Hand Dominance  Hand Dominance: Right    ADL Status:  ADL  Feeding: NPO  Grooming: Independent  UE Bathing: Independent  LE Bathing: Modified independent   UE Dressing: Independent  LE Dressing: Modified independent   Toileting: Modified independent   Additional Comments: ADL tasks completed, self reported or simulated on this date. pt and son feel she is performing at baseline function.  Toilet Transfers  Toilet Transfer: Unable to assess  Toilet Transfers Comments: self-reports IND  Tub Transfers  Tub Transfers: Not tested    Functional Mobility:    Transfers  Stand Step Transfers: Modified independent  Sit to stand: Modified independent  Stand to sit: Modified independent  Transfer Comments: pt completes transfers within room with mod I - generalized safety concerns d/t pt feeling slightly weakened    Patient ambulated to/from bathroom with No device at Mod I level.     Bed Mobility  Bed mobility  Supine to Sit: Independent  Sit to Supine: Independent    Seated and Standing Balance:  Balance  Sitting: Intact  Standing: Intact    Functional Endurance:  Activity Tolerance  Activity Tolerance: Patient limited by pain    D/C Recommendations:  OT D/C RECOMMENDATIONS  REQUIRES OT FOLLOW-UP: No    Equipment Recommendations:  OT Equipment Recommendations  Equipment Needed:  (TBD)  Other: continue to assess    OT Education:   Patient Education  Education Given To: Patient;Family  Education Provided: Role of Therapy;Plan of Care  Education Method: Verbal  Barriers to Learning: None  Education Outcome: Verbalized understanding    OT Follow Up:   OT D/C RECOMMENDATIONS  REQUIRES OT FOLLOW-UP: No       Assessment/Discharge Disposition:  Assessment: Pt is a 62yo female presenting at what appears to be baseline function. Pt IND with most ADL/iADL tasks prior to admission with help from children

## 2024-03-28 NOTE — PROGRESS NOTES
Hematology/Oncology   Progress Note        CHIEF COMPLAINT/HPI:  Follow up of anemia in a head ad neck cancer patient. Had one dose of ferlecet yesterday and get another dose today.    REVIEW OF SYSTEMS:    Unremarkable except for symptoms mentioned in HPI.    Current Inpatient Medications:    Current Facility-Administered Medications   Medication Dose Route Frequency Provider Last Rate Last Admin    lidocaine viscous hcl (XYLOCAINE) 2 % solution 15 mL  15 mL Mouth/Throat Q3H PRN Janeth Escudero MD        HYDROmorphone (DILAUDID) injection 0.5 mg  0.5 mg IntraVENous Q3H PRN Colleen Mayberry APRN - CNP   0.5 mg at 03/28/24 0915    HYDROmorphone (DILAUDID) tablet 4 mg  4 mg Oral Q4H PRN Colleen Mayberry APRN - CNP   4 mg at 03/28/24 1212    sodium chloride flush 0.9 % injection 5-40 mL  5-40 mL IntraVENous 2 times per day Janeth Escudero MD   10 mL at 03/28/24 0916    sodium chloride flush 0.9 % injection 5-40 mL  5-40 mL IntraVENous PRN Janeth Escudero MD        0.9 % sodium chloride infusion   IntraVENous PRN Janeth Escudero MD        sodium chloride flush 0.9 % injection 5-40 mL  5-40 mL IntraVENous 2 times per day Borisov Dorene, APRN - CNP   10 mL at 03/26/24 2033    sodium chloride flush 0.9 % injection 5-40 mL  5-40 mL IntraVENous PRN Borisov, Dorene, APRN - CNP        0.9 % sodium chloride infusion   IntraVENous PRN Borisov, Dorene, APRN - CNP        potassium chloride (KLOR-CON M) extended release tablet 40 mEq  40 mEq Oral PRN Borisov, Dorene, APRN - CNP        Or    potassium bicarb-citric acid (EFFER-K) effervescent tablet 40 mEq  40 mEq Oral PRN Borisov, Dorene, APRN - CNP   40 mEq at 03/24/24 0818    Or    potassium chloride 10 mEq/100 mL IVPB (Peripheral Line)  10 mEq IntraVENous PRN Borisov, Dorene, APRN -  mL/hr at 03/27/24 1636 10 mEq at 03/27/24 1636    magnesium sulfate 2000 mg in 50 mL IVPB premix  2,000 mg IntraVENous PRN Dorene Yeboah APRN - CNP   Stopped at 03/24/24 1014

## 2024-03-28 NOTE — CARE COORDINATION
0800 UPDATED [facility] PLAN FOR POSSIBLE DC TODAY AWAIT ID FOR FINAL RX [facility] TO LET THIS CM KNOW IF CAN ACCEPT PT BY 2 PM TODAY PERFECT SERVE TO DR LOWERY FOR RX TO SET UP [facility]     1106:  CALL FROM [facility] PT REFERRAL DENIED     1500 [facility] CAN ACCEPT PT STILL AWAITING FINAL ID PLAN/RX, PT HAS MORE PAIN TODAY MEDS BEING ADJUSTED PER PALL CARE     1617 [facility] THEY WERE UPDATED WITH PT DC PLAN AND STATUS

## 2024-03-28 NOTE — PROGRESS NOTES
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  Patient has allergy to penicillin, currently on meropenem.  Started on 3/23/2024.  Planning on 7 days course.  IV vancomycin was discontinued today this morning    Swelling stable. Nose congested.   Her mother is actively dying in  facility and she is sad about this.   Meropenem  BC and WC negative    Asking for nasal spray - will defer decision to ENT.   States that she has tried nasal saline.     Lab Results   Component Value Date    WBC 16.7 (H) 03/27/2024    HGB 10.3 (L) 03/27/2024    HCT 33.1 (L) 03/27/2024    MCV 85.1 03/27/2024     (H) 03/27/2024     Lab Results   Component Value Date/Time     03/27/2024 04:39 AM    K 2.9 03/27/2024 04:39 AM    K 3.4 03/25/2024 05:04 AM    CL 92 03/27/2024 04:39 AM    CO2 31 03/27/2024 04:39 AM    BUN 14 03/27/2024 04:39 AM    CREATININE 0.38 03/27/2024 04:39 AM    GLUCOSE 122 03/27/2024 04:39 AM    CALCIUM 8.8 03/27/2024 04:39 AM    LABGLOM >90.0 03/27/2024 04:39 AM        WBC trends are being monitored. Antibiotic doses are being adjusted per most recent renal labs.     Vitals:    03/27/24 2042   BP:    Pulse:    Resp: 18   Temp:    SpO2:        Patient is awake and alert, NAD  Ears look ok.  Deformity mouth and cheek left side large draining mass left neck, left facial redness is coming down but  and very swollen.  Left cheek is softer  Neck supple  Chest equal expansion, clear  Heart S1S2  Abdomen ND, NTTP  Extrem no new changes, no pain  Expressions symmetrical  No obvious rashes.   Mood and affect appropriate.       Patient Active Problem List   Diagnosis    COPD (chronic obstructive pulmonary disease) (HCC)    Hepatitis C virus infection    Hypothyroidism    Insomnia    Itching    Osteoarthritis    Reflux esophagitis    Mixed hyperlipidemia

## 2024-03-28 NOTE — DISCHARGE INSTR - COC
Continuity of Care Form    Patient Name: Archana Melendez   :  1960  MRN:  [id number]    Admit date:  3/23/2024  Discharge date:  ***    Code Status Order: DNR-CCA   Advance Directives:     Admitting Physician:  Veda Wolf DO  PCP: Ivone Barnard MD    Discharging Nurse: ***  Discharging Hospital Unit/Room#: W492/W492-01  Discharging Unit Phone Number: ***    Emergency Contact:   Extended Emergency Contact Information  Primary Emergency Contact: Sol Boothe   [facility]  Home Phone: [phone]  Mobile Phone: [phone]  Relation: Child  Secondary Emergency Contact: Cyril Melendez  Mobile Phone: [phone]  Relation: Child  Preferred language: English   needed? No    Past Surgical History:  Past Surgical History:   Procedure Laterality Date    CT NEEDLE BIOPSY LUNG PERCUTANEOUS  2023    CT NEEDLE BIOPSY LUNG PERCUTANEOUS 2023    GASTROSTOMY TUBE PLACEMENT N/A 8/10/2023    EGD REPLACE GASTROSTOMY TUBE ROOM 485 performed by Wale Wright MD at [facility] OR    LEG SURGERY   MASS R LEG SOFT TISSUE    LUMBAR FUSION      Dr. Rodriguez    OTHER SURGICAL HISTORY  09/16/15    R ÁLVARO ROBLES IMPLANT ICD       Immunization History:   Immunization History   Administered Date(s) Administered    COVID-19, MODERNA BLUE border, Primary or Immunocompromised, (age 12y+), IM, 100 mcg/0.5mL 2021, 2021, 2021    COVID-19, MODERNA Bivalent, (age 12y+), IM, 50 mcg/0.5 mL 2022    Influenza 10/02/2013    Influenza Virus Vaccine 10/26/2010, 10/01/2012, 10/30/2014    Influenza Whole 10/30/2014    Influenza, AFLURIA (age 3 yrs+), FLUZONE, (age 6 mo+), MDV, 0.5mL 10/06/2020    Influenza, FLUCELVAX, (age 6 mo+), MDCK, PF, 0.5mL 10/08/2021, 2022    Pneumococcal, PPSV23, PNEUMOVAX 23, (age 2y+), SC/IM, 0.5mL 2006    TDaP, ADACEL (age 10y-64y), BOOSTRIX (age 10y+), IM, 0.5mL 2013       Active Problems:  Patient Active Problem List   Diagnosis Code

## 2024-03-28 NOTE — PROGRESS NOTES
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 Pt stated that she was seen here initially and has been following up with her pcp for care. Pt stated that she has had increased drainage and bloody drainage which has her concerned      Patient Active Problem List    Diagnosis Date Noted    Neoplasm of face 2024    Abscess of tongue 2024    Tumor of oral cavity 2024    Oral cancer (HCC) 2024    Facial infection 02/15/2024    Cancer related pain 2024    Encounter for hospice care discussion 2024    Neck abscess 2024    Severe malnutrition (HCC) 2024    Skin ulcer of neck (HCC) 2024    Immunosuppression due to drug therapy (HCC) 2024    Tongue cancer (HCC) 2024    Allergy to penicillin 2024    Facial cellulitis 2024    Neoplasm related pain 2023    Neck pain 2023    Palliative care encounter 2023    Goals of care, counseling/discussion 2023    Full code status 2023    Advanced care planning/counseling discussion 2023    Anxiety about health 2023    Pneumonia of left lung due to infectious organism 2023    Community acquired pneumonia of left upper lobe of lung 2023    Acute respiratory failure with hypoxia (HCC) 2023    Chronic left shoulder pain 2021    Subacromial impingement of left shoulder 2021    Subacromial bursitis of left shoulder joint 2021    Diastolic dysfunction     ICD (implantable cardioverter-defibrillator) battery depletion     Hypercholesterolemia 2019

## 2024-03-28 NOTE — PROGRESS NOTES
Palliative Care Progress Note  Patient: Archana Melendez  Gender: female  YOB: 1960  Unit/Bed: W492/W492-01  CodeStatus: DNR-CCA  Inpatient Treatment Team: Treatment Team: Attending Provider: Janeth Escudero MD; Consulting Physician: Maria Eugenia Sebastian APRN - CNP; Consulting Physician: Arcadio Holley DO; Consulting Physician: Javi Hankins MD; Consulting Physician: Jayesh Iverson MD; Utilization Reviewer: Dennis Coombs, RN; : Veronique Huerta RN; : Ivone Brooks, RN; Registered Nurse: Kaylah Gomez RN; Registered Nurse: Alycia Espinal RN; : Ivone Kincaid LSW  Admit Date:  3/23/2024    Chief Complaint: Cyst    History of Presenting Illness:      Archana Melendez is a 63 y.o. female on hospital day 4 with a history of HTN, HLD, COPD, diastolic HF, hep B and C, oral cancer with mets to lungs, alcoholism.    Patient was brought to the emergency room with cyst from cancer that ruptured about 2 months ago.  Patient states she has had increased drainage and bloody drainage.  Patient recently admitted to the hospital 2/11-2/14 due to facial cellulitis and abscess in the submandibular area that CT result showed progression of cancer.  Patient reports redness and swelling worsened after antibiotics.  Patient was admitted for tumor of oral cavity.    Palliative care was consulted for goals of care, CODE STATUS discussion, family support, and symptom management.  Patient follows with palliative care in the outpatient setting.     Patient lives at home alone. She is up independently. She is able to do all ADL's.   Patient admits to 30 weight loss over one year.     Upon entering room patient is in bed. She is alert and oriented x4. She denies any pain at this time. She reports the morphine IV does not help her pain. She was taking Dilaudid 2 mg q4h prn at home and that helped pain more. She denies nause,a fever, chills, sob. She has slight cough. She  treating patient. Discussed symptom management related to chronic disease/condition. Provided emotional support and active listening. Patient understands and is agreeable to current plan.  -Palliative care currently follows with patient in outpatient setting.   -Continue with current plan of care.   -Palliative care will continue to follow while hospitalized.     Hospice: Patient is hospice eligible in the setting of recurrent SCC of larynx sp radical neck dissection and radiation with recurrent oral abscess and poor prognosis per oncology. Hospice does not align with current goals of care.       3. Advance Care Planning  Discussed goals of care with patient. Explained in extensive detail nuances between full code, DNR CCA and DNR CC. Patient has made the decision to be DNRCCA.     Patients daughter is POA. Patient declined me calling to update her.       4. Goals of Care Discussion:  Disease process and goals of treatment were discussed in basic terms. Archana's goal is to optimize available comfort care measures and all aggressive treatment. We discussed the palliative care philosophy in light of those goals. We discussed all care options contingent on treatment response and QOL. Much active listening, presence, and emotional support were given.   Plan is to go home at discharge.     5. Cancer related pain  Will order patients home dose of Dilaudid 2 mg q4h prn through peg tube.  Use Morphine 1 mg q2h prn for breakthrough pain.     I have discussed/reviewed the patient's case with nurse.    3/26/2024:  -Cancer related pain: Stable/managed, encouraged patient to continue to use current regimen consistent of: Tylenol 650 mg tablet every 6 hours as needed for pain, Dilaudid 2 mg  tablet every 4 hours as needed for severe pain and morphine injection 1 mg IV push every 2 hours as needed as needed.  Reviewed MAR report and patient sparsely using current medications.  -Palliative care will continue to follow while patient

## 2024-03-28 NOTE — PROGRESS NOTES
Hospitalist Progress Note      PCP: Ivone Banrard MD    Date of Admission: 3/23/2024    Chief Complaint:  no acute events, afebrile, stable HD     Medications:  Reviewed    Infusion Medications    sodium chloride      sodium chloride       Scheduled Medications    sodium chloride flush  5-40 mL IntraVENous 2 times per day    sodium chloride flush  5-40 mL IntraVENous 2 times per day    atorvastatin  40 mg Oral Daily    famotidine  20 mg Per G Tube Daily    amitriptyline  100 mg Oral Nightly    folic acid  1,000 mcg Oral Daily    levothyroxine  125 mcg Oral Daily    sertraline  250 mg Oral Daily    meropenem  1,000 mg IntraVENous Q8H     PRN Meds: lidocaine viscous hcl, HYDROmorphone, HYDROmorphone, sodium chloride flush, sodium chloride, sodium chloride flush, sodium chloride, potassium chloride **OR** potassium alternative oral replacement **OR** potassium chloride, magnesium sulfate, ondansetron **OR** ondansetron, acetaminophen **OR** acetaminophen, bisacodyl, sodium chloride      Intake/Output Summary (Last 24 hours) at 3/28/2024 1308  Last data filed at 3/28/2024 0631  Gross per 24 hour   Intake 793.98 ml   Output --   Net 793.98 ml         Exam:    /77   Pulse (!) 101   Temp 97.5 °F (36.4 °C) (Axillary)   Resp 18   Ht 1.626 m (5' 4\")   Wt 45.4 kg (100 lb)   SpO2 93%   BMI 17.16 kg/m²     General appearance: awake, cooperative.  Respiratory:  CTA bilaterally.  Cardiovascular: Regular rate and rhythm, S1/S2.  Abdomen: Soft, active bowel sounds, PEG tube is present.  Musculoskeletal: No edema bilaterally.    Labs:   Recent Labs     03/26/24  0514 03/27/24  0440 03/28/24  0526   WBC 15.2* 16.7* 16.1*   HGB 10.4* 10.3* 10.2*   HCT 33.4* 33.1* 32.9*   * 509* 436*       Recent Labs     03/26/24  0514 03/27/24  0439 03/28/24  0526    134* 132*   K 3.7 2.9* 3.9   CL 97 92* 91*   CO2 30 31 33*   BUN 11 14 12   CREATININE 0.39* 0.38* 0.37*   CALCIUM 8.9 8.8 9.2   PHOS 2.2* 1.9* 2.4

## 2024-03-29 LAB
ALBUMIN SERPL-MCNC: 2.9 G/DL (ref 3.5–4.6)
ANION GAP SERPL CALCULATED.3IONS-SCNC: 10 MEQ/L (ref 9–15)
BASOPHILS # BLD: 0.1 K/UL (ref 0–0.2)
BASOPHILS NFR BLD: 0.6 %
BUN SERPL-MCNC: 18 MG/DL (ref 8–23)
CALCIUM SERPL-MCNC: 9.4 MG/DL (ref 8.5–9.9)
CHLORIDE SERPL-SCNC: 94 MEQ/L (ref 95–107)
CO2 SERPL-SCNC: 30 MEQ/L (ref 20–31)
CREAT SERPL-MCNC: 0.44 MG/DL (ref 0.5–0.9)
CRP SERPL HS-MCNC: 58.6 MG/L (ref 0–5)
EOSINOPHIL # BLD: 0.3 K/UL (ref 0–0.7)
EOSINOPHIL NFR BLD: 1.9 %
ERYTHROCYTE [DISTWIDTH] IN BLOOD BY AUTOMATED COUNT: 15.8 % (ref 11.5–14.5)
GLUCOSE SERPL-MCNC: 157 MG/DL (ref 70–99)
HCT VFR BLD AUTO: 33.7 % (ref 37–47)
HGB BLD-MCNC: 10.1 G/DL (ref 12–16)
LYMPHOCYTES # BLD: 0.8 K/UL (ref 1–4.8)
LYMPHOCYTES NFR BLD: 5 %
MAGNESIUM SERPL-MCNC: 1.9 MG/DL (ref 1.7–2.4)
MCH RBC QN AUTO: 26.2 PG (ref 27–31.3)
MCHC RBC AUTO-ENTMCNC: 30 % (ref 33–37)
MCV RBC AUTO: 87.3 FL (ref 79.4–94.8)
MONOCYTES # BLD: 1.2 K/UL (ref 0.2–0.8)
MONOCYTES NFR BLD: 7.5 %
NEUTROPHILS # BLD: 13.5 K/UL (ref 1.4–6.5)
NEUTS SEG NFR BLD: 84.5 %
PHOSPHATE SERPL-MCNC: 2.6 MG/DL (ref 2.3–4.8)
PLATELET # BLD AUTO: 434 K/UL (ref 130–400)
POTASSIUM SERPL-SCNC: 3.8 MEQ/L (ref 3.4–4.9)
RBC # BLD AUTO: 3.86 M/UL (ref 4.2–5.4)
SODIUM SERPL-SCNC: 134 MEQ/L (ref 135–144)
WBC # BLD AUTO: 16 K/UL (ref 4.8–10.8)

## 2024-03-29 PROCEDURE — 85025 COMPLETE CBC W/AUTO DIFF WBC: CPT

## 2024-03-29 PROCEDURE — 6370000000 HC RX 637 (ALT 250 FOR IP)

## 2024-03-29 PROCEDURE — 36415 COLL VENOUS BLD VENIPUNCTURE: CPT

## 2024-03-29 PROCEDURE — 86140 C-REACTIVE PROTEIN: CPT

## 2024-03-29 PROCEDURE — 2580000003 HC RX 258: Performed by: INTERNAL MEDICINE

## 2024-03-29 PROCEDURE — 80069 RENAL FUNCTION PANEL: CPT

## 2024-03-29 PROCEDURE — 2580000003 HC RX 258

## 2024-03-29 PROCEDURE — 83735 ASSAY OF MAGNESIUM: CPT

## 2024-03-29 PROCEDURE — 99232 SBSQ HOSP IP/OBS MODERATE 35: CPT | Performed by: INTERNAL MEDICINE

## 2024-03-29 PROCEDURE — 6360000002 HC RX W HCPCS

## 2024-03-29 PROCEDURE — 1210000000 HC MED SURG R&B

## 2024-03-29 PROCEDURE — 6360000002 HC RX W HCPCS: Performed by: INTERNAL MEDICINE

## 2024-03-29 RX ADMIN — Medication 10 ML: at 10:26

## 2024-03-29 RX ADMIN — HYDROMORPHONE HYDROCHLORIDE 0.5 MG: 1 INJECTION, SOLUTION INTRAMUSCULAR; INTRAVENOUS; SUBCUTANEOUS at 22:50

## 2024-03-29 RX ADMIN — SERTRALINE 250 MG: 100 TABLET, FILM COATED ORAL at 10:25

## 2024-03-29 RX ADMIN — FOLIC ACID 1000 MCG: 1 TABLET ORAL at 10:25

## 2024-03-29 RX ADMIN — HYDROMORPHONE HYDROCHLORIDE 4 MG: 2 TABLET ORAL at 14:46

## 2024-03-29 RX ADMIN — SALINE NASAL SPRAY 1 SPRAY: 1.5 SOLUTION NASAL at 20:28

## 2024-03-29 RX ADMIN — Medication 10 ML: at 20:29

## 2024-03-29 RX ADMIN — LEVOTHYROXINE SODIUM 125 MCG: 0.12 TABLET ORAL at 06:25

## 2024-03-29 RX ADMIN — HYDROMORPHONE HYDROCHLORIDE 0.5 MG: 1 INJECTION, SOLUTION INTRAMUSCULAR; INTRAVENOUS; SUBCUTANEOUS at 18:57

## 2024-03-29 RX ADMIN — HYDROMORPHONE HYDROCHLORIDE 0.5 MG: 1 INJECTION, SOLUTION INTRAMUSCULAR; INTRAVENOUS; SUBCUTANEOUS at 02:53

## 2024-03-29 RX ADMIN — HYDROMORPHONE HYDROCHLORIDE 4 MG: 2 TABLET ORAL at 10:25

## 2024-03-29 RX ADMIN — FAMOTIDINE 20 MG: 20 TABLET, FILM COATED ORAL at 10:26

## 2024-03-29 RX ADMIN — HYDROMORPHONE HYDROCHLORIDE 4 MG: 2 TABLET ORAL at 01:00

## 2024-03-29 RX ADMIN — MEROPENEM 1000 MG: 1 INJECTION, POWDER, FOR SOLUTION INTRAVENOUS at 20:10

## 2024-03-29 RX ADMIN — SALINE NASAL SPRAY 1 SPRAY: 1.5 SOLUTION NASAL at 21:00

## 2024-03-29 RX ADMIN — ATORVASTATIN CALCIUM 40 MG: 40 TABLET, FILM COATED ORAL at 10:25

## 2024-03-29 RX ADMIN — Medication 10 ML: at 10:27

## 2024-03-29 RX ADMIN — AMITRIPTYLINE HYDROCHLORIDE 100 MG: 100 TABLET, FILM COATED ORAL at 20:28

## 2024-03-29 RX ADMIN — HYDROMORPHONE HYDROCHLORIDE 0.5 MG: 1 INJECTION, SOLUTION INTRAMUSCULAR; INTRAVENOUS; SUBCUTANEOUS at 06:25

## 2024-03-29 RX ADMIN — MEROPENEM 1000 MG: 1 INJECTION, POWDER, FOR SOLUTION INTRAVENOUS at 11:57

## 2024-03-29 RX ADMIN — MEROPENEM 1000 MG: 1 INJECTION, POWDER, FOR SOLUTION INTRAVENOUS at 04:40

## 2024-03-29 ASSESSMENT — PAIN DESCRIPTION - LOCATION
LOCATION: JAW
LOCATION: MOUTH
LOCATION: FACE
LOCATION: JAW;FACE
LOCATION: JAW
LOCATION: FACE
LOCATION: JAW
LOCATION: JAW;MOUTH

## 2024-03-29 ASSESSMENT — PAIN SCALES - GENERAL
PAINLEVEL_OUTOF10: 9
PAINLEVEL_OUTOF10: 9
PAINLEVEL_OUTOF10: 10
PAINLEVEL_OUTOF10: 6
PAINLEVEL_OUTOF10: 10
PAINLEVEL_OUTOF10: 8
PAINLEVEL_OUTOF10: 9
PAINLEVEL_OUTOF10: 10

## 2024-03-29 ASSESSMENT — PAIN DESCRIPTION - DESCRIPTORS
DESCRIPTORS: THROBBING;ACHING;DISCOMFORT
DESCRIPTORS: THROBBING;CRAMPING;ACHING

## 2024-03-29 ASSESSMENT — PAIN DESCRIPTION - ORIENTATION
ORIENTATION: LEFT
ORIENTATION: LEFT
ORIENTATION: UPPER
ORIENTATION: LEFT
ORIENTATION: LEFT;LOWER;MID
ORIENTATION: LEFT
ORIENTATION: LEFT

## 2024-03-29 NOTE — PROGRESS NOTES
Assumed care of patient. Patient is resting in bed. Pain rated a 7/10. Pain medication  last given 1025.  Denies needs. Call light in reach.

## 2024-03-29 NOTE — PROGRESS NOTES
Comprehensive Nutrition Assessment    Type and Reason for Visit:  Reassess    Nutrition Recommendations/Plan:   Boost VHC (pt has from home)   2 cartons (480 ml) TID   Recommend 100 ml water flush before and after each bolus   Ensure pt wakes for TF administration  Suggest adding daily bowel regimen, no BM documented since admission      Malnutrition Assessment:  Malnutrition Status:  Severe malnutrition (03/25/24 1608)    Context:  Chronic Illness     Findings of the 6 clinical characteristics of malnutrition:  Energy Intake:  No significant decrease in energy intake  Weight Loss:  Greater than 20% over 1 year     Body Fat Loss:  Severe body fat loss Triceps   Muscle Mass Loss:  Severe muscle mass loss Clavicles (pectoralis & deltoids), Temples (temporalis), Calf (gastrocnemius)  Fluid Accumulation:  Unable to assess     Strength:  Not Performed    Nutrition Assessment:    Severe malnutrition, addressed with EN.  Pt administers her own TF with formula from home.  Pt has been very lethargic and sleeping most of the time with current medication, unsure if pt recieving full dose of TF.  Discussed with RN, suggest to ensure pt is wakened to offer her feeding if she is asleep.  No BM documented this admission, may need to add a daily bowel regimen due to daily pain meds.    Nutrition Related Findings:    PMH of HTN, HLD, COPD, diastolic HF, hep B and C, oral cancer with mets to lungs, alcoholism, who presents to the emergency department with chief complaint of Cyst. s/p I&D of neck abscess 2/25, Has PEG tube. labs reviewed (hyponatremia), meds include synthroid, folvite, dilaudid daily, No BM  documented this admission, no edema noted. Cachectic Wound Type:  (\"throat wound\")       Current Nutrition Intake & Therapies:    Average Meal Intake: NPO  Average Supplements Intake: None Ordered  Diet NPO  ADULT TUBE FEEDING; PEG; Other Tube Feeding (specify); Boost VHC (pt brings from home-not in [facility] formulary); Bolus; 3  Times Daily; 480; Syringe Push; 100; Before and after each bolus  Current Tube Feeding (TF) Orders:  Feeding Route: PEG  Formula: Other Tube Feeding (Comment) ([facility])  Schedule: Bolus  Feeding Regimen: 480 ml TID (1440 ml)  Additives/Modulars: None  Water Flushes: 180 ml before and after each bolus (1080 ml)  Current TF & Flush Orders Provides: 3600 kcal, 132 gm protein ~ 2040 ml free water  Goal TF & Flush Orders Provides: 3600 kcal, 132 gm protein ~ 2040 ml free water    Anthropometric Measures:  Height: 162.6 cm (5' 4\")  Ideal Body Weight (IBW): 120 lbs (55 kg)    Admission Body Weight: 45.4 kg (100 lb) (? source)  Current Body Weight: 45.4 kg (100 lb) (3/29),Weight Source: Bed Scale  Current BMI (kg/m2): 17.2  Usual Body Weight: 57.6 kg (127 lb) (1/2023, 102 lb (2/2024))  % Weight Change (Calculated): -25.2                    BMI Categories: Underweight (BMI less than 18.5)    Estimated Daily Nutrient Needs:  Energy Requirements Based On: Kcal/kg  Weight Used for Energy Requirements: Current  Energy (kcal/day): 0059-6271 (kg x 33-35)  Weight Used for Protein Requirements: Current  Protein (g/day): ~54-68 gm @ 1.2-1.5 gm/kg  Method Used for Fluid Requirements: 1 ml/kcal  Fluid (ml/day): ~1500 ml    Nutrition Diagnosis:   Severe malnutrition, In context of chronic illness related to catabolic illness as evidenced by Criteria as identified in malnutrition assessment    Nutrition Interventions:   Food and/or Nutrient Delivery: Continue Current Diet ([facility] (pt has from home) 2 cartons (480 ml) TID Recommend 100 ml water flush before and after each bolus  Ensure pt wakes for TF  Suggest adding daily bowel regimen, no BM documented since admission)  Nutrition Education/Counseling: No recommendation at this time  Coordination of Nutrition Care: Continue to monitor while inpatient       Goals:  Previous Goal Met: Progressing toward Goal(s)  Goals: other (specify)  Specify Other Goals: +BM    Nutrition Monitoring

## 2024-03-29 NOTE — CARE COORDINATION
SPOKE WITH DR LOWERY. SHE STATES SHE WILL SPEAK WITH DR CID ABOUT PLAN FOR DC. TOMORROW WILL END THE COURSE FOR IV MERREM UNLESS MORE ANTIBIOTICS WOULD BE WARRANTED. SHE WILL FOLLOW THIS WEEKEND. WILL UPDATE [facility] TO CONTINUE TO FOLLOW.

## 2024-03-29 NOTE — PLAN OF CARE
Problem: Discharge Planning  Goal: Discharge to home or other facility with appropriate resources  Outcome: Progressing     Problem: Skin/Tissue Integrity  Goal: Absence of new skin breakdown  Description: 1.  Monitor for areas of redness and/or skin breakdown  2.  Assess vascular access sites hourly  3.  Every 4-6 hours minimum:  Change oxygen saturation probe site  4.  Every 4-6 hours:  If on nasal continuous positive airway pressure, respiratory therapy assess nares and determine need for appliance change or resting period.  Outcome: Progressing     Problem: Pain  Goal: Verbalizes/displays adequate comfort level or baseline comfort level  Outcome: Progressing     Problem: Nutrition Deficit:  Goal: Optimize nutritional status  Outcome: Progressing     Problem: ABCDS Injury Assessment  Goal: Absence of physical injury  Outcome: Progressing

## 2024-03-29 NOTE — CARE COORDINATION
DC PLAN IS PENDING ID PLAN. MESSAGE TO DR LOWERY TO CLARIFY DC PLAN. PT FOR [facility] IF IV NEEDED. IF IV ANTIBIOTIC IS TO STOP TOMORROW THEN HHC WILL NOT BE NEEDED. SIX CLICK 24. AWAITING ID RESPONSE. JABARI AT [facility] UPDATED.

## 2024-03-29 NOTE — PLAN OF CARE
Nutrition Problem #1: Severe malnutrition, In context of chronic illness  Intervention: Food and/or Nutrient Delivery: Continue Current Diet (Boost [facility] (pt has from home) 2 cartons (480 ml) TID Recommend 100 ml water flush before and after each bolus  Ensure pt wakes for TF  Suggest adding daily bowel regimen, no BM documented since admission)

## 2024-03-29 NOTE — PROGRESS NOTES
Infectious Disease Progress Note       3/29/2024    Patient is a followup regarding swelling left face with history of recurrent laryngeal cancer status post radical neck dissection and radiation.  Patient has allergy to penicillin, currently on meropenem.  Started on 3/23/2024.  Planning on 7 days course.  IV vancomycin was discontinued today this morning    Swelling stable. Nose congested.   Her mother is actively dying in  facility and she is sad about this.   Meropenem  BC and WC negative    Asking for nasal spray - will defer decision to ENT.   States that she has tried nasal saline.     Lab Results   Component Value Date    WBC 16.0 (H) 03/29/2024    HGB 10.1 (L) 03/29/2024    HCT 33.7 (L) 03/29/2024    MCV 87.3 03/29/2024     (H) 03/29/2024     Lab Results   Component Value Date/Time     03/29/2024 05:20 AM    K 3.8 03/29/2024 05:20 AM    K 3.4 03/25/2024 05:04 AM    CL 94 03/29/2024 05:20 AM    CO2 30 03/29/2024 05:20 AM    BUN 18 03/29/2024 05:20 AM    CREATININE 0.44 03/29/2024 05:20 AM    GLUCOSE 157 03/29/2024 05:20 AM    CALCIUM 9.4 03/29/2024 05:20 AM    LABGLOM >90.0 03/29/2024 05:20 AM        WBC trends are being monitored. Antibiotic doses are being adjusted per most recent renal labs.     Vitals:    03/29/24 0703   BP: 104/78   Pulse: (!) 102   Resp: 16   Temp: 97.3 °F (36.3 °C)   SpO2: 93%       Patient is awake and alert, NAD  Ears look ok.  Deformity mouth and cheek left side large draining mass left neck, left facial redness is coming down but  and very swollen.  Left cheek is softer  Neck supple  Chest equal expansion, clear  Heart S1S2  Abdomen ND, NTTP  Extrem no new changes, no pain  Expressions symmetrical  No obvious rashes.   Mood and affect appropriate.       Patient Active Problem List   Diagnosis    COPD (chronic obstructive pulmonary disease) (HCC)    Hepatitis C virus infection    Hypothyroidism    Insomnia    Itching    Osteoarthritis    Reflux esophagitis

## 2024-03-29 NOTE — PROGRESS NOTES
Palliative Care Progress Note  Patient: Archana Melendez  Gender: female  YOB: 1960  Unit/Bed: W492/W492-01  CodeStatus: DNR-CCA  Inpatient Treatment Team: Treatment Team: Attending Provider: Janeth Escudero MD; Consulting Physician: Maria Eugenia Sebastian APRN - CNP; Consulting Physician: Arcadio Holley DO; Consulting Physician: Javi Hankins MD; Consulting Physician: Jayesh Iverson MD; Utilization Reviewer: Dennis Coombs, RN; Registered Nurse: Geeta Ocampo, RN; : Shara Porter RN; : Ivone Kincaid LSW; Registered Nurse: Nunu Carbone RN  Admit Date:  3/23/2024    Chief Complaint: Cyst    History of Presenting Illness:      Archana Melendez is a 63 y.o. female on hospital day 5 with a history of HTN, HLD, COPD, diastolic HF, hep B and C, oral cancer with mets to lungs, alcoholism.    Patient was brought to the emergency room with cyst from cancer that ruptured about 2 months ago.  Patient states she has had increased drainage and bloody drainage.  Patient recently admitted to the hospital 2/11-2/14 due to facial cellulitis and abscess in the submandibular area that CT result showed progression of cancer.  Patient reports redness and swelling worsened after antibiotics.  Patient was admitted for tumor of oral cavity.    Palliative care was consulted for goals of care, CODE STATUS discussion, family support, and symptom management.  Patient follows with palliative care in the outpatient setting.     Patient lives at home alone. She is up independently. She is able to do all ADL's.   Patient admits to 30 weight loss over one year.     Upon entering room patient is in bed. She is alert and oriented x4. She denies any pain at this time. She reports the morphine IV does not help her pain. She was taking Dilaudid 2 mg q4h prn at home and that helped pain more. She denies nause,a fever, chills, sob. She has slight cough. She feels weak and fatigued.     I discussed  8.2 03/23/2024 07:30 PM    LABALBU 2.9 03/29/2024 05:20 AM    CALCIUM 9.4 03/29/2024 05:20 AM    BILITOT 0.3 03/23/2024 07:30 PM    ALKPHOS 144 03/23/2024 07:30 PM    AST 20 03/23/2024 07:30 PM    ALT 10 03/23/2024 07:30 PM     BMP:    Lab Results   Component Value Date/Time     03/29/2024 05:20 AM    K 3.8 03/29/2024 05:20 AM    K 3.4 03/25/2024 05:04 AM    CL 94 03/29/2024 05:20 AM    CO2 30 03/29/2024 05:20 AM    BUN 18 03/29/2024 05:20 AM    LABALBU 2.9 03/29/2024 05:20 AM    CREATININE 0.44 03/29/2024 05:20 AM    CALCIUM 9.4 03/29/2024 05:20 AM    GFRAA >60.0 07/26/2022 12:46 PM    LABGLOM >90.0 03/29/2024 05:20 AM    GLUCOSE 157 03/29/2024 05:20 AM     TSH:   Lab Results   Component Value Date/Time    TSH 1.330 08/28/2020 12:18 PM     Urinalysis:   Lab Results   Component Value Date/Time    NITRU Negative 07/08/2014 08:10 AM    WBCUA 0-2 07/08/2014 08:10 AM    BACTERIA Few 07/08/2014 08:10 AM    RBCUA 0-2 07/08/2014 08:10 AM    BLOODU TRACE 07/08/2014 08:10 AM    SPECGRAV 1.011 07/08/2014 08:10 AM    GLUCOSEU Negative 07/08/2014 08:10 AM     Albumin:   Lab Results   Component Value Date    LABALBU 2.9 (L) 03/29/2024     Weight Trends  Wt Readings from Last 10 Encounters:   03/23/24 45.4 kg (100 lb)   03/07/24 42.6 kg (94 lb)   02/28/24 42.8 kg (94 lb 6.4 oz)   02/13/24 43.5 kg (95 lb 12.8 oz)   02/09/24 46.3 kg (102 lb)   01/23/24 44.5 kg (98 lb)   01/23/24 44.5 kg (98 lb)   11/21/23 43.4 kg (95 lb 9.6 oz)   10/31/23 47 kg (103 lb 9.6 oz)   10/27/23 46.6 kg (102 lb 12.8 oz)          FUNCTIONAL ADL´S:     Independent: [ x ] Eating, [   x] Dressing, [ x  ] Transferring, [  x ] Toileting, [ x  ] Bathing, [  x ] Continence  Dependent   : [  ] Eating, [   ] Dressing, [   ] Transferring, [   ] Toileting, [   ] Bathing, [   ] Continence  W. assistant : [  ] Eating, [   ] Dressing, [   ] Transferring, [   ] Toileting, [   ] Bathing, [   ] Continence    Radiology: Reviewed      CT SOFT TISSUE NECK W

## 2024-03-29 NOTE — PROGRESS NOTES
Hospitalist Progress Note      PCP: Ivone Barnard MD    Date of Admission: 3/23/2024    Chief Complaint:  no acute events, afebrile, stable HD     Medications:  Reviewed    Infusion Medications    sodium chloride      sodium chloride       Scheduled Medications    sodium chloride flush  5-40 mL IntraVENous 2 times per day    sodium chloride flush  5-40 mL IntraVENous 2 times per day    atorvastatin  40 mg Oral Daily    famotidine  20 mg Per G Tube Daily    amitriptyline  100 mg Oral Nightly    folic acid  1,000 mcg Oral Daily    levothyroxine  125 mcg Oral Daily    sertraline  250 mg Oral Daily    meropenem  1,000 mg IntraVENous Q8H     PRN Meds: lidocaine viscous hcl, HYDROmorphone, HYDROmorphone, sodium chloride flush, sodium chloride, sodium chloride flush, sodium chloride, potassium chloride **OR** potassium alternative oral replacement **OR** potassium chloride, magnesium sulfate, ondansetron **OR** ondansetron, acetaminophen **OR** acetaminophen, bisacodyl, sodium chloride      Intake/Output Summary (Last 24 hours) at 3/29/2024 1158  Last data filed at 3/29/2024 1000  Gross per 24 hour   Intake 713.01 ml   Output --   Net 713.01 ml         Exam:    /78   Pulse (!) 102   Temp 97.3 °F (36.3 °C) (Oral)   Resp 16   Ht 1.626 m (5' 4\")   Wt 45.4 kg (100 lb)   SpO2 93%   BMI 17.16 kg/m²     General appearance: awake, cooperative.  Respiratory:  CTA bilaterally.  Cardiovascular: Regular rate and rhythm, S1/S2.  Abdomen: Soft, active bowel sounds, PEG tube is present.  Musculoskeletal: No edema bilaterally.    Labs:   Recent Labs     03/27/24  0440 03/28/24  0526 03/29/24  0520   WBC 16.7* 16.1* 16.0*   HGB 10.3* 10.2* 10.1*   HCT 33.1* 32.9* 33.7*   * 436* 434*       Recent Labs     03/27/24  0439 03/28/24  0526 03/29/24  0520   * 132* 134*   K 2.9* 3.9 3.8   CL 92* 91* 94*   CO2 31 33* 30   BUN 14 12 18   CREATININE 0.38* 0.37* 0.44*   CALCIUM 8.8 9.2 9.4   PHOS 1.9* 2.4 2.6       No  NPO  ADULT TUBE FEEDING; PEG; Other Tube Feeding (specify); Boost VHC (pt brings from home-not in [facility] formulary); Bolus; 3 Times Daily; 480; Syringe Push; 100; Before and after each bolus    Code Status: DNR-CCA/DNI, followed by palliative care      Disposition - home when medically ready      Electronically signed by LUDWIN CID MD on 3/29/2024 at 11:58 AM

## 2024-03-30 LAB
ALBUMIN SERPL-MCNC: 3.2 G/DL (ref 3.5–4.6)
ANION GAP SERPL CALCULATED.3IONS-SCNC: 11 MEQ/L (ref 9–15)
BASOPHILS # BLD: 0.1 K/UL (ref 0–0.2)
BASOPHILS NFR BLD: 0.5 %
BUN SERPL-MCNC: 16 MG/DL (ref 8–23)
CALCIUM SERPL-MCNC: 9.6 MG/DL (ref 8.5–9.9)
CHLORIDE SERPL-SCNC: 92 MEQ/L (ref 95–107)
CO2 SERPL-SCNC: 33 MEQ/L (ref 20–31)
CREAT SERPL-MCNC: 0.46 MG/DL (ref 0.5–0.9)
CULTURE WOUND: NORMAL
EOSINOPHIL # BLD: 0.2 K/UL (ref 0–0.7)
EOSINOPHIL NFR BLD: 1.6 %
ERYTHROCYTE [DISTWIDTH] IN BLOOD BY AUTOMATED COUNT: 15.8 % (ref 11.5–14.5)
GLUCOSE SERPL-MCNC: 117 MG/DL (ref 70–99)
HCT VFR BLD AUTO: 32.9 % (ref 37–47)
HGB BLD-MCNC: 10.1 G/DL (ref 12–16)
LYMPHOCYTES # BLD: 0.9 K/UL (ref 1–4.8)
LYMPHOCYTES NFR BLD: 5.8 %
MAGNESIUM SERPL-MCNC: 1.9 MG/DL (ref 1.7–2.4)
MCH RBC QN AUTO: 26.4 PG (ref 27–31.3)
MCHC RBC AUTO-ENTMCNC: 30.7 % (ref 33–37)
MCV RBC AUTO: 86.1 FL (ref 79.4–94.8)
MONOCYTES # BLD: 1.2 K/UL (ref 0.2–0.8)
MONOCYTES NFR BLD: 7.9 %
NEUTROPHILS # BLD: 12.4 K/UL (ref 1.4–6.5)
NEUTS SEG NFR BLD: 83.8 %
PHOSPHATE SERPL-MCNC: 2.4 MG/DL (ref 2.3–4.8)
PLATELET # BLD AUTO: 467 K/UL (ref 130–400)
POTASSIUM SERPL-SCNC: 3.3 MEQ/L (ref 3.4–4.9)
RBC # BLD AUTO: 3.82 M/UL (ref 4.2–5.4)
SODIUM SERPL-SCNC: 136 MEQ/L (ref 135–144)
WBC # BLD AUTO: 14.8 K/UL (ref 4.8–10.8)

## 2024-03-30 PROCEDURE — 2580000003 HC RX 258: Performed by: INTERNAL MEDICINE

## 2024-03-30 PROCEDURE — 80069 RENAL FUNCTION PANEL: CPT

## 2024-03-30 PROCEDURE — 99232 SBSQ HOSP IP/OBS MODERATE 35: CPT | Performed by: INTERNAL MEDICINE

## 2024-03-30 PROCEDURE — 99232 SBSQ HOSP IP/OBS MODERATE 35: CPT | Performed by: STUDENT IN AN ORGANIZED HEALTH CARE EDUCATION/TRAINING PROGRAM

## 2024-03-30 PROCEDURE — 83735 ASSAY OF MAGNESIUM: CPT

## 2024-03-30 PROCEDURE — 6360000002 HC RX W HCPCS

## 2024-03-30 PROCEDURE — 2580000003 HC RX 258

## 2024-03-30 PROCEDURE — 6370000000 HC RX 637 (ALT 250 FOR IP)

## 2024-03-30 PROCEDURE — 1210000000 HC MED SURG R&B

## 2024-03-30 PROCEDURE — 6370000000 HC RX 637 (ALT 250 FOR IP): Performed by: INTERNAL MEDICINE

## 2024-03-30 PROCEDURE — 6360000002 HC RX W HCPCS: Performed by: INTERNAL MEDICINE

## 2024-03-30 PROCEDURE — 85025 COMPLETE CBC W/AUTO DIFF WBC: CPT

## 2024-03-30 RX ORDER — DOXYCYCLINE HYCLATE 100 MG/1
100 CAPSULE ORAL EVERY 12 HOURS SCHEDULED
Status: DISCONTINUED | OUTPATIENT
Start: 2024-03-30 | End: 2024-03-31 | Stop reason: HOSPADM

## 2024-03-30 RX ORDER — DOXYCYCLINE HYCLATE 100 MG
100 TABLET ORAL 2 TIMES DAILY
Qty: 10 TABLET | Refills: 0 | Status: SHIPPED | OUTPATIENT
Start: 2024-03-30 | End: 2024-04-04

## 2024-03-30 RX ORDER — UREA 10 %
100 LOTION (ML) TOPICAL 2 TIMES DAILY
Qty: 10 TABLET | Refills: 0 | Status: SHIPPED | OUTPATIENT
Start: 2024-03-30 | End: 2024-04-04

## 2024-03-30 RX ADMIN — HYDROMORPHONE HYDROCHLORIDE 4 MG: 2 TABLET ORAL at 06:24

## 2024-03-30 RX ADMIN — MEROPENEM 1000 MG: 1 INJECTION, POWDER, FOR SOLUTION INTRAVENOUS at 14:29

## 2024-03-30 RX ADMIN — LEVOTHYROXINE SODIUM 125 MCG: 0.12 TABLET ORAL at 06:24

## 2024-03-30 RX ADMIN — FOLIC ACID 1000 MCG: 1 TABLET ORAL at 09:38

## 2024-03-30 RX ADMIN — HYDROMORPHONE HYDROCHLORIDE 4 MG: 2 TABLET ORAL at 11:45

## 2024-03-30 RX ADMIN — Medication 10 ML: at 09:39

## 2024-03-30 RX ADMIN — Medication 10 ML: at 21:05

## 2024-03-30 RX ADMIN — MEROPENEM 1000 MG: 1 INJECTION, POWDER, FOR SOLUTION INTRAVENOUS at 05:06

## 2024-03-30 RX ADMIN — SALINE NASAL SPRAY 1 SPRAY: 1.5 SOLUTION NASAL at 01:00

## 2024-03-30 RX ADMIN — FAMOTIDINE 20 MG: 20 TABLET, FILM COATED ORAL at 09:38

## 2024-03-30 RX ADMIN — ATORVASTATIN CALCIUM 40 MG: 40 TABLET, FILM COATED ORAL at 09:38

## 2024-03-30 RX ADMIN — HYDROMORPHONE HYDROCHLORIDE 4 MG: 2 TABLET ORAL at 17:10

## 2024-03-30 RX ADMIN — HYDROMORPHONE HYDROCHLORIDE 0.5 MG: 1 INJECTION, SOLUTION INTRAMUSCULAR; INTRAVENOUS; SUBCUTANEOUS at 09:31

## 2024-03-30 RX ADMIN — AMITRIPTYLINE HYDROCHLORIDE 100 MG: 100 TABLET, FILM COATED ORAL at 21:03

## 2024-03-30 RX ADMIN — SERTRALINE 250 MG: 100 TABLET, FILM COATED ORAL at 09:38

## 2024-03-30 RX ADMIN — HYDROMORPHONE HYDROCHLORIDE 0.5 MG: 1 INJECTION, SOLUTION INTRAMUSCULAR; INTRAVENOUS; SUBCUTANEOUS at 01:53

## 2024-03-30 RX ADMIN — HYDROMORPHONE HYDROCHLORIDE 0.5 MG: 1 INJECTION, SOLUTION INTRAMUSCULAR; INTRAVENOUS; SUBCUTANEOUS at 21:03

## 2024-03-30 RX ADMIN — DOXYCYCLINE HYCLATE 100 MG: 100 CAPSULE ORAL at 21:03

## 2024-03-30 ASSESSMENT — PAIN DESCRIPTION - ORIENTATION
ORIENTATION: MID;ANTERIOR;LEFT
ORIENTATION: ANTERIOR

## 2024-03-30 ASSESSMENT — PAIN SCALES - GENERAL
PAINLEVEL_OUTOF10: 10
PAINLEVEL_OUTOF10: 7

## 2024-03-30 ASSESSMENT — PAIN DESCRIPTION - LOCATION
LOCATION: JAW
LOCATION: JAW;HEAD;NECK
LOCATION: JAW;NECK
LOCATION: FACE;HEAD
LOCATION: JAW

## 2024-03-30 ASSESSMENT — PAIN - FUNCTIONAL ASSESSMENT: PAIN_FUNCTIONAL_ASSESSMENT: ACTIVITIES ARE NOT PREVENTED

## 2024-03-30 NOTE — PROGRESS NOTES
Pt assessed and plan addressed with pt and Dr. Odonnell. Pt c/o increase drainage and pain on left nasal sinus. Not alleviated by  decongestants. Per ID request for pt to be assessed by ENT before D/C to address this problem. Spoke with Dr. Marlyn douglas on ENT service and states she plans to assess pt on 3/30. Pt made aware of upcoming assessment. Pain continues to be severe with little relief. Request to change frequency. NPAURORA differs request and states \"maybe primary should consult pain management\" . Anterior dressing changed and irrigated with saline. SHRADDHA midline dressing changed and CELOX saturated dressing removed that was post dated 3/26. Transparent with CHG in place. Pt bolus fed 480ml boost. Call light in reach and safety maintained

## 2024-03-30 NOTE — PROGRESS NOTES
Hospitalist Progress Note      PCP: Ivone Barnard MD    Date of Admission: 3/23/2024    Chief Complaint:  no acute events, afebrile, stable HD     Medications:  Reviewed    Infusion Medications    sodium chloride      sodium chloride       Scheduled Medications    sodium chloride flush  5-40 mL IntraVENous 2 times per day    sodium chloride flush  5-40 mL IntraVENous 2 times per day    atorvastatin  40 mg Oral Daily    famotidine  20 mg Per G Tube Daily    amitriptyline  100 mg Oral Nightly    folic acid  1,000 mcg Oral Daily    levothyroxine  125 mcg Oral Daily    sertraline  250 mg Oral Daily    meropenem  1,000 mg IntraVENous Q8H     PRN Meds: lidocaine viscous hcl, HYDROmorphone, HYDROmorphone, sodium chloride flush, sodium chloride, sodium chloride flush, sodium chloride, potassium chloride **OR** potassium alternative oral replacement **OR** potassium chloride, magnesium sulfate, ondansetron **OR** ondansetron, acetaminophen **OR** acetaminophen, bisacodyl, sodium chloride      Intake/Output Summary (Last 24 hours) at 3/30/2024 1446  Last data filed at 3/30/2024 0552  Gross per 24 hour   Intake 690 ml   Output 0 ml   Net 690 ml         Exam:    BP (!) 145/90   Pulse 96   Temp 98.1 °F (36.7 °C) (Oral)   Resp 16   Ht 1.626 m (5' 4\")   Wt 45.7 kg (100 lb 11.2 oz)   SpO2 94%   BMI 17.29 kg/m²     General appearance: awake, cooperative.  Respiratory:  CTA bilaterally.  Cardiovascular: Regular rate and rhythm, S1/S2.  Abdomen: Soft, active bowel sounds, PEG tube is present.  Musculoskeletal: No edema bilaterally.    Labs:   Recent Labs     03/28/24  0526 03/29/24  0520 03/30/24  0550   WBC 16.1* 16.0* 14.8*   HGB 10.2* 10.1* 10.1*   HCT 32.9* 33.7* 32.9*   * 434* 467*       Recent Labs     03/28/24  0526 03/29/24  0520 03/30/24  0550   * 134* 136   K 3.9 3.8 3.3*   CL 91* 94* 92*   CO2 33* 30 33*   BUN 12 18 16   CREATININE 0.37* 0.44* 0.46*   CALCIUM 9.2 9.4 9.6   PHOS 2.4 2.6 2.4        No results for input(s): \"AST\", \"ALT\", \"BILIDIR\", \"BILITOT\", \"ALKPHOS\" in the last 72 hours.    No results for input(s): \"INR\" in the last 72 hours.  No results for input(s): \"CKTOTAL\", \"TROPONINI\" in the last 72 hours.    Urinalysis:      Lab Results   Component Value Date/Time    NITRU Negative 07/08/2014 08:10 AM    WBCUA 0-2 07/08/2014 08:10 AM    BACTERIA Few 07/08/2014 08:10 AM    RBCUA 0-2 07/08/2014 08:10 AM    BLOODU TRACE 07/08/2014 08:10 AM    SPECGRAV 1.011 07/08/2014 08:10 AM    GLUCOSEU Negative 07/08/2014 08:10 AM       Radiology:  CT SOFT TISSUE NECK W CONTRAST   Final Result   1. Interval increase in size of the complex abscess in the area of left   tongue resection, now measuring 6.3 x 3.2 x 7.0 cm. It now clearly extends   through the floor of the mouth, previously seen to have a loculation in the   floor of the mouth.   2. Stable heterogeneous hypodensity in the posterior belly of the tongue   consistent with myositis or possibly residual malignancy.   3. Stable thickening of the epiglottis, aryepiglottic folds, and false cords.   4. Stable cavitary regions in the anterior left upper lobe from previous   inflammatory disease.   5. Spiculated nodular densities in the anterior and posterior right upper   lobe consistent with previous inflammatory disease, although malignancy   cannot entirely be excluded.   6. Stable cellulitis of the left mandibular and submandibular subcutaneous   fat.         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

## 2024-03-30 NOTE — PROGRESS NOTES
Infectious Disease Progress Note         Patient is a followup regarding swelling left face with history of recurrent laryngeal cancer status post radical neck dissection and radiation.  Patient has allergy to penicillin, currently on meropenem.  Started on 3/23/2024.  Planning on 7 days course.  IV vancomycin was discontinued today this morning    Swelling stable. Nose increasingly congested.  Copious mucus production, nasal drainage now yellowish in color.  Complains of ear pain left ear.  Her mother passed away 2 days ago and she is upset about this  Meropenem course finishing  BC and WC negative    Asking for nasal spray and regimen for her congestion- will defer decision to ENT.   States that she has tried nasal saline already along with decongestants concern for development of sinusitis in immunocompromised patient.  She has already been on a course of antibiotics    Once again, we discussed chronic antibiotic suppression, benefits and risks.  Nurse was at bedside  Lab Results   Component Value Date    WBC 16.0 (H) 03/29/2024    HGB 10.1 (L) 03/29/2024    HCT 33.7 (L) 03/29/2024    MCV 87.3 03/29/2024     (H) 03/29/2024     Lab Results   Component Value Date/Time     03/29/2024 05:20 AM    K 3.8 03/29/2024 05:20 AM    K 3.4 03/25/2024 05:04 AM    CL 94 03/29/2024 05:20 AM    CO2 30 03/29/2024 05:20 AM    BUN 18 03/29/2024 05:20 AM    CREATININE 0.44 03/29/2024 05:20 AM    GLUCOSE 157 03/29/2024 05:20 AM    CALCIUM 9.4 03/29/2024 05:20 AM    LABGLOM >90.0 03/29/2024 05:20 AM        WBC trends are being monitored. Antibiotic doses are being adjusted per most recent renal labs.     Vitals:    03/29/24 1503   BP: 104/71   Pulse: (!) 105   Resp: 18   Temp: 98.2 °F (36.8 °C)   SpO2: 99%       Patient is awake and alert, NAD  Is going through almost a box of Kleenex, copious mucus drainage from her nose  Ears look ok externally  Deformity mouth and cheek left side large draining mass left neck, left  possibility of developing sinus infection  Lactobacillus.  Finishing up her course of meropenem.  She has allergy to penicillins.  This is marked as anaphylaxis.  She has tolerated carbapenem but will not place on oral tail of penicillin.  Supportive care  ENT to see patient     Sasha Odonnell, DO

## 2024-03-30 NOTE — PROGRESS NOTES
S:     63-year-old female with a history of malignant squamous cell carcinoma of the tongue with extension into the neck with evident tumor necrosis superimposed with abscess formation     Underwent I&D of neck abscess on 3/23 by Dr Iverson. Purulence expressed. Cultures negative.    Patient known to the ENT team. Has been admitted a number of times for infected recurrent tumor of the oral cavity with extension into the neck. Continues to decline definitive or palliative surgical intervention for her tumor. Follows with med onc to determine if palliative chemotherapy/immunotherapy is an option.     ENT was asked to re evaluated due to increased drainage (oral) and pain radiating to the ear.    Patient reports that she is spitting up more mucus. Pain was better controlled on prior regimen is she is concerned that the amount of pain medication she is getting now is less and is not controlling her pain as well.    O   General- sitting comfortably in bed   Voice- dysarthric secondary to prior surgical changes and presence of oral cavity cancer   Ears- external normal. Left ear exam with normal EAC and TM no evidence of AOM. No drainage   Oral cavity- Post surgical changes with extensive recurrent tumor involving the left side of the FOM/tongue extending posteriorly in the oral cavity   Neck- flat, s/p I&D with dressing (clean) in place    Recent Labs     03/30/24  0550 03/29/24  0520 03/28/24  0526   WBC 14.8* 16.0* 16.1*   HGB 10.1* 10.1* 10.2*   HCT 32.9* 33.7* 32.9*   MCV 86.1 87.3 85.0   * 434* 436*       A/P   63 y recurrent oral cavity SCC, declines surgical treatment of her cancer, following with med onc for continued treatment options. Has presented numerous times for admission due to superimposed infection of her tumor. Now s/p I&D on 3/23. We discussed that as long as the tumor continues to grow, this may be a recurrent problem for her. I do not see a sign of other infection or drainable collection. Her L

## 2024-03-31 VITALS
HEIGHT: 64 IN | OXYGEN SATURATION: 95 % | DIASTOLIC BLOOD PRESSURE: 78 MMHG | BODY MASS INDEX: 17.19 KG/M2 | TEMPERATURE: 97.7 F | RESPIRATION RATE: 16 BRPM | HEART RATE: 113 BPM | SYSTOLIC BLOOD PRESSURE: 121 MMHG | WEIGHT: 100.7 LBS

## 2024-03-31 PROCEDURE — 6360000002 HC RX W HCPCS

## 2024-03-31 PROCEDURE — 2580000003 HC RX 258: Performed by: INTERNAL MEDICINE

## 2024-03-31 PROCEDURE — 6370000000 HC RX 637 (ALT 250 FOR IP)

## 2024-03-31 PROCEDURE — 6370000000 HC RX 637 (ALT 250 FOR IP): Performed by: INTERNAL MEDICINE

## 2024-03-31 RX ADMIN — HYDROMORPHONE HYDROCHLORIDE 4 MG: 2 TABLET ORAL at 14:51

## 2024-03-31 RX ADMIN — HYDROMORPHONE HYDROCHLORIDE 4 MG: 2 TABLET ORAL at 05:23

## 2024-03-31 RX ADMIN — FOLIC ACID 1000 MCG: 1 TABLET ORAL at 10:04

## 2024-03-31 RX ADMIN — SERTRALINE 250 MG: 100 TABLET, FILM COATED ORAL at 10:04

## 2024-03-31 RX ADMIN — Medication 10 ML: at 10:00

## 2024-03-31 RX ADMIN — HYDROMORPHONE HYDROCHLORIDE 0.5 MG: 1 INJECTION, SOLUTION INTRAMUSCULAR; INTRAVENOUS; SUBCUTANEOUS at 01:13

## 2024-03-31 RX ADMIN — Medication 15 ML: at 01:13

## 2024-03-31 RX ADMIN — POTASSIUM BICARBONATE 40 MEQ: 782 TABLET, EFFERVESCENT ORAL at 10:04

## 2024-03-31 RX ADMIN — FAMOTIDINE 20 MG: 20 TABLET, FILM COATED ORAL at 10:04

## 2024-03-31 RX ADMIN — HYDROMORPHONE HYDROCHLORIDE 4 MG: 2 TABLET ORAL at 10:04

## 2024-03-31 RX ADMIN — DOXYCYCLINE HYCLATE 100 MG: 100 CAPSULE ORAL at 10:04

## 2024-03-31 RX ADMIN — LEVOTHYROXINE SODIUM 125 MCG: 0.12 TABLET ORAL at 05:23

## 2024-03-31 RX ADMIN — ATORVASTATIN CALCIUM 40 MG: 40 TABLET, FILM COATED ORAL at 10:04

## 2024-03-31 ASSESSMENT — PAIN DESCRIPTION - LOCATION
LOCATION: JAW

## 2024-03-31 ASSESSMENT — PAIN SCALES - GENERAL
PAINLEVEL_OUTOF10: 9
PAINLEVEL_OUTOF10: 4
PAINLEVEL_OUTOF10: 3

## 2024-03-31 ASSESSMENT — PAIN DESCRIPTION - ORIENTATION: ORIENTATION: LEFT

## 2024-03-31 NOTE — PLAN OF CARE
Problem: Discharge Planning  Goal: Discharge to home or other facility with appropriate resources  Outcome: Progressing     Problem: Skin/Tissue Integrity  Goal: Absence of new skin breakdown  Description: 1.  Monitor for areas of redness and/or skin breakdown  2.  Assess vascular access sites hourly  3.  Every 4-6 hours minimum:  Change oxygen saturation probe site  4.  Every 4-6 hours:  If on nasal continuous positive airway pressure, respiratory therapy assess nares and determine need for appliance change or resting period.  Outcome: Progressing

## 2024-03-31 NOTE — PROGRESS NOTES
Pt intermittently calling out for pain management PRN medication. Attempted non-pharmalogical  pain management pathways as well. Pt self fed 240 ML of home boost supplement. Midline in SHRADDHA maintained. Tube site care done. Pt refuse anterior neck wound dressing change. Safety maintained. Call light in reach

## 2024-03-31 NOTE — PROGRESS NOTES
Infectious Disease Progress Note         Patient is a followup regarding swelling left face with history of recurrent laryngeal cancer status post radical neck dissection and radiation.  Patient has allergy to penicillin, currently on meropenem.  Started on 3/23/2024.  Planning on 7 days course.    Change from meropenem to p.o. doxycycline for discharge  ENT has evaluated patient  Discharge planning for the morning  Lab Results   Component Value Date    WBC 14.8 (H) 03/30/2024    HGB 10.1 (L) 03/30/2024    HCT 32.9 (L) 03/30/2024    MCV 86.1 03/30/2024     (H) 03/30/2024     Lab Results   Component Value Date/Time     03/30/2024 05:50 AM    K 3.3 03/30/2024 05:50 AM    K 3.4 03/25/2024 05:04 AM    CL 92 03/30/2024 05:50 AM    CO2 33 03/30/2024 05:50 AM    BUN 16 03/30/2024 05:50 AM    CREATININE 0.46 03/30/2024 05:50 AM    GLUCOSE 117 03/30/2024 05:50 AM    CALCIUM 9.6 03/30/2024 05:50 AM    LABGLOM >90.0 03/30/2024 05:50 AM        WBC trends are being monitored. Antibiotic doses are being adjusted per most recent renal labs.     Vitals:    03/30/24 1917   BP: (!) 112/91   Pulse: (!) 109   Resp: 18   Temp: 97.5 °F (36.4 °C)   SpO2: 97%   Sleeping soundly but arousable  neck supple  Chest equal expansion, clear  Heart S1S2  Abdomen ND, NTTP  Extrem no new changes, no pain  No obvious rashes.   Mood and affect appropriate.       Patient Active Problem List   Diagnosis    COPD (chronic obstructive pulmonary disease) (HCC)    Hepatitis C virus infection    Hypothyroidism    Insomnia    Itching    Osteoarthritis    Reflux esophagitis    Mixed hyperlipidemia    Internal derangement of right knee    Lumbar radiculitis    Cardiomyopathy (HCC)    Premature osteoporosis    Hypercholesterolemia    ICD (implantable cardioverter-defibrillator) battery depletion    Diastolic dysfunction    Chronic left shoulder pain    Subacromial impingement of left shoulder    Subacromial bursitis of left shoulder joint

## 2024-03-31 NOTE — DISCHARGE SUMMARY
Hospital Medicine Discharge Summary    Archana Melendez  :  1960  MRN:  [id number]    Admit date:  3/23/2024  Discharge date:  3/31/2024    Admitting Physician:  Veda Wolf DO  Primary Care Physician:  Ivone Barnard MD      Discharge Diagnoses:    Principal Problem:    Oral cancer (HCC)  Active Problems:    Allergy to penicillin    Neoplasm of face    Abscess of tongue    Tumor of oral cavity  Resolved Problems:    * No resolved hospital problems. *      Hospital Course:   Archana Melendez is a 63 y.o. female that was admitted and treated at [facility] for the following medical issues:     Recurrent tongue/oral abscess    - s/p I&D on 3/24  - blood and wound cultures no growth  - treated with IV Meropenem x 7 days, switched to Doxycycline on d/c per ID  - followed ENT and oncology    Acute /chronic pain  - treated with PO/IV dilaudid  - followed by palliative care    Anemia   - s/p IV Iron    Diet: Diet NPO  ADULT TUBE FEEDING; PEG; Other Tube Feeding (specify); Boost VHC (pt brings from home-not in [facility]); Bolus; 3 Times Daily; 480; Syringe Push; 100; Before and after each bolus    Code Status: DNR-CCA/DNI, followed by palliative care      Disposition - home       Patient was seen by the following consultants while admitted to [facility]:   Consults:  PHARMACY TO DOSE MEDICATION  IP CONSULT TO PALLIATIVE CARE  IP CONSULT TO ONCOLOGY  IP CONSULT TO INFECTIOUS DISEASES  IP CONSULT TO DIETITIAN  IP CONSULT TO OTOLARYNGOLOGY  IP CONSULT TO HOME CARE NEEDS    Significant Diagnostic Studies:    CT SOFT TISSUE NECK W CONTRAST    Result Date: 3/23/2024  EXAMINATION: CT OF THE NECK SOFT TISSUE WITH CONTRAST  3/23/2024 TECHNIQUE: CT of the neck was performed with the administration of intravenous contrast. Multiplanar reformatted images are provided for review. Automated exposure control, iterative reconstruction, and/or weight based adjustment of the mA/kV was

## 2024-03-31 NOTE — PROGRESS NOTES
Hospitalist Progress Note      PCP: Ivone Barnard MD    Date of Admission: 3/23/2024    Chief Complaint:  no acute events, afebrile, stable HD     Medications:  Reviewed    Infusion Medications    sodium chloride      sodium chloride       Scheduled Medications    doxycycline  100 mg Oral 2 times per day    sodium chloride flush  5-40 mL IntraVENous 2 times per day    sodium chloride flush  5-40 mL IntraVENous 2 times per day    atorvastatin  40 mg Oral Daily    famotidine  20 mg Per G Tube Daily    amitriptyline  100 mg Oral Nightly    folic acid  1,000 mcg Oral Daily    levothyroxine  125 mcg Oral Daily    sertraline  250 mg Oral Daily     PRN Meds: lidocaine viscous hcl, HYDROmorphone, HYDROmorphone, sodium chloride flush, sodium chloride, sodium chloride flush, sodium chloride, potassium chloride **OR** potassium alternative oral replacement **OR** potassium chloride, magnesium sulfate, ondansetron **OR** ondansetron, acetaminophen **OR** acetaminophen, bisacodyl, sodium chloride      Intake/Output Summary (Last 24 hours) at 3/31/2024 1228  Last data filed at 3/30/2024 2220  Gross per 24 hour   Intake 1120 ml   Output --   Net 1120 ml         Exam:    /78   Pulse (!) 113   Temp 97.7 °F (36.5 °C) (Oral)   Resp 16   Ht 1.626 m (5' 4\")   Wt 45.7 kg (100 lb 11.2 oz)   SpO2 95%   BMI 17.29 kg/m²     General appearance: awake, cooperative.  Respiratory:  CTA bilaterally.  Cardiovascular: Regular rate and rhythm, S1/S2.  Abdomen: Soft, active bowel sounds, PEG tube is present.  Musculoskeletal: No edema bilaterally.    Labs:   Recent Labs     03/29/24  0520 03/30/24  0550   WBC 16.0* 14.8*   HGB 10.1* 10.1*   HCT 33.7* 32.9*   * 467*       Recent Labs     03/29/24  0520 03/30/24  0550   * 136   K 3.8 3.3*   CL 94* 92*   CO2 30 33*   BUN 18 16   CREATININE 0.44* 0.46*   CALCIUM 9.4 9.6   PHOS 2.6 2.4       No results for input(s): \"AST\", \"ALT\", \"BILIDIR\", \"BILITOT\", \"ALKPHOS\" in the last

## 2024-04-01 DIAGNOSIS — C02.9 TONGUE CANCER (HCC): ICD-10-CM

## 2024-04-01 RX ORDER — HYDROMORPHONE HYDROCHLORIDE 4 MG/1
4 TABLET ORAL EVERY 4 HOURS PRN
Qty: 84 TABLET | Refills: 0 | Status: ON HOLD | OUTPATIENT
Start: 2024-04-01 | End: 2024-04-15

## 2024-04-01 NOTE — TELEPHONE ENCOUNTER
Please refill, if this is the current order you want.    Rx requested:  Requested Prescriptions     Pending Prescriptions Disp Refills    HYDROmorphone (DILAUDID) 4 MG tablet 42 tablet 0     Si.5 tablets by PEG Tube route every 4 hours as needed for Pain for up to 14 days. Max Daily Amount: 12 mg       Last Office Visit:   2024      Last filled:       Next Visit Date:  Future Appointments   Date Time Provider Department Center   4/15/2024  2:00 PM Colleen Mayberry, APRN - CNP PC [facility] PHYS [facility]   2024 11:45 AM Sasha Odonnell DO [facility]   10/17/2024 12:30 PM Feliciano Garcia DO [facility]

## 2024-04-02 ENCOUNTER — APPOINTMENT (OUTPATIENT)
Dept: GENERAL RADIOLOGY | Age: 64
DRG: 133 | End: 2024-04-02
Payer: MEDICAID

## 2024-04-02 ENCOUNTER — APPOINTMENT (OUTPATIENT)
Dept: CT IMAGING | Age: 64
DRG: 133 | End: 2024-04-02
Payer: MEDICAID

## 2024-04-02 ENCOUNTER — HOSPITAL ENCOUNTER (INPATIENT)
Age: 64
LOS: 2 days | Discharge: HOSPICE | DRG: 133 | End: 2024-04-04
Attending: EMERGENCY MEDICINE | Admitting: INTERNAL MEDICINE
Payer: MEDICAID

## 2024-04-02 DIAGNOSIS — R41.82 ALTERED MENTAL STATUS, UNSPECIFIED ALTERED MENTAL STATUS TYPE: Primary | ICD-10-CM

## 2024-04-02 DIAGNOSIS — A41.9 SEPSIS, DUE TO UNSPECIFIED ORGANISM, UNSPECIFIED WHETHER ACUTE ORGAN DYSFUNCTION PRESENT (HCC): ICD-10-CM

## 2024-04-02 LAB
ALBUMIN SERPL-MCNC: 2.9 G/DL (ref 3.5–4.6)
ALP SERPL-CCNC: 147 U/L (ref 40–130)
ALT SERPL-CCNC: 23 U/L (ref 0–33)
ANION GAP SERPL CALCULATED.3IONS-SCNC: 13 MEQ/L (ref 9–15)
AST SERPL-CCNC: 32 U/L (ref 0–35)
BASE EXCESS ARTERIAL: 10 (ref -3–3)
BASOPHILS # BLD: 0.1 K/UL (ref 0–0.2)
BASOPHILS NFR BLD: 0.3 %
BILIRUB SERPL-MCNC: <0.2 MG/DL (ref 0.2–0.7)
BUN SERPL-MCNC: 32 MG/DL (ref 8–23)
CALCIUM IONIZED: 1.28 MMOL/L (ref 1.12–1.32)
CALCIUM SERPL-MCNC: 9.6 MG/DL (ref 8.5–9.9)
CHLORIDE SERPL-SCNC: 88 MEQ/L (ref 95–107)
CO2 SERPL-SCNC: 27 MEQ/L (ref 20–31)
CREAT SERPL-MCNC: 0.64 MG/DL (ref 0.5–0.9)
EOSINOPHIL # BLD: 0 K/UL (ref 0–0.7)
EOSINOPHIL NFR BLD: 0 %
ERYTHROCYTE [DISTWIDTH] IN BLOOD BY AUTOMATED COUNT: 15.9 % (ref 11.5–14.5)
GLOBULIN SER CALC-MCNC: 3.7 G/DL (ref 2.3–3.5)
GLUCOSE BLD-MCNC: 139 MG/DL (ref 70–99)
GLUCOSE SERPL-MCNC: 127 MG/DL (ref 70–99)
HCO3 ARTERIAL: 34.5 MMOL/L (ref 21–29)
HCT VFR BLD AUTO: 33.6 % (ref 37–47)
HCT VFR BLD AUTO: 38 % (ref 36–48)
HGB BLD CALC-MCNC: 12.8 GM/DL (ref 12–16)
HGB BLD-MCNC: 10.5 G/DL (ref 12–16)
LACTATE BLDV-SCNC: 2.2 MMOL/L (ref 0.5–2.2)
LACTATE: 0.71 MMOL/L (ref 0.4–2)
LACTIC ACID, SEPSIS: 2.3 MMOL/L (ref 0.5–1.9)
LACTIC ACID, SEPSIS: 2.6 MMOL/L (ref 0.5–1.9)
LYMPHOCYTES # BLD: 0.5 K/UL (ref 1–4.8)
LYMPHOCYTES NFR BLD: 2.4 %
MAGNESIUM SERPL-MCNC: 1.8 MG/DL (ref 1.7–2.4)
MCH RBC QN AUTO: 26.9 PG (ref 27–31.3)
MCHC RBC AUTO-ENTMCNC: 31.3 % (ref 33–37)
MCV RBC AUTO: 85.9 FL (ref 79.4–94.8)
MONOCYTES # BLD: 0.7 K/UL (ref 0.2–0.8)
MONOCYTES NFR BLD: 3.5 %
NEUTROPHILS # BLD: 19.5 K/UL (ref 1.4–6.5)
NEUTS SEG NFR BLD: 93.3 %
O2 SAT, ARTERIAL: 77 % (ref 93–100)
PCO2 ARTERIAL: 56 MM HG (ref 35–45)
PERFORMED ON: ABNORMAL
PH ARTERIAL: 7.4 (ref 7.35–7.45)
PLATELET # BLD AUTO: 486 K/UL (ref 130–400)
PO2 ARTERIAL: 43 MM HG (ref 75–108)
POC CHLORIDE: 92 MEQ/L (ref 99–110)
POC CREATININE: 0.6 MG/DL (ref 0.6–1.2)
POC FIO2: 100
POC SAMPLE TYPE: ABNORMAL
POTASSIUM SERPL-SCNC: 3.7 MEQ/L (ref 3.5–5.1)
POTASSIUM SERPL-SCNC: 4.2 MEQ/L (ref 3.4–4.9)
PROCALCITONIN SERPL IA-MCNC: 0.25 NG/ML (ref 0–0.15)
PROT SERPL-MCNC: 6.6 G/DL (ref 6.3–8)
RBC # BLD AUTO: 3.91 M/UL (ref 4.2–5.4)
SODIUM BLD-SCNC: 133 MEQ/L (ref 136–145)
SODIUM SERPL-SCNC: 128 MEQ/L (ref 135–144)
TCO2 ARTERIAL: 36 MMOL/L (ref 21–32)
TROPONIN, HIGH SENSITIVITY: 20 NG/L (ref 0–19)
WBC # BLD AUTO: 21 K/UL (ref 4.8–10.8)

## 2024-04-02 PROCEDURE — 82435 ASSAY OF BLOOD CHLORIDE: CPT

## 2024-04-02 PROCEDURE — 84484 ASSAY OF TROPONIN QUANT: CPT

## 2024-04-02 PROCEDURE — 6360000002 HC RX W HCPCS: Performed by: EMERGENCY MEDICINE

## 2024-04-02 PROCEDURE — 2580000003 HC RX 258: Performed by: EMERGENCY MEDICINE

## 2024-04-02 PROCEDURE — 93005 ELECTROCARDIOGRAM TRACING: CPT | Performed by: EMERGENCY MEDICINE

## 2024-04-02 PROCEDURE — 36600 WITHDRAWAL OF ARTERIAL BLOOD: CPT

## 2024-04-02 PROCEDURE — 84145 PROCALCITONIN (PCT): CPT

## 2024-04-02 PROCEDURE — 84295 ASSAY OF SERUM SODIUM: CPT

## 2024-04-02 PROCEDURE — 83605 ASSAY OF LACTIC ACID: CPT

## 2024-04-02 PROCEDURE — 87040 BLOOD CULTURE FOR BACTERIA: CPT

## 2024-04-02 PROCEDURE — 84132 ASSAY OF SERUM POTASSIUM: CPT

## 2024-04-02 PROCEDURE — 81003 URINALYSIS AUTO W/O SCOPE: CPT

## 2024-04-02 PROCEDURE — 36415 COLL VENOUS BLD VENIPUNCTURE: CPT

## 2024-04-02 PROCEDURE — 80053 COMPREHEN METABOLIC PANEL: CPT

## 2024-04-02 PROCEDURE — 94660 CPAP INITIATION&MGMT: CPT

## 2024-04-02 PROCEDURE — 96365 THER/PROPH/DIAG IV INF INIT: CPT

## 2024-04-02 PROCEDURE — 85014 HEMATOCRIT: CPT

## 2024-04-02 PROCEDURE — 99285 EMERGENCY DEPT VISIT HI MDM: CPT

## 2024-04-02 PROCEDURE — 83735 ASSAY OF MAGNESIUM: CPT

## 2024-04-02 PROCEDURE — 71045 X-RAY EXAM CHEST 1 VIEW: CPT

## 2024-04-02 PROCEDURE — 82330 ASSAY OF CALCIUM: CPT

## 2024-04-02 PROCEDURE — 2000000000 HC ICU R&B

## 2024-04-02 PROCEDURE — 82565 ASSAY OF CREATININE: CPT

## 2024-04-02 PROCEDURE — 82803 BLOOD GASES ANY COMBINATION: CPT

## 2024-04-02 PROCEDURE — 85025 COMPLETE CBC W/AUTO DIFF WBC: CPT

## 2024-04-02 RX ORDER — 0.9 % SODIUM CHLORIDE 0.9 %
1500 INTRAVENOUS SOLUTION INTRAVENOUS ONCE
Status: COMPLETED | OUTPATIENT
Start: 2024-04-02 | End: 2024-04-03

## 2024-04-02 RX ORDER — SODIUM CHLORIDE 9 MG/ML
INJECTION, SOLUTION INTRAVENOUS CONTINUOUS
Status: DISCONTINUED | OUTPATIENT
Start: 2024-04-02 | End: 2024-04-03

## 2024-04-02 RX ADMIN — MEROPENEM 1000 MG: 1 INJECTION, POWDER, FOR SOLUTION INTRAVENOUS at 22:04

## 2024-04-02 RX ADMIN — SODIUM CHLORIDE 1500 ML: 9 INJECTION, SOLUTION INTRAVENOUS at 22:04

## 2024-04-02 RX ADMIN — SODIUM CHLORIDE: 9 INJECTION, SOLUTION INTRAVENOUS at 19:10

## 2024-04-02 ASSESSMENT — PAIN - FUNCTIONAL ASSESSMENT: PAIN_FUNCTIONAL_ASSESSMENT: NONE - DENIES PAIN

## 2024-04-02 NOTE — ED NOTES
Patient sat up in bed, was speaking coherently, asked for warm blanket. Patient denies any pain. Patient keeps removing oxygen mask from face. Instructed patient to keep mask on face for best results of oxygen therapy.

## 2024-04-02 NOTE — ED PROVIDER NOTES
I took over care for this patient. 1926 I reevaluated her.  She voices to me that she wants to go home.  She does not want any invasive procedures, intubation, CPR, to be placed on a ventilator.  At this time she is declining admission to the hospital for medical management, she does not want admission or IV antibiotics.  She is currently being treated for facial cellulitis on oral doxycycline.  The patient is currently alert and oriented x4 and follows all commands.  Appears to have appropriate capacity to make this decision at this time.  I do however have concerns as she did present as a probable opioid overdose.  Will reach out to her son and daughter as well.  Patient currently states that she wants to go home with hospice care.    The son Cyril believes that it is most appropriate for his mother to be evaluated bedside by hospice. Would like code status changed to DNRCC.    I spoke with the daughter Sol, via telephone, in the presence of Cyril as well as the hospice nurse.  They do not feel comfortable at this time placing the patient on a DNR CC.  They do verbalize that they do not want any invasive measures, surgical procedures, invasive procedures, intubation or CPR.  They would like her to be a DNR CCA with medical management at this time.  They can consult with hospice and palliative care in the hospital. The patient is agreeable to this plan    The patient states that she will attempt BiPAP, based on her gas the patient will not do well without intubation, family and patient alerted of this.       Bebo Ford MD  04/02/24 2052       Bebo Ford MD  04/02/24 2143       Bebo Ford MD  04/02/24 2159

## 2024-04-02 NOTE — ED PROVIDER NOTES
[facility] ED  eMERGENCY dEPARTMENT eNCOUnter      Pt Name: Archana Melendez  MRN: [id number]  Birthdate 1960  Date of evaluation: 4/2/2024  Provider: Abel Calvo MD    CHIEF COMPLAINT       Chief Complaint   Patient presents with    Altered Mental Status     Arrived by ems for unresponsiveness         HISTORY OF PRESENT ILLNESS   (Location/Symptom, Timing/Onset,Context/Setting, Quality, Duration, Modifying Factors, Severity)  Note limiting factors.   Archana Melendez is a 63 y.o. 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  She was discharged from the hospital 2 days ago.  The family reports that the patient's total intake in the past 2 days has been 2 packages of Ensure.  The patient's Dilaudid prescription for 4 mg tablets was renewed yesterday and her dose was doubled from one half to a full tablet.  Today at home the patient has been somnolent to the point of being unarousable.  The case was discussed at length with the patient's son who reports that the patient is DNR DNI but is not yet in hospice.  He feels she would not want any sort of invasive or central line.  She did recently have a PICC line in the arm which was tolerated well.  The patient has still been excepting treatment from ENT and infectious disease but has declined any additional surgery.  Hospice care has been discussed with the patient and the family but the patient does not feel she is ready for it at this time per her son.          NursingNotes were reviewed.    REVIEW OF SYSTEMS    (2-9 systems for level 4, 10 or more for level 5)     Review of Systems   Reason unable to perform ROS: Patient somnolent, non-verbal, arouses only to look around room.       Except as noted above the remainder of the review of systems was reviewed and negative.

## 2024-04-03 ENCOUNTER — APPOINTMENT (OUTPATIENT)
Dept: CT IMAGING | Age: 64
DRG: 133 | End: 2024-04-03
Payer: MEDICAID

## 2024-04-03 LAB
ALBUMIN SERPL-MCNC: 3 G/DL (ref 3.5–4.6)
ALP SERPL-CCNC: 133 U/L (ref 40–130)
ALT SERPL-CCNC: 26 U/L (ref 0–33)
ANION GAP SERPL CALCULATED.3IONS-SCNC: 14 MEQ/L (ref 9–15)
AST SERPL-CCNC: 35 U/L (ref 0–35)
BASE EXCESS ARTERIAL: 5 (ref -3–3)
BASOPHILS # BLD: 0.3 K/UL (ref 0–0.2)
BASOPHILS NFR BLD: 1 %
BILIRUB SERPL-MCNC: <0.2 MG/DL (ref 0.2–0.7)
BILIRUB UR QL STRIP: NEGATIVE
BUN SERPL-MCNC: 27 MG/DL (ref 8–23)
CALCIUM IONIZED: 1.24 MMOL/L (ref 1.12–1.32)
CALCIUM SERPL-MCNC: 9.6 MG/DL (ref 8.5–9.9)
CHLORIDE SERPL-SCNC: 94 MEQ/L (ref 95–107)
CLARITY UR: CLEAR
CO2 SERPL-SCNC: 26 MEQ/L (ref 20–31)
COLOR UR: YELLOW
CREAT SERPL-MCNC: 0.5 MG/DL (ref 0.5–0.9)
EKG ATRIAL RATE: 98 BPM
EKG P AXIS: 71 DEGREES
EKG P-R INTERVAL: 124 MS
EKG Q-T INTERVAL: 424 MS
EKG QRS DURATION: 138 MS
EKG QTC CALCULATION (BAZETT): 541 MS
EKG R AXIS: 202 DEGREES
EKG T AXIS: 26 DEGREES
EKG VENTRICULAR RATE: 98 BPM
EOSINOPHIL # BLD: 0 K/UL (ref 0–0.7)
EOSINOPHIL NFR BLD: 0.1 %
ERYTHROCYTE [DISTWIDTH] IN BLOOD BY AUTOMATED COUNT: 15.8 % (ref 11.5–14.5)
GLOBULIN SER CALC-MCNC: 3.6 G/DL (ref 2.3–3.5)
GLUCOSE BLD-MCNC: 152 MG/DL (ref 70–99)
GLUCOSE SERPL-MCNC: 133 MG/DL (ref 70–99)
GLUCOSE UR STRIP-MCNC: NEGATIVE MG/DL
HCO3 ARTERIAL: 30.3 MMOL/L (ref 21–29)
HCT VFR BLD AUTO: 32.6 % (ref 37–47)
HCT VFR BLD AUTO: 38 % (ref 36–48)
HGB BLD CALC-MCNC: 12.8 GM/DL (ref 12–16)
HGB BLD-MCNC: 10.3 G/DL (ref 12–16)
HGB UR QL STRIP: NEGATIVE
KETONES UR STRIP-MCNC: NEGATIVE MG/DL
LACTATE BLDV-SCNC: 1.7 MMOL/L (ref 0.5–2.2)
LACTATE: 1.3 MMOL/L (ref 0.4–2)
LEUKOCYTE ESTERASE UR QL STRIP: NEGATIVE
LYMPHOCYTES # BLD: 0.3 K/UL (ref 1–4.8)
LYMPHOCYTES NFR BLD: 1 %
MCH RBC QN AUTO: 26.7 PG (ref 27–31.3)
MCHC RBC AUTO-ENTMCNC: 31.6 % (ref 33–37)
MCV RBC AUTO: 84.5 FL (ref 79.4–94.8)
MONOCYTES # BLD: 0.8 K/UL (ref 0.2–0.8)
MONOCYTES NFR BLD: 2.9 %
NEUTROPHILS # BLD: 25.9 K/UL (ref 1.4–6.5)
NEUTS SEG NFR BLD: 95 %
NITRITE UR QL STRIP: NEGATIVE
O2 SAT, ARTERIAL: 75 % (ref 93–100)
PCO2 ARTERIAL: 50 MM HG (ref 35–45)
PERFORMED ON: ABNORMAL
PH ARTERIAL: 7.39 (ref 7.35–7.45)
PH UR STRIP: 6 [PH] (ref 5–9)
PLATELET # BLD AUTO: 458 K/UL (ref 130–400)
PO2 ARTERIAL: 41 MM HG (ref 75–108)
POC CHLORIDE: 98 MEQ/L (ref 99–110)
POC CREATININE: 0.5 MG/DL (ref 0.6–1.2)
POC FIO2: 100
POC SAMPLE TYPE: ABNORMAL
POTASSIUM SERPL-SCNC: 3.6 MEQ/L (ref 3.5–5.1)
POTASSIUM SERPL-SCNC: 3.9 MEQ/L (ref 3.4–4.9)
PROT SERPL-MCNC: 6.6 G/DL (ref 6.3–8)
PROT UR STRIP-MCNC: ABNORMAL MG/DL
RBC # BLD AUTO: 3.86 M/UL (ref 4.2–5.4)
SODIUM BLD-SCNC: 134 MEQ/L (ref 136–145)
SODIUM SERPL-SCNC: 134 MEQ/L (ref 135–144)
SP GR UR STRIP: 1.03 (ref 1–1.03)
TCO2 ARTERIAL: 32 MMOL/L (ref 21–32)
UROBILINOGEN UR STRIP-ACNC: 0.2 E.U./DL
WBC # BLD AUTO: 27.3 K/UL (ref 4.8–10.8)

## 2024-04-03 PROCEDURE — 93010 ELECTROCARDIOGRAM REPORT: CPT | Performed by: INTERNAL MEDICINE

## 2024-04-03 PROCEDURE — 6360000002 HC RX W HCPCS: Performed by: INTERNAL MEDICINE

## 2024-04-03 PROCEDURE — 2580000003 HC RX 258: Performed by: INTERNAL MEDICINE

## 2024-04-03 PROCEDURE — 1210000000 HC MED SURG R&B

## 2024-04-03 PROCEDURE — 6360000004 HC RX CONTRAST MEDICATION: Performed by: INTERNAL MEDICINE

## 2024-04-03 PROCEDURE — 85014 HEMATOCRIT: CPT

## 2024-04-03 PROCEDURE — 99223 1ST HOSP IP/OBS HIGH 75: CPT | Performed by: INTERNAL MEDICINE

## 2024-04-03 PROCEDURE — 36600 WITHDRAWAL OF ARTERIAL BLOOD: CPT

## 2024-04-03 PROCEDURE — 82330 ASSAY OF CALCIUM: CPT

## 2024-04-03 PROCEDURE — 99223 1ST HOSP IP/OBS HIGH 75: CPT

## 2024-04-03 PROCEDURE — 94761 N-INVAS EAR/PLS OXIMETRY MLT: CPT

## 2024-04-03 PROCEDURE — 51798 US URINE CAPACITY MEASURE: CPT

## 2024-04-03 PROCEDURE — 82435 ASSAY OF BLOOD CHLORIDE: CPT

## 2024-04-03 PROCEDURE — 70450 CT HEAD/BRAIN W/O DYE: CPT

## 2024-04-03 PROCEDURE — 84132 ASSAY OF SERUM POTASSIUM: CPT

## 2024-04-03 PROCEDURE — 2700000000 HC OXYGEN THERAPY PER DAY

## 2024-04-03 PROCEDURE — 80053 COMPREHEN METABOLIC PANEL: CPT

## 2024-04-03 PROCEDURE — 36415 COLL VENOUS BLD VENIPUNCTURE: CPT

## 2024-04-03 PROCEDURE — 82803 BLOOD GASES ANY COMBINATION: CPT

## 2024-04-03 PROCEDURE — 82565 ASSAY OF CREATININE: CPT

## 2024-04-03 PROCEDURE — 83605 ASSAY OF LACTIC ACID: CPT

## 2024-04-03 PROCEDURE — 51701 INSERT BLADDER CATHETER: CPT

## 2024-04-03 PROCEDURE — 6370000000 HC RX 637 (ALT 250 FOR IP)

## 2024-04-03 PROCEDURE — 71275 CT ANGIOGRAPHY CHEST: CPT

## 2024-04-03 PROCEDURE — 5A09357 ASSISTANCE WITH RESPIRATORY VENTILATION, LESS THAN 24 CONSECUTIVE HOURS, CONTINUOUS POSITIVE AIRWAY PRESSURE: ICD-10-PCS | Performed by: INTERNAL MEDICINE

## 2024-04-03 PROCEDURE — 94660 CPAP INITIATION&MGMT: CPT

## 2024-04-03 PROCEDURE — 85025 COMPLETE CBC W/AUTO DIFF WBC: CPT

## 2024-04-03 PROCEDURE — 84295 ASSAY OF SERUM SODIUM: CPT

## 2024-04-03 RX ORDER — POTASSIUM CHLORIDE 20 MEQ/1
40 TABLET, EXTENDED RELEASE ORAL PRN
Status: DISCONTINUED | OUTPATIENT
Start: 2024-04-03 | End: 2024-04-04 | Stop reason: HOSPADM

## 2024-04-03 RX ORDER — ACETAMINOPHEN 325 MG/1
650 TABLET ORAL EVERY 6 HOURS PRN
Status: DISCONTINUED | OUTPATIENT
Start: 2024-04-03 | End: 2024-04-04 | Stop reason: HOSPADM

## 2024-04-03 RX ORDER — SODIUM CHLORIDE 9 MG/ML
INJECTION, SOLUTION INTRAVENOUS PRN
Status: DISCONTINUED | OUTPATIENT
Start: 2024-04-03 | End: 2024-04-04 | Stop reason: HOSPADM

## 2024-04-03 RX ORDER — POTASSIUM CHLORIDE 7.45 MG/ML
10 INJECTION INTRAVENOUS PRN
Status: DISCONTINUED | OUTPATIENT
Start: 2024-04-03 | End: 2024-04-04 | Stop reason: HOSPADM

## 2024-04-03 RX ORDER — MAGNESIUM SULFATE IN WATER 40 MG/ML
2000 INJECTION, SOLUTION INTRAVENOUS PRN
Status: DISCONTINUED | OUTPATIENT
Start: 2024-04-03 | End: 2024-04-04 | Stop reason: HOSPADM

## 2024-04-03 RX ORDER — ENOXAPARIN SODIUM 100 MG/ML
30 INJECTION SUBCUTANEOUS DAILY
Status: DISCONTINUED | OUTPATIENT
Start: 2024-04-03 | End: 2024-04-04 | Stop reason: HOSPADM

## 2024-04-03 RX ORDER — ACETAMINOPHEN 650 MG/1
650 SUPPOSITORY RECTAL EVERY 6 HOURS PRN
Status: DISCONTINUED | OUTPATIENT
Start: 2024-04-03 | End: 2024-04-04 | Stop reason: HOSPADM

## 2024-04-03 RX ORDER — HYDROMORPHONE HYDROCHLORIDE 2 MG/1
2 TABLET ORAL EVERY 4 HOURS PRN
Status: DISCONTINUED | OUTPATIENT
Start: 2024-04-03 | End: 2024-04-04 | Stop reason: HOSPADM

## 2024-04-03 RX ORDER — POLYETHYLENE GLYCOL 3350 17 G/17G
17 POWDER, FOR SOLUTION ORAL DAILY PRN
Status: DISCONTINUED | OUTPATIENT
Start: 2024-04-03 | End: 2024-04-04 | Stop reason: HOSPADM

## 2024-04-03 RX ORDER — SODIUM CHLORIDE 0.9 % (FLUSH) 0.9 %
5-40 SYRINGE (ML) INJECTION EVERY 12 HOURS SCHEDULED
Status: DISCONTINUED | OUTPATIENT
Start: 2024-04-03 | End: 2024-04-04 | Stop reason: HOSPADM

## 2024-04-03 RX ORDER — ONDANSETRON 2 MG/ML
4 INJECTION INTRAMUSCULAR; INTRAVENOUS EVERY 6 HOURS PRN
Status: DISCONTINUED | OUTPATIENT
Start: 2024-04-03 | End: 2024-04-04 | Stop reason: HOSPADM

## 2024-04-03 RX ORDER — SODIUM CHLORIDE 0.9 % (FLUSH) 0.9 %
5-40 SYRINGE (ML) INJECTION PRN
Status: DISCONTINUED | OUTPATIENT
Start: 2024-04-03 | End: 2024-04-04 | Stop reason: HOSPADM

## 2024-04-03 RX ORDER — MORPHINE SULFATE 2 MG/ML
2 INJECTION, SOLUTION INTRAMUSCULAR; INTRAVENOUS EVERY 4 HOURS PRN
Status: DISCONTINUED | OUTPATIENT
Start: 2024-04-03 | End: 2024-04-03

## 2024-04-03 RX ORDER — ONDANSETRON 4 MG/1
4 TABLET, ORALLY DISINTEGRATING ORAL EVERY 8 HOURS PRN
Status: DISCONTINUED | OUTPATIENT
Start: 2024-04-03 | End: 2024-04-04 | Stop reason: HOSPADM

## 2024-04-03 RX ORDER — SODIUM CHLORIDE 9 MG/ML
INJECTION, SOLUTION INTRAVENOUS CONTINUOUS
Status: DISCONTINUED | OUTPATIENT
Start: 2024-04-03 | End: 2024-04-04 | Stop reason: HOSPADM

## 2024-04-03 RX ADMIN — MEROPENEM 1000 MG: 1 INJECTION, POWDER, FOR SOLUTION INTRAVENOUS at 13:52

## 2024-04-03 RX ADMIN — MORPHINE SULFATE 2 MG: 2 INJECTION, SOLUTION INTRAMUSCULAR; INTRAVENOUS at 09:23

## 2024-04-03 RX ADMIN — SODIUM CHLORIDE: 9 INJECTION, SOLUTION INTRAVENOUS at 02:03

## 2024-04-03 RX ADMIN — MEROPENEM 1000 MG: 1 INJECTION, POWDER, FOR SOLUTION INTRAVENOUS at 22:16

## 2024-04-03 RX ADMIN — Medication 10 ML: at 08:30

## 2024-04-03 RX ADMIN — MEROPENEM 1000 MG: 1 INJECTION, POWDER, FOR SOLUTION INTRAVENOUS at 06:20

## 2024-04-03 RX ADMIN — HYDROMORPHONE HYDROCHLORIDE 2 MG: 2 TABLET ORAL at 23:25

## 2024-04-03 RX ADMIN — IOPAMIDOL 75 ML: 755 INJECTION, SOLUTION INTRAVENOUS at 01:12

## 2024-04-03 RX ADMIN — Medication 10 ML: at 23:08

## 2024-04-03 RX ADMIN — SODIUM CHLORIDE: 9 INJECTION, SOLUTION INTRAVENOUS at 13:56

## 2024-04-03 ASSESSMENT — PAIN SCALES - GENERAL
PAINLEVEL_OUTOF10: 5
PAINLEVEL_OUTOF10: 5
PAINLEVEL_OUTOF10: 6

## 2024-04-03 ASSESSMENT — ENCOUNTER SYMPTOMS
VOMITING: 0
NAUSEA: 0
COUGH: 0
SHORTNESS OF BREATH: 0

## 2024-04-03 NOTE — PROGRESS NOTES
0830 Am assessment completed as noted. Pt and family to meet with hospice today. Pt vss, no distress noted. Will continue to monitor. Electronically signed by Julissa Sabillon RN on 4/3/2024 at 12:53 PM  1250 Son Cyril called and stated that after speaking with hospice nurse that they would like to change code status to DNRCC and to sign hospice papers tomorrow. [facility] and will notify her of family wishes. Electronically signed by Julissa Sabillon RN on 4/3/2024 at 12:58 PM  1420 Report called to [facility] rn. Electronically signed by Julissa Sabillon RN on 4/3/2024 at 2:20 PM  1431 pt placed on nrb for transfer. Electronically signed by Julissa Sabillon RN on 4/3/2024 at 2:33 PM  1435 portable telly applied and verified with telly room. [address]. Electronically signed by Julissa Sabillon RN on 4/3/2024 at 2:37 PM  1450 Pt maintained oxygen level for 15 minutes on nrb for transfer. Pt oxygen 97% on nrb. Electronically signed by Julissa Sabillon RN on 4/3/2024 at 2:50 PM  1458 Called Cyril and updated him on patient transferring to room 476. Electronically signed by Julissa Sabillon RN on 4/3/2024 at 2:59 PM

## 2024-04-03 NOTE — PLAN OF CARE
Problem: Discharge Planning  Goal: Discharge to home or other facility with appropriate resources  Outcome: Progressing  Flowsheets  Taken 4/3/2024 0054  Discharge to home or other facility with appropriate resources: Identify barriers to discharge with patient and caregiver  Taken 4/3/2024 0030  Discharge to home or other facility with appropriate resources: Identify barriers to discharge with patient and caregiver     Problem: Safety - Adult  Goal: Free from fall injury  Outcome: Progressing     Problem: ABCDS Injury Assessment  Goal: Absence of physical injury  Outcome: Progressing     Problem: Skin/Tissue Integrity  Goal: Absence of new skin breakdown  Description: 1.  Monitor for areas of redness and/or skin breakdown  2.  Assess vascular access sites hourly  3.  Every 4-6 hours minimum:  Change oxygen saturation probe site  4.  Every 4-6 hours:  If on nasal continuous positive airway pressure, respiratory therapy assess nares and determine need for appliance change or resting period.  Outcome: Progressing     Problem: Risk for Elopement  Goal: Patient will not exit the unit/facility without proper excort  Outcome: Progressing  Flowsheets  Taken 4/3/2024 0054  Nursing Interventions for Elopement Risk: Assist with personal care needs such as toileting, eating, dressing, as needed to reduce the risk of wandering  Taken 4/3/2024 0030  Nursing Interventions for Elopement Risk:   Assist with personal care needs such as toileting, eating, dressing, as needed to reduce the risk of wandering   Reduce environmental triggers     Problem: Pain  Goal: Verbalizes/displays adequate comfort level or baseline comfort level  Outcome: Progressing

## 2024-04-03 NOTE — PROGRESS NOTES
Progress Note  Date:4/3/2024       Room:[place] [address]-01  Patient Name:Archana Melendez     YOB: 1960     Age:63 y.o.        Subjective    Subjective:  Symptoms:  She reports malaise and weakness.  No shortness of breath, cough, chest pain, headache, chest pressure, anorexia, diarrhea or anxiety.    Diet:  No nausea or vomiting.    Pain:  She complains of pain that is moderate.       Review of Systems   Respiratory:  Negative for cough and shortness of breath.    Cardiovascular:  Negative for chest pain.   Gastrointestinal:  Negative for anorexia, diarrhea, nausea and vomiting.   Neurological:  Positive for weakness.     Objective         Vitals Last 24 Hours:  TEMPERATURE:  Temp  Av.7 °F (36.5 °C)  Min: 97.3 °F (36.3 °C)  Max: 98 °F (36.7 °C)  RESPIRATIONS RANGE: Resp  Av.4  Min: 15  Max: 38  PULSE OXIMETRY RANGE: SpO2  Av.3 %  Min: 43 %  Max: 100 %  PULSE RANGE: Pulse  Av.7  Min: 94  Max: 123  BLOOD PRESSURE RANGE: Systolic (24hrs), Av , Min:77 , Max:142   ; Diastolic (24hrs), Av, Min:52, Max:93    I/O (24Hr):    Intake/Output Summary (Last 24 hours) at 4/3/2024 1125  Last data filed at 4/3/2024 0653  Gross per 24 hour   Intake 560.39 ml   Output --   Net 560.39 ml     Objective:  General Appearance:  In no acute distress and ill-appearing.    Vital signs: (most recent): Blood pressure 101/77, pulse (!) 106, temperature 98 °F (36.7 °C), temperature source Axillary, resp. rate 19, height 1.626 m (5' 4\"), weight 44.5 kg (98 lb 1.7 oz), SpO2 92 %, not currently breastfeeding.    HEENT: Normal HEENT exam.    Lungs:  There are decreased breath sounds.    Heart: S1 normal and S2 normal.    Abdomen: Abdomen is soft.  Bowel sounds are normal.     Extremities: Normal range of motion.    Neurological: Patient is alert.    Pupils:  Pupils are equal, round, and reactive to light.    Skin:  Warm.      Labs/Imaging/Diagnostics    Labs:  CBC:  Recent Labs     24  1850 24  7755  hydrocephalus. ORBITS: The visualized portion of the orbits demonstrate no acute abnormality. SINUSES: The visualized paranasal sinuses and mastoid air cells demonstrate no acute abnormality. SOFT TISSUES/SKULL:  No acute abnormality of the visualized skull or soft tissues.     No acute intracranial abnormality.     XR CHEST PORTABLE    Result Date: 4/2/2024  EXAMINATION: ONE XRAY VIEW OF THE CHEST 4/2/2024 6:54 pm COMPARISON: None. HISTORY: ORDERING SYSTEM PROVIDED HISTORY: cough TECHNOLOGIST PROVIDED HISTORY: Reason for exam:->cough What reading provider will be dictating this exam?->CRC FINDINGS: Patient is rotated to the left. There is a permanent pacemaker with 2 wires placed left subclavian vein which is also defibrillator. Mediastinum appears unremarkable. Lungs are normally expanded.  No infiltrates, consolidations or pleural effusions are seen. There is no perihilar vascular congestion. There is no pneumothorax.     No acute cardiopulmonary process.     Assessment//Plan           Hospital Problems             Last Modified POA    * (Principal) Acute respiratory failure with hypoxia (HCC) 4/2/2024 Yes   Acute hypoxic respiratory failure  Tongue abscess  Dehydration  Oral cavity  squamous cell carcinoma  Assessment & Plan  4/3: Continue with Merrem.  Patient is meeting with hospice today.  DNR CCA no intubation no CPR.  Spoke with nursing.  Morphine as needed for pain. TCT 35 min   Electronically signed by Brayan Madison MD on 4/3/24 at 11:25 AM EDT

## 2024-04-03 NOTE — CONSULTS
Referral visit completed with pt and pt children at bedside. Pt is A&OX3 but is drowsy and drifts off during conversation. Hospice Philosophy, & Hospice Patient Information Booklet reviewed during referral visit. Family states that they are in agreement with hospice and the philosophy however after talking with the patient themselves the patient is currently telling them that she wants aggressive treatments and is not ready to give up yet and that they would like to respect pt wishes, Family states that they will call [facility] when they are ready to proceed with hospice services. Spoke with bedside nurse Julissa and updated on outcome of meeting.

## 2024-04-03 NOTE — CARE COORDINATION
Case Management Assessment  Initial Evaluation    Date/Time of Evaluation: 4/3/2024 8:18 AM  Assessment Completed by: Ewa Azevedo    If patient is discharged prior to next notation, then this note serves as note for discharge by case management.    Patient Name: Archana Melendez                   YOB: 1960  Diagnosis: Acute respiratory failure with hypoxia (HCC) [J96.01]  Altered mental status, unspecified altered mental status type [R41.82]  Sepsis, due to unspecified organism, unspecified whether acute organ dysfunction present (HCC) [A41.9]                   Date / Time: 4/2/2024  5:31 PM    Patient Admission Status: Inpatient   Readmission Risk (Low < 19, Mod (19-27), High > 27): Readmission Risk Score: 25.1    Current PCP: Ivone Barnard MD  PCP verified by CM? Yes    Chart Reviewed: Yes      History Provided by: Patient, Child/Family  Patient Orientation: Alert and Oriented, Person, Place, Situation, Self    Patient Cognition: Alert    Hospitalization in the last 30 days (Readmission):  Yes    If yes, Readmission Assessment in  Navigator will be completed.    Advance Directives:      Code Status: DNR-CCA   Patient's Primary Decision Maker is: Named in Scanned ACP Document    Primary Decision Maker (Active): Sol Boothe - Child - [phone]    Secondary Decision Maker: Cyril Melendez - Child - [phone]    Secondary Decision Maker: Na Melendez - Child - [phone]    Discharge Planning:    Patient lives with: Alone Type of Home: House  Primary Care Giver: Self  Patient Support Systems include: Children, Family Members, Other (Comment) ([facility])   Current Financial resources:    Current community resources:    Current services prior to admission: None            Current DME:              Type of Home Care services:  None    ADLS  Prior functional level: Independent in ADLs/IADLs  Current functional level: Other (see comment) (Pt on bedrest in ICU on Bipap)    PT AM-PAC:    and/or patient representative Archana and her family were provided with a choice of provider and agrees with the discharge plan. Freedom of choice list with basic dialogue that supports the patient's individualized plan of care/goals and shares the quality data associated with the providers was provided to:     Patient Representative Name:       The Patient and/or Patient Representative Agree with the Discharge Plan?      Ewa Azevedo  Case Management Department

## 2024-04-03 NOTE — CODE DOCUMENTATION
Admit from ER.  Patient on bipap SpO2 reading 55%.  Rapid response called.  Dr. Ledesma at bedside.  Ordered ABG

## 2024-04-03 NOTE — CONSULTS
Received call from pt son Gregory with Sol present in background. Per Andriy after speaking with physicians this morning they would like to proceed with hospice and services. Pt's mothers  is this evening with the mass in the AM so no family available to sign consents until tomorrow after the . Andriy states that he will call the ICU and request pt to be made DNRCC now with plans to transfer to hospice tomorrow. Bedside nurse Julissa notified.

## 2024-04-03 NOTE — PROGRESS NOTES
Physical Therapy Missed Treatment   Facility/Department: [facility] MED SURG IC06/IC06-01    NAME: Archana Melendez    : 1960 (63 y.o.)  MRN: [id number]    Account: [id number]  Gender: female      PT evaluation and treatment orders received. Chart reviewed. PT evaluation attempted. Hold per RN - poor respiratory status. RN believes that pt would not tolerate mobility at this time.       Will follow and attempt PT evaluation again at earliest availability.       Marlyn Brown, PT, 24 at 10:53 AM

## 2024-04-03 NOTE — PROGRESS NOTES
Nutrition Assessment     Type and Reason for Visit: Initial, Consult (TF order and manage)    Nutrition Recommendations/Plan:   Boost VHC (family providing from home)   1 carton (237 ml) TID   30 ml water flush before and after each feeding while on IVF(or as per medical team)     Malnutrition Assessment:  Malnutrition Status: Severe malnutrition    Nutrition Assessment:  Severe malnutrition, related to catabolic illness. Pt administers her own TF with formula from home (Boost VHC).  Noted plan for hospice care, family would like to continue to her feedings.    Estimated Daily Nutrient Needs:  Energy (kcal):  ~1500 kcal @ 33 kcal/kg Weight Used for Energy Requirements: Current     Protein (g):  ~67 gm @ 1.5 gm/kg Weight Used for Protein Requirements: Current        Fluid (ml/day):  ~1500 ml Method Used for Fluid Requirements: 1 ml/kcal    Nutrition Related Findings:   PMH of HTN, HLD, COPD, diastolic HF, hep B and C, oral cancer with mets to lungs, alcoholism, who presents to the emergency department with chief complaint of Cyst. s/p I&D of neck abscess 2/25, Has PEG tube. labs reviewed (hyponatremia), meds reviewed, IVF: NaCl @ 75 ml/hr, on bipap      Current Nutrition Therapies:    Diet NPO  Current Tube Feeding (TF) Orders:   Feeding Route: PEG  Formula: Other Tube Feeding (Comment) (Boost VHC (from home))  Schedule: Bolus  Additives/Modulars: None  Water Flushes: 30 ml before and after each bolus (180 ml)  Current TF & Flush Orders Provides: 1590 kcal, 66 gm protein ~ 660 ml free water  Goal TF & Flush Orders Provides: 1590 kcal, 66 gm protein ~ 660 ml free water     Anthropometric Measures:  Height: 162.6 cm (5' 4\")  Current Body Wt: 44.5 kg (98 lb) (4/3)   BMI: 16.8    Nutrition Diagnosis:   Severe malnutrition, In context of chronic illness related to catabolic illness, inadequate protein-energy intake as evidenced by Criteria as identified in malnutrition assessment    Nutrition Interventions:   Food and/or  Nutrient Delivery: Continue NPO, Start Tube Feeding (Boost VHC (family providing from home)  1 carton (237 ml) TID)  Nutrition Education/Counseling: Education not indicated  Coordination of Nutrition Care: Continue to monitor while inpatient     Goals:     Goals: Initiate nutrition support       Nutrition Monitoring and Evaluation:      Food/Nutrient Intake Outcomes: Enteral Nutrition Intake/Tolerance  Physical Signs/Symptoms Outcomes: None Identified    Discharge Planning:    Enteral Nutrition     Sinai Schaffer RD, LD

## 2024-04-03 NOTE — PROGRESS NOTES
[facility]  Occupational Therapy        NAME:  Archana Melendez  ROOM: IC06/IC06-01  :  1960  DATE: 4/3/2024    Attempted to see Archana Melendez at 1105 on this date for:   [x]  Initial Evaluation   []  Treatment       Patient was unable to be seen due to:   [] Off unit for testing/procedure    [] Patient refused, stating \"    [] Therapy on hold due to     [x] Nursing deferred due to pt with currently poor respiratory status. RN believes pt unable to participate in mobility at this time.   [] Other:      Discussed with JESIKA Costa    Electronically signed by SILVANA Mercado on 4/3/24 at 11:06 AM EDT

## 2024-04-03 NOTE — PROGRESS NOTES
Patient son, contacted palliative care to discuss goals of care. Patient's mother passed away on Thursday, the  is today and arrangements have already been made. Family wishes to move forth with hospice philosophy during inpatient stay and sign onto hospice services tomorrow once they can be patient at hospice care center.     Two sons, Ambrocio/Cyril, patient and Sol CHRISTIAN agreeable to DNRCC code status. Discussed code status in full transparency. Patient verbalizes that she does not want aggressive measures, cpr, intubation. She does not want blood cultures drawn. She does not heroic interventions. Patient is tearful during encounter. Patient is alert and oriented but is having intermittent confusion and lethargy.    DNRCC form filled out and signed. Placed in chart.     Patient home orders for cancer related pain were Hydromorphone 4mg via PEG every 4 hours PRN     Will order Hydromorphone 2mg via peg tube every 4 hours PRN   Will order Dilaudid 0.5mg IV every 4 hours PRN for breakthrough pain     Consult palliative care services for any cancer related pain, symptomatic concerns via perfect serve.

## 2024-04-03 NOTE — H&P
Hospitalist Group   History and Physical      CHIEF COMPLAINT: Altered mental status    History of Present Illness:  63 y.o. female with a history of COPD hepatitis B, hepatitis C, tongue cancer/oral cavity SCC, recurrent tongue abscess, presents with altered mental status.  Patient was discharged couple days from the hospital.  She was treated for the tongue abscess.  She had I&D a week ago.  She was on meropenem and discharged on doxycycline.  Patient Dilaudid dose was increased and patient became altered after taking the high-dose Dilaudid.  He started waking up in the ER.  She continued to be hypoxic despite being on 15 L O2.  Initially patient did not want any intervention wanted to go to hospice.  She refused intubation, CPR, any procedures including central lines.  Hospice nurse came and saw patient but then the daughter changed her mind after talking to hospice and wanted patient to be admitted to the hospital DNR CCA for now.  Patient still refuses any Procedures or any heroic measures.  She is okay with antibiotic and fluids.  Patient was also placed on BiPAP which she is okay with currently.  She is currently denying any complaints.  No chest pain, shortness of breath, nausea, vomiting.    REVIEW OF SYSTEMS:  no fevers, chills, cp, sob, n/v, ha, vision/hearing changes, wt changes, hot/cold flashes, other open skin lesions, diarrhea, constipation, dysuria/hematuria unless noted in HPI. Complete ROS performed with the patient and is otherwise negative.      PMH:  Past Medical History:   Diagnosis Date    Anxiety     Bilateral carpal tunnel syndrome JAN 2011    Cancer (HCC) 2005-TIP OF TONGUE    METS TO LEFT LYMPH NODE-SQUAMOUS    Cardiomyopathy (HCC) 3/6/2015    COPD (chronic obstructive pulmonary disease) (HCC)     COPD (chronic obstructive pulmonary disease) (HCC)     Dental disease     SEVERE DENTAL PROBLEMS    Diastolic dysfunction     DJD (degenerative joint disease), lumbar     Dyslipidemia

## 2024-04-03 NOTE — ACP (ADVANCE CARE PLANNING)
Advance Care Planning   Healthcare Decision Maker:    Primary Decision Maker (Active): Sol Boothe - Child - [phone]    Secondary Decision Maker: Cyril Melendez - Child - [phone]    Secondary Decision Maker: Na Melendez [facility] [phone]    Click here to complete Healthcare Decision Makers including selection of the Healthcare Decision Maker Relationship (ie \"Primary\").

## 2024-04-03 NOTE — CONSULTS
Pulmonary and Critical Care Medicine  Consult Note  Encounter Date: 4/3/2024 1:26 PM    Ms. Archana Melendez is a 63 y.o. female  : 1960  Requesting Provider: Brayan Madison MD    Reason for request: ICU management            HISTORY OF PRESENT ILLNESS:    Patient is 63 y.o. presents with altered mental status, patient has a history of tongue cancer squamous cell carcinoma, she has history of recurrent tongue abscess, recently treated and discharged couple days ago prior to this admission.  She has been having poor oral intake, Dilaudid dose was increased yesterday, patient reports no chest pain, she does have some shortness of breath, she is currently on BiPAP at 80% FiO2, no fever, she has mild tachycardia.          Past Medical History:        Diagnosis Date    Anxiety     Bilateral carpal tunnel syndrome 2011    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     COPD (chronic obstructive pulmonary disease) (HCC)     Dental disease     SEVERE DENTAL PROBLEMS    Diastolic dysfunction     DJD (degenerative joint disease), lumbar     Dyslipidemia 3/6/2015    Hepatitis B infection VIRAL-    Hepatitis C without mention of hepatic coma -CHEMO    History of alcoholism (HCC)     History of osteopenia     Hypothyroidism     ICD (implantable cardioverter-defibrillator) battery depletion     Tongue cancer (HCC)        Past Surgical History:        Procedure Laterality Date    CT NEEDLE BIOPSY LUNG PERCUTANEOUS  2023    CT NEEDLE BIOPSY LUNG PERCUTANEOUS 2023    GASTROSTOMY TUBE PLACEMENT N/A 8/10/2023    EGD REPLACE GASTROSTOMY TUBE ROOM 485 performed by Wale Wright MD at [facility] OR    LEG SURGERY   MASS R LEG SOFT TISSUE    LUMBAR FUSION      Dr. Rodriguez    OTHER SURGICAL HISTORY  09/16/15    FEDERICO REA MD IMPLANT ICD       Social History:     reports that she quit

## 2024-04-03 NOTE — PROGRESS NOTES
Spiritual Care Services     Summary of Visit:    Patient is currently on Bipap, pt is Hindu, Coordinated for a  visit tomorrow, Per Father Bradley, Pt appreciated the visit, no family present at time of visit,       Encounter Summary  Encounter Overview/Reason : Initial Encounter  Service Provided For:: Patient  Referral/Consult From:: Rounding  Support System: Children, Family members  Complexity of Encounter: Moderate  Begin Time: 1025  End Time : 1040  Total Time Calculated: 15 min        Spiritual/Emotional needs  Type: Spiritual Support  Rituals, Rites and Sacraments  Type:  (Pt request anointing)                   Spiritual Assessment/Intervention/Outcomes:    Assessment: Concerns with suffering, Compromised coping, Other (Comment)    Intervention: Sustaining Presence/Ministry of presence, Active listening, Discussed relationship with God, Discussed meaning/purpose    Outcome: Receptive, Engaged in conversation, Comfort      Care Plan:    Plan and Referrals  Plan/Referrals: Continue to visit, (comment)          Spiritual Care Services   Electronically signed by Chaplain Amalia on 4/3/2024 at 2:13 PM.    To reach a  for emotional and spiritual support, place an EPIC consult request.   If a  is needed immediately, dial “0” and ask to page the on-call .

## 2024-04-03 NOTE — CONSULTS
Palliative Care Consult Note  Patient: Archana Melendez  Gender: female  YOB: 1960  Unit/Bed: IC06/IC06-01  CodeStatus: DNR-CCA  Inpatient Treatment Team: Treatment Team: Attending Provider: Brayan Madison MD; Consulting Physician: Sasha Odonnell DO; Registered Nurse: Rico Buchanan, RN; Registered Nurse: Julissa Sabillon, JESIKA; : Ewa Azevedo  Admit Date:  4/2/2024    Chief Complaint: Altered Mental Status     History of Presenting Illness:      Archana Melendez is a 63 y.o. 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     Patient was brought to the emergency room with unresponsiveness, last known well 12 hours prior. Patient family reports decreased peg tube tubing and increased weakness. Recently discharged on Easter. Patient was admitted in the setting of acute respiratory failure with hypoxia, altered mental status, sepsis.     Patient has had frequent hospitalizations regarding progression of necrotic tumor superimposed with abscess formation of her neck.     Palliative care was consulted by Dr. Madison for goals of care.     Upon entering the room I find the patient awake, alert, having intermittent confusion. She is on BiPAP FiO2  90% for Spo2 93%. Her cognitive state is impaired compared to baseline. She has known malignant abscess that was recently drainage by ENT. Per chart review, ENT noted clearly necrotic malignant tumor present during abscess drainage. There is abscess formation, but the vast majority of this is tumor necrosis. ,  Patient is well known to palliative care. Patient's previous functional status is previous ambulatory on room air. Patient lives at home alone.   Patient usually takes Diluadid 4mg via PEG tube for pain.  Patient/health caregiver admits to decreased peg tube feedings/appetite. Poor hydration intake via peg. Patient/health caregiver has had sever functional decline over the last 3 months.    Most recent notable labs:   ABG: PH: 7.388 pCO2: 50 pO2: 41 HCO3: 30.3 O2 Sat: 75  WBC: 27.3 Hemoglobin: 10.3 Hematocrit: 32.6 Platelet: 458 Albumin: 3.0 Sodium: 134 Chloride: 94       Review of Systems:       Review of Systems   Unable to perform ROS: Acuity of condition       Physical Examination:       /89   Pulse (!) 112   Temp 98 °F (36.7 °C) (Axillary)   Resp 24   Ht 1.626 m (5' 4\")   Wt 44.5 kg (98 lb 1.7 oz)   SpO2 97%   BMI 16.84 kg/m²      Physical Exam  Constitutional:       Appearance: She is ill-appearing.   Cardiovascular:      Rate and Rhythm: Tachycardia present.   Pulmonary:      Effort: Pulmonary effort is normal. No respiratory distress.   Skin:     General: Skin is warm and dry.   Neurological:      Mental Status: She is alert. She is disoriented.      Motor: Weakness present.      Gait: Gait abnormal.         Allergies:      Allergies   Allergen Reactions    Pcn [Penicillins] Anaphylaxis     Tolerated a full course of Meropenem without issue 3/2024    Demerol Rash    Meperidine Rash       Medications:      Current Facility-Administered Medications   Medication Dose Route Frequency Provider Last Rate Last Admin    sodium chloride flush 0.9 % injection 5-40 mL  5-40 mL IntraVENous 2 times per day Luis F Ledesma MD        sodium chloride flush 0.9 % injection 5-40 mL  5-40 mL IntraVENous PRN Luis F Ledesma MD        0.9 % sodium chloride infusion   IntraVENous PRN Luis F Ledesma MD        potassium chloride (KLOR-CON M) extended release tablet 40 mEq  40 mEq Oral PRN Luis F Ledesma MD        Or    potassium bicarb-citric acid (EFFER-K) effervescent tablet 40 mEq  40 mEq Oral PRN Luis F Ledesma MD        Or    potassium chloride 10

## 2024-04-03 NOTE — PROGRESS NOTES
[id number]: Pt arrived to ICU bed 6 without issue. Report received from ED RN. Appropriate monitoring equipment attached to pt. Admission assessments and med rec completed, see flowsheets. A&OX4. Oriented to room and call light. Bed alarm on. SCDs on. Skin WNL with exception of neck wound (see LDA/flowsheets), protective mepilexes placed to bilateral heels and sacrum.    0034: RRT called for pt O2 sustaining 40s-50s on BIPAP. See rapid documentation.    0108: Pt taken to CT and returned to ICU without issue.    0200: Pt daughter at bedside, all questions answered, no further needs verbalized at this time.    0238: Dr Luis F Ledesma MD notified via [employer] of pt med rec completion, pt c/o pain, and pt c/o anxiety. Awaiting response. No new orders at this time.

## 2024-04-04 VITALS
HEIGHT: 64 IN | OXYGEN SATURATION: 100 % | HEART RATE: 117 BPM | SYSTOLIC BLOOD PRESSURE: 119 MMHG | DIASTOLIC BLOOD PRESSURE: 85 MMHG | WEIGHT: 98.11 LBS | RESPIRATION RATE: 17 BRPM | BODY MASS INDEX: 16.75 KG/M2 | TEMPERATURE: 99 F

## 2024-04-04 PROBLEM — C06.9 PRIMARY ORAL SQUAMOUS CELL CARCINOMA (HCC): Status: ACTIVE | Noted: 2024-04-04

## 2024-04-04 PROBLEM — R41.82 ALTERED MENTAL STATUS: Status: ACTIVE | Noted: 2024-04-04

## 2024-04-04 LAB
ALBUMIN SERPL-MCNC: 3 G/DL (ref 3.5–4.6)
ALP SERPL-CCNC: 129 U/L (ref 40–130)
ALT SERPL-CCNC: 21 U/L (ref 0–33)
ANION GAP SERPL CALCULATED.3IONS-SCNC: 10 MEQ/L (ref 9–15)
AST SERPL-CCNC: 34 U/L (ref 0–35)
BACTERIA BLD CULT ORG #2: NORMAL
BACTERIA BLD CULT: NORMAL
BASOPHILS # BLD: 0 K/UL (ref 0–0.2)
BASOPHILS NFR BLD: 0.3 %
BILIRUB SERPL-MCNC: 0.3 MG/DL (ref 0.2–0.7)
BUN SERPL-MCNC: 14 MG/DL (ref 8–23)
CALCIUM SERPL-MCNC: 9 MG/DL (ref 8.5–9.9)
CHLORIDE SERPL-SCNC: 101 MEQ/L (ref 95–107)
CO2 SERPL-SCNC: 28 MEQ/L (ref 20–31)
CREAT SERPL-MCNC: 0.44 MG/DL (ref 0.5–0.9)
EOSINOPHIL # BLD: 0 K/UL (ref 0–0.7)
EOSINOPHIL NFR BLD: 0 %
ERYTHROCYTE [DISTWIDTH] IN BLOOD BY AUTOMATED COUNT: 16.2 % (ref 11.5–14.5)
GLOBULIN SER CALC-MCNC: 3.6 G/DL (ref 2.3–3.5)
GLUCOSE BLD-MCNC: 87 MG/DL (ref 70–99)
GLUCOSE SERPL-MCNC: 97 MG/DL (ref 70–99)
HCT VFR BLD AUTO: 30.7 % (ref 37–47)
HGB BLD-MCNC: 9.5 G/DL (ref 12–16)
LYMPHOCYTES # BLD: 0.3 K/UL (ref 1–4.8)
LYMPHOCYTES NFR BLD: 1 %
MAGNESIUM SERPL-MCNC: 1.7 MG/DL (ref 1.7–2.4)
MCH RBC QN AUTO: 26.2 PG (ref 27–31.3)
MCHC RBC AUTO-ENTMCNC: 30.9 % (ref 33–37)
MCV RBC AUTO: 84.6 FL (ref 79.4–94.8)
MONOCYTES # BLD: 1.1 K/UL (ref 0.2–0.8)
MONOCYTES NFR BLD: 3.8 %
NEUTROPHILS # BLD: 25.2 K/UL (ref 1.4–6.5)
NEUTS SEG NFR BLD: 95 %
PERFORMED ON: NORMAL
PLATELET # BLD AUTO: 411 K/UL (ref 130–400)
POTASSIUM SERPL-SCNC: 3 MEQ/L (ref 3.4–4.9)
PROT SERPL-MCNC: 6.6 G/DL (ref 6.3–8)
RBC # BLD AUTO: 3.63 M/UL (ref 4.2–5.4)
SODIUM SERPL-SCNC: 139 MEQ/L (ref 135–144)
WBC # BLD AUTO: 26.5 K/UL (ref 4.8–10.8)

## 2024-04-04 PROCEDURE — 80053 COMPREHEN METABOLIC PANEL: CPT

## 2024-04-04 PROCEDURE — 94761 N-INVAS EAR/PLS OXIMETRY MLT: CPT

## 2024-04-04 PROCEDURE — 85025 COMPLETE CBC W/AUTO DIFF WBC: CPT

## 2024-04-04 PROCEDURE — 36415 COLL VENOUS BLD VENIPUNCTURE: CPT

## 2024-04-04 PROCEDURE — 2580000003 HC RX 258: Performed by: INTERNAL MEDICINE

## 2024-04-04 PROCEDURE — 83735 ASSAY OF MAGNESIUM: CPT

## 2024-04-04 PROCEDURE — 6360000002 HC RX W HCPCS: Performed by: INTERNAL MEDICINE

## 2024-04-04 PROCEDURE — 2700000000 HC OXYGEN THERAPY PER DAY

## 2024-04-04 RX ORDER — ACETAMINOPHEN 650 MG/1
650 SUPPOSITORY RECTAL EVERY 6 HOURS PRN
OUTPATIENT
Start: 2024-04-04

## 2024-04-04 RX ORDER — ONDANSETRON 4 MG/1
4 TABLET, ORALLY DISINTEGRATING ORAL EVERY 8 HOURS PRN
OUTPATIENT
Start: 2024-04-04

## 2024-04-04 RX ORDER — ONDANSETRON 2 MG/ML
4 INJECTION INTRAMUSCULAR; INTRAVENOUS EVERY 6 HOURS PRN
OUTPATIENT
Start: 2024-04-04

## 2024-04-04 RX ORDER — ENOXAPARIN SODIUM 100 MG/ML
30 INJECTION SUBCUTANEOUS DAILY
OUTPATIENT
Start: 2024-04-05

## 2024-04-04 RX ORDER — ACETAMINOPHEN 325 MG/1
650 TABLET ORAL EVERY 6 HOURS PRN
OUTPATIENT
Start: 2024-04-04

## 2024-04-04 RX ORDER — SODIUM CHLORIDE 0.9 % (FLUSH) 0.9 %
5-40 SYRINGE (ML) INJECTION PRN
OUTPATIENT
Start: 2024-04-04

## 2024-04-04 RX ORDER — POLYETHYLENE GLYCOL 3350 17 G/17G
17 POWDER, FOR SOLUTION ORAL DAILY PRN
OUTPATIENT
Start: 2024-04-04

## 2024-04-04 RX ORDER — HYDROMORPHONE HYDROCHLORIDE 2 MG/1
2 TABLET ORAL EVERY 4 HOURS PRN
OUTPATIENT
Start: 2024-04-04

## 2024-04-04 RX ADMIN — POTASSIUM CHLORIDE 10 MEQ: 7.46 INJECTION, SOLUTION INTRAVENOUS at 15:58

## 2024-04-04 RX ADMIN — POTASSIUM CHLORIDE 10 MEQ: 7.46 INJECTION, SOLUTION INTRAVENOUS at 14:46

## 2024-04-04 RX ADMIN — POTASSIUM CHLORIDE 10 MEQ: 7.46 INJECTION, SOLUTION INTRAVENOUS at 12:11

## 2024-04-04 RX ADMIN — POTASSIUM CHLORIDE 10 MEQ: 7.46 INJECTION, SOLUTION INTRAVENOUS at 11:12

## 2024-04-04 RX ADMIN — POTASSIUM CHLORIDE 10 MEQ: 7.46 INJECTION, SOLUTION INTRAVENOUS at 09:46

## 2024-04-04 RX ADMIN — SODIUM CHLORIDE: 9 INJECTION, SOLUTION INTRAVENOUS at 04:25

## 2024-04-04 RX ADMIN — POTASSIUM CHLORIDE 10 MEQ: 7.46 INJECTION, SOLUTION INTRAVENOUS at 13:42

## 2024-04-04 RX ADMIN — MEROPENEM 1000 MG: 1 INJECTION, POWDER, FOR SOLUTION INTRAVENOUS at 05:30

## 2024-04-04 RX ADMIN — MEROPENEM 1000 MG: 1 INJECTION, POWDER, FOR SOLUTION INTRAVENOUS at 16:52

## 2024-04-04 ASSESSMENT — ENCOUNTER SYMPTOMS
SHORTNESS OF BREATH: 0
COUGH: 0
DIARRHEA: 0
VOMITING: 0
NAUSEA: 0

## 2024-04-04 NOTE — PROGRESS NOTES
Called and spoke with case management Penny and patient is to sign hospice care consents today.  Palliative care will sign off at this time and allow hospice care to take assume care of patient.  Palliative care is available if any additional questions arise or concerns, please reach out or reconsult as needed.    Electronically signed by ALEN Muniz CNP on 4/4/2024 at 10:48 AM  Collaborating physician Dr. Brooks    Please note this report is partially produced by using speech recognition hardware.  It may contain errors related to the system, including grammar, punctuation and spelling as well as words and phrases that may seem inaccurate.  For any questions or concerns feel free to contact me for clarification.    Thanks for the opportunity you have allowed us to provide palliative care to Archana Melendez. We will be in touch as care progresses. Please feel free to reach out to us should you have any questions or requests.

## 2024-04-04 NOTE — PROGRESS NOTES
Occupational Therapy  Facility/Department: [facility] W476/W476-01  Interdisciplinary Communication Note      To the referring provider,     While we thank you for your referral, Archana Melendez was not seen for an evaluation as pt is planning to discharge with hospice. Confirmed with  that pt's plan is currently to d/c to hospice center. Please re order OT if pt's status changes and they wish to complete therapy.    Thank you,    Electronically signed by TRUNG Mercado/L on 4/4/24 at 10:36 AM EDT    [facility].

## 2024-04-04 NOTE — CARE COORDINATION
Spoke with Ivone from [facility]. Patient will transfer to inpatient hospice [facility] at 1800.

## 2024-04-04 NOTE — CARE COORDINATION
SPOKE W/MOSHE FROM [facility] WHO UPDATED THE PLAN IS TO SIGN CONSENT W/FAMILY AT 1:00PM TODAY. UPDATED SEAN FROM [facility]. WILL FOLLOW. SPOKE W/OT REGARDING PLAN TO SIGN TO HOSPICE.

## 2024-04-04 NOTE — CONSULTS
Archana Melendez  1960  female  Medical Record Number: [id number]    Patient informed that I am an Infectious Disease physician and permission obtained from the patient to speak in front of any visitors prior to any discussion for HIPPA purposes.     HPI: Patient familiar to me from previous admission.  Patient was treated for abscess.  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  She was recently treated with meropenem.  She was discharged on doxycycline due to acute sinusitis in the setting of immunocompromised state.  Her mentation became altered after taking high-dose Dilaudid outpatient.  Family decisions are pending.  Patient recently lost her brother and her mother and has been very down about this.    Subjectively, no new complaints at this time. She is awake and able to talk to me    Review of Systems: All 14 review of systems were discussed with the patient and are negative other than as stated above  No increased pain in her left cheek, tongue or neck    Past Medical History:   Diagnosis Date    Anxiety     Bilateral carpal tunnel syndrome JAN 2011    Cancer (HCC) 2005-TIP OF TONGUE    METS TO LEFT LYMPH NODE-SQUAMOUS    Cardiomyopathy (HCC) 3/6/2015    COPD (chronic obstructive pulmonary disease) (HCC)     COPD (chronic obstructive pulmonary disease) (HCC)     Dental disease     SEVERE DENTAL PROBLEMS    Diastolic dysfunction     DJD (degenerative joint disease), lumbar     Dyslipidemia 3/6/2015    Hepatitis B infection VIRAL-2006    Hepatitis C without mention of hepatic coma 2007-CHEMO    History of alcoholism (HCC)     History of osteopenia 2009    Hypothyroidism     ICD (implantable cardioverter-defibrillator) battery depletion     Tongue cancer (HCC)        Past Surgical History:   Procedure Laterality Date    CT NEEDLE BIOPSY LUNG PERCUTANEOUS  7/28/2023    CT NEEDLE BIOPSY LUNG PERCUTANEOUS 7/28/2023    GASTROSTOMY TUBE PLACEMENT N/A

## 2024-04-04 NOTE — PROGRESS NOTES
Shift assessment complete. VSS. A&Ox4. Patient is calm and cooperative. Denies having pain this morning. Dyspnea present on exertion. On Heated High flow 60 L. 100% o2 at this time. Denies having nausea. Lung sounds are diminished. Abdomen is soft and flat. Bowel sounds are present. PEG tube in pace. One watery bowel movement this morning. NPO. Tolerating ICE.  Tongue and neck swollen: Mass present. Puss/purulent drainage to neck: Foam dressing changed and is CDI. Buttocks red. Patient able to turn self. Up x 1 assist to commode. Generalized bruising present. PRN potassium replaced for 3.0 k+: Attending aware. Bed in lowest position. Call light in reach. No needs at this time. Care ongoing  - Patient to transfer to [facility] @ 6pm. To be transferred on 15L nonrebreather per Ivone JUAREZ.   1530 - Patients son brought in BOOST. Patient declined for this RN   to administer boost via PEG. States she does not feel hungry at this time.  This RN advised patient that Boost can be given at any time that she would like.   Electronically signed by Chele Desai RN on 4/4/2024 at 3:17 PM     6/6 bags of potassium 10meq replaced

## 2024-04-04 NOTE — CONSULTS
Remeet with pt and pt family at bedside. Family and pt ready to sign onto hospice services. Consents signed by pt and pt JJ Horton. [facility] transport arranged for 1800 this evening. Bedside nurse Chele notified and to obtain D/C orders for pt to go to center.

## 2024-04-04 NOTE — PROGRESS NOTES
Progress Note  Date:2024       Room:[place] 192536-  Patient Name:Archana Melendez     YOB: 1960     Age:63 y.o.        Subjective    Subjective:  Symptoms:  She reports malaise and weakness.  No shortness of breath, cough, chest pain, headache, chest pressure, anorexia, diarrhea or anxiety.    Diet:  No nausea or vomiting.    Pain:  She complains of pain that is moderate.       Review of Systems   Respiratory:  Negative for cough and shortness of breath.    Cardiovascular:  Negative for chest pain.   Gastrointestinal:  Negative for anorexia, diarrhea, nausea and vomiting.   Neurological:  Positive for weakness.     Objective         Vitals Last 24 Hours:  TEMPERATURE:  Temp  Av.9 °F (36.6 °C)  Min: 97.7 °F (36.5 °C)  Max: 98.1 °F (36.7 °C)  RESPIRATIONS RANGE: Resp  Av.3  Min: 15  Max: 24  PULSE OXIMETRY RANGE: SpO2  Av.7 %  Min: 91 %  Max: 100 %  PULSE RANGE: Pulse  Av.6  Min: 100  Max: 113  BLOOD PRESSURE RANGE: Systolic (24hrs), Av , Min:139 , Max:156   ; Diastolic (24hrs), Av, Min:87, Max:97    I/O (24Hr):    Intake/Output Summary (Last 24 hours) at 2024 1134  Last data filed at 2024 1107  Gross per 24 hour   Intake 5 ml   Output 1450 ml   Net -1445 ml       Objective:  General Appearance:  In no acute distress and ill-appearing.    Vital signs: (most recent): Blood pressure (!) 140/87, pulse 100, temperature 97.7 °F (36.5 °C), temperature source Axillary, resp. rate 16, height 1.626 m (5' 4\"), weight 44.5 kg (98 lb 1.7 oz), SpO2 91 %, not currently breastfeeding.    HEENT: Normal HEENT exam.    Lungs:  There are decreased breath sounds.    Heart: S1 normal and S2 normal.    Abdomen: Abdomen is soft.  Bowel sounds are normal.     Extremities: Normal range of motion.    Neurological: Patient is alert.    Pupils:  Pupils are equal, round, and reactive to light.    Skin:  Warm.      Labs/Imaging/Diagnostics    Labs:  CBC:  Recent Labs     24  1859  embolus.  No evidence of mediastinal lymphadenopathy.  The heart and pericardium demonstrate no acute abnormality. Collapse of the left lower lobe and mild leftward shift of the cardio mediastinum.  Mild 10 mm nodular interstitial infiltrate of the right upper lobe.  There is hyperinflation of the lungs with flattening of the diaphragms suggesting COPD.  Biventricular transvenous pacemaker leads with left pectoral subcutaneous generator. Lungs/Pleura: Complete collapse of the left lower lobe with soft tissue density occlusion of the left mainstem bronchus which could be secondary to mass versus mucous plugging.  Mild interstitial infiltrate through the aerated left upper lobe suggesting bronchitis/pneumonitis.  No evidence of pleural effusion or pneumothorax. Upper Abdomen: Limited images of the upper abdomen are unremarkable. Soft Tissues/Bones: No acute bone or soft tissue abnormality.     1. Complete collapse of the left lower lobe with soft tissue density occlusion of the left mainstem bronchus which could be secondary to mass versus mucous plugging. 2. Mild interstitial infiltrate through the aerated left upper lobe suggesting bronchitis/pneumonitis. 3. No pulmonary embolus.     CT HEAD WO CONTRAST    Result Date: 4/3/2024  EXAMINATION: CT OF THE HEAD WITHOUT CONTRAST  4/2/2024 11:57 pm TECHNIQUE: CT of the head was performed without the administration of intravenous contrast. Automated exposure control, iterative reconstruction, and/or weight based adjustment of the mA/kV was utilized to reduce the radiation dose to as low as reasonably achievable. COMPARISON: April 19, 2012 HISTORY: ORDERING SYSTEM PROVIDED HISTORY: [facility] TECHNOLOGIST PROVIDED HISTORY: Reason for exam:->[facility] Has a \"code stroke\" or \"stroke alert\" been called?->No Decision Support Exception - unselect if not a suspected or confirmed emergency medical condition->Emergency Medical Condition (MA) What reading provider will be dictating this

## 2024-04-04 NOTE — PROGRESS NOTES
Physical Therapy Missed Treatment   Facility/Department: [facility] MED SUR[facility] W476/W476-01    NAME: Archana Melendez    : 1960 (63 y.o.)  MRN: [id number]    Account: [id number]  Gender: female      PT referral received. Cahrt reviewed. Pt has anticipated d/c to hospice care. No PT eval warranted at this time.       Ewa Henriquez, PT, 24 at 1:43 PM

## 2024-04-08 LAB
BACTERIA BLD CULT ORG #2: NORMAL
BACTERIA BLD CULT: NORMAL

## 2025-04-15 NOTE — TELEPHONE ENCOUNTER
Please approve or deny this refill request. The order is pended. Thank you.     LOV 11/12/2021    Next Visit Date:  Future Appointments   Date Time Provider Nidia Thomas   [facility]6/2022  [facility]   8/12/2022 11:30 AM Feliciano Morris, DO [address] [place] [address]
negative

## (undated) DEVICE — STERILE POLYISOPRENE POWDER-FREE SURGICAL GLOVES: Brand: PROTEXIS

## (undated) DEVICE — PEG KIT STD 20 FR PUL W/ ENFIT ENDOVIVE

## (undated) DEVICE — TUBING, SUCTION, 9/32" X 12', STRAIGHT: Brand: MEDLINE INDUSTRIES, INC.

## (undated) DEVICE — CONMED SCOPE SAVER BITE BLOCK, 20X27 MM: Brand: SCOPE SAVER

## (undated) DEVICE — INSTINCT PLUS ENDOSCOPIC CLIPPING DEVICE: Brand: INSTINCT

## (undated) DEVICE — TUBE SET 96 MM 64 MM H2O PERISTALTIC STD AUX CHANNEL

## (undated) DEVICE — ENDO CARRY-ON PROCEDURE KIT INCLUDES LUBRICANT, DEFENDO OLYMPUS AIR, WATER, SUCTION, BIOPSY VALVE KIT, ENZYMATIC SPONGE, AND BASIN.: Brand: ENDO CARRY-ON PROCEDURE KIT

## (undated) DEVICE — SINGLE PORT MANIFOLD: Brand: NEPTUNE 2

## (undated) DEVICE — Device: Brand: ENDO SMARTCAP

## (undated) DEVICE — ENDOSCOPIC TRAY TRNSPRT 20.5X16.5X4.1 IN RECYCL SUGAR PULP

## (undated) DEVICE — ENDO CARRY-ON PROCEDURE KIT: Brand: ENDO CARRY-ON PROCEDURE KIT

## (undated) DEVICE — RESTRAINT EXTREMITY LIMB HOLDER SFT FOAM SINGLE STRP DISP

## (undated) DEVICE — APPLICATOR MEDICATED 10.5 CC SOLUTION HI LT ORNG CHLORAPREP

## (undated) DEVICE — ENDOVIVE SFT PEG KIT PULL WENFIT 20F BX2

## (undated) DEVICE — BRUSH ENDO CLN L90.5IN SHTH DIA1.7MM BRIST DIA5-7MM 2-6MM

## (undated) DEVICE — COVER,MAYO STAND,STERILE: Brand: MEDLINE

## (undated) DEVICE — ADAPTER FLSH PMP FLD MGMT GI IRRIG OFP 2 DISPOSABLE